# Patient Record
Sex: MALE | Race: WHITE | NOT HISPANIC OR LATINO
[De-identification: names, ages, dates, MRNs, and addresses within clinical notes are randomized per-mention and may not be internally consistent; named-entity substitution may affect disease eponyms.]

---

## 2017-02-24 ENCOUNTER — APPOINTMENT (OUTPATIENT)
Dept: ULTRASOUND IMAGING | Facility: HOSPITAL | Age: 72
End: 2017-02-24

## 2017-02-24 ENCOUNTER — OUTPATIENT (OUTPATIENT)
Dept: OUTPATIENT SERVICES | Facility: HOSPITAL | Age: 72
LOS: 1 days | End: 2017-02-24
Payer: MEDICARE

## 2017-02-24 DIAGNOSIS — R16.0 HEPATOMEGALY, NOT ELSEWHERE CLASSIFIED: ICD-10-CM

## 2017-02-24 PROCEDURE — 76700 US EXAM ABDOM COMPLETE: CPT

## 2017-03-06 ENCOUNTER — APPOINTMENT (OUTPATIENT)
Dept: MRI IMAGING | Facility: HOSPITAL | Age: 72
End: 2017-03-06

## 2017-03-06 ENCOUNTER — OUTPATIENT (OUTPATIENT)
Dept: OUTPATIENT SERVICES | Facility: HOSPITAL | Age: 72
LOS: 1 days | End: 2017-03-06
Payer: MEDICARE

## 2017-03-06 DIAGNOSIS — Z90.49 ACQUIRED ABSENCE OF OTHER SPECIFIED PARTS OF DIGESTIVE TRACT: ICD-10-CM

## 2017-03-06 DIAGNOSIS — K76.0 FATTY (CHANGE OF) LIVER, NOT ELSEWHERE CLASSIFIED: ICD-10-CM

## 2017-03-06 PROCEDURE — 76376 3D RENDER W/INTRP POSTPROCES: CPT

## 2017-03-06 PROCEDURE — 74183 MRI ABD W/O CNTR FLWD CNTR: CPT

## 2017-03-06 PROCEDURE — A9579: CPT

## 2017-03-17 ENCOUNTER — RX RENEWAL (OUTPATIENT)
Age: 72
End: 2017-03-17

## 2017-04-19 ENCOUNTER — APPOINTMENT (OUTPATIENT)
Dept: FAMILY MEDICINE | Facility: CLINIC | Age: 72
End: 2017-04-19

## 2017-05-19 ENCOUNTER — APPOINTMENT (OUTPATIENT)
Dept: FAMILY MEDICINE | Facility: CLINIC | Age: 72
End: 2017-05-19

## 2017-05-19 VITALS
DIASTOLIC BLOOD PRESSURE: 80 MMHG | TEMPERATURE: 98.5 F | SYSTOLIC BLOOD PRESSURE: 144 MMHG | HEIGHT: 68 IN | OXYGEN SATURATION: 96 % | HEART RATE: 90 BPM | RESPIRATION RATE: 16 BRPM

## 2017-05-19 VITALS — WEIGHT: 290 LBS | BODY MASS INDEX: 44.09 KG/M2

## 2017-05-19 DIAGNOSIS — Z78.9 OTHER SPECIFIED HEALTH STATUS: ICD-10-CM

## 2017-05-23 LAB
ALBUMIN SERPL ELPH-MCNC: 4.4 G/DL
ALP BLD-CCNC: 72 U/L
ALT SERPL-CCNC: 69 U/L
ANION GAP SERPL CALC-SCNC: 16 MMOL/L
APPEARANCE: CLEAR
AST SERPL-CCNC: 108 U/L
BACTERIA: NEGATIVE
BASOPHILS # BLD AUTO: 0.02 K/UL
BASOPHILS NFR BLD AUTO: 0.2 %
BILIRUB SERPL-MCNC: 0.4 MG/DL
BILIRUBIN URINE: NEGATIVE
BLOOD URINE: NEGATIVE
BUN SERPL-MCNC: 14 MG/DL
CALCIUM SERPL-MCNC: 9.6 MG/DL
CHLORIDE SERPL-SCNC: 101 MMOL/L
CHOLEST SERPL-MCNC: 226 MG/DL
CHOLEST/HDLC SERPL: 5.8 RATIO
CK SERPL-CCNC: 237 U/L
CO2 SERPL-SCNC: 26 MMOL/L
COLOR: ABNORMAL
CREAT SERPL-MCNC: 1.07 MG/DL
CREAT SPEC-SCNC: 174 MG/DL
EOSINOPHIL # BLD AUTO: 0.19 K/UL
EOSINOPHIL NFR BLD AUTO: 2.2 %
GLUCOSE QUALITATIVE U: 250 MG/DL
GLUCOSE SERPL-MCNC: 205 MG/DL
HBA1C MFR BLD HPLC: 8.4 %
HCT VFR BLD CALC: 50.2 %
HDLC SERPL-MCNC: 39 MG/DL
HGB BLD-MCNC: 16.3 G/DL
HYALINE CASTS: 0 /LPF
IMM GRANULOCYTES NFR BLD AUTO: 0.2 %
KETONES URINE: NEGATIVE
LDLC SERPL CALC-MCNC: NORMAL
LEUKOCYTE ESTERASE URINE: NEGATIVE
LYMPHOCYTES # BLD AUTO: 2.51 K/UL
LYMPHOCYTES NFR BLD AUTO: 29.4 %
MAN DIFF?: NORMAL
MCHC RBC-ENTMCNC: 31.3 PG
MCHC RBC-ENTMCNC: 32.5 GM/DL
MCV RBC AUTO: 96.4 FL
MICROALBUMIN 24H UR DL<=1MG/L-MCNC: 20.8 MG/DL
MICROALBUMIN/CREAT 24H UR-RTO: 120
MICROSCOPIC-UA: NORMAL
MONOCYTES # BLD AUTO: 0.78 K/UL
MONOCYTES NFR BLD AUTO: 9.1 %
NEUTROPHILS # BLD AUTO: 5.01 K/UL
NEUTROPHILS NFR BLD AUTO: 58.9 %
NITRITE URINE: NEGATIVE
PH URINE: 6.5
PLATELET # BLD AUTO: 179 K/UL
POTASSIUM SERPL-SCNC: 4 MMOL/L
PROT SERPL-MCNC: 7.4 G/DL
PROTEIN URINE: 30 MG/DL
RBC # BLD: 5.21 M/UL
RBC # FLD: 15.1 %
RED BLOOD CELLS URINE: 4 /HPF
SODIUM SERPL-SCNC: 143 MMOL/L
SPECIFIC GRAVITY URINE: 1.02
SQUAMOUS EPITHELIAL CELLS: 0 /HPF
T4 FREE SERPL-MCNC: 1.1 NG/DL
TRIGL SERPL-MCNC: 462 MG/DL
TSH SERPL-ACNC: 2.69 UIU/ML
UROBILINOGEN URINE: NORMAL MG/DL
WBC # FLD AUTO: 8.53 K/UL
WHITE BLOOD CELLS URINE: 1 /HPF

## 2017-05-31 ENCOUNTER — APPOINTMENT (OUTPATIENT)
Dept: SURGERY | Facility: CLINIC | Age: 72
End: 2017-05-31

## 2017-05-31 DIAGNOSIS — Z86.39 PERSONAL HISTORY OF OTHER ENDOCRINE, NUTRITIONAL AND METABOLIC DISEASE: ICD-10-CM

## 2017-05-31 DIAGNOSIS — Z85.46 PERSONAL HISTORY OF MALIGNANT NEOPLASM OF PROSTATE: ICD-10-CM

## 2017-05-31 DIAGNOSIS — R10.30 LOWER ABDOMINAL PAIN, UNSPECIFIED: ICD-10-CM

## 2017-06-01 PROBLEM — R10.30 GROIN PAIN, RIGHT: Status: ACTIVE | Noted: 2017-06-01

## 2017-06-13 ENCOUNTER — APPOINTMENT (OUTPATIENT)
Dept: HEMATOLOGY ONCOLOGY | Facility: CLINIC | Age: 72
End: 2017-06-13

## 2017-06-13 ENCOUNTER — RX RENEWAL (OUTPATIENT)
Age: 72
End: 2017-06-13

## 2017-06-13 VITALS
SYSTOLIC BLOOD PRESSURE: 154 MMHG | HEART RATE: 84 BPM | BODY MASS INDEX: 44.7 KG/M2 | DIASTOLIC BLOOD PRESSURE: 80 MMHG | WEIGHT: 294 LBS | TEMPERATURE: 97.8 F

## 2018-03-23 ENCOUNTER — APPOINTMENT (OUTPATIENT)
Dept: MRI IMAGING | Facility: HOSPITAL | Age: 73
End: 2018-03-23
Payer: MEDICARE

## 2018-03-23 ENCOUNTER — OUTPATIENT (OUTPATIENT)
Dept: OUTPATIENT SERVICES | Facility: HOSPITAL | Age: 73
LOS: 1 days | End: 2018-03-23
Payer: MEDICARE

## 2018-03-23 DIAGNOSIS — R59.0 LOCALIZED ENLARGED LYMPH NODES: ICD-10-CM

## 2018-03-23 DIAGNOSIS — K86.2 CYST OF PANCREAS: ICD-10-CM

## 2018-03-23 DIAGNOSIS — N28.1 CYST OF KIDNEY, ACQUIRED: ICD-10-CM

## 2018-03-23 DIAGNOSIS — K86.89 OTHER SPECIFIED DISEASES OF PANCREAS: ICD-10-CM

## 2018-03-23 PROCEDURE — A9579: CPT

## 2018-03-23 PROCEDURE — 74183 MRI ABD W/O CNTR FLWD CNTR: CPT

## 2018-03-23 PROCEDURE — 74183 MRI ABD W/O CNTR FLWD CNTR: CPT | Mod: 26

## 2018-04-03 ENCOUNTER — RX RENEWAL (OUTPATIENT)
Age: 73
End: 2018-04-03

## 2018-04-12 ENCOUNTER — MEDICATION RENEWAL (OUTPATIENT)
Age: 73
End: 2018-04-12

## 2018-06-08 ENCOUNTER — RX RENEWAL (OUTPATIENT)
Age: 73
End: 2018-06-08

## 2018-07-23 ENCOUNTER — FORM ENCOUNTER (OUTPATIENT)
Age: 73
End: 2018-07-23

## 2018-07-23 ENCOUNTER — MEDICATION RENEWAL (OUTPATIENT)
Age: 73
End: 2018-07-23

## 2018-07-23 ENCOUNTER — APPOINTMENT (OUTPATIENT)
Dept: FAMILY MEDICINE | Facility: CLINIC | Age: 73
End: 2018-07-23
Payer: MEDICARE

## 2018-07-23 VITALS
DIASTOLIC BLOOD PRESSURE: 80 MMHG | SYSTOLIC BLOOD PRESSURE: 158 MMHG | HEART RATE: 76 BPM | WEIGHT: 282 LBS | OXYGEN SATURATION: 91 % | TEMPERATURE: 98.2 F | BODY MASS INDEX: 42.74 KG/M2 | HEIGHT: 68 IN

## 2018-07-23 VITALS — RESPIRATION RATE: 16 BRPM

## 2018-07-23 PROCEDURE — 36415 COLL VENOUS BLD VENIPUNCTURE: CPT

## 2018-07-23 PROCEDURE — 93000 ELECTROCARDIOGRAM COMPLETE: CPT | Mod: 59

## 2018-07-23 PROCEDURE — G0439: CPT

## 2018-07-23 RX ORDER — EXENATIDE 2 MG/.65ML
2 INJECTION, SUSPENSION, EXTENDED RELEASE SUBCUTANEOUS
Qty: 12 | Refills: 3 | Status: COMPLETED | COMMUNITY
Start: 2017-05-19 | End: 2018-07-23

## 2018-07-23 NOTE — ASSESSMENT
[FreeTextEntry1] : Continue present medical management. Followup with urology, endocrinology, podiatry, rheumatology, and vascular.\par \par Comprehensive blood work obtained.\par \par Electrocardiogram no acute ST-T wave changes.\par \par Stool for fecal occult blood. Immunology ordered.

## 2018-07-23 NOTE — PHYSICAL EXAM
[No Acute Distress] : no acute distress [Well Nourished] : well nourished [Well Developed] : well developed [Well-Appearing] : well-appearing [Normal Sclera/Conjunctiva] : normal sclera/conjunctiva [PERRL] : pupils equal round and reactive to light [EOMI] : extraocular movements intact [Normal Outer Ear/Nose] : the outer ears and nose were normal in appearance [Normal Oropharynx] : the oropharynx was normal [No JVD] : no jugular venous distention [Supple] : supple [No Lymphadenopathy] : no lymphadenopathy [Thyroid Normal, No Nodules] : the thyroid was normal and there were no nodules present [No Respiratory Distress] : no respiratory distress  [Clear to Auscultation] : lungs were clear to auscultation bilaterally [No Accessory Muscle Use] : no accessory muscle use [Normal Rate] : normal rate  [Regular Rhythm] : with a regular rhythm [Normal S1, S2] : normal S1 and S2 [No Murmur] : no murmur heard [No Carotid Bruits] : no carotid bruits [No Abdominal Bruit] : a ~M bruit was not heard ~T in the abdomen [Pedal Pulses Present] : the pedal pulses are present [No Extremity Clubbing/Cyanosis] : no extremity clubbing/cyanosis [No Palpable Aorta] : no palpable aorta [Soft] : abdomen soft [Non Tender] : non-tender [Non-distended] : non-distended [No HSM] : no HSM [Normal Bowel Sounds] : normal bowel sounds [Normal Posterior Cervical Nodes] : no posterior cervical lymphadenopathy [Normal Anterior Cervical Nodes] : no anterior cervical lymphadenopathy [No CVA Tenderness] : no CVA  tenderness [No Spinal Tenderness] : no spinal tenderness [No Joint Swelling] : no joint swelling [Grossly Normal Strength/Tone] : grossly normal strength/tone [No Rash] : no rash [Normal Gait] : normal gait [Coordination Grossly Intact] : coordination grossly intact [No Focal Deficits] : no focal deficits [Deep Tendon Reflexes (DTR)] : deep tendon reflexes were 2+ and symmetric [Normal Affect] : the affect was normal [Normal Insight/Judgement] : insight and judgment were intact [Normal Voice/Communication] : normal voice/communication [Normal Nasal Mucosa] : the nasal mucosa was normal [Normal Appearance] : normal in appearance [No Masses] : no palpable masses [No Nipple Discharge] : no nipple discharge [No Hernias] : no hernias [Normal Supraclavicular Nodes] : no supraclavicular lymphadenopathy [Normal Axillary Nodes] : no axillary lymphadenopathy [Normal Femoral Nodes] : no femoral lymphadenopathy [Normal Inguinal Nodes] : no inguinal lymphadenopathy [Kyphosis] : no kyphosis [Scoliosis] : no scoliosis [Speech Grossly Normal] : speech grossly normal [Alert and Oriented x3] : oriented to person, place, and time [Normal Mood] : the mood was normal [de-identified] : reviewed opht. exam by Dr. Goff [de-identified] : hearing aides in place [de-identified] : stasis dermatitis,trace pre-tibial edema [FreeTextEntry1] : urology [de-identified] : urology [de-identified] : stasis dermatitis [de-identified] : podiatry

## 2018-07-23 NOTE — COUNSELING
[Weight management counseling provided] : Weight management [Healthy eating counseling provided] : healthy eating [Activity counseling provided] : activity [Behavioral health counseling provided] : behavioral health  [Target Wt Loss Goal ___] : Target weight loss goal [unfilled] lbs [Weight Self Once Weekly] : Weight self once weekly [Keep Food Diary] : Keep food diary [Low Fat Diet] : Low fat diet [Decrease Portions] : Decrease food portions [Low Salt Diet] : Low salt diet [___ min/wk activity recommended] : [unfilled] min/wk activity recommended [Walking] : Walking [Sleep ___ hours/day] : Sleep [unfilled] hours/day [Engage in a relaxing activity] : Engage in a relaxing activity [Plan in advance] : Plan in advance [None] : None [Good understanding] : Patient has a good understanding of lifestyle changes and the steps needed to achieve self management goals

## 2018-07-23 NOTE — HEALTH RISK ASSESSMENT
[Very Good] : ~his/her~  mood as very good [No falls in past year] : Patient reported no falls in the past year [0] : 2) Feeling down, depressed, or hopeless: Not at all (0) [Patient reported colonoscopy was normal] : Patient reported colonoscopy was normal [HIV test declined] : HIV test declined [None] : None [With Family] : lives with family [# of Members in Household ___] :  household currently consist of [unfilled] member(s) [Retired] : retired [Graduate School] : graduate school [] :  [Feels Safe at Home] : Feels safe at home [Fully functional (bathing, dressing, toileting, transferring, walking, feeding)] : Fully functional (bathing, dressing, toileting, transferring, walking, feeding) [Fully functional (using the telephone, shopping, preparing meals, housekeeping, doing laundry, using] : Fully functional and needs no help or supervision to perform IADLs (using the telephone, shopping, preparing meals, housekeeping, doing laundry, using transportation, managing medications and managing finances) [Smoke Detector] : smoke detector [Carbon Monoxide Detector] : carbon monoxide detector [Safety elements used in home] : safety elements used in home [Seat Belt] :  uses seat belt [Sunscreen] : uses sunscreen [Discussed at today's visit] : Advance Directives Discussed at today's visit [Patient's preferences updated] : Patient's preferences updated  [Designated Healthcare Proxy] : Designated healthcare proxy [Name: ___] : Health Care Proxy's Name: [unfilled]  [Relationship: ___] : Relationship: [unfilled] [Aggressive treatment] : aggressive treatment [] : No [de-identified] : Rarely [[id number]] : 0 [Change in mental status noted] : No change in mental status noted [Language] : denies difficulty with language [Behavior] : denies difficulty with behavior [Learning/Retaining New Information] : denies difficulty learning/retaining new information [Handling Complex Tasks] : denies difficulty handling complex tasks [Reasoning] : denies difficulty with reasoning [Spatial Ability and Orientation] : denies difficulty with spatial ability and orientation [Sexually Active] : not sexually active [High Risk Behavior] : no high risk behavior [Reports changes in hearing] : Reports no changes in hearing [Reports changes in vision] : Reports no changes in vision [Reports changes in dental health] : Reports no changes in dental health [Travel to Developing Areas] : does not  travel to developing areas [TB Exposure] : is not being exposed to tuberculosis [Caregiver Concerns] : does not have caregiver concerns [ColonoscopyDate] : 08/16

## 2018-07-23 NOTE — HISTORY OF PRESENT ILLNESS
[FreeTextEntry1] : Please see history of present illness [de-identified] : Patient is a 73-year-old gentleman, who presents today for health maintenance, physical exam patient is here for comprehensive yearly physical exam. Patient states it is in his usual state of health. He is followed by multiple subspecialists endocrinology for diabetes mellitus, type II insulin requiring, rheumatology, and Dr. Nicola Barajas. Patient is also followed by podiatry on a regular basis. Dr. Upton

## 2018-07-24 ENCOUNTER — APPOINTMENT (OUTPATIENT)
Dept: RADIOLOGY | Facility: HOSPITAL | Age: 73
End: 2018-07-24
Payer: MEDICARE

## 2018-07-24 ENCOUNTER — OUTPATIENT (OUTPATIENT)
Dept: OUTPATIENT SERVICES | Facility: HOSPITAL | Age: 73
LOS: 1 days | End: 2018-07-24
Payer: MEDICARE

## 2018-07-24 DIAGNOSIS — Z00.8 ENCOUNTER FOR OTHER GENERAL EXAMINATION: ICD-10-CM

## 2018-07-24 LAB
ALBUMIN SERPL ELPH-MCNC: 4.4 G/DL
ALP BLD-CCNC: 72 U/L
ALT SERPL-CCNC: 44 U/L
ANION GAP SERPL CALC-SCNC: 20 MMOL/L
APPEARANCE: CLEAR
AST SERPL-CCNC: 51 U/L
BACTERIA: NEGATIVE
BASOPHILS # BLD AUTO: 0.02 K/UL
BASOPHILS NFR BLD AUTO: 0.3 %
BILIRUB SERPL-MCNC: 0.4 MG/DL
BILIRUBIN URINE: NEGATIVE
BLOOD URINE: NEGATIVE
BUN SERPL-MCNC: 13 MG/DL
CALCIUM SERPL-MCNC: 10 MG/DL
CHLORIDE SERPL-SCNC: 103 MMOL/L
CHOLEST SERPL-MCNC: 207 MG/DL
CHOLEST/HDLC SERPL: 5.4 RATIO
CK SERPL-CCNC: 260 U/L
CO2 SERPL-SCNC: 23 MMOL/L
COLOR: ABNORMAL
CREAT SERPL-MCNC: 1.07 MG/DL
CRP SERPL-MCNC: 0.71 MG/DL
EOSINOPHIL # BLD AUTO: 0.15 K/UL
EOSINOPHIL NFR BLD AUTO: 2.3 %
ERYTHROCYTE [SEDIMENTATION RATE] IN BLOOD BY WESTERGREN METHOD: 20 MM/HR
GLUCOSE QUALITATIVE U: NEGATIVE MG/DL
GLUCOSE SERPL-MCNC: 149 MG/DL
HBA1C MFR BLD HPLC: 7.6 %
HCT VFR BLD CALC: 48.5 %
HCV AB SER QL: NONREACTIVE
HCV S/CO RATIO: 0.14 S/CO
HDLC SERPL-MCNC: 38 MG/DL
HGB BLD-MCNC: 16.2 G/DL
HYALINE CASTS: 4 /LPF
IMM GRANULOCYTES NFR BLD AUTO: 0.3 %
KETONES URINE: NEGATIVE
LDLC SERPL CALC-MCNC: 116 MG/DL
LEUKOCYTE ESTERASE URINE: NEGATIVE
LYMPHOCYTES # BLD AUTO: 1.99 K/UL
LYMPHOCYTES NFR BLD AUTO: 30 %
MAN DIFF?: NORMAL
MCHC RBC-ENTMCNC: 30.1 PG
MCHC RBC-ENTMCNC: 33.4 GM/DL
MCV RBC AUTO: 90.1 FL
MICROSCOPIC-UA: NORMAL
MONOCYTES # BLD AUTO: 0.63 K/UL
MONOCYTES NFR BLD AUTO: 9.5 %
NEUTROPHILS # BLD AUTO: 3.82 K/UL
NEUTROPHILS NFR BLD AUTO: 57.6 %
NITRITE URINE: NEGATIVE
PH URINE: 6.5
PLATELET # BLD AUTO: 196 K/UL
POTASSIUM SERPL-SCNC: 3.8 MMOL/L
PROT SERPL-MCNC: 7.3 G/DL
PROTEIN URINE: 300 MG/DL
PSA SERPL-MCNC: 0.02 NG/ML
RBC # BLD: 5.38 M/UL
RBC # FLD: 14.3 %
RED BLOOD CELLS URINE: 3 /HPF
SODIUM SERPL-SCNC: 146 MMOL/L
SPECIFIC GRAVITY URINE: 1.02
SQUAMOUS EPITHELIAL CELLS: 2 /HPF
T4 FREE SERPL-MCNC: 1.2 NG/DL
TRIGL SERPL-MCNC: 265 MG/DL
TSH SERPL-ACNC: 3.47 UIU/ML
UROBILINOGEN URINE: NEGATIVE MG/DL
WBC # FLD AUTO: 6.63 K/UL
WHITE BLOOD CELLS URINE: 2 /HPF

## 2018-07-24 PROCEDURE — 77080 DXA BONE DENSITY AXIAL: CPT

## 2018-07-24 PROCEDURE — 77080 DXA BONE DENSITY AXIAL: CPT | Mod: 26

## 2018-07-25 LAB
CREAT SPEC-SCNC: 149 MG/DL
MICROALBUMIN 24H UR DL<=1MG/L-MCNC: 110.6 MG/DL
MICROALBUMIN/CREAT 24H UR-RTO: 743 MG/G

## 2018-07-28 ENCOUNTER — TRANSCRIPTION ENCOUNTER (OUTPATIENT)
Age: 73
End: 2018-07-28

## 2018-07-30 LAB — HEMOCCULT STL QL IA: NEGATIVE

## 2018-09-21 ENCOUNTER — APPOINTMENT (OUTPATIENT)
Dept: FAMILY MEDICINE | Facility: CLINIC | Age: 73
End: 2018-09-21
Payer: MEDICARE

## 2018-09-21 VITALS
RESPIRATION RATE: 20 BRPM | SYSTOLIC BLOOD PRESSURE: 126 MMHG | TEMPERATURE: 98.9 F | DIASTOLIC BLOOD PRESSURE: 76 MMHG | HEART RATE: 78 BPM

## 2018-09-21 PROCEDURE — 99213 OFFICE O/P EST LOW 20 MIN: CPT

## 2018-09-21 NOTE — PHYSICAL EXAM
[Ill-Appearing] : ill-appearing [Normal Outer Ear/Nose] : the outer ears and nose were normal in appearance [Normal Nasal Mucosa] : the nasal mucosa was normal [Supple] : supple [No Respiratory Distress] : no respiratory distress  [No Accessory Muscle Use] : no accessory muscle use [Normal Rate] : normal rate  [Regular Rhythm] : with a regular rhythm [Normal S1, S2] : normal S1 and S2 [No Murmur] : no murmur heard [Soft] : abdomen soft [Non Tender] : non-tender [Cervical Lymph Nodes Enlarged Anterior Bilaterally] : enlarged nodes bilaterally [de-identified] : TM's and pharynx markedly congested; pharynx mildly injected [de-identified] : bilateral coarse rhonchi and scattered exp wheezes

## 2018-09-21 NOTE — HISTORY OF PRESENT ILLNESS
[Cough] : cough [Sore Throat] : sore throat [Moderate] : moderate [___ Weeks ago] :  [unfilled] weeks ago [Gradual] : gradually [Constant] : constant [OTC Remedies] : OTC remedies [Worsening] : worsening

## 2018-09-21 NOTE — ASSESSMENT
[FreeTextEntry1] : Acute bronchitis - will order Augmentin 875mg twice daily for one week; Medrol DosePak; encourage fluids

## 2019-07-26 ENCOUNTER — APPOINTMENT (OUTPATIENT)
Dept: FAMILY MEDICINE | Facility: CLINIC | Age: 74
End: 2019-07-26
Payer: MEDICARE

## 2019-07-26 ENCOUNTER — NON-APPOINTMENT (OUTPATIENT)
Age: 74
End: 2019-07-26

## 2019-07-26 VITALS
TEMPERATURE: 98.3 F | WEIGHT: 303 LBS | HEIGHT: 68 IN | HEART RATE: 79 BPM | BODY MASS INDEX: 45.92 KG/M2 | DIASTOLIC BLOOD PRESSURE: 86 MMHG | SYSTOLIC BLOOD PRESSURE: 164 MMHG | OXYGEN SATURATION: 93 %

## 2019-07-26 VITALS — RESPIRATION RATE: 16 BRPM

## 2019-07-26 PROCEDURE — G0009: CPT

## 2019-07-26 PROCEDURE — 90670 PCV13 VACCINE IM: CPT

## 2019-07-26 PROCEDURE — G0439: CPT

## 2019-07-26 PROCEDURE — 36415 COLL VENOUS BLD VENIPUNCTURE: CPT

## 2019-07-26 PROCEDURE — 93000 ELECTROCARDIOGRAM COMPLETE: CPT | Mod: 59

## 2019-07-26 RX ORDER — AMOXICILLIN AND CLAVULANATE POTASSIUM 875; 125 MG/1; MG/1
875-125 TABLET, COATED ORAL
Qty: 14 | Refills: 0 | Status: COMPLETED | COMMUNITY
Start: 2018-09-21 | End: 2019-07-26

## 2019-07-26 RX ORDER — METHYLPREDNISOLONE 4 MG/1
4 TABLET ORAL
Qty: 1 | Refills: 0 | Status: COMPLETED | COMMUNITY
Start: 2018-09-21 | End: 2019-07-26

## 2019-07-26 NOTE — HEALTH RISK ASSESSMENT
[Very Good] : ~his/her~ current health as very good [Excellent] : ~his/her~  mood as  excellent [Monthly or less (1 pt)] : Monthly or less (1 point) [1 or 2 (0 pts)] : 1 or 2 (0 points) [Yes] : Yes [No falls in past year] : Patient reported no falls in the past year [No] : In the past 12 months have you used drugs other than those required for medical reasons? No [0] : 2) Feeling down, depressed, or hopeless: Not at all (0) [HIV test declined] : HIV test declined [None] : None [With Family] : lives with family [# of Members in Household ___] :  household currently consist of [unfilled] member(s) [Retired] : retired [Graduate School] : graduate school [] :  [Feels Safe at Home] : Feels safe at home [Fully functional (bathing, dressing, toileting, transferring, walking, feeding)] : Fully functional (bathing, dressing, toileting, transferring, walking, feeding) [Fully functional (using the telephone, shopping, preparing meals, housekeeping, doing laundry, using] : Fully functional and needs no help or supervision to perform IADLs (using the telephone, shopping, preparing meals, housekeeping, doing laundry, using transportation, managing medications and managing finances) [Reports normal functional visual acuity (ie: able to read med bottle)] : Reports normal functional visual acuity [Smoke Detector] : smoke detector [Carbon Monoxide Detector] : carbon monoxide detector [Safety elements used in home] : safety elements used in home [Seat Belt] :  uses seat belt [Sunscreen] : uses sunscreen [Reviewed no changes] : Reviewed no changes [Designated Healthcare Proxy] : Designated healthcare proxy [Name: ___] : Health Care Proxy's Name: [unfilled]  [Relationship: ___] : Relationship: [unfilled] [Aggressive treatment] : aggressive treatment [] : No [Audit-CScore] : 1 [[id number]] : 0 [Change in mental status noted] : No change in mental status noted [Language] : denies difficulty with language [Behavior] : denies difficulty with behavior [Learning/Retaining New Information] : denies difficulty learning/retaining new information [Handling Complex Tasks] : denies difficulty handling complex tasks [Reasoning] : denies difficulty with reasoning [Spatial Ability and Orientation] : denies difficulty with spatial ability and orientation [Sexually Active] : not sexually active [High Risk Behavior] : no high risk behavior [Reports changes in hearing] : Reports no changes in hearing [Reports changes in vision] : Reports no changes in vision [Reports changes in dental health] : Reports no changes in dental health [Travel to Developing Areas] : does not  travel to developing areas [TB Exposure] : is not being exposed to tuberculosis [Caregiver Concerns] : does not have caregiver concerns [de-identified] : Hearing aids [AdvancecareDate] : 07/19

## 2019-07-26 NOTE — PHYSICAL EXAM
[No Acute Distress] : no acute distress [Well Nourished] : well nourished [Well Developed] : well developed [Well-Appearing] : well-appearing [Normal Voice/Communication] : normal voice/communication [Normal Sclera/Conjunctiva] : normal sclera/conjunctiva [PERRL] : pupils equal round and reactive to light [EOMI] : extraocular movements intact [Normal Outer Ear/Nose] : the outer ears and nose were normal in appearance [Normal Oropharynx] : the oropharynx was normal [No JVD] : no jugular venous distention [Supple] : supple [No Lymphadenopathy] : no lymphadenopathy [Thyroid Normal, No Nodules] : the thyroid was normal and there were no nodules present [No Respiratory Distress] : no respiratory distress  [No Accessory Muscle Use] : no accessory muscle use [Clear to Auscultation] : lungs were clear to auscultation bilaterally [Normal Rate] : normal rate  [Regular Rhythm] : with a regular rhythm [Normal S1, S2] : normal S1 and S2 [No Murmur] : no murmur heard [No Carotid Bruits] : no carotid bruits [No Abdominal Bruit] : a ~M bruit was not heard ~T in the abdomen [No Varicosities] : no varicosities [Pedal Pulses Present] : the pedal pulses are present [No Palpable Aorta] : no palpable aorta [No Extremity Clubbing/Cyanosis] : no extremity clubbing/cyanosis [Normal Appearance] : normal in appearance [No Nipple Discharge] : no nipple discharge [No Axillary Lymphadenopathy] : no axillary lymphadenopathy [Soft] : abdomen soft [Non Tender] : non-tender [Non-distended] : non-distended [No Masses] : no abdominal mass palpated [No HSM] : no HSM [Normal Bowel Sounds] : normal bowel sounds [No Hernias] : no hernias [Declined Rectal Exam] : declined rectal exam [Normal Posterior Cervical Nodes] : no posterior cervical lymphadenopathy [Normal Supraclavicular Nodes] : no supraclavicular lymphadenopathy [Normal Axillary Nodes] : no axillary lymphadenopathy [Normal Anterior Cervical Nodes] : no anterior cervical lymphadenopathy [Normal Femoral Nodes] : no femoral lymphadenopathy [Normal Inguinal Nodes] : no inguinal lymphadenopathy [No Spinal Tenderness] : no spinal tenderness [No CVA Tenderness] : no CVA  tenderness [No Joint Swelling] : no joint swelling [Grossly Normal Strength/Tone] : grossly normal strength/tone [No Rash] : no rash [No Focal Deficits] : no focal deficits [Coordination Grossly Intact] : coordination grossly intact [Normal Gait] : normal gait [Deep Tendon Reflexes (DTR)] : deep tendon reflexes were 2+ and symmetric [Speech Grossly Normal] : speech grossly normal [Memory Grossly Normal] : memory grossly normal [Normal Affect] : the affect was normal [Normal Mood] : the mood was normal [Alert and Oriented x3] : oriented to person, place, and time [Normal Insight/Judgement] : insight and judgment were intact [Comprehensive Foot Exam Normal] : Right and left foot were examined and both feet are normal. No ulcers in either foot. Toes are normal and with full ROM.  Normal tactile sensation with monofilament testing throughout both feet [de-identified] : Stasis dermatitis, secondary to venous insufficiency, right lower extremity edematous secondary to DVT in the past [de-identified] : obese [FreeTextEntry1] : urology [de-identified] : urology [de-identified] : Lipoma left upper back [de-identified] : Stasis dermatitis bilateral lower extremities

## 2019-07-26 NOTE — HISTORY OF PRESENT ILLNESS
[FreeTextEntry1] : Annual wellness exam [de-identified] : This is a 74-year-old gentleman presents today for annual wellness exam. Patient states that he is in his usual state of health. He does have multiple medical problems which are basically well controlled. He is followed by multiple subspecialists. Patient is awake, alert, and oriented x3 in no acute distress. He is calm, cooperative, well-groomed, and nourished. He denies any chest pain or shortness of breath.\par \par Medical history as stated earlier, is complicated and long has a history of hyperlipidemia, hypothyroidism, hypertension, diabetes mellitus, type II. 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 Patient is doing very well and he also has a history of DVT with a pulmonary embolus.

## 2019-07-26 NOTE — COUNSELING
[Weight management counseling provided] : Weight management [Healthy eating counseling provided] : healthy eating [Activity counseling provided] : activity [Fall prevention counseling provided] : fall prevention  [Behavioral health counseling provided] : behavioral health  [Target Wt Loss Goal ___] : Target weight loss goal [unfilled] lbs [Good understanding] : Patient has a good understanding of disease, goals and obesity follow-up plan [Weigh Self Once Weekly] : Weigh self once weekly [Low Fat Diet] : Low fat diet [Low Salt Diet] : Low salt diet [Keep Food Diary] : Keep food diary [Decrease Portions] : Decrease food portions [___ min/wk activity recommended] : [unfilled] min/wk activity recommended [Walking] : Walking [Sleep ___ hours/day] : Sleep [unfilled] hours/day [Engage in a relaxing activity] : Engage in a relaxing activity [Plan in advance] : Plan in advance [Adequate lighting] : Adequate lighting [No throw rugs] : No throw rugs [Use proper foot wear] : Use proper foot wear [None] : None

## 2019-07-26 NOTE — ASSESSMENT
[FreeTextEntry1] : Assessment and plan:\par \par Comprehensive health maintenance physical exam, stable compared to previous.\par \par Hypertension. Add hydralazine 25 mg twice a day.\par \par Health maintenance issues. Patient had Prevnar 13 administered at today's visit.\par \par Comprehensive blood work obtained.\par \par Electrocardiogram shows no acute ST-T wave changes, stable. When compared to previous the

## 2019-07-29 LAB
ALBUMIN SERPL ELPH-MCNC: 4.1 G/DL
ALP BLD-CCNC: 67 U/L
ALT SERPL-CCNC: 35 U/L
ANION GAP SERPL CALC-SCNC: 12 MMOL/L
AST SERPL-CCNC: 40 U/L
BASOPHILS # BLD AUTO: 0.03 K/UL
BASOPHILS NFR BLD AUTO: 0.5 %
BILIRUB SERPL-MCNC: 0.4 MG/DL
BUN SERPL-MCNC: 18 MG/DL
CALCIUM SERPL-MCNC: 9.6 MG/DL
CHLORIDE SERPL-SCNC: 103 MMOL/L
CHOLEST SERPL-MCNC: 209 MG/DL
CHOLEST/HDLC SERPL: 5.1 RATIO
CO2 SERPL-SCNC: 28 MMOL/L
CREAT SERPL-MCNC: 1.18 MG/DL
EOSINOPHIL # BLD AUTO: 0.16 K/UL
EOSINOPHIL NFR BLD AUTO: 2.7 %
ESTIMATED AVERAGE GLUCOSE: 143 MG/DL
GLUCOSE SERPL-MCNC: 172 MG/DL
HBA1C MFR BLD HPLC: 6.6 %
HCT VFR BLD CALC: 49.1 %
HDLC SERPL-MCNC: 41 MG/DL
HGB BLD-MCNC: 16.7 G/DL
IMM GRANULOCYTES NFR BLD AUTO: 0.2 %
LDLC SERPL CALC-MCNC: 105 MG/DL
LYMPHOCYTES # BLD AUTO: 1.42 K/UL
LYMPHOCYTES NFR BLD AUTO: 23.9 %
MAN DIFF?: NORMAL
MCHC RBC-ENTMCNC: 31 PG
MCHC RBC-ENTMCNC: 34 GM/DL
MCV RBC AUTO: 91.3 FL
MONOCYTES # BLD AUTO: 0.61 K/UL
MONOCYTES NFR BLD AUTO: 10.3 %
NEUTROPHILS # BLD AUTO: 3.7 K/UL
NEUTROPHILS NFR BLD AUTO: 62.4 %
PLATELET # BLD AUTO: 171 K/UL
POTASSIUM SERPL-SCNC: 4.1 MMOL/L
PROT SERPL-MCNC: 6.6 G/DL
RBC # BLD: 5.38 M/UL
RBC # FLD: 14.2 %
SODIUM SERPL-SCNC: 143 MMOL/L
T4 FREE SERPL-MCNC: 1 NG/DL
TRIGL SERPL-MCNC: 313 MG/DL
TSH SERPL-ACNC: 2.72 UIU/ML
WBC # FLD AUTO: 5.93 K/UL

## 2019-08-08 ENCOUNTER — RX RENEWAL (OUTPATIENT)
Age: 74
End: 2019-08-08

## 2019-09-18 ENCOUNTER — RX RENEWAL (OUTPATIENT)
Age: 74
End: 2019-09-18

## 2020-02-06 ENCOUNTER — APPOINTMENT (OUTPATIENT)
Dept: RHEUMATOLOGY | Facility: CLINIC | Age: 75
End: 2020-02-06
Payer: MEDICARE

## 2020-02-06 VITALS — HEART RATE: 74 BPM | DIASTOLIC BLOOD PRESSURE: 74 MMHG | SYSTOLIC BLOOD PRESSURE: 146 MMHG | RESPIRATION RATE: 14 BRPM

## 2020-02-06 PROCEDURE — 99204 OFFICE O/P NEW MOD 45 MIN: CPT

## 2020-02-06 NOTE — PHYSICAL EXAM
[General Appearance - Alert] : alert [General Appearance - In No Acute Distress] : in no acute distress [General Appearance - Well Nourished] : well nourished [Sclera] : the sclera and conjunctiva were normal [PERRL With Normal Accommodation] : pupils were equal in size, round, and reactive to light [Extraocular Movements] : extraocular movements were intact [Outer Ear] : the ears and nose were normal in appearance [Oropharynx] : the oropharynx was normal [Neck Appearance] : the appearance of the neck was normal [Auscultation Breath Sounds / Voice Sounds] : lungs were clear to auscultation bilaterally [Heart Rate And Rhythm] : heart rate was normal and rhythm regular [Heart Sounds] : normal S1 and S2 [Heart Sounds Gallop] : no gallops [Murmurs] : no murmurs [Heart Sounds Pericardial Friction Rub] : no pericardial rub [Bowel Sounds] : normal bowel sounds [Abdomen Soft] : soft [Cervical Lymph Nodes Enlarged Anterior Bilaterally] : anterior cervical [Supraclavicular Lymph Nodes Enlarged Bilaterally] : supraclavicular [No CVA Tenderness] : no ~M costovertebral angle tenderness [No Spinal Tenderness] : no spinal tenderness [Abnormal Walk] : normal gait [Nail Clubbing] : no clubbing  or cyanosis of the fingernails [Musculoskeletal - Swelling] : no joint swelling seen [Motor Tone] : muscle strength and tone were normal [FreeTextEntry1] : No synovitis, contractures. ROM intact. + crepitus in R shoulder but no limitation to ROM  [Skin Color & Pigmentation] : normal skin color and pigmentation [Skin Turgor] : normal skin turgor [] : no rash [Motor Exam] : the motor exam was normal [No Focal Deficits] : no focal deficits [Oriented To Time, Place, And Person] : oriented to person, place, and time [Impaired Insight] : insight and judgment were intact [Affect] : the affect was normal

## 2020-02-06 NOTE — HISTORY OF PRESENT ILLNESS
[FreeTextEntry1] : CK PACHECO is a 74 year old man with chronic, well controlled RA on Humira and SSZ 1000mg BID. Previously following with Dr Vaughan but unable to travel to his new office so transferring care. Presently with stable joints- no synovitis, prolonged AM stiffness, loss of ROM. Mild achiness in R shoulder but known OA in that joint. No recent F/C, infections. Feels well. No SE from meds. \par \par RA hx - dx in 2007, few flares since but no flares and has been off steroids x years. Never had rashes, eye inflammation, C spine involvement, inflammatory low back pain, IBD, lung involvement, SQ nodules, parethesias, muscle weakness.  \par \par + RLE DVT in 2016, off A/C\par + R hip replacement \par + hx of TM in 2006, fully resolved \par + intentional weight loss of approx 30lbs which has improved LE edema, HBA1c\par + recent retinal swelling, self-resolved, following with optho, reportedly related to DM2 and not RA\par \par Labs - chronically mild elevation in CPK since 2016\par DEXA 2018 - normal

## 2020-02-06 NOTE — ASSESSMENT
[FreeTextEntry1] : CK PACHECO is a 74 year old man with stable, well controlled RA on Humira and SSZ. Doing well today, joints quiescent. 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 \par \par - c/w Humira q2 weeks\par - c/w SSZ 1000mg BID \par - check routine labs and preimmunosuppression labs \par - release signed for prior rheum - Dr Vaughan - chart to review\par - RTC in 3 months

## 2020-03-06 LAB
25(OH)D3 SERPL-MCNC: 30.1 NG/ML
ALBUMIN SERPL ELPH-MCNC: 4.3 G/DL
ALP BLD-CCNC: 59 U/L
ALT SERPL-CCNC: 24 U/L
ANION GAP SERPL CALC-SCNC: 15 MMOL/L
AST SERPL-CCNC: 35 U/L
BASOPHILS # BLD AUTO: 0.04 K/UL
BASOPHILS NFR BLD AUTO: 0.6 %
BILIRUB SERPL-MCNC: 0.3 MG/DL
BUN SERPL-MCNC: 19 MG/DL
CALCIUM SERPL-MCNC: 9.3 MG/DL
CHLORIDE SERPL-SCNC: 105 MMOL/L
CO2 SERPL-SCNC: 26 MMOL/L
CREAT SERPL-MCNC: 1.32 MG/DL
CRP SERPL-MCNC: 0.34 MG/DL
EOSINOPHIL # BLD AUTO: 0.3 K/UL
EOSINOPHIL NFR BLD AUTO: 4.8 %
ERYTHROCYTE [SEDIMENTATION RATE] IN BLOOD BY WESTERGREN METHOD: 37 MM/HR
GLUCOSE SERPL-MCNC: 90 MG/DL
HBV CORE IGG+IGM SER QL: NONREACTIVE
HBV SURFACE AB SER QL: NONREACTIVE
HBV SURFACE AG SER QL: NONREACTIVE
HCT VFR BLD CALC: 50.6 %
HCV AB SER QL: NONREACTIVE
HCV S/CO RATIO: 0.22 S/CO
HGB BLD-MCNC: 16.2 G/DL
IMM GRANULOCYTES NFR BLD AUTO: 0.3 %
LYMPHOCYTES # BLD AUTO: 1.64 K/UL
LYMPHOCYTES NFR BLD AUTO: 26.4 %
MAN DIFF?: NORMAL
MCHC RBC-ENTMCNC: 30.6 PG
MCHC RBC-ENTMCNC: 32 GM/DL
MCV RBC AUTO: 95.7 FL
MONOCYTES # BLD AUTO: 0.55 K/UL
MONOCYTES NFR BLD AUTO: 8.8 %
NEUTROPHILS # BLD AUTO: 3.67 K/UL
NEUTROPHILS NFR BLD AUTO: 59.1 %
PLATELET # BLD AUTO: 186 K/UL
POTASSIUM SERPL-SCNC: 4.6 MMOL/L
PROT SERPL-MCNC: 7 G/DL
RBC # BLD: 5.29 M/UL
RBC # FLD: 13.6 %
SODIUM SERPL-SCNC: 146 MMOL/L
WBC # FLD AUTO: 6.22 K/UL

## 2020-03-09 LAB
CCP AB SER IA-ACNC: <8 UNITS
M TB IFN-G BLD-IMP: NEGATIVE
QUANTIFERON TB PLUS MITOGEN MINUS NIL: 6.76 IU/ML
QUANTIFERON TB PLUS NIL: 0 IU/ML
QUANTIFERON TB PLUS TB1 MINUS NIL: 0 IU/ML
QUANTIFERON TB PLUS TB2 MINUS NIL: 0 IU/ML
RF+CCP IGG SER-IMP: NEGATIVE

## 2020-05-07 ENCOUNTER — APPOINTMENT (OUTPATIENT)
Dept: RHEUMATOLOGY | Facility: CLINIC | Age: 75
End: 2020-05-07
Payer: MEDICARE

## 2020-05-07 PROCEDURE — 99214 OFFICE O/P EST MOD 30 MIN: CPT | Mod: 95

## 2020-05-07 NOTE — ASSESSMENT
[FreeTextEntry1] : CK PACHECO is a 74 year old man with stable, well controlled RA on Humira and SSZ. Doing well today, joints quiescent. 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 \par \par - c/w Humira q2 weeks\par - c/w SSZ 1000mg BID \par - check routine labs including CPK to trend, without any c/o muscle weakness\par - prior labs reviewed with patient \par - RTC on 7/29 at 1pm

## 2020-05-07 NOTE — HISTORY OF PRESENT ILLNESS
[Home] : at home, [unfilled] , at the time of the visit. [Other Location: e.g. Home (Enter Location, City,State)___] : at [unfilled] [Patient] : the patient [Self] : self [FreeTextEntry1] : CK PACHECO is a 74 year old man with chronic, well controlled RA on Humira and SSZ 1000mg BID. Presently with stable joints- no synovitis, prolonged AM stiffness, loss of ROM. No recent F/C, infections, cough, CP, SOB. Mild, tylenol responsive HA. Feels well. No SE from meds. Observing covid recommendations of social distancing and using a mask when outside. \par \par + R shoulder OA\par \par RA hx - dx in 2007, few flares since but no flares and has been off steroids x years. Never had rashes, eye inflammation, C spine involvement, inflammatory low back pain, IBD, lung involvement, SQ nodules, paresthesias, muscle weakness.  \par \par + RLE DVT in 2016, off A/C\par + R hip replacement \par + hx of TM in 2006, fully resolved \par + intentional weight loss of approx 30lbs which has improved LE edema, HBA1c\par + recent retinal swelling, self-resolved, following with optho, reportedly related to DM2 and not RA\par \par Labs - chronically mild elevation in CPK since 2016\par Preimm -- Hep B/C, Quant - negative 3/2020\par DEXA 2018 - normal

## 2020-05-07 NOTE — PHYSICAL EXAM
[General Appearance - In No Acute Distress] : in no acute distress [General Appearance - Alert] : alert [General Appearance - Well Nourished] : well nourished [Sclera] : the sclera and conjunctiva were normal [FreeTextEntry1] : On visual inspection, no synovitis in b/l hands or wrists  [Oriented To Time, Place, And Person] : oriented to person, place, and time [Impaired Insight] : insight and judgment were intact [Affect] : the affect was normal

## 2020-05-14 LAB
ALBUMIN SERPL ELPH-MCNC: 4.9 G/DL
ALP BLD-CCNC: 61 U/L
ALT SERPL-CCNC: 27 U/L
ANION GAP SERPL CALC-SCNC: 11 MMOL/L
AST SERPL-CCNC: 36 U/L
BASOPHILS # BLD AUTO: 0.03 K/UL
BASOPHILS NFR BLD AUTO: 0.4 %
BILIRUB SERPL-MCNC: 0.4 MG/DL
BUN SERPL-MCNC: 18 MG/DL
CALCIUM SERPL-MCNC: 9.9 MG/DL
CHLORIDE SERPL-SCNC: 105 MMOL/L
CK SERPL-CCNC: 481 U/L
CO2 SERPL-SCNC: 32 MMOL/L
CREAT SERPL-MCNC: 1.2 MG/DL
CRP SERPL-MCNC: 0.61 MG/DL
EOSINOPHIL # BLD AUTO: 0.2 K/UL
EOSINOPHIL NFR BLD AUTO: 2.9 %
ERYTHROCYTE [SEDIMENTATION RATE] IN BLOOD BY WESTERGREN METHOD: 26 MM/HR
GLUCOSE SERPL-MCNC: 94 MG/DL
HCT VFR BLD CALC: 51.3 %
HGB BLD-MCNC: 17 G/DL
IMM GRANULOCYTES NFR BLD AUTO: 0.3 %
LYMPHOCYTES # BLD AUTO: 2.02 K/UL
LYMPHOCYTES NFR BLD AUTO: 29.1 %
MAN DIFF?: NORMAL
MCHC RBC-ENTMCNC: 30.7 PG
MCHC RBC-ENTMCNC: 33.1 GM/DL
MCV RBC AUTO: 92.6 FL
MONOCYTES # BLD AUTO: 0.7 K/UL
MONOCYTES NFR BLD AUTO: 10.1 %
NEUTROPHILS # BLD AUTO: 3.98 K/UL
NEUTROPHILS NFR BLD AUTO: 57.2 %
PLATELET # BLD AUTO: 184 K/UL
POTASSIUM SERPL-SCNC: 4.8 MMOL/L
PROT SERPL-MCNC: 7.5 G/DL
RBC # BLD: 5.54 M/UL
RBC # FLD: 13.6 %
SODIUM SERPL-SCNC: 147 MMOL/L
WBC # FLD AUTO: 6.95 K/UL

## 2020-05-15 LAB — ALDOLASE SERPL-CCNC: 9.9 U/L

## 2020-07-29 ENCOUNTER — APPOINTMENT (OUTPATIENT)
Dept: RHEUMATOLOGY | Facility: CLINIC | Age: 75
End: 2020-07-29

## 2020-07-30 ENCOUNTER — APPOINTMENT (OUTPATIENT)
Dept: RHEUMATOLOGY | Facility: CLINIC | Age: 75
End: 2020-07-30
Payer: MEDICARE

## 2020-07-30 VITALS
HEART RATE: 84 BPM | SYSTOLIC BLOOD PRESSURE: 175 MMHG | WEIGHT: 282 LBS | RESPIRATION RATE: 14 BRPM | BODY MASS INDEX: 42.74 KG/M2 | OXYGEN SATURATION: 96 % | TEMPERATURE: 98.1 F | HEIGHT: 68 IN | DIASTOLIC BLOOD PRESSURE: 81 MMHG

## 2020-07-30 PROCEDURE — 99214 OFFICE O/P EST MOD 30 MIN: CPT

## 2020-07-30 NOTE — REVIEW OF SYSTEMS
[Negative] : Endocrine [As Noted in HPI] : as noted in HPI [de-identified] : chronic peripheral neuropathy

## 2020-07-30 NOTE — PHYSICAL EXAM
[General Appearance - Alert] : alert [General Appearance - In No Acute Distress] : in no acute distress [Sclera] : the sclera and conjunctiva were normal [General Appearance - Well Nourished] : well nourished [Oriented To Time, Place, And Person] : oriented to person, place, and time [Affect] : the affect was normal [Impaired Insight] : insight and judgment were intact [Extraocular Movements] : extraocular movements were intact [PERRL With Normal Accommodation] : pupils were equal in size, round, and reactive to light [Oropharynx] : the oropharynx was normal [Neck Appearance] : the appearance of the neck was normal [Auscultation Breath Sounds / Voice Sounds] : lungs were clear to auscultation bilaterally [Heart Rate And Rhythm] : heart rate was normal and rhythm regular [Heart Sounds] : normal S1 and S2 [Edema] : there was no peripheral edema [Murmurs] : no murmurs [Bowel Sounds] : normal bowel sounds [Abdomen Soft] : soft [Abdomen Tenderness] : non-tender [No CVA Tenderness] : no ~M costovertebral angle tenderness [No Spinal Tenderness] : no spinal tenderness [Abnormal Walk] : normal gait [Nail Clubbing] : no clubbing  or cyanosis of the fingernails [Musculoskeletal - Swelling] : no joint swelling seen [] : no rash [No Focal Deficits] : no focal deficits [Motor Tone] : muscle strength and tone were normal [Motor Exam] : the motor exam was normal [FreeTextEntry1] : strength 5/5 x 4

## 2020-07-30 NOTE — HISTORY OF PRESENT ILLNESS
[Patient] : the patient [Other Location: e.g. Home (Enter Location, City,State)___] : at [unfilled] [Home] : at home, [unfilled] , at the time of the visit. [Self] : self [FreeTextEntry1] : CK PACHECO is a 75 year old man with chronic, well controlled RA on Humira and SSZ 1000mg BID. \par \par + R shoulder OA\par \par RA hx - dx in 2007, few flares since but no flares and has been off steroids x years. Never had rashes, eye inflammation, C spine involvement, inflammatory low back pain, IBD, lung involvement, SQ nodules, paresthesias, muscle weakness.  \par \par + RLE DVT in 2016, off A/C\par + R hip replacement \par + hx of TM in 2006, fully resolved \par + intentional weight loss of approx 30lbs which has improved LE edema, HBA1c\par + recent retinal swelling, self-resolved, following with optho, reportedly related to DM2 and not RA\par \par Labs - chronically mild elevation in CPK since 2016\par Preimm -- Hep B/C, Quant - negative 3/2020\par DEXA 2018 - normal \par ----------------\par \par 7/30/20-- No complaints today -- no synovitis, prolonged AM stiffness, loss of ROM. No recent F/C, infections, cough, CP, SOB. Feels well. No SE from meds. Observing covid recommendations of social distancing and using a mask when outside. Recent improvement in HBA1c.

## 2020-07-31 ENCOUNTER — NON-APPOINTMENT (OUTPATIENT)
Age: 75
End: 2020-07-31

## 2020-07-31 ENCOUNTER — APPOINTMENT (OUTPATIENT)
Dept: FAMILY MEDICINE | Facility: CLINIC | Age: 75
End: 2020-07-31
Payer: MEDICARE

## 2020-07-31 VITALS
TEMPERATURE: 97.7 F | HEIGHT: 68 IN | WEIGHT: 282 LBS | RESPIRATION RATE: 14 BRPM | DIASTOLIC BLOOD PRESSURE: 90 MMHG | OXYGEN SATURATION: 97 % | HEART RATE: 84 BPM | BODY MASS INDEX: 42.74 KG/M2 | SYSTOLIC BLOOD PRESSURE: 187 MMHG

## 2020-07-31 DIAGNOSIS — Z23 ENCOUNTER FOR IMMUNIZATION: ICD-10-CM

## 2020-07-31 PROCEDURE — 93000 ELECTROCARDIOGRAM COMPLETE: CPT

## 2020-07-31 PROCEDURE — G0438: CPT

## 2020-07-31 PROCEDURE — G0009: CPT

## 2020-07-31 PROCEDURE — 90732 PPSV23 VACC 2 YRS+ SUBQ/IM: CPT

## 2020-07-31 PROCEDURE — 36415 COLL VENOUS BLD VENIPUNCTURE: CPT

## 2020-07-31 NOTE — PHYSICAL EXAM
[No Acute Distress] : no acute distress [Well Nourished] : well nourished [Well Developed] : well developed [Well-Appearing] : well-appearing [Normal Sclera/Conjunctiva] : normal sclera/conjunctiva [Normal Voice/Communication] : normal voice/communication [Normal Outer Ear/Nose] : the outer ears and nose were normal in appearance [EOMI] : extraocular movements intact [PERRL] : pupils equal round and reactive to light [Normal Oropharynx] : the oropharynx was normal [No JVD] : no jugular venous distention [No Lymphadenopathy] : no lymphadenopathy [Thyroid Normal, No Nodules] : the thyroid was normal and there were no nodules present [Supple] : supple [No Accessory Muscle Use] : no accessory muscle use [No Respiratory Distress] : no respiratory distress  [Normal Rate] : normal rate  [Clear to Auscultation] : lungs were clear to auscultation bilaterally [Regular Rhythm] : with a regular rhythm [Normal S1, S2] : normal S1 and S2 [No Carotid Bruits] : no carotid bruits [No Murmur] : no murmur heard [No Abdominal Bruit] : a ~M bruit was not heard ~T in the abdomen [No Varicosities] : no varicosities [No Palpable Aorta] : no palpable aorta [Pedal Pulses Present] : the pedal pulses are present [Normal Appearance] : normal in appearance [No Extremity Clubbing/Cyanosis] : no extremity clubbing/cyanosis [No Nipple Discharge] : no nipple discharge [No Axillary Lymphadenopathy] : no axillary lymphadenopathy [Non Tender] : non-tender [Soft] : abdomen soft [No Masses] : no abdominal mass palpated [Non-distended] : non-distended [No HSM] : no HSM [Normal Bowel Sounds] : normal bowel sounds [Declined Rectal Exam] : declined rectal exam [No Hernias] : no hernias [Normal Supraclavicular Nodes] : no supraclavicular lymphadenopathy [Normal Posterior Cervical Nodes] : no posterior cervical lymphadenopathy [Normal Axillary Nodes] : no axillary lymphadenopathy [Normal Inguinal Nodes] : no inguinal lymphadenopathy [Normal Anterior Cervical Nodes] : no anterior cervical lymphadenopathy [Normal Femoral Nodes] : no femoral lymphadenopathy [No CVA Tenderness] : no CVA  tenderness [No Spinal Tenderness] : no spinal tenderness [No Joint Swelling] : no joint swelling [No Rash] : no rash [Grossly Normal Strength/Tone] : grossly normal strength/tone [Coordination Grossly Intact] : coordination grossly intact [No Focal Deficits] : no focal deficits [Normal Gait] : normal gait [Memory Grossly Normal] : memory grossly normal [Speech Grossly Normal] : speech grossly normal [Normal Affect] : the affect was normal [Alert and Oriented x3] : oriented to person, place, and time [Normal Insight/Judgement] : insight and judgment were intact [Normal Mood] : the mood was normal [Comprehensive Foot Exam Normal] : Right and left foot were examined and both feet are normal. No ulcers in either foot. Toes are normal and with full ROM.  Normal tactile sensation with monofilament testing throughout both feet [de-identified] : Stasis dermatitis, secondary to venous insufficiency, right lower extremity edematous secondary to DVT in the past [de-identified] : obese [de-identified] : urology [FreeTextEntry1] : urology [de-identified] : Stasis dermatitis bilateral lower extremities [de-identified] : Lipoma left upper back stable [de-identified] : Peripheral neuropathy stable for patient, history of transverse myelitis patient has recovered

## 2020-07-31 NOTE — HISTORY OF PRESENT ILLNESS
[FreeTextEntry1] : Comprehensive health maintenance physical exam [de-identified] : Is a 75-year-old gentleman who presents today for comprehensive health maintenance physical exam patient states that he is been in his usual state of health in fact he states that he is feeling well he is followed by multiple subspecialist which include endocrinology and rheumatology patient also sees vascular surgery history of previous DVT and in the past has been seen by hematology oncology.\par \par At this visit we will be addressing hyperlipidemia hypothyroidism diabetes mellitus type 2, diabetic neuropathy which is well controlled at this time hypertension, history of hepatic steatosis and elevated BMI patient does have underlying rheumatoid arthritis which is well controlled with present medical management.\par \par Patient is awake alert and oriented x3 in no acute distress calm cooperative well-groomed and nourished.

## 2020-07-31 NOTE — ASSESSMENT
[FreeTextEntry1] : Assessment and plan:\par \par Comprehensive health maintenance physical exam is stable for patient no acute findings in fact improved.\par \par Health maintenance issues discussed with patient Pneumovax 23 administered at this visit.\par \par Electrocardiogram shows no acute ST-T wave changes in fact it is within normal limits.\par \par Hypertension blood pressure has been elevated on several occasions increase hydralazine to 50 mg twice a day continue amlodipine and Bystolic at present dose patient will be checking home blood pressure.\par \par Comprehensive blood work drawn in office by examiner.\par \par No change in other medications follow-up with rheumatology, and endocrinology.

## 2020-07-31 NOTE — COUNSELING
[Fall prevention counseling provided] : Fall prevention counseling provided [No throw rugs] : No throw rugs [Adequate lighting] : Adequate lighting [Behavioral health counseling provided] : Behavioral health counseling provided [Use proper foot wear] : Use proper foot wear [Sleep ___ hours/day] : Sleep [unfilled] hours/day [Engage in a relaxing activity] : Engage in a relaxing activity [Plan in advance] : Plan in advance [AUDIT-C Screening administered and reviewed] : AUDIT-C Screening administered and reviewed [Potential consequences of obesity discussed] : Potential consequences of obesity discussed [Benefits of weight loss discussed] : Benefits of weight loss discussed [Structured Weight Management Program suggested:] : Structured weight management program suggested [Encouraged to maintain food diary] : Encouraged to maintain food diary [Encouraged to increase physical activity] : Encouraged to increase physical activity [Weigh Self Weekly] : weigh self weekly [Encouraged to use exercise tracking device] : Encouraged to use exercise tracking device [Decrease Portions] : decrease portions [____ min/wk Activity] : [unfilled] min/wk activity [Keep Food Diary] : keep food diary [None] : None [Good understanding] : Patient has a good understanding of lifestyle changes and steps needed to achieve self management goal

## 2020-07-31 NOTE — HEALTH RISK ASSESSMENT
[Excellent] : ~his/her~  mood as  excellent [Yes] : Yes [Monthly or less (1 pt)] : Monthly or less (1 point) [1 or 2 (0 pts)] : 1 or 2 (0 points) [Never (0 pts)] : Never (0 points) [No] : In the past 12 months have you used drugs other than those required for medical reasons? No [No falls in past year] : Patient reported no falls in the past year [0] : 2) Feeling down, depressed, or hopeless: Not at all (0) [HIV test declined] : HIV test declined [Alone] : lives alone [Retired] : retired [] :  [Graduate School] : graduate school [Fully functional (using the telephone, shopping, preparing meals, housekeeping, doing laundry, using] : Fully functional and needs no help or supervision to perform IADLs (using the telephone, shopping, preparing meals, housekeeping, doing laundry, using transportation, managing medications and managing finances) [Feels Safe at Home] : Feels safe at home [Fully functional (bathing, dressing, toileting, transferring, walking, feeding)] : Fully functional (bathing, dressing, toileting, transferring, walking, feeding) [Reports normal functional visual acuity (ie: able to read med bottle)] : Reports normal functional visual acuity [Carbon Monoxide Detector] : carbon monoxide detector [Smoke Detector] : smoke detector [Safety elements used in home] : safety elements used in home [Seat Belt] :  uses seat belt [Sunscreen] : uses sunscreen [With Patient/Caregiver] : With Patient/Caregiver [Designated Healthcare Proxy] : Designated healthcare proxy [Name: ___] : Health Care Proxy's Name: [unfilled]  [Aggressive treatment] : aggressive treatment [Relationship: ___] : Relationship: [unfilled] [I will adhere to the patient's wishes as expressed in the advance directive except as noted below.] : I will adhere to the patient's wishes as expressed in the advance directive except as noted below [Audit-CScore] : 1 [] : No [[id number]] : 0 [Change in mental status noted] : No change in mental status noted [Language] : denies difficulty with language [Behavior] : denies difficulty with behavior [Learning/Retaining New Information] : denies difficulty learning/retaining new information [Reasoning] : denies difficulty with reasoning [Handling Complex Tasks] : denies difficulty handling complex tasks [Spatial Ability and Orientation] : denies difficulty with spatial ability and orientation [Sexually Active] : not sexually active [High Risk Behavior] : no high risk behavior [Reports changes in hearing] : Reports no changes in hearing [Reports changes in dental health] : Reports no changes in dental health [Reports changes in vision] : Reports no changes in vision [TB Exposure] : is not being exposed to tuberculosis [Travel to Developing Areas] : does not  travel to developing areas [Caregiver Concerns] : does not have caregiver concerns [AdvancecareDate] : 07/20

## 2020-08-02 LAB
ALBUMIN SERPL ELPH-MCNC: 4.9 G/DL
ALP BLD-CCNC: 55 U/L
ALT SERPL-CCNC: 25 U/L
ANION GAP SERPL CALC-SCNC: 14 MMOL/L
APPEARANCE: ABNORMAL
AST SERPL-CCNC: 32 U/L
BACTERIA: ABNORMAL
BASOPHILS # BLD AUTO: 0.04 K/UL
BASOPHILS NFR BLD AUTO: 0.7 %
BILIRUB SERPL-MCNC: 0.3 MG/DL
BILIRUBIN URINE: NEGATIVE
BLOOD URINE: ABNORMAL
BUN SERPL-MCNC: 16 MG/DL
CALCIUM SERPL-MCNC: 9.7 MG/DL
CHLORIDE SERPL-SCNC: 105 MMOL/L
CHOLEST SERPL-MCNC: 175 MG/DL
CHOLEST/HDLC SERPL: 3.7 RATIO
CO2 SERPL-SCNC: 25 MMOL/L
COLOR: YELLOW
CREAT SERPL-MCNC: 1.23 MG/DL
CREAT SPEC-SCNC: 95 MG/DL
CRP SERPL-MCNC: 0.21 MG/DL
EOSINOPHIL # BLD AUTO: 0.18 K/UL
EOSINOPHIL NFR BLD AUTO: 3.1 %
ERYTHROCYTE [SEDIMENTATION RATE] IN BLOOD BY WESTERGREN METHOD: 13 MM/HR
ESTIMATED AVERAGE GLUCOSE: 111 MG/DL
GLUCOSE QUALITATIVE U: NEGATIVE
GLUCOSE SERPL-MCNC: 137 MG/DL
HBA1C MFR BLD HPLC: 5.5 %
HCT VFR BLD CALC: 46.5 %
HDLC SERPL-MCNC: 48 MG/DL
HGB BLD-MCNC: 15.6 G/DL
HYALINE CASTS: 0 /LPF
IMM GRANULOCYTES NFR BLD AUTO: 0.3 %
KETONES URINE: NEGATIVE
LDLC SERPL CALC-MCNC: 90 MG/DL
LEUKOCYTE ESTERASE URINE: ABNORMAL
LYMPHOCYTES # BLD AUTO: 1.53 K/UL
LYMPHOCYTES NFR BLD AUTO: 26.2 %
MAN DIFF?: NORMAL
MCHC RBC-ENTMCNC: 31.3 PG
MCHC RBC-ENTMCNC: 33.5 GM/DL
MCV RBC AUTO: 93.4 FL
MICROALBUMIN 24H UR DL<=1MG/L-MCNC: 104.4 MG/DL
MICROALBUMIN/CREAT 24H UR-RTO: 1103 MG/G
MICROSCOPIC-UA: NORMAL
MONOCYTES # BLD AUTO: 0.52 K/UL
MONOCYTES NFR BLD AUTO: 8.9 %
NEUTROPHILS # BLD AUTO: 3.56 K/UL
NEUTROPHILS NFR BLD AUTO: 60.8 %
NITRITE URINE: NEGATIVE
PH URINE: 5.5
PLATELET # BLD AUTO: 187 K/UL
POTASSIUM SERPL-SCNC: 4 MMOL/L
PROT SERPL-MCNC: 7.3 G/DL
PROTEIN URINE: ABNORMAL
RBC # BLD: 4.98 M/UL
RBC # FLD: 13.8 %
RED BLOOD CELLS URINE: 18 /HPF
SODIUM SERPL-SCNC: 144 MMOL/L
SPECIFIC GRAVITY URINE: 1.02
SQUAMOUS EPITHELIAL CELLS: 2 /HPF
T4 FREE SERPL-MCNC: 1.1 NG/DL
TRIGL SERPL-MCNC: 187 MG/DL
TSH SERPL-ACNC: 2.58 UIU/ML
UROBILINOGEN URINE: NORMAL
WBC # FLD AUTO: 5.85 K/UL
WHITE BLOOD CELLS URINE: >720 /HPF

## 2020-08-15 ENCOUNTER — APPOINTMENT (OUTPATIENT)
Dept: ULTRASOUND IMAGING | Facility: HOSPITAL | Age: 75
End: 2020-08-15
Payer: MEDICARE

## 2020-08-15 ENCOUNTER — OUTPATIENT (OUTPATIENT)
Dept: OUTPATIENT SERVICES | Facility: HOSPITAL | Age: 75
LOS: 1 days | End: 2020-08-15
Payer: MEDICARE

## 2020-08-15 DIAGNOSIS — K76.0 FATTY (CHANGE OF) LIVER, NOT ELSEWHERE CLASSIFIED: ICD-10-CM

## 2020-08-15 PROCEDURE — 76700 US EXAM ABDOM COMPLETE: CPT | Mod: 26

## 2020-08-15 PROCEDURE — 76700 US EXAM ABDOM COMPLETE: CPT

## 2020-08-24 ENCOUNTER — INPATIENT (INPATIENT)
Facility: HOSPITAL | Age: 75
LOS: 1 days | Discharge: HOME | DRG: 378 | End: 2020-08-26
Attending: STUDENT IN AN ORGANIZED HEALTH CARE EDUCATION/TRAINING PROGRAM | Admitting: INTERNAL MEDICINE
Payer: MEDICARE

## 2020-08-24 VITALS
TEMPERATURE: 98 F | RESPIRATION RATE: 16 BRPM | DIASTOLIC BLOOD PRESSURE: 69 MMHG | OXYGEN SATURATION: 100 % | HEIGHT: 68 IN | SYSTOLIC BLOOD PRESSURE: 124 MMHG | HEART RATE: 67 BPM | WEIGHT: 278 LBS

## 2020-08-24 DIAGNOSIS — Z98.890 OTHER SPECIFIED POSTPROCEDURAL STATES: ICD-10-CM

## 2020-08-24 DIAGNOSIS — Z98.890 OTHER SPECIFIED POSTPROCEDURAL STATES: Chronic | ICD-10-CM

## 2020-08-24 LAB
ALBUMIN SERPL ELPH-MCNC: 3.2 G/DL — LOW (ref 3.3–5)
ALP SERPL-CCNC: 44 U/L — SIGNIFICANT CHANGE UP (ref 40–120)
ALT FLD-CCNC: 28 U/L — SIGNIFICANT CHANGE UP (ref 10–45)
ANION GAP SERPL CALC-SCNC: 8 MMOL/L — SIGNIFICANT CHANGE UP (ref 5–17)
APPEARANCE UR: ABNORMAL
APTT BLD: 25 SEC — LOW (ref 27.5–35.5)
AST SERPL-CCNC: 21 U/L — SIGNIFICANT CHANGE UP (ref 10–40)
BACTERIA # UR AUTO: ABNORMAL /HPF
BASOPHILS # BLD AUTO: 0.03 K/UL — SIGNIFICANT CHANGE UP (ref 0–0.2)
BASOPHILS NFR BLD AUTO: 0.4 % — SIGNIFICANT CHANGE UP (ref 0–2)
BILIRUB SERPL-MCNC: 0.2 MG/DL — SIGNIFICANT CHANGE UP (ref 0.2–1.2)
BILIRUB UR-MCNC: NEGATIVE — SIGNIFICANT CHANGE UP
BLD GP AB SCN SERPL QL: SIGNIFICANT CHANGE UP
BUN SERPL-MCNC: 30 MG/DL — HIGH (ref 7–23)
CALCIUM SERPL-MCNC: 8 MG/DL — LOW (ref 8.4–10.5)
CHLORIDE SERPL-SCNC: 110 MMOL/L — HIGH (ref 96–108)
CO2 SERPL-SCNC: 28 MMOL/L — SIGNIFICANT CHANGE UP (ref 22–31)
COLOR SPEC: YELLOW — SIGNIFICANT CHANGE UP
CREAT SERPL-MCNC: 2.04 MG/DL — HIGH (ref 0.5–1.3)
DIFF PNL FLD: ABNORMAL
EOSINOPHIL # BLD AUTO: 0.15 K/UL — SIGNIFICANT CHANGE UP (ref 0–0.5)
EOSINOPHIL NFR BLD AUTO: 2 % — SIGNIFICANT CHANGE UP (ref 0–6)
EPI CELLS # UR: SIGNIFICANT CHANGE UP
GLUCOSE BLDC GLUCOMTR-MCNC: 122 MG/DL — HIGH (ref 70–99)
GLUCOSE BLDC GLUCOMTR-MCNC: 145 MG/DL — HIGH (ref 70–99)
GLUCOSE SERPL-MCNC: 158 MG/DL — HIGH (ref 70–99)
GLUCOSE UR QL: NEGATIVE — SIGNIFICANT CHANGE UP
HCT VFR BLD CALC: 36 % — LOW (ref 39–50)
HGB BLD-MCNC: 11.8 G/DL — LOW (ref 13–17)
IMM GRANULOCYTES NFR BLD AUTO: 0.4 % — SIGNIFICANT CHANGE UP (ref 0–1.5)
INR BLD: 1.09 RATIO — SIGNIFICANT CHANGE UP (ref 0.88–1.16)
KETONES UR-MCNC: NEGATIVE — SIGNIFICANT CHANGE UP
LEUKOCYTE ESTERASE UR-ACNC: ABNORMAL
LYMPHOCYTES # BLD AUTO: 1.8 K/UL — SIGNIFICANT CHANGE UP (ref 1–3.3)
LYMPHOCYTES # BLD AUTO: 24.5 % — SIGNIFICANT CHANGE UP (ref 13–44)
MCHC RBC-ENTMCNC: 31.5 PG — SIGNIFICANT CHANGE UP (ref 27–34)
MCHC RBC-ENTMCNC: 32.8 GM/DL — SIGNIFICANT CHANGE UP (ref 32–36)
MCV RBC AUTO: 96 FL — SIGNIFICANT CHANGE UP (ref 80–100)
MONOCYTES # BLD AUTO: 0.61 K/UL — SIGNIFICANT CHANGE UP (ref 0–0.9)
MONOCYTES NFR BLD AUTO: 8.3 % — SIGNIFICANT CHANGE UP (ref 2–14)
NEUTROPHILS # BLD AUTO: 4.73 K/UL — SIGNIFICANT CHANGE UP (ref 1.8–7.4)
NEUTROPHILS NFR BLD AUTO: 64.4 % — SIGNIFICANT CHANGE UP (ref 43–77)
NITRITE UR-MCNC: POSITIVE
NRBC # BLD: 0 /100 WBCS — SIGNIFICANT CHANGE UP (ref 0–0)
OB PNL STL: POSITIVE
PH UR: 5 — SIGNIFICANT CHANGE UP (ref 5–8)
PLATELET # BLD AUTO: 203 K/UL — SIGNIFICANT CHANGE UP (ref 150–400)
POTASSIUM SERPL-MCNC: 4.1 MMOL/L — SIGNIFICANT CHANGE UP (ref 3.5–5.3)
POTASSIUM SERPL-SCNC: 4.1 MMOL/L — SIGNIFICANT CHANGE UP (ref 3.5–5.3)
PROT SERPL-MCNC: 6.1 G/DL — SIGNIFICANT CHANGE UP (ref 6–8.3)
PROT UR-MCNC: 100
PROTHROM AB SERPL-ACNC: 13.1 SEC — SIGNIFICANT CHANGE UP (ref 10.6–13.6)
RBC # BLD: 3.75 M/UL — LOW (ref 4.2–5.8)
RBC # FLD: 14.5 % — SIGNIFICANT CHANGE UP (ref 10.3–14.5)
RBC CASTS # UR COMP ASSIST: ABNORMAL /HPF (ref 0–4)
SARS-COV-2 RNA SPEC QL NAA+PROBE: SIGNIFICANT CHANGE UP
SODIUM SERPL-SCNC: 146 MMOL/L — HIGH (ref 135–145)
SP GR SPEC: 1.02 — SIGNIFICANT CHANGE UP (ref 1.01–1.02)
TROPONIN I SERPL-MCNC: 0.03 NG/ML — SIGNIFICANT CHANGE UP (ref 0.02–0.06)
UROBILINOGEN FLD QL: NEGATIVE — SIGNIFICANT CHANGE UP
WBC # BLD: 7.35 K/UL — SIGNIFICANT CHANGE UP (ref 3.8–10.5)
WBC # FLD AUTO: 7.35 K/UL — SIGNIFICANT CHANGE UP (ref 3.8–10.5)
WBC UR QL: ABNORMAL /HPF (ref 0–5)

## 2020-08-24 PROCEDURE — 99222 1ST HOSP IP/OBS MODERATE 55: CPT

## 2020-08-24 PROCEDURE — 99285 EMERGENCY DEPT VISIT HI MDM: CPT | Mod: CS

## 2020-08-24 PROCEDURE — 74176 CT ABD & PELVIS W/O CONTRAST: CPT | Mod: 26

## 2020-08-24 PROCEDURE — 93010 ELECTROCARDIOGRAM REPORT: CPT

## 2020-08-24 RX ORDER — NEBIVOLOL HYDROCHLORIDE 5 MG/1
10 TABLET ORAL DAILY
Refills: 0 | Status: DISCONTINUED | OUTPATIENT
Start: 2020-08-25 | End: 2020-08-26

## 2020-08-24 RX ORDER — SODIUM CHLORIDE 9 MG/ML
1000 INJECTION, SOLUTION INTRAVENOUS
Refills: 0 | Status: DISCONTINUED | OUTPATIENT
Start: 2020-08-24 | End: 2020-08-26

## 2020-08-24 RX ORDER — CEFTRIAXONE 500 MG/1
1000 INJECTION, POWDER, FOR SOLUTION INTRAMUSCULAR; INTRAVENOUS EVERY 24 HOURS
Refills: 0 | Status: DISCONTINUED | OUTPATIENT
Start: 2020-08-25 | End: 2020-08-25

## 2020-08-24 RX ORDER — DEXTROSE 50 % IN WATER 50 %
12.5 SYRINGE (ML) INTRAVENOUS ONCE
Refills: 0 | Status: DISCONTINUED | OUTPATIENT
Start: 2020-08-24 | End: 2020-08-26

## 2020-08-24 RX ORDER — ONDANSETRON 8 MG/1
4 TABLET, FILM COATED ORAL EVERY 6 HOURS
Refills: 0 | Status: DISCONTINUED | OUTPATIENT
Start: 2020-08-24 | End: 2020-08-26

## 2020-08-24 RX ORDER — INSULIN LISPRO 100 [IU]/ML
10 INJECTION, SUSPENSION SUBCUTANEOUS
Qty: 0 | Refills: 0 | DISCHARGE

## 2020-08-24 RX ORDER — INSULIN LISPRO 100/ML
VIAL (ML) SUBCUTANEOUS
Refills: 0 | Status: DISCONTINUED | OUTPATIENT
Start: 2020-08-24 | End: 2020-08-26

## 2020-08-24 RX ORDER — CEFTRIAXONE 500 MG/1
INJECTION, POWDER, FOR SOLUTION INTRAMUSCULAR; INTRAVENOUS
Refills: 0 | Status: DISCONTINUED | OUTPATIENT
Start: 2020-08-24 | End: 2020-08-25

## 2020-08-24 RX ORDER — SACCHAROMYCES BOULARDII 250 MG
250 POWDER IN PACKET (EA) ORAL
Refills: 0 | Status: DISCONTINUED | OUTPATIENT
Start: 2020-08-24 | End: 2020-08-26

## 2020-08-24 RX ORDER — SEMAGLUTIDE 0.68 MG/ML
0 INJECTION, SOLUTION SUBCUTANEOUS
Qty: 0 | Refills: 0 | DISCHARGE

## 2020-08-24 RX ORDER — DULOXETINE HYDROCHLORIDE 30 MG/1
60 CAPSULE, DELAYED RELEASE ORAL DAILY
Refills: 0 | Status: DISCONTINUED | OUTPATIENT
Start: 2020-08-24 | End: 2020-08-26

## 2020-08-24 RX ORDER — GLUCAGON INJECTION, SOLUTION 0.5 MG/.1ML
1 INJECTION, SOLUTION SUBCUTANEOUS ONCE
Refills: 0 | Status: DISCONTINUED | OUTPATIENT
Start: 2020-08-24 | End: 2020-08-26

## 2020-08-24 RX ORDER — CEFTRIAXONE 500 MG/1
1000 INJECTION, POWDER, FOR SOLUTION INTRAMUSCULAR; INTRAVENOUS ONCE
Refills: 0 | Status: COMPLETED | OUTPATIENT
Start: 2020-08-24 | End: 2020-08-24

## 2020-08-24 RX ORDER — INSULIN DEGLUDEC 100 U/ML
40 INJECTION, SOLUTION SUBCUTANEOUS
Qty: 0 | Refills: 0 | DISCHARGE

## 2020-08-24 RX ORDER — CHOLECALCIFEROL (VITAMIN D3) 125 MCG
1000 CAPSULE ORAL DAILY
Refills: 0 | Status: DISCONTINUED | OUTPATIENT
Start: 2020-08-24 | End: 2020-08-26

## 2020-08-24 RX ORDER — FENOFIBRATE,MICRONIZED 130 MG
145 CAPSULE ORAL DAILY
Refills: 0 | Status: DISCONTINUED | OUTPATIENT
Start: 2020-08-24 | End: 2020-08-26

## 2020-08-24 RX ORDER — INSULIN GLARGINE 100 [IU]/ML
20 INJECTION, SOLUTION SUBCUTANEOUS EVERY MORNING
Refills: 0 | Status: DISCONTINUED | OUTPATIENT
Start: 2020-08-24 | End: 2020-08-26

## 2020-08-24 RX ORDER — SODIUM CHLORIDE 9 MG/ML
1000 INJECTION INTRAMUSCULAR; INTRAVENOUS; SUBCUTANEOUS ONCE
Refills: 0 | Status: COMPLETED | OUTPATIENT
Start: 2020-08-24 | End: 2020-08-24

## 2020-08-24 RX ORDER — ZOLPIDEM TARTRATE 10 MG/1
5 TABLET ORAL AT BEDTIME
Refills: 0 | Status: DISCONTINUED | OUTPATIENT
Start: 2020-08-24 | End: 2020-08-26

## 2020-08-24 RX ORDER — MONTELUKAST 4 MG/1
10 TABLET, CHEWABLE ORAL DAILY
Refills: 0 | Status: DISCONTINUED | OUTPATIENT
Start: 2020-08-24 | End: 2020-08-26

## 2020-08-24 RX ORDER — DEXTROSE 50 % IN WATER 50 %
15 SYRINGE (ML) INTRAVENOUS ONCE
Refills: 0 | Status: DISCONTINUED | OUTPATIENT
Start: 2020-08-24 | End: 2020-08-26

## 2020-08-24 RX ORDER — FOLIC ACID 0.8 MG
1 TABLET ORAL DAILY
Refills: 0 | Status: DISCONTINUED | OUTPATIENT
Start: 2020-08-24 | End: 2020-08-26

## 2020-08-24 RX ORDER — PANTOPRAZOLE SODIUM 20 MG/1
40 TABLET, DELAYED RELEASE ORAL
Refills: 0 | Status: DISCONTINUED | OUTPATIENT
Start: 2020-08-24 | End: 2020-08-26

## 2020-08-24 RX ORDER — SULFASALAZINE 500 MG
1000 TABLET ORAL
Refills: 0 | Status: DISCONTINUED | OUTPATIENT
Start: 2020-08-24 | End: 2020-08-26

## 2020-08-24 RX ORDER — LEVOTHYROXINE SODIUM 125 MCG
75 TABLET ORAL DAILY
Refills: 0 | Status: DISCONTINUED | OUTPATIENT
Start: 2020-08-24 | End: 2020-08-26

## 2020-08-24 RX ORDER — ACETAMINOPHEN 500 MG
650 TABLET ORAL EVERY 4 HOURS
Refills: 0 | Status: DISCONTINUED | OUTPATIENT
Start: 2020-08-24 | End: 2020-08-26

## 2020-08-24 RX ORDER — INSULIN LISPRO 100/ML
VIAL (ML) SUBCUTANEOUS AT BEDTIME
Refills: 0 | Status: DISCONTINUED | OUTPATIENT
Start: 2020-08-24 | End: 2020-08-26

## 2020-08-24 RX ADMIN — CEFTRIAXONE 100 MILLIGRAM(S): 500 INJECTION, POWDER, FOR SOLUTION INTRAMUSCULAR; INTRAVENOUS at 18:05

## 2020-08-24 RX ADMIN — Medication 75 MICROGRAM(S): at 18:16

## 2020-08-24 RX ADMIN — SODIUM CHLORIDE 1000 MILLILITER(S): 9 INJECTION INTRAMUSCULAR; INTRAVENOUS; SUBCUTANEOUS at 12:55

## 2020-08-24 RX ADMIN — PANTOPRAZOLE SODIUM 40 MILLIGRAM(S): 20 TABLET, DELAYED RELEASE ORAL at 18:16

## 2020-08-24 RX ADMIN — Medication 1000 MILLIGRAM(S): at 18:16

## 2020-08-24 RX ADMIN — SODIUM CHLORIDE 75 MILLILITER(S): 9 INJECTION, SOLUTION INTRAVENOUS at 19:48

## 2020-08-24 RX ADMIN — Medication 250 MILLIGRAM(S): at 18:16

## 2020-08-24 NOTE — ED PROVIDER NOTE - OBJECTIVE STATEMENT
Pt is 74 y/o male with PMhx of RA, DM, asthma here for eval of BRBPR x  6 episodes today, also dizziness. Pt states Pt is 74 y/o male with PMhx of RA, DM, asthma here for eval of BRBPR x  6 episodes today, also dizziness. Pt states that he woke up at 12;30 am had the first bloody BM at that time and continued with very loose stools mixed with blood since then had another 5 episodes. Denies abd pain, denies fever, chills, nausea, vomiting , denies recent travel, sick contacts, denies recent abx use, on baby asa, denies other blood thinners venessa, had negative colonoscopy  2.5 yrs ago by Dr Parker. PCP - Dr Gannon; denies syncope, cp or sob; Pt is 74 y/o male with PMhx of RA, DM, HTN, asthma here for eval of BRBPR x  6 episodes today, also dizziness. Pt states that he woke up at 12;30 am had the first bloody BM at that time and continued with very loose stools mixed with blood since then had another 5 episodes. Denies abd pain, denies fever, chills, nausea, vomiting , denies recent travel, sick contacts, denies recent abx use, on baby asa, denies other blood thinners venessa, had negative colonoscopy  2.5 yrs ago by Dr Parker. PCP - Dr Gannon; denies syncope, cp or sob;

## 2020-08-24 NOTE — H&P ADULT - ASSESSMENT
This is 76 y/o male with h/o HLD, DM2(a1c 5.5% July 2020), Diabetic neuropathy, HTN, Colon polyps, RA, hypothyroidism, DVT s/p Eliquis  2016, prostate ca s/p TURP who p/w BRBPR/Dizziness.     Acute blood loss anemia sec to GIB likely sec to lower   Last Hgb 15.6 and today's Hgb 11.8  Clears  tele   IVF  GI called by ED per ED  PPI but likely LGIB  Hold ASA    [place]ne   IVF  Cr in AM    Dizziness likely sec to above  ECHO had none on file  Orthostatic  IVF  Hold hydralazine/Norvasc for now  Cont Bystolic with parameters    DM2  LAntus/Humalog while inpt  no need to get a1c as done last month    Pyuria with CT findings   Rocephin  urine c/s    Pt wishes to be DNR/DNI  PCP-Dr. Carrasquillo updated

## 2020-08-24 NOTE — H&P ADULT - NSHPREVIEWOFSYSTEMS_GEN_ALL_CORE
REVIEW OF SYSTEMS:    CONSTITUTIONAL: No weakness, fevers or chills  EYES/ENT: No visual changes;  No vertigo or throat pain   NECK: No pain or stiffness  RESPIRATORY: No cough, wheezing, hemoptysis; No shortness of breath  CARDIOVASCULAR: No chest pain or palpitations  GASTROINTESTINAL: No abdominal or epigastric pain.  GENITOURINARY: No dysuria, frequency or hematuria  NEUROLOGICAL: No numbness or weakness  SKIN: No itching, burning, rashes, or lesions   All other review of systems is negative unless indicated above.

## 2020-08-24 NOTE — CONSULT NOTE ADULT - SUBJECTIVE AND OBJECTIVE BOX
Chief Complaint:  Patient is a 75y old  Male who presents with a chief complaint of bloody stools.  Hypertension  Prostate cancer  Diabetes type 2, uncontrolled  Asthma  Rheumatoid arthritis  H/O hernia repair     HPI: 76 y/o male with PMhx of RA, DM, HTN, asthma here for eval of BRBPR x  6 episodes today, also started feeling dizzy. Pt states that he woke up at 12;30 am had the first bloody BM at that time and continued with very loose stools mixed with blood since then had another 5 episodes. Denies abd pain, denies fever, chills, nausea, vomiting , denies recent travel, sick contacts, denies recent abx use, on baby asa, denies other blood thinners venessa, had negative colonoscopy  2.5 yrs ago by Dr Parker. PCP - Dr Gannon; denies syncope, cp or sob     In ED H/H was 11.8/36.0 down from Hg of 15 as per attending.    [place] (Unknown)  No Known Drug Allergies      acetaminophen  Suppository .. 650 milliGRAM(s) Rectal every 4 hours PRN  dextrose 40% Gel 15 Gram(s) Oral once PRN  dextrose 5%. 1000 milliLiter(s) IV Continuous <Continuous>  dextrose 50% Injectable 12.5 Gram(s) IV Push once  glucagon  Injectable 1 milliGRAM(s) IntraMuscular once PRN  insulin lispro (HumaLOG) corrective regimen sliding scale   SubCutaneous three times a day before meals  insulin lispro (HumaLOG) corrective regimen sliding scale   SubCutaneous at bedtime  ondansetron Injectable 4 milliGRAM(s) IV Push every 6 hours PRN        FAMILY HISTORY:        Review of Systems:    General:  No wt loss, fevers, chills, night sweats, fatigue  Eyes:  Good vision, no reported pain  ENT:  No sore throat, pain, runny nose, dysphagia  CV:  No pain, palpitations, no lightheadedness  Resp:  No dyspnea, cough, tachypnea, wheezing  GI: no abdominal pain, no melena, no nausea, no vomiting, Positive for bloody BM.  :  No pain, bleeding, incontinence, nocturia  Muscle:  No pain, weakness  Neuro:  No weakness, tingling, memory problems  Psych:  No fatigue, insomnia, mood problems, depression  Endocrine:  No polyuria, polydipsia cold/heat intolerance  Heme:  No petechiae, ecchymosis, easy bruisability  Skin:  No rash, tattoos, scars, edema    Relevant Family History:   n/c    Relevant Social History: n/c      Physical Exam:    Vital Signs:  Vital Signs Last 24 Hrs  T(C): 36.4 (24 Aug 2020 12:27), Max: 36.4 (24 Aug 2020 12:27)  T(F): 97.5 (24 Aug 2020 12:27), Max: 97.5 (24 Aug 2020 12:27)  HR: 67 (24 Aug 2020 12:) (67 - 67)  BP: 124/69 (24 Aug 2020 12:) (124/69 - 124/69)  BP(mean): --  RR: 16 (24 Aug 2020 12:) (16 - 16)  SpO2: 100% (24 Aug 2020 12:) (100% - 100%)  Daily Height in cm: 172.72 (24 Aug 2020 12:)    Daily     General:  Appears stated age, well-groomed, nad  HEENT:  NC/AT,  conjunctivae clear and pink, no thyromegaly, nodules, adenopathy, no JVD  Chest:  Full & symmetric excursion, no increased effort, breath sounds clear  Cardiovascular:  Regular rhythm, S1, S2, no murmur/rub/S3/S4, no abdominal bruit, no edema  Abdomen:  Soft, non-tender, non-distended, normoactive bowel sounds,  no masses ,no hepato splenomegaly no signs of chronic liver disease  Extremities:  no cyanosis, clubbing or edema  Skin:  No rash/erythema/ecchymoses/petechiae/wounds/abscess/warm/dry  Neuro/Psych:  A & O x 3  , no asterixis, no tremor, no encephalopathy    Laboratory:                            11.8   7.35  )-----------( 203      ( 24 Aug 2020 13:00 )             36.0     08-24    146<H>  |  110<H>  |  30<H>  ----------------------------<  158<H>  4.1   |  28  |  2.04<H>    Ca    8.0<L>      24 Aug 2020 13:00    TPro  6.1  /  Alb  3.2<L>  /  TBili  0.2  /  DBili  x   /  AST  21  /  ALT  28  /  AlkPhos  44  08-24    LIVER FUNCTIONS - ( 24 Aug 2020 13:00 )  Alb: 3.2 g/dL / Pro: 6.1 g/dL / ALK PHOS: 44 U/L / ALT: 28 U/L / AST: 21 U/L / GGT: x           PT/INR - ( 24 Aug 2020 13:00 )   PT: 13.1 sec;   INR: 1.09 ratio         PTT - ( 24 Aug 2020 13:00 )  PTT:25.0 sec  Urinalysis Basic - ( 24 Aug 2020 15:30 )    Color: Yellow / Appearance: Slightly Turbid / S.020 / pH: x  Gluc: x / Ketone: Negative  / Bili: Negative / Urobili: Negative   Blood: x / Protein: 100 / Nitrite: Positive   Leuk Esterase: Moderate / RBC: 5-10 /HPF / WBC 26-50 /HPF   Sq Epi: x / Non Sq Epi: Neg.-Few / Bacteria: Moderate /HPF        Imaging: EXAM:  CT ABDOMEN AND PELVIS      PROCEDURE DATE:  2020        INTERPRETATION:  CLINICAL INFORMATION: Bloody diarrhea    COMPARISON: 10/31/2015    PROCEDURE:  CT of the Abdomen and Pelvis was performed without intravenous contrast.  Intravenous contrast: None.  Oral contrast: None.  Sagittal and coronal reformats were performed.    FINDINGS:  LOWER CHEST: Cardiac valvular and coronary artery calcification    LIVER: Within normal limits.  BILE DUCTS: Normal caliber.  GALLBLADDER: Cholecystectomy.  SPLEEN: Within normal limits.  PANCREAS: Within normal limits.  ADRENALS: Within normal limits.  KIDNEYS/URETERS: Bilateral perirenal stranding is nonspecific, can be seen in the setting of UTI. Please correlate    BLADDER: Mild bladder wall thickening and stranding suggestive of cystitis. Consider radiation cystitis and/or infectious etiology. Recommend laboratory clinical correlation  REPRODUCTIVE ORGANS: Prostate not enlarged. Prostate radiation seeds    BOWEL: Evaluation the alimentary tube is limited by non opacification underdistention. Colonic diverticula. No definite diverticulitis or other gross bowel inflammation. No bowel obstruction Appendix no appendicitis  PERITONEUM: No ascites.  VESSELS: Nonaneurysmal  RETROPERITONEUM/LYMPH NODES: No lymphadenopathy.  ABDOMINAL WALL: Dependent subcutaneous edema lower lumbar region. Fat-containing periumbilical hernia.  BONES: Senescent/degenerative changes    IMPRESSION:  Colonic diverticulosis. No definite diverticulitis or other gross bowel inflammation.  Bilateral nonspecific perirenal stranding. Bladder wall thickening and stranding suggestive of cystitis. Correlate with urinalysis and symptomatology for UTI                ROSSI PARSON M.D., ATTENDING RADIOLOGIST  Thisdocument has been electronically signed. Aug 24 2020  2:19PM

## 2020-08-24 NOTE — H&P ADULT - HISTORY OF PRESENT ILLNESS
This is 74 y/o male with h/o HLD, DM2(a1c 5.5% July 2020), Diabetic neuropathy, HTN, Colon polyps, RA, hypothyroidism, DVT s/p Eliquis  2016, prostate ca s/p TURP who p/w BRBPR.   Pt started to have BRBPR X 6 today which started suddenly.   Pos for dizziness.   Denies CP, palp, SOB, n/v, abd pain.   Had hot dog yesterday, daughter ate same food, but she's well.   Pt had colonoscopy 2.5 yra ago with Dr. Gates and was nl as far as he knows.

## 2020-08-24 NOTE — H&P ADULT - NSICDXPASTMEDICALHX_GEN_ALL_CORE_FT
PAST MEDICAL HISTORY:  Asthma     Diabetes type 2, uncontrolled     Hypertension     Prostate cancer     Rheumatoid arthritis

## 2020-08-24 NOTE — CONSULT NOTE ADULT - ATTENDING COMMENTS
Patient seen and examined.  Agree with assessment and plan as above.    Elderly male with multiple medical problems admitted with painless rectal bleeding that appears now to have ceased.  No prior similar episodes.  Last colonoscopy approx 2 years ago, reportedly unremarkable.    VSS.  Abdomen soft, nontender    Likely diverticular bleed  Clear liquid diet  Monitor Hgb/Hct and bowel movements for now

## 2020-08-24 NOTE — CONSULT NOTE ADULT - ASSESSMENT
Impression:    74 y/o male with PMhx of RA, DM, HTN, asthma here for eval of BRBPR x  6 episodes today, also started feeling dizzy. Last  colonoscopy  2.5 yrs ago was negative as per patient. CT Abdomen without contrast revealed Colonic diverticulosis. No definite diverticulitis or other gross bowel inflammation.  Bilateral nonspecific perirenal stranding. Bladder wall thickening and stranding suggestive of cystitis. Correlate with urinalysis and symptomatology for UTI.        Plan:    - GI Bleed  - H/H at present 11.8/36.0  - No h/o Bleeding,  No Melena  - Positive for Bloody diarrhea x 6   - Maintain Goal Hbg > 7.0  - Recommend trending CBC q12H for 24 hrs.  - Monitor Vitals signs for hemodynamic stability  - Transfuse as needed   - Continue monitoring, hold AC.  - Will need Colonoscopy to be scheduled.      Case discussed with Dr Orlando and he will followup.      Shane BROWN  Gastroenterology   GI cell: [phone]

## 2020-08-24 NOTE — ED PROVIDER NOTE - CLINICAL SUMMARY MEDICAL DECISION MAKING FREE TEXT BOX
bloody diarrhea/BRBPR, concern for GI bleed, pt is hemodynamically stable, stool positive for occult blood - will admit for scope; bloody diarrhea/BRBPR, concern for GI bleed, pt is hemodynamically stable, stool positive for occult blood - will admit for scope;  Pt also has decrease in HgB from 15 to 11. He has rise in creatinine from normal 1.1 to >2. Pt to be admitted for workup and eval.

## 2020-08-24 NOTE — H&P ADULT - NSHPPHYSICALEXAM_GEN_ALL_CORE
Vital Signs Last 24 Hrs  T(C): 36.4 (24 Aug 2020 12:27), Max: 36.4 (24 Aug 2020 12:27)  T(F): 97.5 (24 Aug 2020 12:27), Max: 97.5 (24 Aug 2020 12:27)  HR: 67 (24 Aug 2020 12:27) (67 - 67)  BP: 124/69 (24 Aug 2020 12:27) (124/69 - 124/69)  BP(mean): --  RR: 16 (24 Aug 2020 12:27) (16 - 16)  SpO2: 100% (24 Aug 2020 12:27) (100% - 100%)    GENERAL: NAD  HEAD:  Atraumatic, Normocephalic  EYES: EOMI, PERRLA, conjunctiva and sclera clear  ENT: MM mildly dry  NECK: Supple, No JVD  CHEST/LUNG: CTA b/l  HEART: S1S2, RRR  ABDOMEN: SOft, NT/ND, pos BS(guiac pos in ED)  EXTREMITIES:  No edema  NERVOUS SYSTEM:  A&Ox3, no focal deficits   SKIN: No rashes or lesions

## 2020-08-24 NOTE — ED ADULT NURSE NOTE - OBJECTIVE STATEMENT
Patient presents to ED alert and oriented x3 with c/o 6 episodes of bright red bloody diarrhea that began today. Patient /61, HR 62, 98% on room air. Denies SOB, chest pain, abd pain, or other complaints at this time.

## 2020-08-24 NOTE — H&P ADULT - NSHPLABSRESULTS_GEN_ALL_CORE
LABS:                          11.8   7.35  )-----------( 203      ( 24 Aug 2020 13:00 )             36.0     08-24    146<H>  |  110<H>  |  30<H>  ----------------------------<  158<H>  4.1   |  28  |  2.04<H>    Ca    8.0<L>      24 Aug 2020 13:00    TPro  6.1  /  Alb  3.2<L>  /  TBili  0.2  /  DBili  x   /  AST  21  /  ALT  28  /  AlkPhos  44  08-24    LIVER FUNCTIONS - ( 24 Aug 2020 13:00 )  Alb: 3.2 g/dL / Pro: 6.1 g/dL / ALK PHOS: 44 U/L / ALT: 28 U/L / AST: 21 U/L / GGT: x           PT/INR - ( 24 Aug 2020 13:00 )   PT: 13.1 sec;   INR: 1.09 ratio         PTT - ( 24 Aug 2020 13:00 )  PTT:25.0 sec  CARDIAC MARKERS ( 24 Aug 2020 13:00 )  .025 ng/mL / x     / x     / x     / x          Urinalysis Basic - ( 24 Aug 2020 15:30 )    Color: Yellow / Appearance: Slightly Turbid / S.020 / pH: x  Gluc: x / Ketone: Negative  / Bili: Negative / Urobili: Negative   Blood: x / Protein: 100 / Nitrite: Positive   Leuk Esterase: Moderate / RBC: 5-10 /HPF / WBC 26-50 /HPF   Sq Epi: x / Non Sq Epi: Neg.-Few / Bacteria: Moderate /HPF          Radiology personally reviewed.    EXAM:  CT ABDOMEN AND PELVIS      PROCEDURE DATE:  2020        INTERPRETATION:  CLINICAL INFORMATION: Bloody diarrhea    COMPARISON: 10/31/2015    PROCEDURE:  CT of the Abdomen and Pelvis was performed without intravenous contrast.  Intravenous contrast: None.  Oral contrast: None.  Sagittal and coronal reformats were performed.    FINDINGS:  LOWER CHEST: Cardiac valvular and coronary artery calcification    LIVER: Within normal limits.  BILE DUCTS: Normal caliber.  GALLBLADDER: Cholecystectomy.  SPLEEN: Within normal limits.  PANCREAS: Within normal limits.  ADRENALS: Within normal limits.  KIDNEYS/URETERS: Bilateral perirenal stranding is nonspecific, can be seen in the setting of UTI. Please correlate    BLADDER: Mild bladder wall thickening and stranding suggestive of cystitis. Consider radiation cystitis and/or infectious etiology. Recommend laboratory clinical correlation  REPRODUCTIVE ORGANS: Prostate not enlarged. Prostate radiation seeds    BOWEL: Evaluation the alimentary tube is limited by nonopacification underdistention. Colonic diverticula. No definite diverticulitis or other gross bowel inflammation. No bowel obstruction Appendix no appendicitis  PERITONEUM: No ascites.  VESSELS: Nonaneurysmal  RETROPERITONEUM/LYMPH NODES: No lymphadenopathy.  ABDOMINAL WALL: Dependent subcutaneous edema lower lumbar region. Fat-containing periumbilical hernia.  BONES: Senescent/degenerative changes    IMPRESSION:  Colonic diverticulosis. No definite diverticulitis or other gross bowel inflammation.  Bilateral nonspecific perirenal stranding. Bladder wall thickening and stranding suggestive of cystitis. Correlate with urinalysis and symptomatology for UTI

## 2020-08-24 NOTE — ED ADULT TRIAGE NOTE - CHIEF COMPLAINT QUOTE
Pt brought in by EMS reporting 6 episodes of bright red bloody diarrhea since midnight - pt states he also passed out on the toilet but did not fall to the floor.

## 2020-08-25 LAB
A1C WITH ESTIMATED AVERAGE GLUCOSE RESULT: 5.4 % — SIGNIFICANT CHANGE UP (ref 4–5.6)
ALBUMIN SERPL ELPH-MCNC: 3 G/DL — LOW (ref 3.3–5)
ALP SERPL-CCNC: 40 U/L — SIGNIFICANT CHANGE UP (ref 40–120)
ALT FLD-CCNC: 23 U/L — SIGNIFICANT CHANGE UP (ref 10–45)
ANION GAP SERPL CALC-SCNC: 5 MMOL/L — SIGNIFICANT CHANGE UP (ref 5–17)
AST SERPL-CCNC: 17 U/L — SIGNIFICANT CHANGE UP (ref 10–40)
BASOPHILS # BLD AUTO: 0.04 K/UL — SIGNIFICANT CHANGE UP (ref 0–0.2)
BASOPHILS NFR BLD AUTO: 0.6 % — SIGNIFICANT CHANGE UP (ref 0–2)
BILIRUB SERPL-MCNC: 0.3 MG/DL — SIGNIFICANT CHANGE UP (ref 0.2–1.2)
BUN SERPL-MCNC: 22 MG/DL — SIGNIFICANT CHANGE UP (ref 7–23)
CALCIUM SERPL-MCNC: 7.8 MG/DL — LOW (ref 8.4–10.5)
CHLORIDE SERPL-SCNC: 112 MMOL/L — HIGH (ref 96–108)
CO2 SERPL-SCNC: 29 MMOL/L — SIGNIFICANT CHANGE UP (ref 22–31)
CREAT SERPL-MCNC: 1.51 MG/DL — HIGH (ref 0.5–1.3)
EOSINOPHIL # BLD AUTO: 0.13 K/UL — SIGNIFICANT CHANGE UP (ref 0–0.5)
EOSINOPHIL NFR BLD AUTO: 1.9 % — SIGNIFICANT CHANGE UP (ref 0–6)
ESTIMATED AVERAGE GLUCOSE: 108 MG/DL — SIGNIFICANT CHANGE UP (ref 68–114)
GLUCOSE BLDC GLUCOMTR-MCNC: 101 MG/DL — HIGH (ref 70–99)
GLUCOSE BLDC GLUCOMTR-MCNC: 110 MG/DL — HIGH (ref 70–99)
GLUCOSE BLDC GLUCOMTR-MCNC: 120 MG/DL — HIGH (ref 70–99)
GLUCOSE BLDC GLUCOMTR-MCNC: 165 MG/DL — HIGH (ref 70–99)
GLUCOSE SERPL-MCNC: 128 MG/DL — HIGH (ref 70–99)
HCT VFR BLD CALC: 29.9 % — LOW (ref 39–50)
HCT VFR BLD CALC: 30.5 % — LOW (ref 39–50)
HCV AB S/CO SERPL IA: 0.09 S/CO — SIGNIFICANT CHANGE UP (ref 0–0.99)
HCV AB SERPL-IMP: SIGNIFICANT CHANGE UP
HGB BLD-MCNC: 10.1 G/DL — LOW (ref 13–17)
HGB BLD-MCNC: 10.2 G/DL — LOW (ref 13–17)
IMM GRANULOCYTES NFR BLD AUTO: 0.4 % — SIGNIFICANT CHANGE UP (ref 0–1.5)
LYMPHOCYTES # BLD AUTO: 1.58 K/UL — SIGNIFICANT CHANGE UP (ref 1–3.3)
LYMPHOCYTES # BLD AUTO: 23.3 % — SIGNIFICANT CHANGE UP (ref 13–44)
MCHC RBC-ENTMCNC: 31.9 PG — SIGNIFICANT CHANGE UP (ref 27–34)
MCHC RBC-ENTMCNC: 32.4 PG — SIGNIFICANT CHANGE UP (ref 27–34)
MCHC RBC-ENTMCNC: 33.1 GM/DL — SIGNIFICANT CHANGE UP (ref 32–36)
MCHC RBC-ENTMCNC: 34.1 GM/DL — SIGNIFICANT CHANGE UP (ref 32–36)
MCV RBC AUTO: 94.9 FL — SIGNIFICANT CHANGE UP (ref 80–100)
MCV RBC AUTO: 96.2 FL — SIGNIFICANT CHANGE UP (ref 80–100)
MONOCYTES # BLD AUTO: 0.52 K/UL — SIGNIFICANT CHANGE UP (ref 0–0.9)
MONOCYTES NFR BLD AUTO: 7.7 % — SIGNIFICANT CHANGE UP (ref 2–14)
NEUTROPHILS # BLD AUTO: 4.48 K/UL — SIGNIFICANT CHANGE UP (ref 1.8–7.4)
NEUTROPHILS NFR BLD AUTO: 66.1 % — SIGNIFICANT CHANGE UP (ref 43–77)
NRBC # BLD: 0 /100 WBCS — SIGNIFICANT CHANGE UP (ref 0–0)
NRBC # BLD: 0 /100 WBCS — SIGNIFICANT CHANGE UP (ref 0–0)
PLATELET # BLD AUTO: 137 K/UL — LOW (ref 150–400)
PLATELET # BLD AUTO: 155 K/UL — SIGNIFICANT CHANGE UP (ref 150–400)
POTASSIUM SERPL-MCNC: 4.4 MMOL/L — SIGNIFICANT CHANGE UP (ref 3.5–5.3)
POTASSIUM SERPL-SCNC: 4.4 MMOL/L — SIGNIFICANT CHANGE UP (ref 3.5–5.3)
PROT SERPL-MCNC: 5.6 G/DL — LOW (ref 6–8.3)
RBC # BLD: 3.15 M/UL — LOW (ref 4.2–5.8)
RBC # BLD: 3.17 M/UL — LOW (ref 4.2–5.8)
RBC # FLD: 14.4 % — SIGNIFICANT CHANGE UP (ref 10.3–14.5)
RBC # FLD: 14.6 % — HIGH (ref 10.3–14.5)
SARS-COV-2 IGG SERPL QL IA: NEGATIVE — SIGNIFICANT CHANGE UP
SARS-COV-2 IGM SERPL IA-ACNC: 0.01 INDEX — SIGNIFICANT CHANGE UP
SODIUM SERPL-SCNC: 146 MMOL/L — HIGH (ref 135–145)
WBC # BLD: 5.79 K/UL — SIGNIFICANT CHANGE UP (ref 3.8–10.5)
WBC # BLD: 6.78 K/UL — SIGNIFICANT CHANGE UP (ref 3.8–10.5)
WBC # FLD AUTO: 5.79 K/UL — SIGNIFICANT CHANGE UP (ref 3.8–10.5)
WBC # FLD AUTO: 6.78 K/UL — SIGNIFICANT CHANGE UP (ref 3.8–10.5)

## 2020-08-25 PROCEDURE — 99233 SBSQ HOSP IP/OBS HIGH 50: CPT

## 2020-08-25 PROCEDURE — 93306 TTE W/DOPPLER COMPLETE: CPT | Mod: 26

## 2020-08-25 RX ORDER — AMLODIPINE BESYLATE 2.5 MG/1
5 TABLET ORAL DAILY
Refills: 0 | Status: DISCONTINUED | OUTPATIENT
Start: 2020-08-25 | End: 2020-08-26

## 2020-08-25 RX ORDER — ACETAMINOPHEN 500 MG
650 TABLET ORAL ONCE
Refills: 0 | Status: COMPLETED | OUTPATIENT
Start: 2020-08-25 | End: 2020-08-25

## 2020-08-25 RX ADMIN — Medication 2: at 12:14

## 2020-08-25 RX ADMIN — Medication 75 MICROGRAM(S): at 06:44

## 2020-08-25 RX ADMIN — Medication 250 MILLIGRAM(S): at 06:44

## 2020-08-25 RX ADMIN — NEBIVOLOL HYDROCHLORIDE 10 MILLIGRAM(S): 5 TABLET ORAL at 06:45

## 2020-08-25 RX ADMIN — Medication 650 MILLIGRAM(S): at 07:07

## 2020-08-25 RX ADMIN — Medication 250 MILLIGRAM(S): at 17:10

## 2020-08-25 RX ADMIN — Medication 650 MILLIGRAM(S): at 08:07

## 2020-08-25 RX ADMIN — Medication 145 MILLIGRAM(S): at 12:11

## 2020-08-25 RX ADMIN — SODIUM CHLORIDE 75 MILLILITER(S): 9 INJECTION, SOLUTION INTRAVENOUS at 06:44

## 2020-08-25 RX ADMIN — Medication 1000 MILLIGRAM(S): at 06:45

## 2020-08-25 RX ADMIN — PANTOPRAZOLE SODIUM 40 MILLIGRAM(S): 20 TABLET, DELAYED RELEASE ORAL at 06:45

## 2020-08-25 RX ADMIN — INSULIN GLARGINE 20 UNIT(S): 100 INJECTION, SOLUTION SUBCUTANEOUS at 07:53

## 2020-08-25 RX ADMIN — MONTELUKAST 10 MILLIGRAM(S): 4 TABLET, CHEWABLE ORAL at 12:10

## 2020-08-25 RX ADMIN — DULOXETINE HYDROCHLORIDE 60 MILLIGRAM(S): 30 CAPSULE, DELAYED RELEASE ORAL at 12:11

## 2020-08-25 RX ADMIN — Medication 1000 UNIT(S): at 12:17

## 2020-08-25 RX ADMIN — PANTOPRAZOLE SODIUM 40 MILLIGRAM(S): 20 TABLET, DELAYED RELEASE ORAL at 17:11

## 2020-08-25 RX ADMIN — Medication 1 MILLIGRAM(S): at 12:10

## 2020-08-25 RX ADMIN — Medication 1000 MILLIGRAM(S): at 17:11

## 2020-08-25 NOTE — PROGRESS NOTE ADULT - ASSESSMENT
76 y/o male with PMhx of RA, DM, HTN, asthma here for eval of BRBPR x  6 episodes today, also started feeling dizzy. Last  colonoscopy  2.5 yrs ago was negative as per patient. CT Abdomen without contrast revealed Colonic diverticulosis. No definite diverticulitis or other gross bowel inflammation.Bilateral nonspecific perirenal stranding. Bladder wall thickening and stranding suggestive of cystitis.    Plan:    - GI Bleed  - H/H on 8/24/20 -->11.8/36.0  -H/H on 8/25/20 --? 10.7/30.5  - No h/o Bleeding,  No Melena  - Positive for Bloody diarrhea x 6   - Maintain Goal Hbg > 7.0  - Recommend trending CBC q12H   - Monitor Vitals signs for hemodynamic stability  - Transfuse as needed   - Continue monitoring, hold AC.  - Possible colonoscopy to be scheduled.      Case discussed with Dr Orlando and he will followup. 76 y/o male with PMhx of RA, DM, HTN, asthma here for eval of BRBPR x  6 episodes today, also started feeling dizzy. Last  colonoscopy  2.5 yrs ago was negative as per patient. CT Abdomen without contrast revealed Colonic diverticulosis. No definite diverticulitis or other gross bowel inflammation.Bilateral nonspecific perirenal stranding. Bladder wall thickening and stranding suggestive of cystitis. Positive for UTI.     Plan:    - GI Bleed  - H/H on 8/24/20 -->11.8/36.0  -H/H on 8/25/20 --? 10.7/30.5  - No h/o Bleeding,  No Melena  - Positive for Bloody diarrhea x 6. Last bloody BM 8/24/20 AM    - Maintain Goal Hbg > 7.0  - Recommend trending CBC q12H   - Monitor Vitals signs for hemodynamic stability  - Transfuse as needed   - Continue monitoring, hold AC.  - Possible colonoscopy to be scheduled, however deferred at the present time for positive UTI. On antibiotic therapy,    Case discussed with Dr Orlando and he will followup. 74 y/o male with PMhx of RA, DM, HTN, asthma here for eval of BRBPR x  6 episodes today, also started feeling dizzy. Last  colonoscopy  2.5 yrs ago was negative as per patient. CT Abdomen without contrast revealed Colonic diverticulosis. No definite diverticulitis or other gross bowel inflammation.Bilateral nonspecific perirenal stranding. Bladder wall thickening and stranding suggestive of cystitis. Positive for UTI.     Plan:    - GI Bleed  - H/H on 8/24/20 -->11.8/36.0  -H/H on 8/25/20 --? 10.7/30.5  - No h/o Bleeding,  No Melena  - Positive for Bloody diarrhea x 6. Last bloody BM 8/24/20 AM    - Maintain Goal Hbg > 7.0  - Recommend trending CBC q12H   - Monitor Vitals signs for hemodynamic stability  - Transfuse as needed   - Continue monitoring, hold AC.  - Colonoscopy deferred at the present time for positive UTI. On antibiotic therapy.  - No events of bloody BMs, so possibly diverticular bleed    Case discussed with Dr Orlando and he will followup.

## 2020-08-25 NOTE — DIETITIAN INITIAL EVALUATION ADULT. - OTHER INFO
This is 76 y/o male with h/o HLD, DM2(a1c 5.5% July 2020), Diabetic neuropathy, HTN, Colon polyps, RA, hypothyroidism, DVT s/p Eliquis  2016, prostate ca s/p TURP who p/w BRBPR/Dizziness.   Pt interviewed at bedside. Reports tolerating clear liquids, no GI distress/pain. Confirms allergy to anabella. Reports intentional weight loss over the last 6 months on Nutrisystem program. Went from 307lbs to 277lbs. Pt states that he discontinued Nutrisystem 2/2 cost, but will continue to apply portion control principles to his diet. States that he and his daughter share meal preparation at home. Glycemic control improved with weight loss, now A1c 5.5%. Weight management diet education reviewed. No skin breakdown or edema noted. When medically feasible, recommend advance to consistent carbohydrate, DASH/TLC as tolerated. GI following.

## 2020-08-25 NOTE — PROGRESS NOTE ADULT - SUBJECTIVE AND OBJECTIVE BOX
INTERVAL HPI/OVERNIGHT EVENTS:  HPI:  This is 74 y/o male with h/o HLD, DM2(a1c 5.5% 2020), Diabetic neuropathy, HTN, Colon polyps, RA, hypothyroidism, DVT s/p Eliquis  2016, prostate ca s/p TURP who p/w BRBPR x 6 days.     MEDICATIONS  (STANDING):  cefTRIAXone   IVPB 1000 milliGRAM(s) IV Intermittent every 24 hours  cefTRIAXone   IVPB      cholecalciferol 1000 Unit(s) Oral daily  dextrose 5%. 1000 milliLiter(s) (50 mL/Hr) IV Continuous <Continuous>  dextrose 50% Injectable 12.5 Gram(s) IV Push once  DULoxetine 60 milliGRAM(s) Oral daily  fenofibrate Tablet 145 milliGRAM(s) Oral daily  folic acid 1 milliGRAM(s) Oral daily  insulin glargine Injectable (LANTUS) 20 Unit(s) SubCutaneous every morning  insulin lispro (HumaLOG) corrective regimen sliding scale   SubCutaneous three times a day before meals  insulin lispro (HumaLOG) corrective regimen sliding scale   SubCutaneous at bedtime  levothyroxine 75 MICROGram(s) Oral daily  montelukast 10 milliGRAM(s) Oral daily  nebivolol 10 milliGRAM(s) Oral daily  pantoprazole  Injectable 40 milliGRAM(s) IV Push two times a day  saccharomyces boulardii 250 milliGRAM(s) Oral two times a day  sodium chloride 0.45%. 1000 milliLiter(s) (75 mL/Hr) IV Continuous <Continuous>  sulfaSALAzine 1000 milliGRAM(s) Oral two times a day    MEDICATIONS  (PRN):  acetaminophen  Suppository .. 650 milliGRAM(s) Rectal every 4 hours PRN Temp greater or equal to 38C (100.4F), Mild Pain (1 - 3)  dextrose 40% Gel 15 Gram(s) Oral once PRN Blood Glucose LESS THAN 70 milliGRAM(s)/deciliter  glucagon  Injectable 1 milliGRAM(s) IntraMuscular once PRN Glucose LESS THAN 70 milligrams/deciliter  ondansetron Injectable 4 milliGRAM(s) IV Push every 6 hours PRN Nausea and/or Vomiting  zolpidem 5 milliGRAM(s) Oral at bedtime PRN Insomnia  zolpidem 5 milliGRAM(s) Oral at bedtime PRN Insomnia      Allergies    Osito (Unknown)  No Known Drug Allergies              General:  No wt loss, fevers, chills, night sweats, fatigue  Eyes:  Good vision, no reported pain  ENT:  No sore throat, pain, runny nose, dysphagia  CV:  No pain, palpitations, no lightheadedness  Resp:  No dyspnea, cough, tachypnea, wheezing  GI: no abdominal pain, no melena, no nausea, no vomiting, Positive for bloody BM.  :  No pain, bleeding, incontinence, nocturia  Muscle:  No pain, weakness  Neuro:  No weakness, tingling, memory problems  Psych:  No fatigue, insomnia, mood problems, depression  Endocrine:  No polyuria, polydipsia cold/heat intolerance  Heme:  No petechiae, ecchymosis, easy bruisability  Skin:  No rash, tattoos, scars, edema      PHYSICAL EXAM:   Vital Signs:  Vital Signs Last 24 Hrs  T(C): 37.1 (25 Aug 2020 05:38), Max: 37.1 (24 Aug 2020 20:42)  T(F): 98.7 (25 Aug 2020 05:38), Max: 98.7 (24 Aug 2020 20:42)  HR: 83 (25 Aug 2020 05:38) (67 - 87)  BP: 131/84 (25 Aug 2020 05:38) (116/82 - 156/82)  BP(mean): --  RR: 16 (25 Aug 2020 05:38) (16 - 17)  SpO2: 95% (25 Aug 2020 05:38) (93% - 100%)  Daily Height in cm: 172.72 (24 Aug 2020 16:43)    Daily Weight in k (25 Aug 2020 05:38)I&O's Summary    24 Aug 2020 07:01  -  25 Aug 2020 07:00  --------------------------------------------------------  IN: 1715 mL / OUT: 0 mL / NET: 1715 mL          General:  Appears stated age, well-groomed, nad  HEENT:  NC/AT,  conjunctivae clear and pink, no thyromegaly, nodules, adenopathy, no JVD  Chest:  Full & symmetric excursion, no increased effort, breath sounds clear  Cardiovascular:  Regular rhythm, S1, S2, no murmur/rub/S3/S4, no abdominal bruit, no edema  Abdomen:  Soft, non-tender, non-distended, normoactive bowel sounds,  no masses ,no hepato splenomegaly no signs of chronic liver disease  Extremities:  no cyanosis, clubbing or edema  Skin:  No rash/erythema/ecchymoses/petechiae/wounds/abscess/warm/dry  Neuro/Psych:  A & O x 3  , no asterixis, no tremor, no encephalopathy        LABS:                        10.1   6.78  )-----------( 155      ( 25 Aug 2020 06:44 )             30.5     08-25    146<H>  |  112<H>  |  22  ----------------------------<  128<H>  4.4   |  29  |  1.51<H>    Ca    7.8<L>      25 Aug 2020 06:44    TPro  5.6<L>  /  Alb  3.0<L>  /  TBili  0.3  /  DBili  x   /  AST  17  /  ALT  23  /  AlkPhos  40  08-25    PT/INR - ( 24 Aug 2020 13:00 )   PT: 13.1 sec;   INR: 1.09 ratio         PTT - ( 24 Aug 2020 13:00 )  PTT:25.0 sec  Urinalysis Basic - ( 24 Aug 2020 15:30 )    Color: Yellow / Appearance: Slightly Turbid / S.020 / pH: x  Gluc: x / Ketone: Negative  / Bili: Negative / Urobili: Negative   Blood: x / Protein: 100 / Nitrite: Positive   Leuk Esterase: Moderate / RBC: 5-10 /HPF / WBC 26-50 /HPF   Sq Epi: x / Non Sq Epi: Neg.-Few / Bacteria: Moderate /HPF      amylase   lipase  RADIOLOGY & ADDITIONAL TESTS: INTERVAL HPI/OVERNIGHT EVENTS:  HPI:  This is 76 y/o male with h/o HLD, DM2(a1c 5.5% 2020), Diabetic neuropathy, HTN, Colon polyps, RA, hypothyroidism, DVT s/p Eliquis  2016, prostate ca s/p TURP who p/w BRBPR x 6 days.     MEDICATIONS  (STANDING):  cefTRIAXone   IVPB 1000 milliGRAM(s) IV Intermittent every 24 hours  cefTRIAXone   IVPB      cholecalciferol 1000 Unit(s) Oral daily  dextrose 5%. 1000 milliLiter(s) (50 mL/Hr) IV Continuous <Continuous>  dextrose 50% Injectable 12.5 Gram(s) IV Push once  DULoxetine 60 milliGRAM(s) Oral daily  fenofibrate Tablet 145 milliGRAM(s) Oral daily  folic acid 1 milliGRAM(s) Oral daily  insulin glargine Injectable (LANTUS) 20 Unit(s) SubCutaneous every morning  insulin lispro (HumaLOG) corrective regimen sliding scale   SubCutaneous three times a day before meals  insulin lispro (HumaLOG) corrective regimen sliding scale   SubCutaneous at bedtime  levothyroxine 75 MICROGram(s) Oral daily  montelukast 10 milliGRAM(s) Oral daily  nebivolol 10 milliGRAM(s) Oral daily  pantoprazole  Injectable 40 milliGRAM(s) IV Push two times a day  saccharomyces boulardii 250 milliGRAM(s) Oral two times a day  sodium chloride 0.45%. 1000 milliLiter(s) (75 mL/Hr) IV Continuous <Continuous>  sulfaSALAzine 1000 milliGRAM(s) Oral two times a day    MEDICATIONS  (PRN):  acetaminophen  Suppository .. 650 milliGRAM(s) Rectal every 4 hours PRN Temp greater or equal to 38C (100.4F), Mild Pain (1 - 3)  dextrose 40% Gel 15 Gram(s) Oral once PRN Blood Glucose LESS THAN 70 milliGRAM(s)/deciliter  glucagon  Injectable 1 milliGRAM(s) IntraMuscular once PRN Glucose LESS THAN 70 milligrams/deciliter  ondansetron Injectable 4 milliGRAM(s) IV Push every 6 hours PRN Nausea and/or Vomiting  zolpidem 5 milliGRAM(s) Oral at bedtime PRN Insomnia  zolpidem 5 milliGRAM(s) Oral at bedtime PRN Insomnia      Allergies    Osito (Unknown)  No Known Drug Allergies              General:  No wt loss, fevers, chills, night sweats, fatigue  Eyes:  Good vision, no reported pain  ENT:  No sore throat, pain, runny nose, dysphagia  CV:  No pain, palpitations, no lightheadedness  Resp:  No dyspnea, cough, tachypnea, wheezing  GI: no abdominal pain, no melena, no nausea, no vomiting, Positive for bloody BM.  :  No pain, bleeding, incontinence, nocturia  Muscle:  No pain, weakness  Neuro:  No weakness, tingling, memory problems  Psych:  No fatigue, insomnia, mood problems, depression  Endocrine:  No polyuria, polydipsia cold/heat intolerance  Heme:  No petechiae, ecchymosis, easy bruisability  Skin:  No rash, tattoos, scars, edema      PHYSICAL EXAM:   Vital Signs:  Vital Signs Last 24 Hrs  T(C): 37.1 (25 Aug 2020 05:38), Max: 37.1 (24 Aug 2020 20:42)  T(F): 98.7 (25 Aug 2020 05:38), Max: 98.7 (24 Aug 2020 20:42)  HR: 83 (25 Aug 2020 05:38) (67 - 87)  BP: 131/84 (25 Aug 2020 05:38) (116/82 - 156/82)  BP(mean): --  RR: 16 (25 Aug 2020 05:38) (16 - 17)  SpO2: 95% (25 Aug 2020 05:38) (93% - 100%)  Daily Height in cm: 172.72 (24 Aug 2020 16:43)    Daily Weight in k (25 Aug 2020 05:38)I&O's Summary    24 Aug 2020 07:01  -  25 Aug 2020 07:00  --------------------------------------------------------  IN: 1715 mL / OUT: 0 mL / NET: 1715 mL          General:  Appears stated age, well-groomed, nad  HEENT:  NC/AT,  conjunctivae clear and pink, no thyromegaly, nodules, adenopathy, no JVD  Chest:  Full & symmetric excursion, no increased effort, breath sounds clear  Cardiovascular:  Regular rhythm, S1, S2, no murmur/rub/S3/S4, no abdominal bruit, no edema  Abdomen:  Soft, non-tender, non-distended, normoactive bowel sounds,  no masses ,no hepato splenomegaly no signs of chronic liver disease  Extremities:  no cyanosis, clubbing or edema  Skin:  No rash/erythema/ecchymoses/petechiae/wounds/abscess/warm/dry  Neuro/Psych:  A & O x 3  , no asterixis, no tremor, no encephalopathy        LABS:                        10.1   6.78  )-----------( 155      ( 25 Aug 2020 06:44 )             30.5     08-25    146<H>  |  112<H>  |  22  ----------------------------<  128<H>  4.4   |  29  |  1.51<H>    Ca    7.8<L>      25 Aug 2020 06:44    TPro  5.6<L>  /  Alb  3.0<L>  /  TBili  0.3  /  DBili  x   /  AST  17  /  ALT  23  /  AlkPhos  40  08-25    PT/INR - ( 24 Aug 2020 13:00 )   PT: 13.1 sec;   INR: 1.09 ratio         PTT - ( 24 Aug 2020 13:00 )  PTT:25.0 sec  Urinalysis Basic - ( 24 Aug 2020 15:30 )    Color: Yellow / Appearance: Slightly Turbid / S.020 / pH: x  Gluc: x / Ketone: Negative  / Bili: Negative / Urobili: Negative   Blood: x / Protein: 100 / Nitrite: Positive   Leuk Esterase: Moderate / RBC: 5-10 /HPF / WBC 26-50 /HPF   Sq Epi: x / Non Sq Epi: Neg.-Few / Bacteria: Moderate /HPF        RADIOLOGY & ADDITIONAL TESTS:        General:  Appears stated age, well-groomed, nad  HEENT:  NC/AT,  conjunctivae clear and pink, no thyromegaly, nodules, adenopathy, no JVD  Chest:  Full & symmetric excursion, no increased effort, breath sounds clear  Cardiovascular:  Regular rhythm, S1, S2, no murmur/rub/S3/S4, no abdominal bruit, no edema  Abdomen:  Soft, non-tender, non-distended, normoactive bowel sounds,  no masses ,no hepato splenomegaly no signs of chronic liver disease  Extremities:  no cyanosis, clubbing or edema  Skin:  No rash/erythema/ecchymoses/petechiae/wounds/abscess/warm/dry  Neuro/Psych:  A & O x 3  , no asterixis, no tremor, no encephalopathy

## 2020-08-25 NOTE — PROGRESS NOTE ADULT - PROVIDER SPECIALTY LIST ADULT
Hospitalist Patient called in again today requesting a refill on her Zofran 4 mg tablet #30 day supply  Patient would like this to be sent to [place] on [facility]  Please authorize refill if appropriate      Thank you

## 2020-08-25 NOTE — DIETITIAN INITIAL EVALUATION ADULT. - PERTINENT MEDS FT
rocephin, IV NS, lantus, SSI, Vitamin D, cymbalta, folate, synthroid, singulair, zofran, protonix, florastor, ambien

## 2020-08-25 NOTE — CHART NOTE - NSCHARTNOTEFT_GEN_A_CORE
Upon Nutritional Assessment by the Registered Dietitian your patient was determined to meet criteria / has evidence of the following diagnosis/diagnoses:            [X] Morbid Obesity / BMI > 40      Findings as based on:  [X] Comprehensive nutrition assessment   [ ] Nutrition Focused Physical Exam  [X] Other: BMI 42.1      Nutrition Plan/Recommendations:    1. When medically feasible, advance diet to consistent carbohydrate, DASH/TLC as tolerated   2. Weight management education reviewed with pt    PROVIDER Section:     By signing this assessment you are acknowledging and agree with the diagnosis/diagnoses assigned by the Registered Dietitian    Comments:

## 2020-08-25 NOTE — PROGRESS NOTE ADULT - ASSESSMENT
75M with HLD, DM2, diabetic neuropathy, HTN, colon polyps, hypothyroidism, DVT s/p Eliquis 2016, prostate ca s/p TURP came to hospital for BRBPR, dizziness, JAN    #Acute blood loss anemia  #GI bleed, suspect LGIB  #Suspect diverticular bleed  -Colonoscopy 2 years ago - reportedly normal  -Diverticulosis noted on CT scan  -CBC q12 h, c/w clear liquid diet  -Colonoscopy on THURSDAY IF CBC continues to downtrend  -If CBC stabilizes, will defer for outpatient repeat colonoscopy - case d/w Dr. Orlando, GI attending  -hold ASA at this time    #Diabetes, type 2, on insulin  A1C is pending  POCT Blood Glucose.: 165 mg/dL (25 Aug 2020 12:06)  POCT Blood Glucose.: 120 mg/dL (25 Aug 2020 07:43)  POCT Blood Glucose.: 145 mg/dL (24 Aug 2020 21:24)  POCT Blood Glucose.: 122 mg/dL (24 Aug 2020 17:07)  Current insulin regimen:  insulin glargine Injectable (LANTUS)   20 Unit(s) SubCutaneous (08-25-20 @ 07:53)  insulin lispro (HumaLOG) corrective regimen sliding scale   2 Unit(s) SubCutaneous (08-25-20 @ 12:14)  Additional insulin Received in the past 24 hours: 2 units sliding scale coverage  c/w current insulin regimen    #Morbid obesity  -BMI 42.1  -Outpatient exercise regimen as tolerated    #Asymptomatic bacteruria  -NO CLINICAL signs of UTI, patient is ASYMPTOMATIC, was treated for "UTI" one month ago (no symptoms at that time, per patient)  -Urine Cx sent, but would not , as patient asymptomatic, and will stop antibiotics at this time    #JAN  -Baseline Cr 1.2  -c/w IVF    #Dizziness   -likely from volume depletion  -improved with IVF  -Resume norvasc at 1/2 dose (5 mg)  -continue to hold hydralazine    #HTN  -c/w beta blocker, resume CCB at 1/2 dose, hold hydralazine, to avoid hypotension     #DVT ppx: SCDs, hold chemical ppx due to GI bleed    Case d/w patient's daughter, Emma, [phone], agrees with plan

## 2020-08-25 NOTE — PROGRESS NOTE ADULT - SUBJECTIVE AND OBJECTIVE BOX
Patient is a 75y old  Male who presents with a chief complaint of BRBPR (25 Aug 2020 08:56)    Patient seen and examined at bedside. No overnight events reported. No longer having any GI bleeding.    ALLERGIES:  [place] (Unknown)  No Known Drug Allergies    MEDICATIONS  (STANDING):  cefTRIAXone   IVPB 1000 milliGRAM(s) IV Intermittent every 24 hours  cefTRIAXone   IVPB      cholecalciferol 1000 Unit(s) Oral daily  dextrose 5%. 1000 milliLiter(s) (50 mL/Hr) IV Continuous <Continuous>  dextrose 50% Injectable 12.5 Gram(s) IV Push once  DULoxetine 60 milliGRAM(s) Oral daily  fenofibrate Tablet 145 milliGRAM(s) Oral daily  folic acid 1 milliGRAM(s) Oral daily  insulin glargine Injectable (LANTUS) 20 Unit(s) SubCutaneous every morning  insulin lispro (HumaLOG) corrective regimen sliding scale   SubCutaneous three times a day before meals  insulin lispro (HumaLOG) corrective regimen sliding scale   SubCutaneous at bedtime  levothyroxine 75 MICROGram(s) Oral daily  montelukast 10 milliGRAM(s) Oral daily  nebivolol 10 milliGRAM(s) Oral daily  pantoprazole  Injectable 40 milliGRAM(s) IV Push two times a day  saccharomyces boulardii 250 milliGRAM(s) Oral two times a day  sodium chloride 0.45%. 1000 milliLiter(s) (75 mL/Hr) IV Continuous <Continuous>  sulfaSALAzine 1000 milliGRAM(s) Oral two times a day    MEDICATIONS  (PRN):  acetaminophen  Suppository .. 650 milliGRAM(s) Rectal every 4 hours PRN Temp greater or equal to 38C (100.4F), Mild Pain (1 - 3)  dextrose 40% Gel 15 Gram(s) Oral once PRN Blood Glucose LESS THAN 70 milliGRAM(s)/deciliter  glucagon  Injectable 1 milliGRAM(s) IntraMuscular once PRN Glucose LESS THAN 70 milligrams/deciliter  ondansetron Injectable 4 milliGRAM(s) IV Push every 6 hours PRN Nausea and/or Vomiting  zolpidem 5 milliGRAM(s) Oral at bedtime PRN Insomnia  zolpidem 5 milliGRAM(s) Oral at bedtime PRN Insomnia    Vital Signs Last 24 Hrs  T(F): 98.7 (25 Aug 2020 05:38), Max: 98.7 (24 Aug 2020 20:42)  HR: 83 (25 Aug 2020 05:38) (75 - 87)  BP: 131/84 (25 Aug 2020 05:38) (116/82 - 156/82)  RR: 16 (25 Aug 2020 05:38) (16 - 17)  SpO2: 95% (25 Aug 2020 05:38) (93% - 95%)  I&O's Summary	    24 Aug 2020 07:01  -  25 Aug 2020 07:00  --------------------------------------------------------  IN: 1715 mL / OUT: 0 mL / NET: 1715 mL    25 Aug 2020 07:01  -  25 Aug 2020 13:06  --------------------------------------------------------  IN: 700 mL / OUT: 0 mL / NET: 700 mL      PHYSICAL EXAM:  General: NAD, A/O x 3  ENT: Moist mucous membranes, no thrush  Neck: Supple, No JVD  Lungs: Clear to auscultation bilaterally, good air entry, non-labored breathing  Cardio: RRR, S1/S2, No murmur  Abdomen: Soft, Nontender, Nondistended; Bowel sounds present; obese  Extremities: No calf tenderness, No pitting edema    LABS:                        10.1   6.78  )-----------( 155      ( 25 Aug 2020 06:44 )             30.5         146  |  112  |  22  ----------------------------<  128  4.4   |  29  |  1.51    Ca    7.8      25 Aug 2020 06:44    TPro  5.6  /  Alb  3.0  /  TBili  0.3  /  DBili  x   /  AST  17  /  ALT  23  /  AlkPhos  40          eGFR if African American: 52 mL/min/1.73M2 (- @ 06:44)  eGFR if Non African American: 45 mL/min/1.73M2 (20 @ 06:44)    PT/INR - ( 24 Aug 2020 13:00 )   PT: 13.1 sec;   INR: 1.09 ratio         PTT - ( 24 Aug 2020 13:00 )  PTT:25.0 sec      CARDIAC MARKERS ( 24 Aug 2020 13:00 )  .025 ng/mL / x     / x     / x     / x        POCT Blood Glucose.: 165 mg/dL (25 Aug 2020 12:06)  POCT Blood Glucose.: 120 mg/dL (25 Aug 2020 07:43)  POCT Blood Glucose.: 145 mg/dL (24 Aug 2020 21:24)  POCT Blood Glucose.: 122 mg/dL (24 Aug 2020 17:07)      Urinalysis Basic - ( 24 Aug 2020 15:30 )    Color: Yellow / Appearance: Slightly Turbid / S.020 / pH: x  Gluc: x / Ketone: Negative  / Bili: Negative / Urobili: Negative   Blood: x / Protein: 100 / Nitrite: Positive   Leuk Esterase: Moderate / RBC: 5-10 /HPF / WBC 26-50 /HPF   Sq Epi: x / Non Sq Epi: Neg.-Few / Bacteria: Moderate /HPF        RADIOLOGY & ADDITIONAL TESTS:  < from: CT Abdomen and Pelvis No Cont (20 @ 13:56) >  Colonic diverticulosis. No definite diverticulitis or other gross bowel inflammation.  Bilateral nonspecific perirenal stranding. Bladder wall thickening and stranding suggestive of cystitis. Correlate with urinalysis and symptomatology for UTI    < end of copied text >    Care Discussed with Consultants/Other Providers: Case d/w Darion Welch - GI attending

## 2020-08-26 ENCOUNTER — TRANSCRIPTION ENCOUNTER (OUTPATIENT)
Age: 75
End: 2020-08-26

## 2020-08-26 VITALS
SYSTOLIC BLOOD PRESSURE: 146 MMHG | HEART RATE: 81 BPM | DIASTOLIC BLOOD PRESSURE: 73 MMHG | OXYGEN SATURATION: 98 % | TEMPERATURE: 98 F | RESPIRATION RATE: 16 BRPM

## 2020-08-26 LAB
-  AMIKACIN: SIGNIFICANT CHANGE UP
-  AMOXICILLIN/CLAVULANIC ACID: SIGNIFICANT CHANGE UP
-  AMPICILLIN/SULBACTAM: SIGNIFICANT CHANGE UP
-  AMPICILLIN: SIGNIFICANT CHANGE UP
-  AZTREONAM: SIGNIFICANT CHANGE UP
-  CEFAZOLIN: SIGNIFICANT CHANGE UP
-  CEFEPIME: SIGNIFICANT CHANGE UP
-  CEFOXITIN: SIGNIFICANT CHANGE UP
-  CEFTRIAXONE: SIGNIFICANT CHANGE UP
-  CIPROFLOXACIN: SIGNIFICANT CHANGE UP
-  ERTAPENEM: SIGNIFICANT CHANGE UP
-  GENTAMICIN: SIGNIFICANT CHANGE UP
-  IMIPENEM: SIGNIFICANT CHANGE UP
-  LEVOFLOXACIN: SIGNIFICANT CHANGE UP
-  MEROPENEM: SIGNIFICANT CHANGE UP
-  NITROFURANTOIN: SIGNIFICANT CHANGE UP
-  PIPERACILLIN/TAZOBACTAM: SIGNIFICANT CHANGE UP
-  TIGECYCLINE: SIGNIFICANT CHANGE UP
-  TOBRAMYCIN: SIGNIFICANT CHANGE UP
-  TRIMETHOPRIM/SULFAMETHOXAZOLE: SIGNIFICANT CHANGE UP
ANION GAP SERPL CALC-SCNC: 7 MMOL/L — SIGNIFICANT CHANGE UP (ref 5–17)
BUN SERPL-MCNC: 10 MG/DL — SIGNIFICANT CHANGE UP (ref 7–23)
CALCIUM SERPL-MCNC: 7.8 MG/DL — LOW (ref 8.4–10.5)
CHLORIDE SERPL-SCNC: 110 MMOL/L — HIGH (ref 96–108)
CO2 SERPL-SCNC: 29 MMOL/L — SIGNIFICANT CHANGE UP (ref 22–31)
CREAT SERPL-MCNC: 1.3 MG/DL — SIGNIFICANT CHANGE UP (ref 0.5–1.3)
CULTURE RESULTS: SIGNIFICANT CHANGE UP
GLUCOSE BLDC GLUCOMTR-MCNC: 141 MG/DL — HIGH (ref 70–99)
GLUCOSE BLDC GLUCOMTR-MCNC: 154 MG/DL — HIGH (ref 70–99)
GLUCOSE SERPL-MCNC: 114 MG/DL — HIGH (ref 70–99)
HCT VFR BLD CALC: 30.2 % — LOW (ref 39–50)
HGB BLD-MCNC: 10.1 G/DL — LOW (ref 13–17)
MAGNESIUM SERPL-MCNC: 1.7 MG/DL — SIGNIFICANT CHANGE UP (ref 1.6–2.6)
MCHC RBC-ENTMCNC: 31.9 PG — SIGNIFICANT CHANGE UP (ref 27–34)
MCHC RBC-ENTMCNC: 33.4 GM/DL — SIGNIFICANT CHANGE UP (ref 32–36)
MCV RBC AUTO: 95.3 FL — SIGNIFICANT CHANGE UP (ref 80–100)
METHOD TYPE: SIGNIFICANT CHANGE UP
NRBC # BLD: 0 /100 WBCS — SIGNIFICANT CHANGE UP (ref 0–0)
ORGANISM # SPEC MICROSCOPIC CNT: SIGNIFICANT CHANGE UP
ORGANISM # SPEC MICROSCOPIC CNT: SIGNIFICANT CHANGE UP
PHOSPHATE SERPL-MCNC: 2.4 MG/DL — LOW (ref 2.5–4.5)
PLATELET # BLD AUTO: 141 K/UL — LOW (ref 150–400)
POTASSIUM SERPL-MCNC: 3.7 MMOL/L — SIGNIFICANT CHANGE UP (ref 3.5–5.3)
POTASSIUM SERPL-SCNC: 3.7 MMOL/L — SIGNIFICANT CHANGE UP (ref 3.5–5.3)
RBC # BLD: 3.17 M/UL — LOW (ref 4.2–5.8)
RBC # FLD: 14.1 % — SIGNIFICANT CHANGE UP (ref 10.3–14.5)
SODIUM SERPL-SCNC: 146 MMOL/L — HIGH (ref 135–145)
SPECIMEN SOURCE: SIGNIFICANT CHANGE UP
WBC # BLD: 5.63 K/UL — SIGNIFICANT CHANGE UP (ref 3.8–10.5)
WBC # FLD AUTO: 5.63 K/UL — SIGNIFICANT CHANGE UP (ref 3.8–10.5)

## 2020-08-26 PROCEDURE — 80048 BASIC METABOLIC PNL TOTAL CA: CPT

## 2020-08-26 PROCEDURE — 84100 ASSAY OF PHOSPHORUS: CPT

## 2020-08-26 PROCEDURE — 93005 ELECTROCARDIOGRAM TRACING: CPT

## 2020-08-26 PROCEDURE — 85610 PROTHROMBIN TIME: CPT

## 2020-08-26 PROCEDURE — 99239 HOSP IP/OBS DSCHRG MGMT >30: CPT

## 2020-08-26 PROCEDURE — 86900 BLOOD TYPING SEROLOGIC ABO: CPT

## 2020-08-26 PROCEDURE — 93306 TTE W/DOPPLER COMPLETE: CPT

## 2020-08-26 PROCEDURE — U0003: CPT

## 2020-08-26 PROCEDURE — 87086 URINE CULTURE/COLONY COUNT: CPT

## 2020-08-26 PROCEDURE — 83735 ASSAY OF MAGNESIUM: CPT

## 2020-08-26 PROCEDURE — 99285 EMERGENCY DEPT VISIT HI MDM: CPT | Mod: 25

## 2020-08-26 PROCEDURE — 80053 COMPREHEN METABOLIC PANEL: CPT

## 2020-08-26 PROCEDURE — 82962 GLUCOSE BLOOD TEST: CPT

## 2020-08-26 PROCEDURE — 86769 SARS-COV-2 COVID-19 ANTIBODY: CPT

## 2020-08-26 PROCEDURE — 86803 HEPATITIS C AB TEST: CPT

## 2020-08-26 PROCEDURE — 74176 CT ABD & PELVIS W/O CONTRAST: CPT

## 2020-08-26 PROCEDURE — 85730 THROMBOPLASTIN TIME PARTIAL: CPT

## 2020-08-26 PROCEDURE — 86850 RBC ANTIBODY SCREEN: CPT

## 2020-08-26 PROCEDURE — 96360 HYDRATION IV INFUSION INIT: CPT

## 2020-08-26 PROCEDURE — 83036 HEMOGLOBIN GLYCOSYLATED A1C: CPT

## 2020-08-26 PROCEDURE — 82272 OCCULT BLD FECES 1-3 TESTS: CPT

## 2020-08-26 PROCEDURE — 86901 BLOOD TYPING SEROLOGIC RH(D): CPT

## 2020-08-26 PROCEDURE — 87186 SC STD MICRODIL/AGAR DIL: CPT

## 2020-08-26 PROCEDURE — 36415 COLL VENOUS BLD VENIPUNCTURE: CPT

## 2020-08-26 PROCEDURE — 84484 ASSAY OF TROPONIN QUANT: CPT

## 2020-08-26 PROCEDURE — 81001 URINALYSIS AUTO W/SCOPE: CPT

## 2020-08-26 PROCEDURE — 85027 COMPLETE CBC AUTOMATED: CPT

## 2020-08-26 PROCEDURE — 96361 HYDRATE IV INFUSION ADD-ON: CPT

## 2020-08-26 RX ORDER — AMLODIPINE BESYLATE 2.5 MG/1
10 TABLET ORAL DAILY
Refills: 0 | Status: DISCONTINUED | OUTPATIENT
Start: 2020-08-27 | End: 2020-08-26

## 2020-08-26 RX ORDER — ASPIRIN/CALCIUM CARB/MAGNESIUM 324 MG
0 TABLET ORAL
Qty: 0 | Refills: 0 | DISCHARGE

## 2020-08-26 RX ORDER — HYDRALAZINE HCL 50 MG
25 TABLET ORAL
Refills: 0 | Status: DISCONTINUED | OUTPATIENT
Start: 2020-08-26 | End: 2020-08-26

## 2020-08-26 RX ADMIN — AMLODIPINE BESYLATE 5 MILLIGRAM(S): 2.5 TABLET ORAL at 06:07

## 2020-08-26 RX ADMIN — Medication 250 MILLIGRAM(S): at 06:08

## 2020-08-26 RX ADMIN — NEBIVOLOL HYDROCHLORIDE 10 MILLIGRAM(S): 5 TABLET ORAL at 06:08

## 2020-08-26 RX ADMIN — Medication 1000 UNIT(S): at 11:30

## 2020-08-26 RX ADMIN — Medication 1 MILLIGRAM(S): at 11:28

## 2020-08-26 RX ADMIN — INSULIN GLARGINE 20 UNIT(S): 100 INJECTION, SOLUTION SUBCUTANEOUS at 08:41

## 2020-08-26 RX ADMIN — SODIUM CHLORIDE 75 MILLILITER(S): 9 INJECTION, SOLUTION INTRAVENOUS at 06:07

## 2020-08-26 RX ADMIN — Medication 145 MILLIGRAM(S): at 11:28

## 2020-08-26 RX ADMIN — PANTOPRAZOLE SODIUM 40 MILLIGRAM(S): 20 TABLET, DELAYED RELEASE ORAL at 06:08

## 2020-08-26 RX ADMIN — Medication 2: at 11:35

## 2020-08-26 RX ADMIN — Medication 1000 MILLIGRAM(S): at 06:08

## 2020-08-26 RX ADMIN — DULOXETINE HYDROCHLORIDE 60 MILLIGRAM(S): 30 CAPSULE, DELAYED RELEASE ORAL at 11:28

## 2020-08-26 RX ADMIN — Medication 75 MICROGRAM(S): at 06:08

## 2020-08-26 RX ADMIN — MONTELUKAST 10 MILLIGRAM(S): 4 TABLET, CHEWABLE ORAL at 11:28

## 2020-08-26 NOTE — PROGRESS NOTE ADULT - ASSESSMENT
75M with HLD, DM2, diabetic neuropathy, HTN, colon polyps, hypothyroidism, DVT s/p Eliquis 2016, prostate ca s/p TURP came to hospital for BRBPR, dizziness, JAN    #Acute blood loss anemia  #GI bleed, suspect LGIB  #Suspect diverticular bleed  - Colonoscopy 2 years ago - reportedly normal  - Diverticulosis noted on CT scan  - CBC is stable  - Tolerating clear liquid- will advance to soft diet   - Likely plan for outpatient repeat colonoscopy in 2 weeks  - holding ASA at this time    #Diabetes, type 2, on insulin  - HgA1C = 5.4%  - c/w current insulin regimen    #Morbid obesity  - BMI 42.1  - Outpatient exercise regimen as tolerated    #Asymptomatic bacteruria  - NO CLINICAL signs of UTI, patient is ASYMPTOMATIC, was treated for "UTI" one month ago (no symptoms at that time, per patient)  - No indication for antibiotics     #JAN  - Baseline Cr 1.2  - Improved today, Cr 1.3  - c/w IVF for now    #Dizziness   - likely from volume depletion  - improved with IVF    #HTN  - BP: 162/67 (26 Aug 2020 09:08) (160/73 - 173/74)  - c/w beta blocker  - Will resume CCB at normal home dose, resume hydralazine at home dose    #DVT ppx: SCDs, hold chemical ppx due to GI bleed    patient's daughter, Emma, [phone]

## 2020-08-26 NOTE — DISCHARGE NOTE PROVIDER - NSDCMRMEDTOKEN_GEN_ALL_CORE_FT
Alpha Lipoic Acid 200 mg oral capsule: orally once a day  amLODIPine 10 mg oral tablet: 1 tab(s) orally once a day  Bystolic 10 mg oral tablet: 1 tab(s) orally once a day  DULoxetine 60 mg oral delayed release capsule: 1 cap(s) orally once a day  fenofibrate 160 mg oral tablet: 1 tab(s) orally once a day  Fish Oil 1200 mg oral capsule: orally once a day  folic acid 1 mg oral tablet: 1 tab(s) orally once a day  HumaLOG Mix 75/25 subcutaneous suspension: 10  subcutaneous once a day(QPM)  Humira Pen 40 mg/0.8 mL subcutaneous kit: subcutaneous every 2 weeks  hydrALAZINE 25 mg oral tablet: orally 2 times a day  levothyroxine 75 mcg (0.075 mg) oral tablet: 1 tab(s) orally once a day  montelukast 10 mg oral tablet: 1 tab(s) orally once a day  Ozempic (1 mg dose) 2 mg/1.5 mL subcutaneous solution: 1 milligram(s) subcutaneous every 7 days  sulfaSALAzine 500 mg oral tablet: 2 tab(s) orally 2 times a day  Super B Complex oral tablet: 1 tab(s) orally once a day  Tresiba FlexTouch: 40 unit(s) subcutaneous once a day, QAM  Vitamin C 1000 mg oral tablet: 1 tab(s) orally once a day  Vitamin D3 1000 intl units (25 mcg) oral capsule: orally once a day  zolpidem 12.5 mg oral tablet, extended release: 1 tab(s) orally once a day (at bedtime), As Needed

## 2020-08-26 NOTE — DISCHARGE NOTE PROVIDER - CARE PROVIDER_API CALL
Abad Gannon  MEDICINE - [facility] - [address], [place] [address]  Phone: [phone]  Fax: [phone]  Follow Up Time:

## 2020-08-26 NOTE — DISCHARGE NOTE NURSING/CASE MANAGEMENT/SOCIAL WORK - PATIENT PORTAL LINK FT
You can access the FollowMyHealth Patient Portal offered by [facility] by registering at the following website: [email or web address]. By joining [employer]’s FollowMyHealth portal, you will also be able to view your health information using other applications (apps) compatible with our system.

## 2020-08-26 NOTE — PROGRESS NOTE ADULT - ATTENDING COMMENTS
I have personally seen and examined patient on the above date. I discussed the case with GREYSON Katz and I agree with the findings and plan as detailed per note above, which I have amended where appropriate.
Patient seen and examined.  Agree with assessment and plan as above.    He has no acute complaints today.  He is tolerating liquid diet without difficulty.  No abdominal pain.    VSS.  Abdomen soft, nontender    Likely diverticular bleeding that has now stopped  Monitor Hgb/Hct and bowel movements for now  If Hgb remains stable will advance diet    Discussed with Dr. Vidales

## 2020-08-26 NOTE — PROGRESS NOTE ADULT - SUBJECTIVE AND OBJECTIVE BOX
Patient is a 75y old  Male who presents with a chief complaint of BRBPR (25 Aug 2020 13:06)    Patient seen and examined at bedside. Patient feels well, reports that he had BM this morning with no blood. Tolerating clears. Hoping to go home today     ALLERGIES:  [place] (Unknown)  No Known Drug Allergies    MEDICATIONS  (STANDING):  amLODIPine   Tablet 5 milliGRAM(s) Oral daily  cholecalciferol 1000 Unit(s) Oral daily  dextrose 5%. 1000 milliLiter(s) (50 mL/Hr) IV Continuous <Continuous>  dextrose 50% Injectable 12.5 Gram(s) IV Push once  DULoxetine 60 milliGRAM(s) Oral daily  fenofibrate Tablet 145 milliGRAM(s) Oral daily  folic acid 1 milliGRAM(s) Oral daily  insulin glargine Injectable (LANTUS) 20 Unit(s) SubCutaneous every morning  insulin lispro (HumaLOG) corrective regimen sliding scale   SubCutaneous three times a day before meals  insulin lispro (HumaLOG) corrective regimen sliding scale   SubCutaneous at bedtime  levothyroxine 75 MICROGram(s) Oral daily  montelukast 10 milliGRAM(s) Oral daily  nebivolol 10 milliGRAM(s) Oral daily  pantoprazole  Injectable 40 milliGRAM(s) IV Push two times a day  saccharomyces boulardii 250 milliGRAM(s) Oral two times a day  sodium chloride 0.45%. 1000 milliLiter(s) (75 mL/Hr) IV Continuous <Continuous>  sulfaSALAzine 1000 milliGRAM(s) Oral two times a day    MEDICATIONS  (PRN):  acetaminophen  Suppository .. 650 milliGRAM(s) Rectal every 4 hours PRN Temp greater or equal to 38C (100.4F), Mild Pain (1 - 3)  dextrose 40% Gel 15 Gram(s) Oral once PRN Blood Glucose LESS THAN 70 milliGRAM(s)/deciliter  glucagon  Injectable 1 milliGRAM(s) IntraMuscular once PRN Glucose LESS THAN 70 milligrams/deciliter  ondansetron Injectable 4 milliGRAM(s) IV Push every 6 hours PRN Nausea and/or Vomiting  zolpidem 5 milliGRAM(s) Oral at bedtime PRN Insomnia  zolpidem 5 milliGRAM(s) Oral at bedtime PRN Insomnia    Vital Signs Last 24 Hrs  T(F): 98 (26 Aug 2020 06:02), Max: 98.5 (25 Aug 2020 16:19)  HR: 75 (26 Aug 2020 09:08) (75 - 82)  BP: 162/67 (26 Aug 2020 09:08) (160/73 - 173/74)  RR: 16 (26 Aug 2020 09:08) (15 - 16)  SpO2: 95% (26 Aug 2020 09:08) (94% - 96%)    I&O's Summary  25 Aug 2020 07:01  -  26 Aug 2020 07:00  --------------------------------------------------------  IN: 3000 mL / OUT: 0 mL / NET: 3000 mL    PHYSICAL EXAM:  General: NAD, A/O x 3  ENT: MMM  Neck: Supple, No JVD  Lungs: Clear to auscultation bilaterally  Cardio: RRR, S1/S2, No murmurs  Abdomen: Soft, Nontender, Nondistended; Bowel sounds present  Extremities: No calf tenderness, No pitting edema    LABS:                        10.1   5.63  )-----------( 141      ( 26 Aug 2020 05:40 )             30.2     -    146  |  110  |  10  ----------------------------<  114  3.7   |  29  |  1.30    Ca    7.8      26 Aug 2020 05:40  Phos  2.4       Mg     1.7         TPro  5.6  /  Alb  3.0  /  TBili  0.3  /  DBili  x   /  AST  17  /  ALT  23  /  AlkPhos  40  08-25    eGFR if Non African American: 54 mL/min/1.73M2 (20 @ 05:40)  eGFR if : 62 mL/min/1.73M2 (20 @ 05:40)    PT/INR - ( 24 Aug 2020 13:00 )   PT: 13.1 sec;   INR: 1.09 ratio         PTT - ( 24 Aug 2020 13:00 )  PTT:25.0 sec    CARDIAC MARKERS ( 24 Aug 2020 13:00 )  .025 ng/mL / x     / x     / x     / x        POCT Blood Glucose.: 141 mg/dL (26 Aug 2020 08:38)  POCT Blood Glucose.: 101 mg/dL (25 Aug 2020 21:39)  POCT Blood Glucose.: 110 mg/dL (25 Aug 2020 16:56)  POCT Blood Glucose.: 165 mg/dL (25 Aug 2020 12:06)    Urinalysis Basic - ( 24 Aug 2020 15:30 )    Color: Yellow / Appearance: Slightly Turbid / S.020 / pH: x  Gluc: x / Ketone: Negative  / Bili: Negative / Urobili: Negative   Blood: x / Protein: 100 / Nitrite: Positive   Leuk Esterase: Moderate / RBC: 5-10 /HPF / WBC 26-50 /HPF   Sq Epi: x / Non Sq Epi: Neg.-Few / Bacteria: Moderate /HPF    Culture - Urine (collected 24 Aug 2020 15:30)  Source: .Urine Clean Catch (Midstream)  Preliminary Report (25 Aug 2020 21:16):    >100,000 CFU/ml Escherichia coli      RADIOLOGY & ADDITIONAL TESTS:    Care Discussed with Consultants/Other Providers:

## 2020-08-26 NOTE — DISCHARGE NOTE PROVIDER - HOSPITAL COURSE
Hospital Course    75y Male with PMH of HLD, T2DM, Diabetic neuropathy, HTN, Colon polyps, RA, hypothyroidism, DVT s/p Eliquis  2016, prostate ca s/p TURP, presented with sudden onset painless BRBPR and dizziness. Noted to have JAN on admission. CTAP revealed colonic diverticulosis, no definite diverticulitis or other gross bowel inflammation.     Patient was evalured by gastroenterology. Diet was advanced slowly, now tolerating solids, bleeding his resolved. H&H stable. Patient to follow up with gastroenterology in 2 weeks, plan for outpatient colonoscopy.     JAN resolved with IVF.         Palliative Care / Advanced Care Planning    Code Status: DNR        Discharging Provider:  Orin Katz NP    Contact Info: [phone] - Please call with any questions or concerns.        Outpatient Provider: Dr. Gannon         Signout given to  SNF Provider: Hospital Course    75y Male with PMH of HLD, T2DM, Diabetic neuropathy, HTN, Colon polyps, RA, hypothyroidism, DVT s/p Eliquis  2016, prostate ca s/p TURP, presented with sudden onset painless BRBPR and dizziness. Noted to have JAN on admission. CTAP revealed colonic diverticulosis, no definite diverticulitis or other gross bowel inflammation.     Patient was evalured by gastroenterology. Diet was advanced slowly, now tolerating solids, bleeding his resolved. H&H stable. Patient to follow up with gastroenterology in 2 weeks, plan for outpatient colonoscopy.     JAN resolved with IVF.         Palliative Care / Advanced Care Planning    Code Status: DNR        Discharging Provider:  Orin Katz NP    Contact Info: [phone] - Please call with any questions or concerns.        Outpatient Provider: Dr. Teressa PEARL, the attending physician, was physically present for the key portions of the evaluation and management (E/M) service provided. The total amount of time spent reviewing the hospital course, laboratory values, imaging findings, assessing/counseling the patient, discussing with consultant physicians, social work, nursing staff was 32 minutes.

## 2020-08-26 NOTE — DISCHARGE NOTE PROVIDER - NSDCCPCAREPLAN_GEN_ALL_CORE_FT
PRINCIPAL DISCHARGE DIAGNOSIS  Diagnosis: GI bleed  Assessment and Plan of Treatment: - You came to the hospital for bloody stool, likely related to diverticulosis.  - Your bleeding has stopped. Your blood count has stabilized.  - You can eat your normal diet.  - Do not take aspirin until cleared to do so by your gastroenterologist.  - Follow up with your primary care doctor, Dr Gannon, on 8/28.   - Follow up with gastroenterology (Dr. Orlando) in two weeks. Call for appointment.   - Return to hospital for any new or worsening symptoms.      SECONDARY DISCHARGE DIAGNOSES  Diagnosis: JAN (acute kidney injury)  Assessment and Plan of Treatment:

## 2020-08-26 NOTE — DISCHARGE NOTE PROVIDER - NSDCFUSCHEDAPPT_GEN_ALL_CORE_FT
CK PACHECO ; 10/29/2020 ; [facility] Rhe[facility] [address] CK PACHECO ; 10/29/2020 ; NPP Rheum [address] CK PACHECO ; 10/29/2020 ; NPP Rheum [address]

## 2020-08-28 ENCOUNTER — APPOINTMENT (OUTPATIENT)
Dept: FAMILY MEDICINE | Facility: CLINIC | Age: 75
End: 2020-08-28
Payer: MEDICARE

## 2020-08-28 VITALS
HEART RATE: 78 BPM | WEIGHT: 284.31 LBS | DIASTOLIC BLOOD PRESSURE: 72 MMHG | SYSTOLIC BLOOD PRESSURE: 150 MMHG | TEMPERATURE: 98.2 F | BODY MASS INDEX: 43.09 KG/M2 | HEIGHT: 68 IN | OXYGEN SATURATION: 94 %

## 2020-08-28 DIAGNOSIS — Z23 ENCOUNTER FOR IMMUNIZATION: ICD-10-CM

## 2020-08-28 PROBLEM — M06.9 RHEUMATOID ARTHRITIS, UNSPECIFIED: Chronic | Status: ACTIVE | Noted: 2020-08-24

## 2020-08-28 PROBLEM — I10 ESSENTIAL (PRIMARY) HYPERTENSION: Chronic | Status: ACTIVE | Noted: 2020-08-24

## 2020-08-28 PROBLEM — J45.909 UNSPECIFIED ASTHMA, UNCOMPLICATED: Chronic | Status: ACTIVE | Noted: 2020-08-24

## 2020-08-28 PROCEDURE — 90688 IIV4 VACCINE SPLT 0.5 ML IM: CPT

## 2020-08-28 PROCEDURE — 99496 TRANSJ CARE MGMT HIGH F2F 7D: CPT | Mod: 25

## 2020-08-28 PROCEDURE — G0008: CPT

## 2020-08-28 RX ORDER — CIPROFLOXACIN HYDROCHLORIDE 500 MG/1
500 TABLET, FILM COATED ORAL
Qty: 10 | Refills: 0 | Status: DISCONTINUED | COMMUNITY
Start: 2020-08-03 | End: 2020-08-28

## 2020-08-28 NOTE — PHYSICAL EXAM
[Supple] : supple [No Acute Distress] : no acute distress [EOMI] : extraocular movements intact [Clear to Auscultation] : lungs were clear to auscultation bilaterally [Normal S1, S2] : normal S1 and S2 [No Edema] : there was no peripheral edema [Soft] : abdomen soft [Non Tender] : non-tender [No Joint Swelling] : no joint swelling [Normal Bowel Sounds] : normal bowel sounds [No Rash] : no rash [Normal Affect] : the affect was normal [Normal Gait] : normal gait [de-identified] : obese, elderly male

## 2020-08-28 NOTE — HISTORY OF PRESENT ILLNESS
[Post-hospitalization from ___ Hospital] : Post-hospitalization from [unfilled] Hospital [Admitted on: ___] : The patient was admitted on [unfilled] [Discharged on ___] : discharged on [unfilled] [Discharge Summary] : discharge summary [Pertinent Labs] : pertinent labs [Radiology Findings] : radiology findings [Discharge Med List] : discharge medication list [Med Reconciliation] : medication reconciliation has been completed [FreeTextEntry2] : Mr Fabio Medina is a 76 yo male presents for post hospitalization visit. Pt has PMH of HLD, T2 DM, Diabetic neuropathy, HTN, Colon polyps, RA,hypothyroidism, DVT s/p Eliquis 2016 (no longer on Eliquis), prostate ca s/p TURP, who presented with sudden onset painless BRBPR and dizziness to [facility]. Pt was admitted for 3 days for diverticular bleed, which stabilized. Latest H/H 10.8. Pt denies any new symptoms of bleeding had 3 BM which were normal, also no longer having dizziness. Pt is scheduled for an upcoming visit with GI: Dr. Burgos, and currently holding his aspirin 81mg.

## 2020-08-28 NOTE — HEALTH RISK ASSESSMENT
[Intercurrent hospitalizations] : was admitted to the hospital  [No] : In the past 12 months have you used drugs other than those required for medical reasons? No [No falls in past year] : Patient reported no falls in the past year [0] : 2) Feeling down, depressed, or hopeless: Not at all (0) [Patient reported colonoscopy was normal] : Patient reported colonoscopy was normal [# of Members in Household ___] :  household currently consist of [unfilled] member(s) [With Family] : lives with family [None] : None [Graduate School] : graduate school [Retired] : retired [] :  [Fully functional (bathing, dressing, toileting, transferring, walking, feeding)] : Fully functional (bathing, dressing, toileting, transferring, walking, feeding) [Feels Safe at Home] : Feels safe at home [Fully functional (using the telephone, shopping, preparing meals, housekeeping, doing laundry, using] : Fully functional and needs no help or supervision to perform IADLs (using the telephone, shopping, preparing meals, housekeeping, doing laundry, using transportation, managing medications and managing finances) [Smoke Detector] : smoke detector [Carbon Monoxide Detector] : carbon monoxide detector [Seat Belt] :  uses seat belt [Sunscreen] : uses sunscreen [Safety elements used in home] : safety elements used in home [Reviewed no changes] : Reviewed no changes [Relationship: ___] : Relationship: [unfilled] [Name: ___] : Health Care Proxy's Name: [unfilled]  [DNR] : DNR [DNI] : DNI [de-identified] : [facility] [] : No [[id number]] : 0 [Language] : denies difficulty with language [Change in mental status noted] : No change in mental status noted [Learning/Retaining New Information] : denies difficulty learning/retaining new information [Behavior] : denies difficulty with behavior [Spatial Ability and Orientation] : denies difficulty with spatial ability and orientation [Handling Complex Tasks] : denies difficulty handling complex tasks [Reasoning] : denies difficulty with reasoning [Sexually Active] : not sexually active [High Risk Behavior] : no high risk behavior [Reports changes in vision] : Reports no changes in vision [Reports changes in hearing] : Reports no changes in hearing [Reports changes in dental health] : Reports no changes in dental health [ColonoscopyDate] : 08/2017 [Guns at Home] : no guns at home [ColonoscopyComments] : Dr. 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

## 2020-08-28 NOTE — ASSESSMENT
[FreeTextEntry1] : advised to follow up with GI: Dr. Burgos, accordingly. Currently holding Aspirin, reports no new blood in stool since discharge. Dizziness resolved. Advised to watch for recurrence head to nearest ER if new BRBPR. \par Agreed for Flu vaccine today, administered high dose Flu vaccine left deltoid, tolerated procedure well.

## 2020-09-30 LAB
ALBUMIN SERPL ELPH-MCNC: 4.5 G/DL
ALP BLD-CCNC: 62 U/L
ALT SERPL-CCNC: 22 U/L
ANION GAP SERPL CALC-SCNC: 15 MMOL/L
AST SERPL-CCNC: 27 U/L
BASOPHILS # BLD AUTO: 0.03 K/UL
BASOPHILS NFR BLD AUTO: 0.5 %
BILIRUB SERPL-MCNC: 0.3 MG/DL
BUN SERPL-MCNC: 18 MG/DL
CALCIUM SERPL-MCNC: 9.7 MG/DL
CHLORIDE SERPL-SCNC: 106 MMOL/L
CO2 SERPL-SCNC: 26 MMOL/L
CREAT SERPL-MCNC: 1.42 MG/DL
CRP SERPL-MCNC: 0.19 MG/DL
EOSINOPHIL # BLD AUTO: 0.16 K/UL
EOSINOPHIL NFR BLD AUTO: 2.8 %
ERYTHROCYTE [SEDIMENTATION RATE] IN BLOOD BY WESTERGREN METHOD: 25 MM/HR
GLUCOSE SERPL-MCNC: 108 MG/DL
HCT VFR BLD CALC: 45.3 %
HGB BLD-MCNC: 14.1 G/DL
IMM GRANULOCYTES NFR BLD AUTO: 0.2 %
LYMPHOCYTES # BLD AUTO: 1.7 K/UL
LYMPHOCYTES NFR BLD AUTO: 29.7 %
MAN DIFF?: NORMAL
MCHC RBC-ENTMCNC: 29.1 PG
MCHC RBC-ENTMCNC: 31.1 GM/DL
MCV RBC AUTO: 93.4 FL
MONOCYTES # BLD AUTO: 0.63 K/UL
MONOCYTES NFR BLD AUTO: 11 %
NEUTROPHILS # BLD AUTO: 3.2 K/UL
NEUTROPHILS NFR BLD AUTO: 55.8 %
PLATELET # BLD AUTO: 222 K/UL
POTASSIUM SERPL-SCNC: 4.7 MMOL/L
PROT SERPL-MCNC: 7 G/DL
RBC # BLD: 4.85 M/UL
RBC # FLD: 13.8 %
SODIUM SERPL-SCNC: 146 MMOL/L
WBC # FLD AUTO: 5.73 K/UL

## 2020-10-02 ENCOUNTER — RX RENEWAL (OUTPATIENT)
Age: 75
End: 2020-10-02

## 2020-10-16 ENCOUNTER — RX RENEWAL (OUTPATIENT)
Age: 75
End: 2020-10-16

## 2020-10-29 ENCOUNTER — APPOINTMENT (OUTPATIENT)
Dept: RHEUMATOLOGY | Facility: CLINIC | Age: 75
End: 2020-10-29
Payer: MEDICARE

## 2020-10-29 VITALS
HEART RATE: 90 BPM | SYSTOLIC BLOOD PRESSURE: 130 MMHG | OXYGEN SATURATION: 90 % | WEIGHT: 287 LBS | HEIGHT: 68 IN | TEMPERATURE: 98.9 F | BODY MASS INDEX: 43.5 KG/M2 | DIASTOLIC BLOOD PRESSURE: 60 MMHG | RESPIRATION RATE: 16 BRPM

## 2020-10-29 DIAGNOSIS — M25.551 PAIN IN RIGHT HIP: ICD-10-CM

## 2020-10-29 DIAGNOSIS — M19.011 PRIMARY OSTEOARTHRITIS, RIGHT SHOULDER: ICD-10-CM

## 2020-10-29 PROCEDURE — 99214 OFFICE O/P EST MOD 30 MIN: CPT

## 2020-10-29 NOTE — HISTORY OF PRESENT ILLNESS
[FreeTextEntry1] : CK PACHECO is a 75 year old man with chronic, well controlled RA on Humira and SSZ 1000mg BID. \par \par RA hx - dx in 2007, few flares since but no flares and has been off steroids x years. Never had rashes, eye inflammation, C spine involvement, inflammatory low back pain, IBD, lung involvement, SQ nodules, paresthesias, muscle weakness.  \par \par + RLE DVT in 2016, off A/C\par + R shoulder OA\par + R hip replacement \par + hx of TM in 2006, fully resolved \par + intentional weight loss of approx 30lbs which has improved LE edema, HBA1c\par + recent retinal swelling, self-resolved, following with optho, reportedly related to DM2 and not RA\par \par Labs - chronically mild elevation in CPK since 2016\par Preimm -- Hep B/C, Quant - negative 3/2020\par DEXA 2018 - normal \par ----------------\par \par 7/30/20-- No complaints today -- no synovitis, prolonged AM stiffness, loss of ROM. No recent F/C, infections, cough, CP, SOB. Feels well. No SE from meds. Observing covid recommendations of social distancing and using a mask when outside. Recent improvement in HBA1c. \par \par 10/29/20 -- No joint complaints, R hip replacement mild discomfort with positional change but able to ambulate/sit/sleep comfortably. No extra-articular sx, F/C, infectious sx. HBA1c markedly improved and endo adjusted DM2 meds, mild worsening of renal fxn, endo added ARB.

## 2020-10-29 NOTE — REVIEW OF SYSTEMS
[Negative] : Heme/Lymph [As Noted in HPI] : as noted in HPI [Joint Pain] : joint pain [de-identified] : chronic peripheral neuropathy

## 2020-10-29 NOTE — PHYSICAL EXAM
[General Appearance - Alert] : alert [General Appearance - In No Acute Distress] : in no acute distress [General Appearance - Well Nourished] : well nourished [Sclera] : the sclera and conjunctiva were normal [PERRL With Normal Accommodation] : pupils were equal in size, round, and reactive to light [Extraocular Movements] : extraocular movements were intact [Oropharynx] : the oropharynx was normal [Neck Appearance] : the appearance of the neck was normal [] : no respiratory distress [Auscultation Breath Sounds / Voice Sounds] : lungs were clear to auscultation bilaterally [Heart Rate And Rhythm] : heart rate was normal and rhythm regular [Heart Sounds] : normal S1 and S2 [Murmurs] : no murmurs [Bowel Sounds] : normal bowel sounds [Abdomen Soft] : soft [Abdomen Tenderness] : non-tender [No CVA Tenderness] : no ~M costovertebral angle tenderness [No Spinal Tenderness] : no spinal tenderness [Abnormal Walk] : normal gait [Nail Clubbing] : no clubbing  or cyanosis of the fingernails [Musculoskeletal - Swelling] : no joint swelling seen [Motor Tone] : muscle strength and tone were normal [Motor Exam] : the motor exam was normal [No Focal Deficits] : no focal deficits [Oriented To Time, Place, And Person] : oriented to person, place, and time [Impaired Insight] : insight and judgment were intact [Affect] : the affect was normal [FreeTextEntry1] : strength 5/5 x 4

## 2020-10-29 NOTE — ASSESSMENT
[FreeTextEntry1] : CK PACHECO is a 75 year old man with stable, well controlled RA on Humira and SSZ. Doing well today, joints quiescent. Mild R hip pain over site of replacement only with position change. \par \par - c/w Humira -- as he has been stable, will try to increase duration from q2 weeks to q17 days until next visit\par - c/w SSZ 1000mg BID \par - check routine labs prior to next visit \par - prior labs reviewed with patient -- increase PO hydration and will trend renal fxn \par - home ROM exercises for R hip for now. If worsening in intensity, will send back to ortho to eval \par - RTC 3-4 months, sooner if flare

## 2020-12-02 LAB
ALBUMIN SERPL ELPH-MCNC: 4.5 G/DL
ALP BLD-CCNC: 69 U/L
ALT SERPL-CCNC: 25 U/L
ANION GAP SERPL CALC-SCNC: 10 MMOL/L
AST SERPL-CCNC: 31 U/L
BASOPHILS # BLD AUTO: 0.04 K/UL
BASOPHILS NFR BLD AUTO: 0.6 %
BILIRUB SERPL-MCNC: 0.2 MG/DL
BUN SERPL-MCNC: 18 MG/DL
CALCIUM SERPL-MCNC: 9.3 MG/DL
CHLORIDE SERPL-SCNC: 104 MMOL/L
CO2 SERPL-SCNC: 31 MMOL/L
CREAT SERPL-MCNC: 1.25 MG/DL
CRP SERPL-MCNC: 0.17 MG/DL
EOSINOPHIL # BLD AUTO: 0.2 K/UL
EOSINOPHIL NFR BLD AUTO: 3.2 %
ERYTHROCYTE [SEDIMENTATION RATE] IN BLOOD BY WESTERGREN METHOD: 23 MM/HR
GLUCOSE SERPL-MCNC: 136 MG/DL
HCT VFR BLD CALC: 49.9 %
HGB BLD-MCNC: 16 G/DL
IMM GRANULOCYTES NFR BLD AUTO: 0.3 %
LYMPHOCYTES # BLD AUTO: 1.72 K/UL
LYMPHOCYTES NFR BLD AUTO: 27.4 %
MAN DIFF?: NORMAL
MCHC RBC-ENTMCNC: 27.9 PG
MCHC RBC-ENTMCNC: 32.1 GM/DL
MCV RBC AUTO: 87.1 FL
MONOCYTES # BLD AUTO: 0.61 K/UL
MONOCYTES NFR BLD AUTO: 9.7 %
NEUTROPHILS # BLD AUTO: 3.69 K/UL
NEUTROPHILS NFR BLD AUTO: 58.8 %
PLATELET # BLD AUTO: 204 K/UL
POTASSIUM SERPL-SCNC: 4.5 MMOL/L
PROT SERPL-MCNC: 7.3 G/DL
RBC # BLD: 5.73 M/UL
RBC # FLD: 15.4 %
SODIUM SERPL-SCNC: 145 MMOL/L
WBC # FLD AUTO: 6.28 K/UL

## 2021-01-28 ENCOUNTER — APPOINTMENT (OUTPATIENT)
Dept: RHEUMATOLOGY | Facility: CLINIC | Age: 76
End: 2021-01-28
Payer: MEDICARE

## 2021-01-28 VITALS
HEIGHT: 68 IN | DIASTOLIC BLOOD PRESSURE: 70 MMHG | TEMPERATURE: 97.7 F | HEART RATE: 82 BPM | BODY MASS INDEX: 44.1 KG/M2 | WEIGHT: 291 LBS | RESPIRATION RATE: 16 BRPM | SYSTOLIC BLOOD PRESSURE: 130 MMHG | OXYGEN SATURATION: 95 %

## 2021-01-28 PROCEDURE — 99214 OFFICE O/P EST MOD 30 MIN: CPT

## 2021-01-28 NOTE — REVIEW OF SYSTEMS
[As Noted in HPI] : as noted in HPI [Negative] : Musculoskeletal [de-identified] : chronic peripheral neuropathy

## 2021-01-28 NOTE — ASSESSMENT
[FreeTextEntry1] : CK PACHECO is a 75 year old man with stable, well controlled RA on Humira and SSZ. Doing well today, joints quiescent. Tolerating Humira taper well. Mild R hip pain from before now resolved. \par \par - c/w Humira -- has 2 additional pens at home, advised that if no flare as he continues to space it out, can discontinue after the 2nd pen\par - c/w SSZ 1000mg BID \par - check routine labs prior to next visit \par - prior labs reviewed with patient and stable \par - I recommend he get the covid vaccine, advised to hold SSZ for 7 days prior to and after each dose. Advised to hold Humira for 2 weeks prior to and after each dose. \par - RTC 3-4 months, sooner if flare

## 2021-01-28 NOTE — PHYSICAL EXAM
[General Appearance - Alert] : alert [General Appearance - In No Acute Distress] : in no acute distress [General Appearance - Well Nourished] : well nourished [Sclera] : the sclera and conjunctiva were normal [PERRL With Normal Accommodation] : pupils were equal in size, round, and reactive to light [Extraocular Movements] : extraocular movements were intact [Oropharynx] : the oropharynx was normal [Neck Appearance] : the appearance of the neck was normal [Auscultation Breath Sounds / Voice Sounds] : lungs were clear to auscultation bilaterally [Heart Rate And Rhythm] : heart rate was normal and rhythm regular [Heart Sounds] : normal S1 and S2 [Murmurs] : no murmurs [Bowel Sounds] : normal bowel sounds [Abdomen Soft] : soft [Abdomen Tenderness] : non-tender [No CVA Tenderness] : no ~M costovertebral angle tenderness [No Spinal Tenderness] : no spinal tenderness [Abnormal Walk] : normal gait [Nail Clubbing] : no clubbing  or cyanosis of the fingernails [Motor Tone] : muscle strength and tone were normal [Musculoskeletal - Swelling] : no joint swelling seen [Motor Exam] : the motor exam was normal [No Focal Deficits] : no focal deficits [Oriented To Time, Place, And Person] : oriented to person, place, and time [Impaired Insight] : insight and judgment were intact [Affect] : the affect was normal [] : no rash [FreeTextEntry1] : strength 5/5 x 4

## 2021-01-28 NOTE — HISTORY OF PRESENT ILLNESS
[FreeTextEntry1] : CK PACHECO is a 75 year old man with chronic, well controlled RA on Humira and SSZ 1000mg BID. \par \par RA hx - dx in 2007, few flares since but no flares and has been off steroids x years. Never had rashes, eye inflammation, C spine involvement, inflammatory low back pain, IBD, lung involvement, SQ nodules, paresthesias, muscle weakness.  \par \par + RLE DVT in 2016, off A/C\par + R shoulder OA\par + R hip replacement \par + hx of TM in 2006, fully resolved \par + intentional weight loss of approx 30lbs which has improved LE edema, HBA1c\par + recent retinal swelling, self-resolved, following with optho, reportedly related to DM2 and not RA\par \par Labs - chronically mild elevation in CPK since 2016\par Preimm -- Hep B/C, Quant - negative 3/2020\par DEXA 2018 - normal \par ----------------\par \par 7/30/20-- No complaints today -- no synovitis, prolonged AM stiffness, loss of ROM. No recent F/C, infections, cough, CP, SOB. Feels well. No SE from meds. Observing covid recommendations of social distancing and using a mask when outside. Recent improvement in HBA1c. \par \par 10/29/20 -- No joint complaints, R hip replacement mild discomfort with positional change but able to ambulate/sit/sleep comfortably. No extra-articular sx, F/C, infectious sx. HBA1c markedly improved and endo adjusted DM2 meds, mild worsening of renal fxn, endo added ARB. \par \par 1/28/21 -- No joint complaints today. Has been spacing out Humira, now up to q26 days without recurrence of sx. Feels well otherwise, no infectious sx, no extra-articular sx. Improving DM2 control.

## 2021-04-05 ENCOUNTER — RX RENEWAL (OUTPATIENT)
Age: 76
End: 2021-04-05

## 2021-04-06 ENCOUNTER — APPOINTMENT (OUTPATIENT)
Dept: RHEUMATOLOGY | Facility: CLINIC | Age: 76
End: 2021-04-06
Payer: MEDICARE

## 2021-04-06 VITALS
HEART RATE: 74 BPM | BODY MASS INDEX: 44.71 KG/M2 | TEMPERATURE: 96.6 F | HEIGHT: 68 IN | DIASTOLIC BLOOD PRESSURE: 70 MMHG | SYSTOLIC BLOOD PRESSURE: 130 MMHG | OXYGEN SATURATION: 91 % | WEIGHT: 295 LBS | RESPIRATION RATE: 15 BRPM

## 2021-04-06 PROCEDURE — 99213 OFFICE O/P EST LOW 20 MIN: CPT

## 2021-04-06 NOTE — PHYSICAL EXAM
[General Appearance - Alert] : alert [General Appearance - In No Acute Distress] : in no acute distress [General Appearance - Well Nourished] : well nourished [Sclera] : the sclera and conjunctiva were normal [PERRL With Normal Accommodation] : pupils were equal in size, round, and reactive to light [Extraocular Movements] : extraocular movements were intact [Oropharynx] : the oropharynx was normal [Neck Appearance] : the appearance of the neck was normal [Auscultation Breath Sounds / Voice Sounds] : lungs were clear to auscultation bilaterally [Heart Rate And Rhythm] : heart rate was normal and rhythm regular [Heart Sounds] : normal S1 and S2 [Murmurs] : no murmurs [Bowel Sounds] : normal bowel sounds [Abdomen Soft] : soft [Abdomen Tenderness] : non-tender [No CVA Tenderness] : no ~M costovertebral angle tenderness [No Spinal Tenderness] : no spinal tenderness [Nail Clubbing] : no clubbing  or cyanosis of the fingernails [Musculoskeletal - Swelling] : no joint swelling seen [Motor Tone] : muscle strength and tone were normal [] : no rash [Motor Exam] : the motor exam was normal [No Focal Deficits] : no focal deficits [Oriented To Time, Place, And Person] : oriented to person, place, and time [Affect] : the affect was normal [Impaired Insight] : insight and judgment were intact [FreeTextEntry1] : strength 5/5 x 4

## 2021-04-06 NOTE — HISTORY OF PRESENT ILLNESS
[FreeTextEntry1] : CK PACHECO is a 75 year old man with chronic, well controlled RA on Humira and SSZ 1000mg BID. \par \par RA hx - dx in 2007, few flares since but no flares and has been off steroids x years. Never had rashes, eye inflammation, C spine involvement, inflammatory low back pain, IBD, lung involvement, SQ nodules, paresthesias, muscle weakness.  \par \par + RLE DVT in 2016, off A/C\par + R shoulder OA\par + R hip replacement \par + hx of TM in 2006, fully resolved \par + intentional weight loss of approx 30lbs which has improved LE edema, HBA1c\par + recent retinal swelling, self-resolved, following with optho, reportedly related to DM2 and not RA\par \par Labs - chronically mild elevation in CPK since 2016\par Preimm -- Hep B/C, Quant - negative 3/2020\par DEXA 2018 - normal \par ----------------\par \par 7/30/20-- No complaints today -- no synovitis, prolonged AM stiffness, loss of ROM. No recent F/C, infections, cough, CP, SOB. Feels well. No SE from meds. Observing covid recommendations of social distancing and using a mask when outside. Recent improvement in HBA1c. \par \par 10/29/20 -- No joint complaints, R hip replacement mild discomfort with positional change but able to ambulate/sit/sleep comfortably. No extra-articular sx, F/C, infectious sx. HBA1c markedly improved and endo adjusted DM2 meds, mild worsening of renal fxn, endo added ARB. \par \par 1/28/21 -- No joint complaints today. Has been spacing out Humira, now up to q26 days without recurrence of sx. Feels well otherwise, no infectious sx, no extra-articular sx. Improving DM2 control. \par \par 4/6/21 -- L hip acute pain x 5 days, no preceding issues, radiating to groin with weight bearing, no sciatica pain or back pain. Up to Humira q4 weeks, some stiffness creeping back into hands and shoulders but no pain or synovitis. Feels well otherwise.

## 2021-04-06 NOTE — REVIEW OF SYSTEMS
[Negative] : Heme/Lymph [As Noted in HPI] : as noted in HPI [Arthralgias] : arthralgias [Joint Pain] : joint pain [de-identified] : chronic peripheral neuropathy

## 2021-04-06 NOTE — ASSESSMENT
[FreeTextEntry1] : CK PACHECO is a 75 year old man with stable, well controlled RA on Humira and SSZ. Some recurrence of mild inflammatory sx with tapering off Humira. Prior R hip pain resolved, acute onset severe L hip pain x 5 days with radiation to groin, most concerning for hip pathology. \par \par - c/w Humira but given recurrence of sx, return to q3 weeks, if this does not resolve sx, will resume q2 weeks \par - c/w SSZ 1000mg BID \par - check routine labs prior to next visit \par - medrol dose pack for acute hip pain as unable to ambulate. Take with food, monitor sugars closely. \par - if returns s/p pack, can start celebrex 100mg BID with food -- no other NSAIDs given hx of prior GIB \par - XR L hip/pelvis -- if + OA not responding to above, will send for US guided injection \par - will call with imaging results, has RTC in mid May

## 2021-04-07 ENCOUNTER — OUTPATIENT (OUTPATIENT)
Dept: OUTPATIENT SERVICES | Facility: HOSPITAL | Age: 76
LOS: 1 days | End: 2021-04-07
Payer: MEDICARE

## 2021-04-07 ENCOUNTER — APPOINTMENT (OUTPATIENT)
Dept: RADIOLOGY | Facility: HOSPITAL | Age: 76
End: 2021-04-07
Payer: MEDICARE

## 2021-04-07 DIAGNOSIS — M25.552 PAIN IN LEFT HIP: ICD-10-CM

## 2021-04-07 DIAGNOSIS — Z98.890 OTHER SPECIFIED POSTPROCEDURAL STATES: Chronic | ICD-10-CM

## 2021-04-07 PROCEDURE — 73502 X-RAY EXAM HIP UNI 2-3 VIEWS: CPT | Mod: 26,LT

## 2021-04-07 PROCEDURE — 73502 X-RAY EXAM HIP UNI 2-3 VIEWS: CPT

## 2021-04-09 ENCOUNTER — NON-APPOINTMENT (OUTPATIENT)
Age: 76
End: 2021-04-09

## 2021-04-23 ENCOUNTER — APPOINTMENT (OUTPATIENT)
Dept: ULTRASOUND IMAGING | Facility: CLINIC | Age: 76
End: 2021-04-23
Payer: MEDICARE

## 2021-04-23 ENCOUNTER — RESULT REVIEW (OUTPATIENT)
Age: 76
End: 2021-04-23

## 2021-04-23 ENCOUNTER — OUTPATIENT (OUTPATIENT)
Dept: OUTPATIENT SERVICES | Facility: HOSPITAL | Age: 76
LOS: 1 days | End: 2021-04-23
Payer: MEDICARE

## 2021-04-23 DIAGNOSIS — M25.552 PAIN IN LEFT HIP: ICD-10-CM

## 2021-04-23 DIAGNOSIS — Z98.890 OTHER SPECIFIED POSTPROCEDURAL STATES: Chronic | ICD-10-CM

## 2021-04-23 PROCEDURE — 20611 DRAIN/INJ JOINT/BURSA W/US: CPT | Mod: LT

## 2021-04-23 PROCEDURE — 20611 DRAIN/INJ JOINT/BURSA W/US: CPT

## 2021-05-03 ENCOUNTER — NON-APPOINTMENT (OUTPATIENT)
Age: 76
End: 2021-05-03

## 2021-05-05 ENCOUNTER — NON-APPOINTMENT (OUTPATIENT)
Age: 76
End: 2021-05-05

## 2021-05-05 ENCOUNTER — APPOINTMENT (OUTPATIENT)
Dept: ORTHOPEDIC SURGERY | Facility: CLINIC | Age: 76
End: 2021-05-05
Payer: MEDICARE

## 2021-05-05 PROCEDURE — 99204 OFFICE O/P NEW MOD 45 MIN: CPT

## 2021-05-05 PROCEDURE — 73502 X-RAY EXAM HIP UNI 2-3 VIEWS: CPT | Mod: LT

## 2021-05-17 LAB
BASOPHILS # BLD AUTO: 0.03 K/UL
BASOPHILS NFR BLD AUTO: 0.5 %
EOSINOPHIL # BLD AUTO: 0.09 K/UL
EOSINOPHIL NFR BLD AUTO: 1.5 %
HCT VFR BLD CALC: 51.3 %
HGB BLD-MCNC: 17 G/DL
IMM GRANULOCYTES NFR BLD AUTO: 0.3 %
LYMPHOCYTES # BLD AUTO: 1.39 K/UL
LYMPHOCYTES NFR BLD AUTO: 23.4 %
MAN DIFF?: NORMAL
MCHC RBC-ENTMCNC: 30.7 PG
MCHC RBC-ENTMCNC: 33.1 GM/DL
MCV RBC AUTO: 92.6 FL
MONOCYTES # BLD AUTO: 0.51 K/UL
MONOCYTES NFR BLD AUTO: 8.6 %
NEUTROPHILS # BLD AUTO: 3.91 K/UL
NEUTROPHILS NFR BLD AUTO: 65.7 %
PLATELET # BLD AUTO: 202 K/UL
RBC # BLD: 5.54 M/UL
RBC # FLD: 14.2 %
WBC # FLD AUTO: 5.95 K/UL

## 2021-05-18 LAB
ALBUMIN SERPL ELPH-MCNC: 4.5 G/DL
ALP BLD-CCNC: 60 U/L
ALT SERPL-CCNC: 22 U/L
ANION GAP SERPL CALC-SCNC: 13 MMOL/L
AST SERPL-CCNC: 29 U/L
BILIRUB SERPL-MCNC: 0.3 MG/DL
BUN SERPL-MCNC: 18 MG/DL
CALCIUM SERPL-MCNC: 9.6 MG/DL
CHLORIDE SERPL-SCNC: 104 MMOL/L
CO2 SERPL-SCNC: 27 MMOL/L
CREAT SERPL-MCNC: 1.57 MG/DL
CRP SERPL-MCNC: <3 MG/L
ERYTHROCYTE [SEDIMENTATION RATE] IN BLOOD BY WESTERGREN METHOD: 23 MM/HR
GLUCOSE SERPL-MCNC: 147 MG/DL
POTASSIUM SERPL-SCNC: 4.8 MMOL/L
PROT SERPL-MCNC: 7.2 G/DL
SODIUM SERPL-SCNC: 144 MMOL/L

## 2021-05-20 ENCOUNTER — APPOINTMENT (OUTPATIENT)
Dept: RHEUMATOLOGY | Facility: CLINIC | Age: 76
End: 2021-05-20
Payer: MEDICARE

## 2021-05-20 VITALS
DIASTOLIC BLOOD PRESSURE: 64 MMHG | OXYGEN SATURATION: 90 % | TEMPERATURE: 96.9 F | BODY MASS INDEX: 43.19 KG/M2 | SYSTOLIC BLOOD PRESSURE: 144 MMHG | RESPIRATION RATE: 14 BRPM | WEIGHT: 285 LBS | HEIGHT: 68 IN | HEART RATE: 83 BPM

## 2021-05-20 PROCEDURE — 99214 OFFICE O/P EST MOD 30 MIN: CPT

## 2021-05-20 NOTE — PHYSICAL EXAM
[General Appearance - Alert] : alert [General Appearance - In No Acute Distress] : in no acute distress [General Appearance - Well Nourished] : well nourished [Sclera] : the sclera and conjunctiva were normal [PERRL With Normal Accommodation] : pupils were equal in size, round, and reactive to light [Extraocular Movements] : extraocular movements were intact [Oropharynx] : the oropharynx was normal [Neck Appearance] : the appearance of the neck was normal [Auscultation Breath Sounds / Voice Sounds] : lungs were clear to auscultation bilaterally [Heart Rate And Rhythm] : heart rate was normal and rhythm regular [Heart Sounds] : normal S1 and S2 [Murmurs] : no murmurs [Bowel Sounds] : normal bowel sounds [Abdomen Soft] : soft [Abdomen Tenderness] : non-tender [No CVA Tenderness] : no ~M costovertebral angle tenderness [No Spinal Tenderness] : no spinal tenderness [Nail Clubbing] : no clubbing  or cyanosis of the fingernails [Musculoskeletal - Swelling] : no joint swelling seen [Motor Tone] : muscle strength and tone were normal [] : no rash [Motor Exam] : the motor exam was normal [No Focal Deficits] : no focal deficits [Oriented To Time, Place, And Person] : oriented to person, place, and time [Impaired Insight] : insight and judgment were intact [Affect] : the affect was normal [FreeTextEntry1] : strength 5/5 x 4

## 2021-05-20 NOTE — HISTORY OF PRESENT ILLNESS
[FreeTextEntry1] : CK PACHECO is a 75 year old man with chronic, well controlled RA on Humira and SSZ 1000mg BID. \par \par RA hx - dx in 2007, few flares since but no flares and has been off steroids x years. Never had rashes, eye inflammation, C spine involvement, inflammatory low back pain, IBD, lung involvement, SQ nodules, paresthesias, muscle weakness.  \par \par + RLE DVT in 2016, off A/C\par + R shoulder OA\par + R hip replacement \par + hx of TM in 2006, fully resolved \par + intentional weight loss of approx 30lbs which has improved LE edema, HBA1c\par + recent retinal swelling, self-resolved, following with optho, reportedly related to DM2 and not RA\par \par Labs - chronically mild elevation in CPK since 2016\par Preimm -- Hep B/C, Quant - negative 3/2020\par DEXA 2018 - normal \par ----------------\par \par 7/30/20-- No complaints today -- no synovitis, prolonged AM stiffness, loss of ROM. No recent F/C, infections, cough, CP, SOB. Feels well. No SE from meds. Observing covid recommendations of social distancing and using a mask when outside. Recent improvement in HBA1c. \par \par 10/29/20 -- No joint complaints, R hip replacement mild discomfort with positional change but able to ambulate/sit/sleep comfortably. No extra-articular sx, F/C, infectious sx. HBA1c markedly improved and endo adjusted DM2 meds, mild worsening of renal fxn, endo added ARB. \par \par 1/28/21 -- No joint complaints today. Has been spacing out Humira, now up to q26 days without recurrence of sx. Feels well otherwise, no infectious sx, no extra-articular sx. Improving DM2 control. \par \par 4/6/21 -- L hip acute pain x 5 days, no preceding issues, radiating to groin with weight bearing, no sciatica pain or back pain. Up to Humira q4 weeks, some stiffness creeping back into hands and shoulders but no pain or synovitis. Feels well otherwise. \par \par 5/20/21 -- L hip replacement scheduled for 6/10/21. Currently on Humira q3 weeks, daily SSZ and no active inflammatory sx, prior stiffness resolved. No infectious sx, feels fine otherwise.

## 2021-05-20 NOTE — REVIEW OF SYSTEMS
[Arthralgias] : arthralgias [Joint Pain] : joint pain [As Noted in HPI] : as noted in HPI [Negative] : Heme/Lymph [de-identified] : chronic peripheral neuropathy

## 2021-05-27 ENCOUNTER — OUTPATIENT (OUTPATIENT)
Dept: OUTPATIENT SERVICES | Facility: HOSPITAL | Age: 76
LOS: 1 days | End: 2021-05-27
Payer: MEDICARE

## 2021-05-27 VITALS
OXYGEN SATURATION: 96 % | HEART RATE: 72 BPM | DIASTOLIC BLOOD PRESSURE: 72 MMHG | TEMPERATURE: 97 F | SYSTOLIC BLOOD PRESSURE: 149 MMHG | RESPIRATION RATE: 15 BRPM | HEIGHT: 68 IN | WEIGHT: 279.99 LBS

## 2021-05-27 DIAGNOSIS — M06.9 RHEUMATOID ARTHRITIS, UNSPECIFIED: ICD-10-CM

## 2021-05-27 DIAGNOSIS — M25.552 PAIN IN LEFT HIP: ICD-10-CM

## 2021-05-27 DIAGNOSIS — Z90.89 ACQUIRED ABSENCE OF OTHER ORGANS: Chronic | ICD-10-CM

## 2021-05-27 DIAGNOSIS — Z98.890 OTHER SPECIFIED POSTPROCEDURAL STATES: Chronic | ICD-10-CM

## 2021-05-27 DIAGNOSIS — M16.12 UNILATERAL PRIMARY OSTEOARTHRITIS, LEFT HIP: ICD-10-CM

## 2021-05-27 DIAGNOSIS — Z90.49 ACQUIRED ABSENCE OF OTHER SPECIFIED PARTS OF DIGESTIVE TRACT: Chronic | ICD-10-CM

## 2021-05-27 DIAGNOSIS — Z01.818 ENCOUNTER FOR OTHER PREPROCEDURAL EXAMINATION: ICD-10-CM

## 2021-05-27 DIAGNOSIS — G47.30 SLEEP APNEA, UNSPECIFIED: ICD-10-CM

## 2021-05-27 DIAGNOSIS — Z96.649 PRESENCE OF UNSPECIFIED ARTIFICIAL HIP JOINT: Chronic | ICD-10-CM

## 2021-05-27 DIAGNOSIS — E11.9 TYPE 2 DIABETES MELLITUS WITHOUT COMPLICATIONS: ICD-10-CM

## 2021-05-27 LAB
ALBUMIN SERPL ELPH-MCNC: 3.7 G/DL — SIGNIFICANT CHANGE UP (ref 3.3–5)
ALP SERPL-CCNC: 58 U/L — SIGNIFICANT CHANGE UP (ref 30–120)
ALT FLD-CCNC: 35 U/L DA — SIGNIFICANT CHANGE UP (ref 10–60)
ANION GAP SERPL CALC-SCNC: 2 MMOL/L — LOW (ref 5–17)
APTT BLD: 30.2 SEC — SIGNIFICANT CHANGE UP (ref 27.5–35.5)
AST SERPL-CCNC: 29 U/L — SIGNIFICANT CHANGE UP (ref 10–40)
BILIRUB SERPL-MCNC: 0.3 MG/DL — SIGNIFICANT CHANGE UP (ref 0.2–1.2)
BUN SERPL-MCNC: 17 MG/DL — SIGNIFICANT CHANGE UP (ref 7–23)
CALCIUM SERPL-MCNC: 9.4 MG/DL — SIGNIFICANT CHANGE UP (ref 8.4–10.5)
CHLORIDE SERPL-SCNC: 107 MMOL/L — SIGNIFICANT CHANGE UP (ref 96–108)
CO2 SERPL-SCNC: 35 MMOL/L — HIGH (ref 22–31)
CREAT SERPL-MCNC: 1.41 MG/DL — HIGH (ref 0.5–1.3)
GLUCOSE SERPL-MCNC: 95 MG/DL — SIGNIFICANT CHANGE UP (ref 70–99)
HCT VFR BLD CALC: 49.1 % — SIGNIFICANT CHANGE UP (ref 39–50)
HGB BLD-MCNC: 16.7 G/DL — SIGNIFICANT CHANGE UP (ref 13–17)
INR BLD: 1.03 RATIO — SIGNIFICANT CHANGE UP (ref 0.88–1.16)
MCHC RBC-ENTMCNC: 31 PG — SIGNIFICANT CHANGE UP (ref 27–34)
MCHC RBC-ENTMCNC: 34 GM/DL — SIGNIFICANT CHANGE UP (ref 32–36)
MCV RBC AUTO: 91.3 FL — SIGNIFICANT CHANGE UP (ref 80–100)
NRBC # BLD: 0 /100 WBCS — SIGNIFICANT CHANGE UP (ref 0–0)
PLATELET # BLD AUTO: 208 K/UL — SIGNIFICANT CHANGE UP (ref 150–400)
POTASSIUM SERPL-MCNC: 4 MMOL/L — SIGNIFICANT CHANGE UP (ref 3.5–5.3)
POTASSIUM SERPL-SCNC: 4 MMOL/L — SIGNIFICANT CHANGE UP (ref 3.5–5.3)
PROT SERPL-MCNC: 7.6 G/DL — SIGNIFICANT CHANGE UP (ref 6–8.3)
PROTHROM AB SERPL-ACNC: 12.4 SEC — SIGNIFICANT CHANGE UP (ref 10.6–13.6)
RBC # BLD: 5.38 M/UL — SIGNIFICANT CHANGE UP (ref 4.2–5.8)
RBC # FLD: 13.9 % — SIGNIFICANT CHANGE UP (ref 10.3–14.5)
SODIUM SERPL-SCNC: 144 MMOL/L — SIGNIFICANT CHANGE UP (ref 135–145)
WBC # BLD: 6.42 K/UL — SIGNIFICANT CHANGE UP (ref 3.8–10.5)
WBC # FLD AUTO: 6.42 K/UL — SIGNIFICANT CHANGE UP (ref 3.8–10.5)

## 2021-05-27 PROCEDURE — G0463: CPT

## 2021-05-27 RX ORDER — ZOLPIDEM TARTRATE 10 MG/1
1 TABLET ORAL
Qty: 0 | Refills: 0 | DISCHARGE

## 2021-05-27 RX ORDER — ADALIMUMAB 40MG/0.8ML
0 KIT SUBCUTANEOUS
Qty: 0 | Refills: 0 | DISCHARGE

## 2021-05-27 RX ORDER — INSULIN DEGLUDEC 100 U/ML
40 INJECTION, SOLUTION SUBCUTANEOUS
Qty: 0 | Refills: 0 | DISCHARGE

## 2021-05-27 RX ORDER — HYDRALAZINE HCL 50 MG
0 TABLET ORAL
Qty: 0 | Refills: 0 | DISCHARGE

## 2021-05-27 RX ORDER — INSULIN LISPRO 100 [IU]/ML
10 INJECTION, SUSPENSION SUBCUTANEOUS
Qty: 0 | Refills: 0 | DISCHARGE

## 2021-05-27 NOTE — H&P PST ADULT - HISTORY OF PRESENT ILLNESS
this is a    this is a 76 y/o male has been having left hip pain for about 1 yr on and off, it became more intense in the last 3-4 months; has received injections in past; to have left hip replacement

## 2021-05-27 NOTE — H&P PST ADULT - NSICDXPASTSURGICALHX_GEN_ALL_CORE_FT
PAST SURGICAL HISTORY:  H/O hernia repair     S/P foot surgery bilateral    S/P laparoscopic cholecystectomy     S/P LASIK surgery of both eyes     S/P lumbar laminectomy 2012    S/P tonsillectomy     S/P total hip arthroplasty right 2011

## 2021-05-27 NOTE — H&P PST ADULT - NSICDXPROBLEM_GEN_ALL_CORE_FT
PROBLEM DIAGNOSES  Problem: Left hip pain  Assessment and Plan: left total hip replacement, anterior approach, no need for covid test, preop instructions given, went for cardio clearance,had echo, but needs another test, went for rheumatology clearance and endocrine clearance; to go to PCP and urologist    Problem: Type 2 diabetes mellitus  Assessment and Plan: went to endocrine drSuzanne for clearance; was advised regarding diabetes meds preop    Problem: Rheumatoid arthritis  Assessment and Plan: patient went to rheumatoid  for clearance; he received instructions about what to do with humira and sulfasalazone    Problem: Sleep apnea  Assessment and Plan: SAMARA precautions

## 2021-05-27 NOTE — H&P PST ADULT - NSICDXPASTMEDICALHX_GEN_ALL_CORE_FT
PAST MEDICAL HISTORY:  Asthma     DVT (deep venous thrombosis) right leg 4-5 yrs ago    Hyperlipidemia     Hypertension     Hypothyroid     Prostate cancer seed implant 2008    Rheumatoid arthritis     Seasonal allergies     Type 2 diabetes mellitus      PAST MEDICAL HISTORY:  Asthma     COVID-19 vaccine series completed pfizer, last dose 3/6/21    DVT (deep venous thrombosis) right leg 4-5 yrs ago    Hyperlipidemia     Hypertension     Hypothyroid     Prostate cancer seed implant 2008    Rheumatoid arthritis     Seasonal allergies     Type 2 diabetes mellitus

## 2021-05-27 NOTE — H&P PST ADULT - NSICDXFAMILYHX_GEN_ALL_CORE_FT
FAMILY HISTORY:  Father  Still living? No  Family history of prostate cancer in father, Age at diagnosis: Age Unknown    Mother  Still living? No  Family history- stomach cancer, Age at diagnosis: Age Unknown

## 2021-05-28 LAB
A1C WITH ESTIMATED AVERAGE GLUCOSE RESULT: 5.8 % — HIGH (ref 4–5.6)
ESTIMATED AVERAGE GLUCOSE: 120 MG/DL — HIGH (ref 68–114)
MRSA PCR RESULT.: SIGNIFICANT CHANGE UP
S AUREUS DNA NOSE QL NAA+PROBE: SIGNIFICANT CHANGE UP

## 2021-06-01 ENCOUNTER — APPOINTMENT (OUTPATIENT)
Dept: FAMILY MEDICINE | Facility: CLINIC | Age: 76
End: 2021-06-01
Payer: MEDICARE

## 2021-06-01 VITALS
OXYGEN SATURATION: 92 % | HEIGHT: 68 IN | DIASTOLIC BLOOD PRESSURE: 76 MMHG | RESPIRATION RATE: 15 BRPM | WEIGHT: 281 LBS | TEMPERATURE: 96 F | BODY MASS INDEX: 42.59 KG/M2 | HEART RATE: 79 BPM | SYSTOLIC BLOOD PRESSURE: 136 MMHG

## 2021-06-01 DIAGNOSIS — M25.552 PAIN IN LEFT HIP: ICD-10-CM

## 2021-06-01 PROCEDURE — 99214 OFFICE O/P EST MOD 30 MIN: CPT

## 2021-06-02 ENCOUNTER — APPOINTMENT (OUTPATIENT)
Dept: ORTHOPEDIC SURGERY | Facility: CLINIC | Age: 76
End: 2021-06-02
Payer: MEDICARE

## 2021-06-02 VITALS — WEIGHT: 281 LBS | BODY MASS INDEX: 42.59 KG/M2 | HEIGHT: 68 IN

## 2021-06-02 PROCEDURE — 99212 OFFICE O/P EST SF 10 MIN: CPT

## 2021-06-02 NOTE — HISTORY OF PRESENT ILLNESS
[de-identified] : Follow-up chronic intermittent pain left hip increasing in severity. Pain and left groin radiating down thigh to knee is provoked by weightbearing. Walking tolerance is become progressively less and pain interferes with activity or lifting such as putting on shoes and socks pants climbing stairs and getting in and out of a car. His symptoms have not responded to oral anti-inflammatory medication post steroid injection left hip 12 days ago with minimal relief He status post right anterior total hip replacement 2011 with no complaints. Has been undergoing dermatological care for left groin dermatitis has been cleared by dermatology for surgery

## 2021-06-02 NOTE — PHYSICAL EXAM
[de-identified] : Constitutional: Obese, well developed and in no acute distress\par Psychiatric: Alert and oriented to time place and person.Appropriate affect\par Respiratory: Unlabored respirations,no audible wheezing\par Cardiovascular: no leg swelling  ankle edema\par Vascular: no calf or thigh tenderness, \par Peripheral pulses; intact\par Skin:Head, neck, arms and lower extremities:no lesions or discoloration\par Lymphatics:No groin adenopathy\par Neurological: intact light touch sensation and grossly intact coordination and motor power.\par Left hip Trendelenburg gait mild shortening log roll provokes typical groin and thigh pain neurovascular intact no equal tenderness operative site skin markedly improved\par  [de-identified] : Prior x-ray AP pelvis left hip AP lateral reveals degenerative narrowing subchondral sclerosis marginal osteophytes satisfactory appearance right total hip replacement

## 2021-06-02 NOTE — DISCUSSION/SUMMARY
[Surgical risks reviewed] : Surgical risks reviewed [de-identified] : Diagnosis end-stage osteoarthritis left hip compromise and quality life and unresponsive to comprehensive medical management.\par Plan left total hip replacement

## 2021-06-02 NOTE — CONSULT LETTER
[Dear  ___] : Dear  [unfilled], [Consult Letter:] : I had the pleasure of evaluating your patient, [unfilled]. [Consult Closing:] : Thank you very much for allowing me to participate in the care of this patient.  If you have any questions, please do not hesitate to contact me. [Sincerely,] : Sincerely, [FreeTextEntry2] : BESSIE WILLIAMSON [FreeTextEntry3] : Fabio Girard MD, FAAOS\par Total Hip and Total Knee Replacement \par Anterior Total Hip Replacement\par [facility]\par [address]\par [place], [place] \par [phone]\par fax [phone]\par

## 2021-06-07 RX ORDER — CEFAZOLIN SODIUM 1 G
3000 VIAL (EA) INJECTION ONCE
Refills: 0 | Status: COMPLETED | OUTPATIENT
Start: 2021-06-10 | End: 2021-06-10

## 2021-06-07 RX ORDER — TRANEXAMIC ACID 100 MG/ML
1000 INJECTION, SOLUTION INTRAVENOUS ONCE
Refills: 0 | Status: COMPLETED | OUTPATIENT
Start: 2021-06-10 | End: 2021-06-10

## 2021-06-07 RX ORDER — ACETAMINOPHEN 500 MG
1000 TABLET ORAL ONCE
Refills: 0 | Status: COMPLETED | OUTPATIENT
Start: 2021-06-10 | End: 2021-06-10

## 2021-06-07 RX ORDER — CHLORHEXIDINE GLUCONATE 213 G/1000ML
1 SOLUTION TOPICAL ONCE
Refills: 0 | Status: COMPLETED | OUTPATIENT
Start: 2021-06-10 | End: 2021-06-10

## 2021-06-07 RX ORDER — APREPITANT 80 MG/1
40 CAPSULE ORAL ONCE
Refills: 0 | Status: COMPLETED | OUTPATIENT
Start: 2021-06-10 | End: 2021-06-10

## 2021-06-09 NOTE — HISTORY OF PRESENT ILLNESS
[No Pertinent Cardiac History] : no history of aortic stenosis, atrial fibrillation, coronary artery disease, recent myocardial infarction, or implantable device/pacemaker [Asthma] : asthma [Sleep Apnea] : sleep apnea [Chronic Anticoagulation] : chronic anticoagulation [Chronic Kidney Disease] : chronic kidney disease [Diabetes] : diabetes [(Patient denies any chest pain, claudication, dyspnea on exertion, orthopnea, palpitations or syncope)] : Patient denies any chest pain, claudication, dyspnea on exertion, orthopnea, palpitations or syncope [Moderate (4-6 METs)] : Moderate (4-6 METs) [Anti-Platelet Agents: _____] : Anti-Platelet Agents: [unfilled] [NSAIDs: _____] : NSAIDs: [unfilled] [Aortic Stenosis] : no aortic stenosis [Atrial Fibrillation] : no atrial fibrillation [Coronary Artery Disease] : no coronary artery disease [Recent Myocardial Infarction] : no recent myocardial infarction [Implantable Device/Pacemaker] : no implantable device/pacemaker [COPD] : no COPD [Smoker] : not a smoker [Family Member] : no family member with adverse anesthesia reaction/sudden death [Self] : no previous adverse anesthesia reaction [FreeTextEntry1] : Left Total Hip Replacement [FreeTextEntry2] : 06/10/2021 [FreeTextEntry3] : Dr. Girard [FreeTextEntry4] : Mr. Fabio Medina is a 74 yo male presents today for preop evaluation for a left total hip replacement to be done by Dr. Girard on 06/10/2021 at [facility]. Pt has prior hx of right THR, in 2011 with good results. Pt report lately more discomfort of his left hip, and has attempted multiple modalities of treatment with dismal results, now opting for surgery.  [FreeTextEntry7] : Pt seen Cardiology Dr. Scott, pending cardiac clearance

## 2021-06-09 NOTE — RESULTS/DATA
[] : results reviewed [de-identified] : 05/27/2021: Results within acceptable limits [de-identified] : 05/27/2021: Results within acceptable limits [de-identified] : 05/27/2021: Results within acceptable limits [de-identified] : Pt seen Cardiology Dr. Scott, pending cardiac clearance [de-identified] : HgbA1c: 5.8, within acceptable limits

## 2021-06-09 NOTE — PHYSICAL EXAM
[EOMI] : extraocular movements intact [Supple] : supple [Clear to Auscultation] : lungs were clear to auscultation bilaterally [Normal S1, S2] : normal S1 and S2 [No Edema] : there was no peripheral edema [Soft] : abdomen soft [Non Tender] : non-tender [Normal Bowel Sounds] : normal bowel sounds [No Rash] : no rash [Normal Affect] : the affect was normal [de-identified] : obese, elderly male [de-identified] : left hip pain with motion, and ambulation [de-identified] : cane

## 2021-06-09 NOTE — ASSESSMENT
[High Risk Surgery - Intraperitoneal, Intrathoracic or Supringuinal Vascular Procedures] : High Risk Surgery - Intraperitoneal, Intrathoracic or Supringuinal Vascular Procedures - No (0) [Ischemic Heart Disease] : Ischemic Heart Disease - No (0) [Congestive Heart Failure] : Congestive Heart Failure - No (0) [Prior Cerebrovascular Accident or TIA] : Prior Cerebrovascular Accident or TIA - No (0) [Creatinine >= 2mg/dL (1 Point)] : Creatinine >= 2mg/dL - No (0) [Insulin-dependent Diabetic (1 Point)] : Insulin-dependent Diabetic - No (0) [0] : 0 , RCRI Class: I, Risk of Post-Op Cardiac Complications: 3.9%, 95% CI for Risk Estimate: 2.8% - 5.4% [Echocardiogram] : echocardiogram [Cardiology consultation] : Cardiology consultation [Other: _____] : [unfilled] [As per surgery] : as per surgery [Patient Optimized for Surgery] : Patient optimized for surgery [FreeTextEntry4] : Completed physical Exam, reviewed pre op labs, EKG done at cardiology \par Advised pt to avoid any NSAIDs, vitamins, aspirin for 7 days till post surgery.\par  Avoid any food or drinks past midnight, the day prior to surgery. \par HOLD the Humira/Sulfasalazine 1 week prior to surgery, and then resume 2 weeks after\par HOLD anti glycemic medications Tresiba 48 hrs prior surgery. resume 24 hrs post surgery\par Pt may take rest of the medication with small sips of water the day of the surgery.\par  RTO for a follow up appointment post surgery.\par \par RCRI: Class I for procedure\par Cardiac Clearance received\par Received clearance from rheumatology with instructions about medication prior surgery\par Endocrine, received instructions about medications prior to surgery, recent HgbA1c: 5.8, acceptable for surgery\par Medical standpoint: Pt at current time has been optimized for surgery. \par

## 2021-06-10 ENCOUNTER — OUTPATIENT (OUTPATIENT)
Dept: INPATIENT UNIT | Facility: HOSPITAL | Age: 76
LOS: 1 days | End: 2021-06-10
Payer: MEDICARE

## 2021-06-10 ENCOUNTER — NON-APPOINTMENT (OUTPATIENT)
Age: 76
End: 2021-06-10

## 2021-06-10 VITALS
TEMPERATURE: 99 F | RESPIRATION RATE: 20 BRPM | DIASTOLIC BLOOD PRESSURE: 71 MMHG | HEART RATE: 77 BPM | SYSTOLIC BLOOD PRESSURE: 179 MMHG | WEIGHT: 276.46 LBS | OXYGEN SATURATION: 97 % | HEIGHT: 68 IN

## 2021-06-10 DIAGNOSIS — Z01.818 ENCOUNTER FOR OTHER PREPROCEDURAL EXAMINATION: ICD-10-CM

## 2021-06-10 DIAGNOSIS — Z90.89 ACQUIRED ABSENCE OF OTHER ORGANS: Chronic | ICD-10-CM

## 2021-06-10 DIAGNOSIS — Z96.649 PRESENCE OF UNSPECIFIED ARTIFICIAL HIP JOINT: Chronic | ICD-10-CM

## 2021-06-10 DIAGNOSIS — Z98.890 OTHER SPECIFIED POSTPROCEDURAL STATES: Chronic | ICD-10-CM

## 2021-06-10 DIAGNOSIS — Z90.49 ACQUIRED ABSENCE OF OTHER SPECIFIED PARTS OF DIGESTIVE TRACT: Chronic | ICD-10-CM

## 2021-06-10 DIAGNOSIS — M16.12 UNILATERAL PRIMARY OSTEOARTHRITIS, LEFT HIP: ICD-10-CM

## 2021-06-10 PROCEDURE — 82962 GLUCOSE BLOOD TEST: CPT

## 2021-06-10 RX ORDER — ATORVASTATIN CALCIUM 80 MG/1
1 TABLET, FILM COATED ORAL
Qty: 0 | Refills: 0 | DISCHARGE

## 2021-06-10 RX ADMIN — CHLORHEXIDINE GLUCONATE 1 APPLICATION(S): 213 SOLUTION TOPICAL at 07:04

## 2021-06-10 RX ADMIN — APREPITANT 40 MILLIGRAM(S): 80 CAPSULE ORAL at 07:05

## 2021-06-10 NOTE — PROGRESS NOTE ADULT - SUBJECTIVE AND OBJECTIVE BOX
Patient reports undergoing steroid injection by his rheumatologist into the left hip for treatement of panful arthritis approximately 4 weeks ago. Advised patient that minimum wait after steroid injection fro total hip replacement is three months  in order to minimize the risk of periprosthetic infection.

## 2021-06-11 PROBLEM — E78.5 HYPERLIPIDEMIA, UNSPECIFIED: Chronic | Status: ACTIVE | Noted: 2021-05-27

## 2021-06-11 PROBLEM — I82.409 ACUTE EMBOLISM AND THROMBOSIS OF UNSPECIFIED DEEP VEINS OF UNSPECIFIED LOWER EXTREMITY: Chronic | Status: ACTIVE | Noted: 2021-05-27

## 2021-06-11 PROBLEM — E03.9 HYPOTHYROIDISM, UNSPECIFIED: Chronic | Status: ACTIVE | Noted: 2021-05-27

## 2021-06-11 PROBLEM — J30.2 OTHER SEASONAL ALLERGIC RHINITIS: Chronic | Status: ACTIVE | Noted: 2021-05-27

## 2021-06-11 PROBLEM — C61 MALIGNANT NEOPLASM OF PROSTATE: Chronic | Status: ACTIVE | Noted: 2020-08-24

## 2021-06-11 PROBLEM — Z92.29 PERSONAL HISTORY OF OTHER DRUG THERAPY: Chronic | Status: ACTIVE | Noted: 2021-05-27

## 2021-06-11 PROBLEM — E11.9 TYPE 2 DIABETES MELLITUS WITHOUT COMPLICATIONS: Chronic | Status: ACTIVE | Noted: 2021-05-27

## 2021-06-23 ENCOUNTER — INPATIENT (INPATIENT)
Facility: HOSPITAL | Age: 76
LOS: 2 days | Discharge: HOME | DRG: 377 | End: 2021-06-26
Attending: STUDENT IN AN ORGANIZED HEALTH CARE EDUCATION/TRAINING PROGRAM | Admitting: INTERNAL MEDICINE
Payer: MEDICARE

## 2021-06-23 ENCOUNTER — TRANSCRIPTION ENCOUNTER (OUTPATIENT)
Age: 76
End: 2021-06-23

## 2021-06-23 VITALS
TEMPERATURE: 98 F | DIASTOLIC BLOOD PRESSURE: 52 MMHG | WEIGHT: 278 LBS | OXYGEN SATURATION: 97 % | RESPIRATION RATE: 18 BRPM | SYSTOLIC BLOOD PRESSURE: 96 MMHG | HEART RATE: 72 BPM | HEIGHT: 68 IN

## 2021-06-23 DIAGNOSIS — Z98.890 OTHER SPECIFIED POSTPROCEDURAL STATES: Chronic | ICD-10-CM

## 2021-06-23 DIAGNOSIS — K57.91 DIVERTICULOSIS OF INTESTINE, PART UNSPECIFIED, WITHOUT PERFORATION OR ABSCESS WITH BLEEDING: ICD-10-CM

## 2021-06-23 DIAGNOSIS — Z90.49 ACQUIRED ABSENCE OF OTHER SPECIFIED PARTS OF DIGESTIVE TRACT: Chronic | ICD-10-CM

## 2021-06-23 DIAGNOSIS — Z90.89 ACQUIRED ABSENCE OF OTHER ORGANS: Chronic | ICD-10-CM

## 2021-06-23 DIAGNOSIS — Z96.649 PRESENCE OF UNSPECIFIED ARTIFICIAL HIP JOINT: Chronic | ICD-10-CM

## 2021-06-23 LAB
ALBUMIN SERPL ELPH-MCNC: 2.7 G/DL — LOW (ref 3.3–5)
ALP SERPL-CCNC: 53 U/L — SIGNIFICANT CHANGE UP (ref 40–120)
ALT FLD-CCNC: 22 U/L — SIGNIFICANT CHANGE UP (ref 10–45)
ANION GAP SERPL CALC-SCNC: 2 MMOL/L — LOW (ref 5–17)
ANION GAP SERPL CALC-SCNC: 8 MMOL/L — SIGNIFICANT CHANGE UP (ref 5–17)
APTT BLD: 27.1 SEC — LOW (ref 27.5–35.5)
AST SERPL-CCNC: 23 U/L — SIGNIFICANT CHANGE UP (ref 10–40)
BASOPHILS # BLD AUTO: 0.04 K/UL — SIGNIFICANT CHANGE UP (ref 0–0.2)
BASOPHILS NFR BLD AUTO: 0.6 % — SIGNIFICANT CHANGE UP (ref 0–2)
BILIRUB SERPL-MCNC: 0.2 MG/DL — SIGNIFICANT CHANGE UP (ref 0.2–1.2)
BLD GP AB SCN SERPL QL: SIGNIFICANT CHANGE UP
BUN SERPL-MCNC: 30 MG/DL — HIGH (ref 7–23)
BUN SERPL-MCNC: 34 MG/DL — HIGH (ref 7–23)
CALCIUM SERPL-MCNC: 7.8 MG/DL — LOW (ref 8.4–10.5)
CALCIUM SERPL-MCNC: 8.4 MG/DL — SIGNIFICANT CHANGE UP (ref 8.4–10.5)
CHLORIDE SERPL-SCNC: 113 MMOL/L — HIGH (ref 96–108)
CHLORIDE SERPL-SCNC: 113 MMOL/L — HIGH (ref 96–108)
CO2 SERPL-SCNC: 27 MMOL/L — SIGNIFICANT CHANGE UP (ref 22–31)
CO2 SERPL-SCNC: 32 MMOL/L — HIGH (ref 22–31)
CREAT SERPL-MCNC: 1.91 MG/DL — HIGH (ref 0.5–1.3)
CREAT SERPL-MCNC: 1.94 MG/DL — HIGH (ref 0.5–1.3)
EOSINOPHIL # BLD AUTO: 0.18 K/UL — SIGNIFICANT CHANGE UP (ref 0–0.5)
EOSINOPHIL NFR BLD AUTO: 2.7 % — SIGNIFICANT CHANGE UP (ref 0–6)
GLUCOSE BLDC GLUCOMTR-MCNC: 123 MG/DL — HIGH (ref 70–99)
GLUCOSE BLDC GLUCOMTR-MCNC: 146 MG/DL — HIGH (ref 70–99)
GLUCOSE BLDC GLUCOMTR-MCNC: 159 MG/DL — HIGH (ref 70–99)
GLUCOSE BLDC GLUCOMTR-MCNC: 177 MG/DL — HIGH (ref 70–99)
GLUCOSE SERPL-MCNC: 179 MG/DL — HIGH (ref 70–99)
GLUCOSE SERPL-MCNC: 182 MG/DL — HIGH (ref 70–99)
HCT VFR BLD CALC: 36.5 % — LOW (ref 39–50)
HCT VFR BLD CALC: 36.5 % — LOW (ref 39–50)
HCT VFR BLD CALC: 37.3 % — LOW (ref 39–50)
HGB BLD-MCNC: 11.9 G/DL — LOW (ref 13–17)
HGB BLD-MCNC: 12.1 G/DL — LOW (ref 13–17)
HGB BLD-MCNC: 12.1 G/DL — LOW (ref 13–17)
IMM GRANULOCYTES NFR BLD AUTO: 0.4 % — SIGNIFICANT CHANGE UP (ref 0–1.5)
INR BLD: 1.1 RATIO — SIGNIFICANT CHANGE UP (ref 0.88–1.16)
LYMPHOCYTES # BLD AUTO: 1.47 K/UL — SIGNIFICANT CHANGE UP (ref 1–3.3)
LYMPHOCYTES # BLD AUTO: 21.9 % — SIGNIFICANT CHANGE UP (ref 13–44)
MCHC RBC-ENTMCNC: 30.4 PG — SIGNIFICANT CHANGE UP (ref 27–34)
MCHC RBC-ENTMCNC: 30.9 PG — SIGNIFICANT CHANGE UP (ref 27–34)
MCHC RBC-ENTMCNC: 31.2 PG — SIGNIFICANT CHANGE UP (ref 27–34)
MCHC RBC-ENTMCNC: 32.4 GM/DL — SIGNIFICANT CHANGE UP (ref 32–36)
MCHC RBC-ENTMCNC: 32.6 GM/DL — SIGNIFICANT CHANGE UP (ref 32–36)
MCHC RBC-ENTMCNC: 33.2 GM/DL — SIGNIFICANT CHANGE UP (ref 32–36)
MCV RBC AUTO: 93.4 FL — SIGNIFICANT CHANGE UP (ref 80–100)
MCV RBC AUTO: 93.4 FL — SIGNIFICANT CHANGE UP (ref 80–100)
MCV RBC AUTO: 96.1 FL — SIGNIFICANT CHANGE UP (ref 80–100)
MONOCYTES # BLD AUTO: 0.78 K/UL — SIGNIFICANT CHANGE UP (ref 0–0.9)
MONOCYTES NFR BLD AUTO: 11.6 % — SIGNIFICANT CHANGE UP (ref 2–14)
NEUTROPHILS # BLD AUTO: 4.21 K/UL — SIGNIFICANT CHANGE UP (ref 1.8–7.4)
NEUTROPHILS NFR BLD AUTO: 62.8 % — SIGNIFICANT CHANGE UP (ref 43–77)
NRBC # BLD: 0 /100 WBCS — SIGNIFICANT CHANGE UP (ref 0–0)
PLATELET # BLD AUTO: 154 K/UL — SIGNIFICANT CHANGE UP (ref 150–400)
PLATELET # BLD AUTO: 165 K/UL — SIGNIFICANT CHANGE UP (ref 150–400)
PLATELET # BLD AUTO: 182 K/UL — SIGNIFICANT CHANGE UP (ref 150–400)
POTASSIUM SERPL-MCNC: 4.3 MMOL/L — SIGNIFICANT CHANGE UP (ref 3.5–5.3)
POTASSIUM SERPL-MCNC: 4.3 MMOL/L — SIGNIFICANT CHANGE UP (ref 3.5–5.3)
POTASSIUM SERPL-SCNC: 4.3 MMOL/L — SIGNIFICANT CHANGE UP (ref 3.5–5.3)
POTASSIUM SERPL-SCNC: 4.3 MMOL/L — SIGNIFICANT CHANGE UP (ref 3.5–5.3)
PROT SERPL-MCNC: 5.5 G/DL — LOW (ref 6–8.3)
PROTHROM AB SERPL-ACNC: 13.3 SEC — SIGNIFICANT CHANGE UP (ref 10.6–13.6)
RBC # BLD: 3.88 M/UL — LOW (ref 4.2–5.8)
RBC # BLD: 3.91 M/UL — LOW (ref 4.2–5.8)
RBC # BLD: 3.91 M/UL — LOW (ref 4.2–5.8)
RBC # FLD: 14.2 % — SIGNIFICANT CHANGE UP (ref 10.3–14.5)
RBC # FLD: 14.7 % — HIGH (ref 10.3–14.5)
RBC # FLD: 15.9 % — HIGH (ref 10.3–14.5)
SARS-COV-2 RNA SPEC QL NAA+PROBE: SIGNIFICANT CHANGE UP
SODIUM SERPL-SCNC: 147 MMOL/L — HIGH (ref 135–145)
SODIUM SERPL-SCNC: 148 MMOL/L — HIGH (ref 135–145)
TSH SERPL-MCNC: 3.25 UIU/ML — SIGNIFICANT CHANGE UP (ref 0.27–4.2)
WBC # BLD: 6.71 K/UL — SIGNIFICANT CHANGE UP (ref 3.8–10.5)
WBC # BLD: 7.16 K/UL — SIGNIFICANT CHANGE UP (ref 3.8–10.5)
WBC # BLD: 8.24 K/UL — SIGNIFICANT CHANGE UP (ref 3.8–10.5)
WBC # FLD AUTO: 6.71 K/UL — SIGNIFICANT CHANGE UP (ref 3.8–10.5)
WBC # FLD AUTO: 7.16 K/UL — SIGNIFICANT CHANGE UP (ref 3.8–10.5)
WBC # FLD AUTO: 8.24 K/UL — SIGNIFICANT CHANGE UP (ref 3.8–10.5)

## 2021-06-23 PROCEDURE — 93010 ELECTROCARDIOGRAM REPORT: CPT

## 2021-06-23 PROCEDURE — 99223 1ST HOSP IP/OBS HIGH 75: CPT

## 2021-06-23 PROCEDURE — 71045 X-RAY EXAM CHEST 1 VIEW: CPT | Mod: 26

## 2021-06-23 PROCEDURE — 99285 EMERGENCY DEPT VISIT HI MDM: CPT

## 2021-06-23 RX ORDER — SODIUM CHLORIDE 9 MG/ML
1000 INJECTION INTRAMUSCULAR; INTRAVENOUS; SUBCUTANEOUS ONCE
Refills: 0 | Status: COMPLETED | OUTPATIENT
Start: 2021-06-23 | End: 2021-06-23

## 2021-06-23 RX ORDER — FOLIC ACID 0.8 MG
1 TABLET ORAL DAILY
Refills: 0 | Status: DISCONTINUED | OUTPATIENT
Start: 2021-06-23 | End: 2021-06-24

## 2021-06-23 RX ORDER — ATORVASTATIN CALCIUM 80 MG/1
1 TABLET, FILM COATED ORAL
Qty: 0 | Refills: 0 | DISCHARGE

## 2021-06-23 RX ORDER — SODIUM CHLORIDE 9 MG/ML
1000 INJECTION, SOLUTION INTRAVENOUS
Refills: 0 | Status: DISCONTINUED | OUTPATIENT
Start: 2021-06-23 | End: 2021-06-24

## 2021-06-23 RX ORDER — SODIUM CHLORIDE 9 MG/ML
1000 INJECTION, SOLUTION INTRAVENOUS
Refills: 0 | Status: DISCONTINUED | OUTPATIENT
Start: 2021-06-23 | End: 2021-06-25

## 2021-06-23 RX ORDER — DEXTROSE 50 % IN WATER 50 %
25 SYRINGE (ML) INTRAVENOUS ONCE
Refills: 0 | Status: DISCONTINUED | OUTPATIENT
Start: 2021-06-23 | End: 2021-06-24

## 2021-06-23 RX ORDER — SOD SULF/SODIUM/NAHCO3/KCL/PEG
1000 SOLUTION, RECONSTITUTED, ORAL ORAL ONCE
Refills: 0 | Status: COMPLETED | OUTPATIENT
Start: 2021-06-23 | End: 2021-06-23

## 2021-06-23 RX ORDER — ACETAMINOPHEN 500 MG
650 TABLET ORAL EVERY 6 HOURS
Refills: 0 | Status: DISCONTINUED | OUTPATIENT
Start: 2021-06-23 | End: 2021-06-24

## 2021-06-23 RX ORDER — DEXTROSE 50 % IN WATER 50 %
15 SYRINGE (ML) INTRAVENOUS ONCE
Refills: 0 | Status: DISCONTINUED | OUTPATIENT
Start: 2021-06-23 | End: 2021-06-24

## 2021-06-23 RX ORDER — GLUCAGON INJECTION, SOLUTION 0.5 MG/.1ML
1 INJECTION, SOLUTION SUBCUTANEOUS ONCE
Refills: 0 | Status: DISCONTINUED | OUTPATIENT
Start: 2021-06-23 | End: 2021-06-24

## 2021-06-23 RX ORDER — DULOXETINE HYDROCHLORIDE 30 MG/1
60 CAPSULE, DELAYED RELEASE ORAL DAILY
Refills: 0 | Status: DISCONTINUED | OUTPATIENT
Start: 2021-06-23 | End: 2021-06-24

## 2021-06-23 RX ORDER — PANTOPRAZOLE SODIUM 20 MG/1
40 TABLET, DELAYED RELEASE ORAL ONCE
Refills: 0 | Status: COMPLETED | OUTPATIENT
Start: 2021-06-23 | End: 2021-06-23

## 2021-06-23 RX ORDER — PANTOPRAZOLE SODIUM 20 MG/1
40 TABLET, DELAYED RELEASE ORAL
Refills: 0 | Status: DISCONTINUED | OUTPATIENT
Start: 2021-06-23 | End: 2021-06-24

## 2021-06-23 RX ORDER — LEVOTHYROXINE SODIUM 125 MCG
75 TABLET ORAL DAILY
Refills: 0 | Status: DISCONTINUED | OUTPATIENT
Start: 2021-06-23 | End: 2021-06-24

## 2021-06-23 RX ORDER — INSULIN LISPRO 100/ML
VIAL (ML) SUBCUTANEOUS EVERY 6 HOURS
Refills: 0 | Status: DISCONTINUED | OUTPATIENT
Start: 2021-06-23 | End: 2021-06-24

## 2021-06-23 RX ORDER — DEXTROSE 50 % IN WATER 50 %
12.5 SYRINGE (ML) INTRAVENOUS ONCE
Refills: 0 | Status: DISCONTINUED | OUTPATIENT
Start: 2021-06-23 | End: 2021-06-24

## 2021-06-23 RX ORDER — INSULIN GLARGINE 100 [IU]/ML
15 INJECTION, SOLUTION SUBCUTANEOUS EVERY MORNING
Refills: 0 | Status: DISCONTINUED | OUTPATIENT
Start: 2021-06-23 | End: 2021-06-24

## 2021-06-23 RX ORDER — DAPAGLIFLOZIN 10 MG/1
1 TABLET, FILM COATED ORAL
Qty: 0 | Refills: 0 | DISCHARGE

## 2021-06-23 RX ORDER — FENOFIBRATE,MICRONIZED 130 MG
145 CAPSULE ORAL DAILY
Refills: 0 | Status: DISCONTINUED | OUTPATIENT
Start: 2021-06-23 | End: 2021-06-24

## 2021-06-23 RX ORDER — SODIUM CHLORIDE 9 MG/ML
1000 INJECTION, SOLUTION INTRAVENOUS
Refills: 0 | Status: DISCONTINUED | OUTPATIENT
Start: 2021-06-23 | End: 2021-06-23

## 2021-06-23 RX ORDER — MONTELUKAST 4 MG/1
10 TABLET, CHEWABLE ORAL DAILY
Refills: 0 | Status: DISCONTINUED | OUTPATIENT
Start: 2021-06-23 | End: 2021-06-24

## 2021-06-23 RX ADMIN — Medication 1000 MILLILITER(S): at 15:59

## 2021-06-23 RX ADMIN — SODIUM CHLORIDE 100 MILLILITER(S): 9 INJECTION, SOLUTION INTRAVENOUS at 10:05

## 2021-06-23 RX ADMIN — Medication 1000 MILLILITER(S): at 18:15

## 2021-06-23 RX ADMIN — INSULIN GLARGINE 15 UNIT(S): 100 INJECTION, SOLUTION SUBCUTANEOUS at 11:00

## 2021-06-23 RX ADMIN — Medication 1: at 11:59

## 2021-06-23 RX ADMIN — Medication 1 MILLIGRAM(S): at 12:02

## 2021-06-23 RX ADMIN — MONTELUKAST 10 MILLIGRAM(S): 4 TABLET, CHEWABLE ORAL at 12:02

## 2021-06-23 RX ADMIN — Medication 145 MILLIGRAM(S): at 12:02

## 2021-06-23 RX ADMIN — SODIUM CHLORIDE 1000 MILLILITER(S): 9 INJECTION INTRAMUSCULAR; INTRAVENOUS; SUBCUTANEOUS at 03:12

## 2021-06-23 RX ADMIN — SODIUM CHLORIDE 100 MILLILITER(S): 9 INJECTION, SOLUTION INTRAVENOUS at 04:10

## 2021-06-23 RX ADMIN — PANTOPRAZOLE SODIUM 40 MILLIGRAM(S): 20 TABLET, DELAYED RELEASE ORAL at 02:44

## 2021-06-23 RX ADMIN — DULOXETINE HYDROCHLORIDE 60 MILLIGRAM(S): 30 CAPSULE, DELAYED RELEASE ORAL at 12:05

## 2021-06-23 RX ADMIN — SODIUM CHLORIDE 2000 MILLILITER(S): 9 INJECTION INTRAMUSCULAR; INTRAVENOUS; SUBCUTANEOUS at 02:42

## 2021-06-23 NOTE — PROGRESS NOTE ADULT - ASSESSMENT
PLAN:  -npo after midnight for colonoscopy/EGD in AM  -will finish moviprep  -follow lytes in AM post prep  -trend Hb trasnfuse Hb <7  -goal euglycemia with lantus and ISS  -f/u AM labs

## 2021-06-23 NOTE — ED PROVIDER NOTE - CLINICAL SUMMARY MEDICAL DECISION MAKING FREE TEXT BOX
pt with h/o DM , diverticulosis , BB ems s/p having diarrhea with blood, pt was mildly hypotensive initially but improved with IV fluids, pt was kept NPO, given protonix, Ct angio was not possible because of pt's renal function, pt was admitted for  h/h monitoring and GI evaluation.

## 2021-06-23 NOTE — H&P ADULT - NSHPPHYSICALEXAM_GEN_ALL_CORE
Vital Signs Last 24 Hrs  T(C): 36.4 (23 Jun 2021 02:29), Max: 36.4 (23 Jun 2021 02:29)  T(F): 97.6 (23 Jun 2021 02:29), Max: 97.6 (23 Jun 2021 02:29)  HR: 64 (23 Jun 2021 03:41) (64 - 72)  BP: 113/41 (23 Jun 2021 03:41) (93/35 - 113/41)  BP(mean): 61 (23 Jun 2021 03:41) (45 - 61)  RR: 16 (23 Jun 2021 03:41) (15 - 18)  SpO2: 96% (23 Jun 2021 03:41) (95% - 97%)  Daily Height in cm: 172.72 (23 Jun 2021 02:29)    Daily   CAPILLARY BLOOD GLUCOSE        I&O's Summary      GENERAL: NAD, obese.  HEAD:  Normocephalic  EYES: EOMI, PERRLA, conjunctiva and sclera clear  ENMT: No tonsillar erythema, exudates, or enlargement; Moist mucous membranes, No lesions  NECK: Supple, No JVD, no bruit, normal thyroid  NERVOUS SYSTEM:  Alert & Oriented X3, Good concentration; grossly  Motor Strength 5/5 B/L upper and lower extremities; DTRs 2+ intact and symmetric  CHEST/LUNG: Clear to auscultation bilaterally; No rales, rhonchi, wheezing, or rubs  HEART: Regular rate and rhythm; No murmurs, rubs, or gallops  ABDOMEN: Soft, Nontender, Nondistended; Bowel sounds present  EXTREMITIES:  2+ Peripheral Pulses, No clubbing, cyanosis, or edema  LYMPH: No lymphadenopathy noted  SKIN: No rashes or lesions

## 2021-06-23 NOTE — H&P ADULT - ASSESSMENT
75M hx of HTN, HLD, T2DM on insulin, hx of LGIB, hypothyroid, RA on Humira/SSZ, asthma, remote RLE DVT off A/C pw LGIB    #LGIB  trend h/h. Transfuse if hgb <7 or if hemodynamically unstable.   Hold BP meds.   Keep NPO for now.  GI consult.     #JAN  hold losartan.   cont IVFs  renal sono, renal consult if no improvement in am.     #Hypothyroid   cont synthroid    #T2DM on insulin  NPO. cont longacting insulin at reduced dose 15 U QAM for now.   correctional insulin  check HgA1c    #DVT/GI proph  IPCs, PPI.  75M hx of HTN, HLD, T2DM on insulin, hx of LGIB, hypothyroid, RA on Humira/SSZ, asthma, remote RLE DVT off A/C pw LGIB    #LGIB  trend h/h. Transfuse if hgb <7 or if hemodynamically unstable.   Transfusion consent in chart if needed.   Hold BP meds.   Keep NPO for now.  GI consult.     #JAN/hypernatremia  hold losartan.   cont IVFs with LR instead  renal sono, renal consult if no improvement in am.     #Hypothyroid   cont synthroid    #T2DM on insulin  NPO. cont longacting insulin at reduced dose 15 U QAM for now.   correctional insulin  check HgA1c    #DVT/GI proph  IPCs, PPI.  75M hx of HTN, HLD, T2DM on insulin, hx of LGIB, hypothyroid, RA on Humira/SSZ, asthma, remote RLE DVT off A/C pw LGIB    #LGIB  trend h/h. Transfuse if hgb <7 or if hemodynamically unstable. --Addendum: BP s/p 2L NS went down to high 80s with persistent complaint of lightheadedness. Transfuse 2UPRBCs. Reconsider CT angio abd if cr improves. ICU aware.   Transfusion consent in chart if needed.   Hold BP meds.   Keep NPO for now.  GI consult.     #JAN/hypernatremia  hold losartan.   cont IVFs with LR instead  renal sono, renal consult if no improvement in am.     #Hypothyroid   cont synthroid    #T2DM on insulin  NPO. cont longacting insulin at reduced dose 15 U QAM for now.   correctional insulin  check HgA1c    #DVT/GI proph  IPCs, PPI.

## 2021-06-23 NOTE — ED PROVIDER NOTE - OBJECTIVE STATEMENT
74 yo male with h/o DM, obesity and sleep apnea BIB EMS c/o sudden onset BRBPR since mid night. as per pt around mid night he went to bathroom, had diarrhea with some blood, after some time he felt urge to go to bathroom again , this time had large blood in bowl, he fel dizzy and felt like fainting, luckily he had his phone with him , he called 911, EMS found pt was hypotensive in low 80 systolic , given lit of IV fluid and he felt better, denies any abdominal pain

## 2021-06-23 NOTE — H&P ADULT - HISTORY OF PRESENT ILLNESS
75M hx of HTN, HLD, T2DM on insulin, hx of LGIB, hypothyroid, RA on Humira/SSZ, asthma, remote RLE DVT off A/C pw LGIB. Pt related he had an episode of loose stool at about 11 PM with BRBPR. He had another episode at 1AM with sig BRBPR and felt dizzy and rolled off the toilet but no LOC and called 911. Currently still feels lightheaded but improved. Denied any HA, palps, SOB, CP, NV or abdominal pain. No melena. Denied any NSAID use other than aspirin as prescribed.  In ED, afebrile P: 65-72 BP: 93/35 s/p 2L NS now BP: 113/45. H/H: 12.1/37.3 Na: 147 BUN/cr: 30/1.94. Related similar episode in 8/2020 when he was admitted, did not require transfusion at that time. Did not have colonoscopy after that. CT angio of abd cancelled after cr noted to be 1.94   75M hx of HTN, HLD, T2DM on insulin, hx of LGIB, hypothyroid, RA on Humira/SSZ, asthma, remote RLE DVT off A/C pw LGIB. Pt related he had an episode of loose stool at about 11 PM with BRBPR. He had another episode at 1AM with sig BRBPR and felt dizzy and rolled off the toilet but no LOC and no injuries and called 911. EMS noted SBP in the 80s on the field. Currently still feels lightheaded but improved. Denied any HA, palps, SOB, CP, NV or abdominal pain. No melena. Denied any NSAID use other than aspirin as prescribed.  In ED, afebrile P: 65-72 BP: 93/35 s/p 2L NS now BP: 113/45. H/H: 12.1/37.3 Na: 147 BUN/cr: 30/1.94. Related similar episode in 8/2020 when he was admitted, did not require transfusion at that time. Did not have colonoscopy after that. CT angio of abd cancelled after cr noted to be 1.94

## 2021-06-23 NOTE — ED ADULT NURSE NOTE - NSIMPLEMENTINTERV_GEN_ALL_ED
Implemented All Universal Safety Interventions:  [place] to call system. Call bell, personal items and telephone within reach. Instruct patient to call for assistance. Room bathroom lighting operational. Non-slip footwear when patient is off stretcher. Physically safe environment: no spills, clutter or unnecessary equipment. Stretcher in lowest position, wheels locked, appropriate side rails in place.

## 2021-06-23 NOTE — ED ADULT NURSE REASSESSMENT NOTE - NS ED NURSE REASSESS COMMENT FT1
Diabetic Teachings given. Explained the importance of keeping the A1C level below 7.0 ( he's currently 6.1). Pt was shown the "A1C Stick" and explained the Cardiovascular risks of uncontrolled DM. Encouraged to lose  weight and stressed the importance of staying with the prescribed medicines, regular follow up with MD, and regular exercise. Pt commended for being a non smoker.

## 2021-06-23 NOTE — CONSULT NOTE ADULT - SUBJECTIVE AND OBJECTIVE BOX
INTERVAL HPI/OVERNIGHT EVENTS:  HPI:    74 y/o male pmh HTN, HLD, DMT2, Hypothyroid, RA, Asthma, RLE DV (not on anticoags, LGIB 8/2020 who presents with complaints of BRBPR. Patient presented with BRBPR in 8/2020 but colonoscopy held due to UTI, and bleeding resolved. Patient last colonoscopy was 2016, by Dr. Peres, only diverticulosis noted. He denies nausea, vomiting, fever, chills, unintentional weight loss, early satiety, or NSAID use.     MEDICATIONS  (STANDING):  dextrose 40% Gel 15 Gram(s) Oral once  dextrose 5%. 1000 milliLiter(s) (50 mL/Hr) IV Continuous <Continuous>  dextrose 5%. 1000 milliLiter(s) (100 mL/Hr) IV Continuous <Continuous>  dextrose 50% Injectable 25 Gram(s) IV Push once  dextrose 50% Injectable 12.5 Gram(s) IV Push once  dextrose 50% Injectable 25 Gram(s) IV Push once  DULoxetine 60 milliGRAM(s) Oral daily  fenofibrate Tablet 145 milliGRAM(s) Oral daily  folic acid 1 milliGRAM(s) Oral daily  glucagon  Injectable 1 milliGRAM(s) IntraMuscular once  insulin glargine Injectable (LANTUS) 15 Unit(s) SubCutaneous every morning  insulin lispro (ADMELOG) corrective regimen sliding scale   SubCutaneous every 6 hours  levothyroxine 75 MICROGram(s) Oral daily  montelukast 10 milliGRAM(s) Oral daily  pantoprazole    Tablet 40 milliGRAM(s) Oral before breakfast  polyethylene glycol/electrolyte Solution 1000 milliLiter(s) Oral once  polyethylene glycol/electrolyte Solution 1000 milliLiter(s) Oral once  sodium chloride 0.45%. 1000 milliLiter(s) (100 mL/Hr) IV Continuous <Continuous>    MEDICATIONS  (PRN):  acetaminophen   Tablet .. 650 milliGRAM(s) Oral every 6 hours PRN Temp greater or equal to 38C (100.4F), Mild Pain (1 - 3)      Allergies    [place] (Unknown)  No Known Drug Allergies    Intolerances        PAST MEDICAL & SURGICAL HISTORY:  Rheumatoid arthritis    Asthma    Prostate cancer  seed implant 2008    Hypertension    Type 2 diabetes mellitus    Hypothyroid    Hyperlipidemia    Seasonal allergies    DVT (deep venous thrombosis)  right leg 4-5 yrs ago    COVID-19 vaccine series completed  pfizer, last dose 3/6/21    H/O hernia repair    S/P laparoscopic cholecystectomy    S/P total hip arthroplasty  right 2011    S/P tonsillectomy    S/P LASIK surgery of both eyes    S/P lumbar laminectomy  2012    S/P foot surgery  bilateral        REVIEW OF SYSTEMS: negative unless indicated in HPI    Non smoker  No ETOH abuse      PHYSICAL EXAM:   Vital Signs:  Vital Signs Last 24 Hrs  T(C): 36.1 (23 Jun 2021 13:46), Max: 36.7 (23 Jun 2021 07:04)  T(F): 97 (23 Jun 2021 13:46), Max: 98 (23 Jun 2021 07:04)  HR: 63 (23 Jun 2021 13:46) (63 - 72)  BP: 120/58 (23 Jun 2021 13:46) (85/32 - 130/57)  BP(mean): 76 (23 Jun 2021 08:00) (45 - 76)  RR: 16 (23 Jun 2021 13:46) (15 - 18)  SpO2: 97% (23 Jun 2021 13:46) (95% - 99%)  Daily Height in cm: 172.72 (23 Jun 2021 02:29)    Daily I&O's Summary    23 Jun 2021 07:01  -  23 Jun 2021 14:37  --------------------------------------------------------  IN: 600 mL / OUT: 0 mL / NET: 600 mL        GENERAL:  Appears stated age,  HEENT:  NC/AT,  conjunctivae clear and pink,   CHEST:  Full & symmetric excursion, no increased effort, breath sounds clear  HEART:  Regular rhythm, S1, S2, no murmur  ABDOMEN:  Soft, non-tender, non-distended, normoactive bowel sounds,   EXTEREMITIES:  no edema  SKIN:  No rash/warm/dry  NEURO:  Alert, oriented    LABS:                        12.1   8.24  )-----------( 182      ( 23 Jun 2021 07:00 )             36.5     06-23    148<H>  |  113<H>  |  34<H>  ----------------------------<  179<H>  4.3   |  27  |  1.91<H>    Ca    8.4      23 Jun 2021 07:00    TPro  5.5<L>  /  Alb  2.7<L>  /  TBili  0.2  /  DBili  x   /  AST  23  /  ALT  22  /  AlkPhos  53  06-23    PT/INR - ( 23 Jun 2021 02:40 )   PT: 13.3 sec;   INR: 1.10 ratio         PTT - ( 23 Jun 2021 02:40 )  PTT:27.1 sec

## 2021-06-23 NOTE — ED PROVIDER NOTE - PHYSICAL EXAMINATION
General:     NAD, well-nourished, well-appearing  Head:     NC/AT, EOMI, oral mucosa moist  Neck:     supple  Lungs:     CTA b/l, no w/r/r  CVS:     S1S2, RRR, no m/g/r  Abd:     +BS, s/nt/nd, no organomegaly  Ext:    2+ radial and pedal pulses, no c/c/e  Neuro: grossly intact  rectal : gross blood

## 2021-06-23 NOTE — H&P ADULT - NSHPREVIEWOFSYSTEMS_GEN_ALL_CORE
CONSTITUTIONAL: No fever, weight loss, or fatigue  EYES: No eye pain, visual disturbances, or discharge  ENMT:  No difficulty hearing, tinnitus, vertigo; No sinus or throat pain  NECK: No pain or stiffness  RESPIRATORY: No cough, wheezing, chills or hemoptysis; No shortness of breath  CARDIOVASCULAR: No chest pain, palpitations, +dizziness, no  leg swelling  GASTROINTESTINAL: No abdominal or epigastric pain. No nausea, vomiting, or hematemesis; No diarrhea or constipation. No melena + hematochezia.  GENITOURINARY: No dysuria, frequency, hematuria, or incontinence  NEUROLOGICAL: No headaches, memory loss, loss of strength, numbness, or tremors  SKIN: No itching, burning, rashes, or lesions   LYMPH NODES: No enlarged glands  ENDOCRINE: No heat or cold intolerance; No hair loss  MUSCULOSKELETAL: No joint pain or swelling; No muscle, back, or extremity pain  PSYCHIATRIC: No depression, anxiety, mood swings, or difficulty sleeping  HEME/LYMPH: No easy bruising, or bleeding gums  ALLERY AND IMMUNOLOGIC: No hives or eczema    IMPROVE VTE Individual Risk Assessment          RISK                                                          Points  [ 3 ] Previous VTE                                                3  [  ] Thrombophilia                                             2  [  ] Lower limb paralysis                                   2        (unable to hold up >15 seconds)    [  ] Current Cancer                                             2         (within 6 months)  [  ] Immobilization > 24 hrs                              1  [  ] ICU/CCU stay > 24 hours                             1  [ 1 ] Age > 60                                                         1    IMPROVE VTE Score:         [   4      ]    Total Risk Factor Score:    0 - 1:   Consider IPC  >2 - 3:  Thromboprophylaxis required (enoxaparin or SQ heparin)        >4:   High Risk: Thromboprophylaxis required (enoxaparin or SQ heparin), optional add IPC  **If CONTRAINDICATION to enoxaparin or SQ heparin, USE IPCs**

## 2021-06-23 NOTE — CONSULT NOTE ADULT - PROBLEM SELECTOR RECOMMENDATION 9
Monitor stool  Monitor H/H  Keep active type and screen  Transfuse < 8 or symtomatic  Clear liquid diet  Movi prep now and at 7 pm  NPO after midnight  Colonoscopy and Upper Endoscopy tomorrow  Continue PPI for now, most likely LGIB, will d/c based on endoscopic findings

## 2021-06-23 NOTE — ED ADULT NURSE NOTE - OBJECTIVE STATEMENT
Pt is alert, brought to the ER from home by EMS due to 2 episodes of BR rectal bleeding since 11 PM. Feels lightheaded but denies any pain or nausea or vomiting. Pt had the same episode about 10 months ago, no transfusion. On ASA 81 mg daily. Obese, DMT2, HTN, Prostate CA, non smoker.

## 2021-06-23 NOTE — ED PROVIDER NOTE - INTERPRETATION
Non focal on exam
normal sinus rhythm, Normal axis, Normal NV interval and QRS complex. There are no acute ischemic ST or T-wave changes.

## 2021-06-23 NOTE — CONSULT NOTE ADULT - ATTENDING COMMENTS
Patient seen and examined with Leia Montiel NP. Agree with assessment and plan as above.    Elderly male with multiple medical problems, seen by myself in the past admitted with rectal bleeding.  No abdominal pain.  Last colonoscopy in 2018.  No nausea, vomiting, fever or chills.    VSS.  Abdomen soft, nontender to palpation    EGD & Colonoscopy tomorrow  PPI  Monitor Hgb/Hct and bowel movements

## 2021-06-23 NOTE — H&P ADULT - NSHPLABSRESULTS_GEN_ALL_CORE
12.1   6.71  )-----------( 165      ( 23 Jun 2021 02:40 )             37.3       06-23    147<H>  |  113<H>  |  30<H>  ----------------------------<  182<H>  4.3   |  32<H>  |  1.94<H>    Ca    7.8<L>      23 Jun 2021 02:40    TPro  5.5<L>  /  Alb  2.7<L>  /  TBili  0.2  /  DBili  x   /  AST  23  /  ALT  22  /  AlkPhos  53  06-23         LIVER FUNCTIONS - ( 23 Jun 2021 02:40 )  Alb: 2.7 g/dL / Pro: 5.5 g/dL / ALK PHOS: 53 U/L / ALT: 22 U/L / AST: 23 U/L / GGT: x               PT/INR - ( 23 Jun 2021 02:40 )   PT: 13.3 sec;   INR: 1.10 ratio         PTT - ( 23 Jun 2021 02:40 )  PTT:27.1 sec    EKG: personally reviewed. NSR at 64bpm, no acute ST changes      CXR: wet read personally reviewed. NAPD.

## 2021-06-23 NOTE — ED ADULT TRIAGE NOTE - CHIEF COMPLAINT QUOTE
He has 2 episodes of Bright Red rectal bleeding" He has 2 episodes of Bright Red rectal bleeding" (ISAR Positive)

## 2021-06-23 NOTE — PHARMACOTHERAPY INTERVENTION NOTE - COMMENTS
discussed current medications with patient   patient expressed understanding   all questions answered

## 2021-06-23 NOTE — ED PROVIDER NOTE - PMH
Asthma    COVID-19 vaccine series completed  pfizer, last dose 3/6/21  DVT (deep venous thrombosis)  right leg 4-5 yrs ago  Hyperlipidemia    Hypertension    Hypothyroid    Prostate cancer  seed implant 2008  Rheumatoid arthritis    Seasonal allergies    Type 2 diabetes mellitus

## 2021-06-23 NOTE — CHART NOTE - NSCHARTNOTEFT_GEN_A_CORE
Patient seen and examined.  H&P from this morning reviewed.    #Acute blood loss anemia secondary to GI bleed, likely diverticular bleed in etiology  -H/H stabilized  -Getting second unit of blood  -Vitals stable at this time  -Serial CBCs, GI consulted    #Suspect ATN from hypotension and blood loss  -Monitor renal function daily  -Hold nephrotoxic drugs    Patient understands plan of care.  Attempted to call patient's daughter, Emma, [phone] - no answer

## 2021-06-24 LAB
COVID-19 SPIKE DOMAIN AB INTERP: POSITIVE
COVID-19 SPIKE DOMAIN ANTIBODY RESULT: 14.5 U/ML — HIGH
GLUCOSE BLDC GLUCOMTR-MCNC: 109 MG/DL — HIGH (ref 70–99)
GLUCOSE BLDC GLUCOMTR-MCNC: 109 MG/DL — HIGH (ref 70–99)
GLUCOSE BLDC GLUCOMTR-MCNC: 125 MG/DL — HIGH (ref 70–99)
GLUCOSE BLDC GLUCOMTR-MCNC: 132 MG/DL — HIGH (ref 70–99)
GLUCOSE BLDC GLUCOMTR-MCNC: 139 MG/DL — HIGH (ref 70–99)
GLUCOSE BLDC GLUCOMTR-MCNC: 143 MG/DL — HIGH (ref 70–99)
HCT VFR BLD CALC: 32.5 % — LOW (ref 39–50)
HCT VFR BLD CALC: 32.7 % — LOW (ref 39–50)
HGB BLD-MCNC: 10.7 G/DL — LOW (ref 13–17)
HGB BLD-MCNC: 10.9 G/DL — LOW (ref 13–17)
MCHC RBC-ENTMCNC: 30.3 PG — SIGNIFICANT CHANGE UP (ref 27–34)
MCHC RBC-ENTMCNC: 30.4 PG — SIGNIFICANT CHANGE UP (ref 27–34)
MCHC RBC-ENTMCNC: 32.7 GM/DL — SIGNIFICANT CHANGE UP (ref 32–36)
MCHC RBC-ENTMCNC: 33.5 GM/DL — SIGNIFICANT CHANGE UP (ref 32–36)
MCV RBC AUTO: 90.8 FL — SIGNIFICANT CHANGE UP (ref 80–100)
MCV RBC AUTO: 92.6 FL — SIGNIFICANT CHANGE UP (ref 80–100)
NRBC # BLD: 0 /100 WBCS — SIGNIFICANT CHANGE UP (ref 0–0)
NRBC # BLD: 0 /100 WBCS — SIGNIFICANT CHANGE UP (ref 0–0)
PLATELET # BLD AUTO: 132 K/UL — LOW (ref 150–400)
PLATELET # BLD AUTO: 136 K/UL — LOW (ref 150–400)
RBC # BLD: 3.53 M/UL — LOW (ref 4.2–5.8)
RBC # BLD: 3.58 M/UL — LOW (ref 4.2–5.8)
RBC # FLD: 15.6 % — HIGH (ref 10.3–14.5)
RBC # FLD: 15.7 % — HIGH (ref 10.3–14.5)
SARS-COV-2 IGG+IGM SERPL QL IA: 14.5 U/ML — HIGH
SARS-COV-2 IGG+IGM SERPL QL IA: POSITIVE
WBC # BLD: 5.97 K/UL — SIGNIFICANT CHANGE UP (ref 3.8–10.5)
WBC # BLD: 7.21 K/UL — SIGNIFICANT CHANGE UP (ref 3.8–10.5)
WBC # FLD AUTO: 5.97 K/UL — SIGNIFICANT CHANGE UP (ref 3.8–10.5)
WBC # FLD AUTO: 7.21 K/UL — SIGNIFICANT CHANGE UP (ref 3.8–10.5)

## 2021-06-24 PROCEDURE — 99233 SBSQ HOSP IP/OBS HIGH 50: CPT

## 2021-06-24 RX ORDER — DEXTROSE 50 % IN WATER 50 %
12.5 SYRINGE (ML) INTRAVENOUS ONCE
Refills: 0 | Status: DISCONTINUED | OUTPATIENT
Start: 2021-06-24 | End: 2021-06-26

## 2021-06-24 RX ORDER — INSULIN LISPRO 100/ML
VIAL (ML) SUBCUTANEOUS
Refills: 0 | Status: DISCONTINUED | OUTPATIENT
Start: 2021-06-24 | End: 2021-06-26

## 2021-06-24 RX ORDER — SODIUM CHLORIDE 9 MG/ML
1000 INJECTION, SOLUTION INTRAVENOUS
Refills: 0 | Status: DISCONTINUED | OUTPATIENT
Start: 2021-06-24 | End: 2021-06-26

## 2021-06-24 RX ORDER — GLUCAGON INJECTION, SOLUTION 0.5 MG/.1ML
1 INJECTION, SOLUTION SUBCUTANEOUS ONCE
Refills: 0 | Status: DISCONTINUED | OUTPATIENT
Start: 2021-06-24 | End: 2021-06-26

## 2021-06-24 RX ORDER — DULOXETINE HYDROCHLORIDE 30 MG/1
60 CAPSULE, DELAYED RELEASE ORAL DAILY
Refills: 0 | Status: DISCONTINUED | OUTPATIENT
Start: 2021-06-24 | End: 2021-06-26

## 2021-06-24 RX ORDER — DEXTROSE 50 % IN WATER 50 %
25 SYRINGE (ML) INTRAVENOUS ONCE
Refills: 0 | Status: DISCONTINUED | OUTPATIENT
Start: 2021-06-24 | End: 2021-06-26

## 2021-06-24 RX ORDER — DEXTROSE 50 % IN WATER 50 %
15 SYRINGE (ML) INTRAVENOUS ONCE
Refills: 0 | Status: DISCONTINUED | OUTPATIENT
Start: 2021-06-24 | End: 2021-06-26

## 2021-06-24 RX ORDER — FENOFIBRATE,MICRONIZED 130 MG
145 CAPSULE ORAL DAILY
Refills: 0 | Status: DISCONTINUED | OUTPATIENT
Start: 2021-06-24 | End: 2021-06-26

## 2021-06-24 RX ORDER — FOLIC ACID 0.8 MG
1 TABLET ORAL DAILY
Refills: 0 | Status: DISCONTINUED | OUTPATIENT
Start: 2021-06-24 | End: 2021-06-26

## 2021-06-24 RX ORDER — INSULIN LISPRO 100/ML
VIAL (ML) SUBCUTANEOUS EVERY 6 HOURS
Refills: 0 | Status: DISCONTINUED | OUTPATIENT
Start: 2021-06-24 | End: 2021-06-24

## 2021-06-24 RX ORDER — ACETAMINOPHEN 500 MG
650 TABLET ORAL EVERY 6 HOURS
Refills: 0 | Status: DISCONTINUED | OUTPATIENT
Start: 2021-06-24 | End: 2021-06-26

## 2021-06-24 RX ORDER — LEVOTHYROXINE SODIUM 125 MCG
75 TABLET ORAL DAILY
Refills: 0 | Status: DISCONTINUED | OUTPATIENT
Start: 2021-06-24 | End: 2021-06-26

## 2021-06-24 RX ORDER — INSULIN GLARGINE 100 [IU]/ML
15 INJECTION, SOLUTION SUBCUTANEOUS EVERY MORNING
Refills: 0 | Status: DISCONTINUED | OUTPATIENT
Start: 2021-06-25 | End: 2021-06-25

## 2021-06-24 RX ORDER — MONTELUKAST 4 MG/1
10 TABLET, CHEWABLE ORAL DAILY
Refills: 0 | Status: DISCONTINUED | OUTPATIENT
Start: 2021-06-24 | End: 2021-06-26

## 2021-06-24 RX ADMIN — INSULIN GLARGINE 15 UNIT(S): 100 INJECTION, SOLUTION SUBCUTANEOUS at 09:53

## 2021-06-24 RX ADMIN — MONTELUKAST 10 MILLIGRAM(S): 4 TABLET, CHEWABLE ORAL at 16:53

## 2021-06-24 RX ADMIN — Medication 75 MICROGRAM(S): at 05:37

## 2021-06-24 RX ADMIN — Medication 1 MILLIGRAM(S): at 16:53

## 2021-06-24 RX ADMIN — DULOXETINE HYDROCHLORIDE 60 MILLIGRAM(S): 30 CAPSULE, DELAYED RELEASE ORAL at 16:53

## 2021-06-24 RX ADMIN — Medication 145 MILLIGRAM(S): at 16:53

## 2021-06-24 RX ADMIN — PANTOPRAZOLE SODIUM 40 MILLIGRAM(S): 20 TABLET, DELAYED RELEASE ORAL at 05:38

## 2021-06-24 NOTE — PRE-OP CHECKLIST - NOTHING BY MOUTH SINCE
Quality 110: Preventive Care And Screening: Influenza Immunization: Influenza Immunization Administered during Influenza season
Quality 131: Pain Assessment And Follow-Up: Pain assessment using a standardized tool is documented as negative, no follow-up plan required
Detail Level: Detailed
24-Jun-2021 00:00

## 2021-06-25 LAB
ANION GAP SERPL CALC-SCNC: 4 MMOL/L — LOW (ref 5–17)
BUN SERPL-MCNC: 11 MG/DL — SIGNIFICANT CHANGE UP (ref 7–23)
CALCIUM SERPL-MCNC: 8.3 MG/DL — LOW (ref 8.4–10.5)
CHLORIDE SERPL-SCNC: 111 MMOL/L — HIGH (ref 96–108)
CO2 SERPL-SCNC: 29 MMOL/L — SIGNIFICANT CHANGE UP (ref 22–31)
CREAT SERPL-MCNC: 1.45 MG/DL — HIGH (ref 0.5–1.3)
GLUCOSE BLDC GLUCOMTR-MCNC: 121 MG/DL — HIGH (ref 70–99)
GLUCOSE BLDC GLUCOMTR-MCNC: 130 MG/DL — HIGH (ref 70–99)
GLUCOSE BLDC GLUCOMTR-MCNC: 150 MG/DL — HIGH (ref 70–99)
GLUCOSE BLDC GLUCOMTR-MCNC: 199 MG/DL — HIGH (ref 70–99)
GLUCOSE SERPL-MCNC: 182 MG/DL — HIGH (ref 70–99)
HCT VFR BLD CALC: 31.7 % — LOW (ref 39–50)
HGB BLD-MCNC: 10.5 G/DL — LOW (ref 13–17)
MCHC RBC-ENTMCNC: 30.7 PG — SIGNIFICANT CHANGE UP (ref 27–34)
MCHC RBC-ENTMCNC: 33.1 GM/DL — SIGNIFICANT CHANGE UP (ref 32–36)
MCV RBC AUTO: 92.7 FL — SIGNIFICANT CHANGE UP (ref 80–100)
NRBC # BLD: 0 /100 WBCS — SIGNIFICANT CHANGE UP (ref 0–0)
PLATELET # BLD AUTO: 127 K/UL — LOW (ref 150–400)
POTASSIUM SERPL-MCNC: 3.7 MMOL/L — SIGNIFICANT CHANGE UP (ref 3.5–5.3)
POTASSIUM SERPL-SCNC: 3.7 MMOL/L — SIGNIFICANT CHANGE UP (ref 3.5–5.3)
RBC # BLD: 3.42 M/UL — LOW (ref 4.2–5.8)
RBC # FLD: 15 % — HIGH (ref 10.3–14.5)
SODIUM SERPL-SCNC: 144 MMOL/L — SIGNIFICANT CHANGE UP (ref 135–145)
WBC # BLD: 4.78 K/UL — SIGNIFICANT CHANGE UP (ref 3.8–10.5)
WBC # FLD AUTO: 4.78 K/UL — SIGNIFICANT CHANGE UP (ref 3.8–10.5)

## 2021-06-25 PROCEDURE — 99232 SBSQ HOSP IP/OBS MODERATE 35: CPT

## 2021-06-25 PROCEDURE — 99231 SBSQ HOSP IP/OBS SF/LOW 25: CPT

## 2021-06-25 RX ORDER — HYDRALAZINE HCL 50 MG
50 TABLET ORAL
Refills: 0 | Status: DISCONTINUED | OUTPATIENT
Start: 2021-06-25 | End: 2021-06-26

## 2021-06-25 RX ORDER — INSULIN GLARGINE 100 [IU]/ML
25 INJECTION, SOLUTION SUBCUTANEOUS EVERY MORNING
Refills: 0 | Status: DISCONTINUED | OUTPATIENT
Start: 2021-06-26 | End: 2021-06-26

## 2021-06-25 RX ORDER — AMLODIPINE BESYLATE 2.5 MG/1
10 TABLET ORAL DAILY
Refills: 0 | Status: DISCONTINUED | OUTPATIENT
Start: 2021-06-25 | End: 2021-06-26

## 2021-06-25 RX ADMIN — AMLODIPINE BESYLATE 10 MILLIGRAM(S): 2.5 TABLET ORAL at 12:09

## 2021-06-25 RX ADMIN — MONTELUKAST 10 MILLIGRAM(S): 4 TABLET, CHEWABLE ORAL at 12:09

## 2021-06-25 RX ADMIN — Medication 50 MILLIGRAM(S): at 17:42

## 2021-06-25 RX ADMIN — Medication 145 MILLIGRAM(S): at 12:09

## 2021-06-25 RX ADMIN — Medication 75 MICROGRAM(S): at 05:25

## 2021-06-25 RX ADMIN — Medication 1: at 12:09

## 2021-06-25 RX ADMIN — DULOXETINE HYDROCHLORIDE 60 MILLIGRAM(S): 30 CAPSULE, DELAYED RELEASE ORAL at 12:09

## 2021-06-25 RX ADMIN — INSULIN GLARGINE 15 UNIT(S): 100 INJECTION, SOLUTION SUBCUTANEOUS at 08:12

## 2021-06-25 RX ADMIN — Medication 1 MILLIGRAM(S): at 12:09

## 2021-06-25 NOTE — PROGRESS NOTE ADULT - ATTENDING COMMENTS
Pt. is a 75M hx of HTN, HLD, T2DM on insulin, hx of LGIB, hypothyroid, RA on Humira/SSZ, asthma, remote RLE DVT off A/C pw LGIB. Pt related he had an episode of loose stool at about 11 PM with BRBPR. He had another episode at 1AM with sig BRBPR and felt dizzy and rolled off the toilet but no LOC and no injuries and called 911. EMS noted SBP in the 80s on the field. Currently still feels lightheaded but improved. Denied any HA, palps, SOB, CP, NV or abdominal pain. No melena. Denied any NSAID use other than aspirin as prescribed.  Pt. was transiently hemodialysis unstable in ED now hemodialysis stable with BP: 113/45. H/H: 12.1/37.3 Na: 147 BUN/cr: 30/1.94. Related similar episode in 8/2020 when he was admitted, did not require transfusion at that time. Did not have colonoscopy after that. CT angio of abd cancelled after cr noted to be 1.94. Patient should be closely monitored and repeat CT abd angio if recurrent bleeding. Patient also needs colonoscopy (non urgent).    Santo Frazier MD  [facility]

## 2021-06-25 NOTE — DIETITIAN INITIAL EVALUATION ADULT. - PERTINENT LABORATORY DATA
Date: 25 Jun 2021 09:45  Hemoglobin 10.5   Hematocrit 31.7     Date: 06-25  Sodium 144  Potassium 3.7  Glucose Serum 182<H>  BUN 11      Creatinine    ACCUCHECK  POCT Blood Glucose.: 199 mg/dL (25 Jun 2021 11:50)  POCT Blood Glucose.: 121 mg/dL (25 Jun 2021 07:59)  POCT Blood Glucose.: 125 mg/dL (24 Jun 2021 22:06)  POCT Blood Glucose.: 143 mg/dL (24 Jun 2021 17:17)

## 2021-06-25 NOTE — DIETITIAN INITIAL EVALUATION ADULT. - PERTINENT MEDS FT
MEDICATIONS  (STANDING):  amLODIPine   Tablet 10 milliGRAM(s) Oral daily  dextrose 40% Gel 15 Gram(s) Oral once  dextrose 5%. 1000 milliLiter(s) (50 mL/Hr) IV Continuous <Continuous>  dextrose 5%. 1000 milliLiter(s) (100 mL/Hr) IV Continuous <Continuous>  dextrose 50% Injectable 25 Gram(s) IV Push once  dextrose 50% Injectable 12.5 Gram(s) IV Push once  dextrose 50% Injectable 25 Gram(s) IV Push once  DULoxetine 60 milliGRAM(s) Oral daily  fenofibrate Tablet 145 milliGRAM(s) Oral daily  folic acid 1 milliGRAM(s) Oral daily  glucagon  Injectable 1 milliGRAM(s) IntraMuscular once  insulin glargine Injectable (LANTUS) 15 Unit(s) SubCutaneous every morning  insulin lispro (ADMELOG) corrective regimen sliding scale   SubCutaneous Before meals and at bedtime  levothyroxine 75 MICROGram(s) Oral daily  montelukast 10 milliGRAM(s) Oral daily    MEDICATIONS  (PRN):  acetaminophen   Tablet .. 650 milliGRAM(s) Oral every 6 hours PRN Temp greater or equal to 38C (100.4F), Mild Pain (1 - 3)

## 2021-06-25 NOTE — PROGRESS NOTE ADULT - ASSESSMENT
This 76y Male   HPI:  75M hx of HTN, HLD, T2DM on insulin, hx of LGIB, hypothyroid, RA on Humira/SSZ, asthma, remote RLE DVT off A/C pw LGIB. Pt related he had an episode of loose stool at about 11 PM with BRBPR. He had another episode at 1AM with sig BRBPR and felt dizzy and rolled off the toilet but no LOC and no injuries and called 911. EMS noted SBP in the 80s on the field. Currently still feels lightheaded but improved. Denied any HA, palps, SOB, CP, NV or abdominal pain. No melena. Denied any NSAID use other than aspirin as prescribed.  In ED, afebrile P: 65-72 BP: 93/35 s/p 2L NS now BP: 113/45. H/H: 12.1/37.3 Na: 147 BUN/cr: 30/1.94. Related similar episode in 8/2020 when he was admitted, did not require transfusion at that time. Did not have colonoscopy after that. CT angio of abd cancelled after cr noted to be 1.94   (23 Jun 2021 03:50)    -Pt seen on rounds with Dr. Freddie Sexton, [facility]  Department of Gastroenterology  GI cell: [phone]

## 2021-06-25 NOTE — DIETITIAN INITIAL EVALUATION ADULT. - OTHER INFO
75M hx of HTN, HLD, T2DM on insulin, hx of LGIB, hypothyroid, RA on Humira/SSZ, asthma, remote RLE DVT off A/C pw LGIB.     Pt s/p EGD/colonoscopy 6/24- unremarkable. Diet advanced this morning; pt reports tolerating well. No further bleeding in stools, last moved bowels today after lunch (solid). UBW 279lbs. Pt reports incorporating healthy lifestyle changes in an effort to lose weight gradually. Low fiber diet at present, reviewed with pt. Recommend gradually resuming high fiber diet given hx diverticulosis. Increased oral hydration encouraged. A1c 5.8%. Diet education reviewed with pt. Pt agreeable to all recommendations.

## 2021-06-25 NOTE — DIETITIAN NUTRITION RISK NOTIFICATION - TREATMENT: THE FOLLOWING DIET HAS BEEN RECOMMENDED
Diet, Low Fiber:   Consistent Carbohydrate {No Snacks}  Low Sodium (06-25-21 @ 11:22) [Active]

## 2021-06-25 NOTE — PROGRESS NOTE ADULT - ASSESSMENT
75M hx of HTN, HLD, T2DM on insulin, hx of LGIB, hypothyroid, RA on Humira/SSZ, asthma, remote RLE DVT off A/C pw LGIB    Lower GI Bleed  acute blood loss anemia secondary to lower GI bleed  - hgb stable  - EGD/C-scope unremakrable for source of bleed  - Advancing diet today    Acute Kidney Injury on CKD Stage 3  - improved with fluids   - bmp daily     Hypothyroid   cont synthroid    T2DM on insulin  - cont long acting insulin at reduced dose 15 U QAM for now.   a1c 5.8 in May 2021    Hypertension  - Continue with home norvasc. Will add hydralazine if persistently uncontrolled    DVT/GI proph  IPCs, PPI.     Dispo: d/w GI, if bleeding occurs will pursue CT angio or bleeding scan. Monitor CBC in am, if stable may DC tomorrow 75M hx of HTN, HLD, T2DM on insulin, hx of LGIB, hypothyroid, RA on Humira/SSZ, asthma, remote RLE DVT off A/C pw LGIB    Lower GI Bleed  acute blood loss anemia secondary to lower GI bleed  - hgb stable  - EGD/C-scope unremakrable for source of bleed  - Advancing diet today    Acute Kidney Injury on CKD Stage 3  - improved with fluids   - bmp daily     Hypothyroid   cont synthroid    T2DM on insulin  - cont long acting insulin at reduced dose 15 U QAM for now.   a1c 5.8 in May 2021    Hypertension  - Continue with home norvasc. Will add hydralazine if persistently uncontrolled    DVT/GI proph  IPCs, PPI.     Dispo: d/w GI, if bleeding occurs will pursue CT angio or bleeding scan. Monitor CBC in am, if stable may DC tomorrow    Updated daughter Emma [phone]

## 2021-06-25 NOTE — PROGRESS NOTE ADULT - PROBLEM SELECTOR PLAN 1
s/p EGD and colonoscopy 6/24  H/H stable this am  diarrhea resolved  pt denies any dizziness or lightheadedness  d/w Hospitalist, pt for d/c today or tomorrow  We will sign off at this time, please call back if further care is needed  continue with excellent care per primary team s/p EGD and colonoscopy 6/24  H/H stable this am  diarrhea resolved  pt denies any dizziness or lightheadedness  d/w Hospitalist, pt for d/c today or tomorrow  discharge with PPI  We will sign off at this time, please call back if further care is needed  continue with excellent care per primary team

## 2021-06-26 ENCOUNTER — TRANSCRIPTION ENCOUNTER (OUTPATIENT)
Age: 76
End: 2021-06-26

## 2021-06-26 VITALS
SYSTOLIC BLOOD PRESSURE: 142 MMHG | HEART RATE: 75 BPM | RESPIRATION RATE: 15 BRPM | TEMPERATURE: 99 F | OXYGEN SATURATION: 94 % | DIASTOLIC BLOOD PRESSURE: 70 MMHG

## 2021-06-26 LAB
ANION GAP SERPL CALC-SCNC: 3 MMOL/L — LOW (ref 5–17)
BUN SERPL-MCNC: 13 MG/DL — SIGNIFICANT CHANGE UP (ref 7–23)
CALCIUM SERPL-MCNC: 8 MG/DL — LOW (ref 8.4–10.5)
CHLORIDE SERPL-SCNC: 110 MMOL/L — HIGH (ref 96–108)
CO2 SERPL-SCNC: 32 MMOL/L — HIGH (ref 22–31)
CREAT SERPL-MCNC: 1.28 MG/DL — SIGNIFICANT CHANGE UP (ref 0.5–1.3)
GLUCOSE BLDC GLUCOMTR-MCNC: 129 MG/DL — HIGH (ref 70–99)
GLUCOSE BLDC GLUCOMTR-MCNC: 155 MG/DL — HIGH (ref 70–99)
GLUCOSE SERPL-MCNC: 136 MG/DL — HIGH (ref 70–99)
HCT VFR BLD CALC: 31 % — LOW (ref 39–50)
HGB BLD-MCNC: 10.1 G/DL — LOW (ref 13–17)
MCHC RBC-ENTMCNC: 29.5 PG — SIGNIFICANT CHANGE UP (ref 27–34)
MCHC RBC-ENTMCNC: 32.6 GM/DL — SIGNIFICANT CHANGE UP (ref 32–36)
MCV RBC AUTO: 90.6 FL — SIGNIFICANT CHANGE UP (ref 80–100)
NRBC # BLD: 0 /100 WBCS — SIGNIFICANT CHANGE UP (ref 0–0)
PLATELET # BLD AUTO: 137 K/UL — LOW (ref 150–400)
POTASSIUM SERPL-MCNC: 3.7 MMOL/L — SIGNIFICANT CHANGE UP (ref 3.5–5.3)
POTASSIUM SERPL-SCNC: 3.7 MMOL/L — SIGNIFICANT CHANGE UP (ref 3.5–5.3)
RBC # BLD: 3.42 M/UL — LOW (ref 4.2–5.8)
RBC # FLD: 14.6 % — HIGH (ref 10.3–14.5)
SODIUM SERPL-SCNC: 145 MMOL/L — SIGNIFICANT CHANGE UP (ref 135–145)
WBC # BLD: 5.64 K/UL — SIGNIFICANT CHANGE UP (ref 3.8–10.5)
WBC # FLD AUTO: 5.64 K/UL — SIGNIFICANT CHANGE UP (ref 3.8–10.5)

## 2021-06-26 PROCEDURE — 87635 SARS-COV-2 COVID-19 AMP PRB: CPT

## 2021-06-26 PROCEDURE — 85730 THROMBOPLASTIN TIME PARTIAL: CPT

## 2021-06-26 PROCEDURE — P9016: CPT

## 2021-06-26 PROCEDURE — 86923 COMPATIBILITY TEST ELECTRIC: CPT

## 2021-06-26 PROCEDURE — 99285 EMERGENCY DEPT VISIT HI MDM: CPT | Mod: 25

## 2021-06-26 PROCEDURE — 36415 COLL VENOUS BLD VENIPUNCTURE: CPT

## 2021-06-26 PROCEDURE — 82962 GLUCOSE BLOOD TEST: CPT

## 2021-06-26 PROCEDURE — 71045 X-RAY EXAM CHEST 1 VIEW: CPT

## 2021-06-26 PROCEDURE — 86769 SARS-COV-2 COVID-19 ANTIBODY: CPT

## 2021-06-26 PROCEDURE — 85610 PROTHROMBIN TIME: CPT

## 2021-06-26 PROCEDURE — 96374 THER/PROPH/DIAG INJ IV PUSH: CPT

## 2021-06-26 PROCEDURE — 36430 TRANSFUSION BLD/BLD COMPNT: CPT

## 2021-06-26 PROCEDURE — 93005 ELECTROCARDIOGRAM TRACING: CPT

## 2021-06-26 PROCEDURE — 99239 HOSP IP/OBS DSCHRG MGMT >30: CPT

## 2021-06-26 PROCEDURE — 85027 COMPLETE CBC AUTOMATED: CPT

## 2021-06-26 PROCEDURE — 80053 COMPREHEN METABOLIC PANEL: CPT

## 2021-06-26 PROCEDURE — 86901 BLOOD TYPING SEROLOGIC RH(D): CPT

## 2021-06-26 PROCEDURE — 84443 ASSAY THYROID STIM HORMONE: CPT

## 2021-06-26 PROCEDURE — 85025 COMPLETE CBC W/AUTO DIFF WBC: CPT

## 2021-06-26 PROCEDURE — 86900 BLOOD TYPING SEROLOGIC ABO: CPT

## 2021-06-26 PROCEDURE — 80048 BASIC METABOLIC PNL TOTAL CA: CPT

## 2021-06-26 PROCEDURE — 86850 RBC ANTIBODY SCREEN: CPT

## 2021-06-26 RX ADMIN — DULOXETINE HYDROCHLORIDE 60 MILLIGRAM(S): 30 CAPSULE, DELAYED RELEASE ORAL at 12:40

## 2021-06-26 RX ADMIN — Medication 75 MICROGRAM(S): at 05:01

## 2021-06-26 RX ADMIN — Medication 1 MILLIGRAM(S): at 12:40

## 2021-06-26 RX ADMIN — Medication 145 MILLIGRAM(S): at 12:40

## 2021-06-26 RX ADMIN — MONTELUKAST 10 MILLIGRAM(S): 4 TABLET, CHEWABLE ORAL at 12:40

## 2021-06-26 RX ADMIN — Medication 50 MILLIGRAM(S): at 05:01

## 2021-06-26 RX ADMIN — AMLODIPINE BESYLATE 10 MILLIGRAM(S): 2.5 TABLET ORAL at 05:01

## 2021-06-26 RX ADMIN — INSULIN GLARGINE 25 UNIT(S): 100 INJECTION, SOLUTION SUBCUTANEOUS at 07:46

## 2021-06-26 RX ADMIN — Medication 1: at 12:40

## 2021-06-26 NOTE — DISCHARGE NOTE PROVIDER - NSDCFUSCHEDAPPT_GEN_ALL_CORE_FT
CK PACHECO ; 07/02/2021 ; [facility] [address]  CK PACHECO ; 07/21/2021 ; [facility] [address] [facility]  CK PACHECO ; 07/22/2021 ; [facility] [address]  TARA, CK WATSON ; 07/29/2021 ; [facility] [address] CK Jeffries ; 08/06/2021 ; [facility] [address]  TARA, CK WATSON ; 08/06/2021 ; [facility] [address]  TARA, CK WATSON ; 08/11/2021 ; [facility] [place] [facility] [address] [facility]

## 2021-06-26 NOTE — DISCHARGE NOTE PROVIDER - HOSPITAL COURSE
Hospital Course:  Per admitting physician:   HPI:  75M hx of HTN, HLD, T2DM on insulin, hx of LGIB, hypothyroid, RA on Humira/SSZ, asthma, remote RLE DVT off A/C pw LGIB. Pt related he had an episode of loose stool at about 11 PM with BRBPR. He had another episode at 1AM with sig BRBPR and felt dizzy and rolled off the toilet but no LOC and no injuries and called 911. EMS noted SBP in the 80s on the field. Currently still feels lightheaded but improved. Denied any HA, palps, SOB, CP, NV or abdominal pain. No melena. Denied any NSAID use other than aspirin as prescribed.  In ED, afebrile P: 65-72 BP: 93/35 s/p 2L NS now BP: 113/45. H/H: 12.1/37.3 Na: 147 BUN/cr: 30/1.94. Related similar episode in 8/2020 when he was admitted, did not require transfusion at that time. Did not have colonoscopy after that. CT angio of abd cancelled after cr noted to be 1.94   (23 Jun 2021 03:50)    Medical Floor Course:  CK PACHECO was admitted to medical floor under the impression of LGIB. Hemodynamics were monitored closely. He was evaluated by GI, underwent EGD and Colonoscopy which did not show active bleeding. Diet was advanced and tolerated well. Hemoglobin was monitored frequently and remained stable, patient did not require transfusions during admission.     Discharging Provider:  Fernie Rivera MD  Contact Info: Cell [phone] - Please call with any questions or concerns.    Outpatient Provider: Dr. Richard - notified     I, the attending physician, was physically present for the key portions of the evaluation and management (E/M) service provided. The total amount of time spent reviewing the hospital course, laboratory values, imaging findings, assessing/counseling the patient, discussing with consultant physicians, social work, nursing staff was 32 minutes.    Hospital Course:  Per admitting physician:   HPI:  75M hx of HTN, HLD, T2DM on insulin, hx of LGIB, hypothyroid, RA on Humira/SSZ, asthma, remote RLE DVT off A/C pw LGIB. Pt related he had an episode of loose stool at about 11 PM with BRBPR. He had another episode at 1AM with sig BRBPR and felt dizzy and rolled off the toilet but no LOC and no injuries and called 911. EMS noted SBP in the 80s on the field. Currently still feels lightheaded but improved. Denied any HA, palps, SOB, CP, NV or abdominal pain. No melena. Denied any NSAID use other than aspirin as prescribed.  In ED, afebrile P: 65-72 BP: 93/35 s/p 2L NS now BP: 113/45. H/H: 12.1/37.3 Na: 147 BUN/cr: 30/1.94. Related similar episode in 8/2020 when he was admitted, did not require transfusion at that time. Did not have colonoscopy after that. CT angio of abd cancelled after cr noted to be 1.94   (23 Jun 2021 03:50)    Medical Floor Course:  CK PACHECO was admitted to medical floor under the impression of LGIB. Hemodynamics were monitored closely. He was evaluated by GI, underwent EGD and Colonoscopy which did not show active bleeding. Diet was advanced and tolerated well. Hemoglobin was monitored frequently and remained stable, patient did not require transfusions during admission.     Discharging Provider:  Fernie Rivera MD  Contact Info: Cell [phone] - Please call with any questions or concerns.    Outpatient Provider: Dr. Richard - notified of EDUARD PEARL, the attending physician, was physically present for the key portions of the evaluation and management (E/M) service provided. The total amount of time spent reviewing the hospital course, laboratory values, imaging findings, assessing/counseling the patient, discussing with consultant physicians, social work, nursing staff was 32 minutes.

## 2021-06-26 NOTE — PROGRESS NOTE ADULT - ASSESSMENT
75M hx of HTN, HLD, T2DM on insulin, hx of LGIB, hypothyroid, RA on Humira/SSZ, asthma, remote RLE DVT off A/C pw LGIB    Lower GI Bleed  acute blood loss anemia secondary to lower GI bleed  - hgb stable  - EGD/C-scope unremakrable for source of bleed  - Tolerating advancement of diet    Acute Kidney Injury on CKD Stage 3  - improved with fluids   - bmp daily     Hypothyroid   cont synthroid    T2DM on insulin  - cont long acting insulin at reduced dose 15 U QAM for now.   a1c 5.8 in May 2021    Hypertension  - Continue with home norvasc. Will add hydralazine if persistently uncontrolled    DVT/GI proph  IPCs, PPI.     Dispo: DC today    Updated daughter Emma [phone]

## 2021-06-26 NOTE — DISCHARGE NOTE NURSING/CASE MANAGEMENT/SOCIAL WORK - NSDCFUADDAPPT_GEN_ALL_CORE_FT
Dr. Abad Richard on July 2, 2021 at 2:30 pm.  Please call [phone] to reschedule if this is not convenient for you.

## 2021-06-26 NOTE — DISCHARGE NOTE PROVIDER - DETAILS OF MALNUTRITION DIAGNOSIS/DIAGNOSES
This patient has been assessed with a concern for Malnutrition and was treated during this hospitalization for the following Nutrition diagnosis/diagnoses:     -  06/25/2021: Morbid obesity (BMI > 40)

## 2021-06-26 NOTE — DISCHARGE NOTE PROVIDER - NSDCCPCAREPLAN_GEN_ALL_CORE_FT
PRINCIPAL DISCHARGE DIAGNOSIS  Diagnosis: Gastrointestinal hemorrhage associated with intestinal diverticulosis  Assessment and Plan of Treatment: You were admitted for blood in your stool. An endoscopy and colonoscopy was performed which did not show a site of active bleeding. Your blood levels were monitored closely and remained stable, you did not require a blood transfusion during your hospital stay. Please follow up with your PCP and Dr. Orlando for continued care.

## 2021-06-26 NOTE — DISCHARGE NOTE PROVIDER - NSDCMRMEDTOKEN_GEN_ALL_CORE_FT
Alpha Lipoic Acid 200 mg oral capsule: orally once a day  amLODIPine 10 mg oral tablet: 1 tab(s) orally once a day  Bystolic 10 mg oral tablet: 1 tab(s) orally once a day  DULoxetine 60 mg oral delayed release capsule: 1 cap(s) orally once a day  fenofibrate 160 mg oral tablet: 1 tab(s) orally once a day  Fish Oil 1200 mg oral capsule: orally once a day  folic acid 1 mg oral tablet: 1 tab(s) orally once a day  Humira Pen 40 mg/0.8 mL subcutaneous kit: 1  subcutaneous every 3 weeks  hydrALAZINE 50 mg oral tablet: 1 tab(s) orally 2 times a day  Jardiance 10 mg oral tablet: 1 tab(s) orally once a day (in the morning)  levothyroxine 75 mcg (0.075 mg) oral tablet: 1 tab(s) orally once a day  montelukast 10 mg oral tablet: 1 tab(s) orally once a day  Ozempic (1 mg dose) 2 mg/1.5 mL subcutaneous solution: 1 milligram(s) subcutaneous every 7 days  sulfaSALAzine 500 mg oral tablet: 2 tab(s) orally 2 times a day  Super B Complex oral tablet: 1 tab(s) orally once a day  Tresiba FlexTouch 100 units/mL subcutaneous solution: 30 unit(s) subcutaneous once a day  Vitamin C 1000 mg oral tablet: 1 tab(s) orally once a day  Vitamin D3 1000 intl units (25 mcg) oral capsule: orally once a day

## 2021-06-26 NOTE — DISCHARGE NOTE NURSING/CASE MANAGEMENT/SOCIAL WORK - PATIENT PORTAL LINK FT
You can access the FollowMyHealth Patient Portal offered by [facility] by registering at the following website: [email or web address]. By joining [employer]’s FollowMyHealth portal, you will also be able to view your health information using other applications (apps) compatible with our system.

## 2021-06-26 NOTE — PROGRESS NOTE ADULT - NUTRITIONAL ASSESSMENT
This patient has been assessed with a concern for Malnutrition and has been determined to have a diagnosis/diagnoses of Morbid obesity (BMI > 40).    This patient is being managed with:   Diet Low Fiber-  Consistent Carbohydrate {No Snacks}  Low Sodium  Entered: Jun 25 2021 11:21AM

## 2021-06-26 NOTE — PROGRESS NOTE ADULT - SUBJECTIVE AND OBJECTIVE BOX
Patient is a 76y old  Male who presents with a chief complaint of LGIB (25 Jun 2021 09:38)    Patient seen and examined at bedside. No acute overnight events. Drowsy this morning. Denies bloody bowel movements. Tolerating liquid diet well. Denies abdominal pain, nausea, vomiting.     ALLERGIES:  [place] (Unknown)  No Known Drug Allergies    MEDICATIONS  (STANDING):  amLODIPine   Tablet 10 milliGRAM(s) Oral daily  dextrose 40% Gel 15 Gram(s) Oral once  dextrose 5%. 1000 milliLiter(s) (50 mL/Hr) IV Continuous <Continuous>  dextrose 5%. 1000 milliLiter(s) (100 mL/Hr) IV Continuous <Continuous>  dextrose 50% Injectable 25 Gram(s) IV Push once  dextrose 50% Injectable 12.5 Gram(s) IV Push once  dextrose 50% Injectable 25 Gram(s) IV Push once  DULoxetine 60 milliGRAM(s) Oral daily  fenofibrate Tablet 145 milliGRAM(s) Oral daily  folic acid 1 milliGRAM(s) Oral daily  glucagon  Injectable 1 milliGRAM(s) IntraMuscular once  insulin glargine Injectable (LANTUS) 15 Unit(s) SubCutaneous every morning  insulin lispro (ADMELOG) corrective regimen sliding scale   SubCutaneous Before meals and at bedtime  levothyroxine 75 MICROGram(s) Oral daily  montelukast 10 milliGRAM(s) Oral daily  sodium chloride 0.45%. 1000 milliLiter(s) (100 mL/Hr) IV Continuous <Continuous>    MEDICATIONS  (PRN):  acetaminophen   Tablet .. 650 milliGRAM(s) Oral every 6 hours PRN Temp greater or equal to 38C (100.4F), Mild Pain (1 - 3)    Vital Signs Last 24 Hrs  T(F): 98.2 (25 Jun 2021 08:26), Max: 98.8 (25 Jun 2021 00:00)  HR: 69 (25 Jun 2021 08:26) (68 - 87)  BP: 163/75 (25 Jun 2021 08:26) (137/45 - 168/76)  RR: 18 (25 Jun 2021 08:26) (16 - 20)  SpO2: 93% (25 Jun 2021 08:26) (92% - 96%)  I&O's Summary    25 Jun 2021 07:01  -  25 Jun 2021 11:18  --------------------------------------------------------  IN: 240 mL / OUT: 300 mL / NET: -60 mL      BMI (kg/m2): 42.3 (06-24-21 @ 11:39)    PHYSICAL EXAM:  General: NAD, A/O x 3  ENT: MMM, no tonsilar exudate  Neck: Supple, No JVD  Lungs: Clear to auscultation bilaterally, no wheezes. Good air entry bilaterally   Cardio: RRR, S1/S2, No murmurs  Abdomen: Obese, Soft, Nontender, Nondistended; Bowel sounds present  Extremities: No calf tenderness, No pitting edema    LABS:                        10.5   4.78  )-----------( 127      ( 25 Jun 2021 09:45 )             31.7       06-25    144  |  111  |  11  ----------------------------<  182  3.7   |  29  |  1.45    Ca    8.3      25 Jun 2021 09:45    TPro  5.5  /  Alb  2.7  /  TBili  0.2  /  DBili  x   /  AST  23  /  ALT  22  /  AlkPhos  53  06-23     eGFR if Non African American: 47 mL/min/1.73M2 (06-25-21 @ 09:45)  eGFR if : 54 mL/min/1.73M2 (06-25-21 @ 09:45)    PT/INR - ( 23 Jun 2021 02:40 )   PT: 13.3 sec;   INR: 1.10 ratio         PTT - ( 23 Jun 2021 02:40 )  PTT:27.1 sec     TSH 3.25   TSH with FT4 reflex --  Total T3 --    POCT Blood Glucose.: 121 mg/dL (25 Jun 2021 07:59)  POCT Blood Glucose.: 125 mg/dL (24 Jun 2021 22:06)  POCT Blood Glucose.: 143 mg/dL (24 Jun 2021 17:17)    COVID-19 PCR: NotDetec (06-23-21 @ 02:45)    RADIOLOGY & ADDITIONAL TESTS:     Care Discussed with Consultants/Other Providers: D/w NEL Trevino  
[facility] GI Progress Note    Patient is a 76y old  Male who presents with a chief complaint of LGIB (24 Jun 2021 07:40)    SUBJECTIVE/OVERNIGHT  Pt seen and examined at bedside. No acute overnight events. Denies fever, chill, chest pain, shortness of breath, abdominal or epigastric pain, nausea, vomiting, hematemesis, diarrhea, constipation, melena, or hematochezia.     Allergies    [place] (Unknown)  No Known Drug Allergies    MEDICATIONS:  MEDICATIONS  (STANDING):  dextrose 40% Gel 15 Gram(s) Oral once  dextrose 5%. 1000 milliLiter(s) (50 mL/Hr) IV Continuous <Continuous>  dextrose 5%. 1000 milliLiter(s) (100 mL/Hr) IV Continuous <Continuous>  dextrose 50% Injectable 25 Gram(s) IV Push once  dextrose 50% Injectable 12.5 Gram(s) IV Push once  dextrose 50% Injectable 25 Gram(s) IV Push once  DULoxetine 60 milliGRAM(s) Oral daily  fenofibrate Tablet 145 milliGRAM(s) Oral daily  folic acid 1 milliGRAM(s) Oral daily  glucagon  Injectable 1 milliGRAM(s) IntraMuscular once  insulin glargine Injectable (LANTUS) 15 Unit(s) SubCutaneous every morning  insulin lispro (ADMELOG) corrective regimen sliding scale   SubCutaneous Before meals and at bedtime  levothyroxine 75 MICROGram(s) Oral daily  montelukast 10 milliGRAM(s) Oral daily  sodium chloride 0.45%. 1000 milliLiter(s) (100 mL/Hr) IV Continuous <Continuous>    MEDICATIONS  (PRN):  acetaminophen   Tablet .. 650 milliGRAM(s) Oral every 6 hours PRN Temp greater or equal to 38C (100.4F), Mild Pain (1 - 3)    Vital Signs Last 24 Hrs  T(C): 36.8 (25 Jun 2021 08:26), Max: 37.1 (25 Jun 2021 00:00)  T(F): 98.2 (25 Jun 2021 08:26), Max: 98.8 (25 Jun 2021 00:00)  HR: 69 (25 Jun 2021 08:26) (68 - 87)  BP: 163/75 (25 Jun 2021 08:26) (137/45 - 168/76)  BP(mean): --  RR: 18 (25 Jun 2021 08:26) (16 - 20)  SpO2: 93% (25 Jun 2021 08:26) (92% - 96%)    06-25 @ 07:01  -  06-25 @ 09:38  --------------------------------------------------------  IN: 240 mL / OUT: 300 mL / NET: -60 mL        PHYSICAL EXAM:    General: Well developed; well nourished; in no acute distress  Neuro: A&O x 3. No asterixis.  Respiratory: No respiratory distress. No intercostal retractions  Cardiac: S1S2 + no acute arrhythmia noted,   Gastrointestinal: Soft non-tender non-distended; Normal bowel sounds; No hepatosplenomegaly. No rebound or guarding  Skin: Warm and dry. No obvious rash, spider angiomas, telangiectasias, palmar erythema  Extremities: No clubbing, cyanosis, or edema    LABS:             10.7   7.21  )-----------( 132      ( 06-24 @ 19:40 )             32.7                10.9   5.97  )-----------( 136      ( 06-24 @ 08:00 )             32.5                11.9   7.16  )-----------( 154      ( 06-23 @ 17:15 )             36.5                12.1   8.24  )-----------( 182      ( 06-23 @ 07:00 )             36.5                12.1   6.71  )-----------( 165      ( 06-23 @ 02:40 )             37.3       RADIOLOGY & ADDITIONAL STUDIES:    EGD and Colonoscopy - see brief op report    
F/U Note:    76M admitted with diverticulosis and LGIB  -is currently prepping for colonoscopy tomorrow, moviprep    ICU Vital Signs Last 24 Hrs  T(C): 36.8 (24 Jun 2021 00:00), Max: 37 (23 Jun 2021 16:56)  T(F): 98.2 (24 Jun 2021 00:00), Max: 98.6 (23 Jun 2021 16:56)  HR: 77 (24 Jun 2021 00:00) (63 - 77)  BP: 138/65 (24 Jun 2021 00:00) (85/32 - 157/73)  BP(mean): 76 (23 Jun 2021 08:00) (45 - 76)  RR: 17 (24 Jun 2021 00:00) (15 - 18)  SpO2: 96% (24 Jun 2021 00:00) (94% - 99%)                            11.9   7.16  )-----------( 154      ( 23 Jun 2021 17:15 )             36.5       06-23    148<H>  |  113<H>  |  34<H>  ----------------------------<  179<H>  4.3   |  27  |  1.91<H>    Ca    8.4      23 Jun 2021 07:00    TPro  5.5<L>  /  Alb  2.7<L>  /  TBili  0.2  /  DBili  x   /  AST  23  /  ALT  22  /  AlkPhos  53  06-23      NEURO: awake and alert  CV: (+) S1/S2, rrr, no mrg  RESP: CTA b/l  GI: soft, non tender 
Patient is a 76y old  Male who presents with a chief complaint of LGIB (23 Jun 2021 19:36)    Feels better, less abdominal pain.   plan for colonoscopy/endoscopy today.     Patient seen and examined at bedside.    ALLERGIES:  [place] (Unknown)  No Known Drug Allergies    MEDICATIONS  (STANDING):  dextrose 40% Gel 15 Gram(s) Oral once  dextrose 5%. 1000 milliLiter(s) (50 mL/Hr) IV Continuous <Continuous>  dextrose 5%. 1000 milliLiter(s) (100 mL/Hr) IV Continuous <Continuous>  dextrose 50% Injectable 25 Gram(s) IV Push once  dextrose 50% Injectable 12.5 Gram(s) IV Push once  dextrose 50% Injectable 25 Gram(s) IV Push once  DULoxetine 60 milliGRAM(s) Oral daily  fenofibrate Tablet 145 milliGRAM(s) Oral daily  folic acid 1 milliGRAM(s) Oral daily  glucagon  Injectable 1 milliGRAM(s) IntraMuscular once  insulin glargine Injectable (LANTUS) 15 Unit(s) SubCutaneous every morning  insulin lispro (ADMELOG) corrective regimen sliding scale   SubCutaneous every 6 hours  levothyroxine 75 MICROGram(s) Oral daily  montelukast 10 milliGRAM(s) Oral daily  pantoprazole    Tablet 40 milliGRAM(s) Oral before breakfast  sodium chloride 0.45%. 1000 milliLiter(s) (100 mL/Hr) IV Continuous <Continuous>    MEDICATIONS  (PRN):  acetaminophen   Tablet .. 650 milliGRAM(s) Oral every 6 hours PRN Temp greater or equal to 38C (100.4F), Mild Pain (1 - 3)    Vital Signs Last 24 Hrs  T(F): 97.9 (24 Jun 2021 05:00), Max: 98.6 (23 Jun 2021 16:56)  HR: 76 (24 Jun 2021 05:00) (63 - 77)  BP: 155/70 (24 Jun 2021 05:00) (119/56 - 157/73)  RR: 17 (24 Jun 2021 05:00) (16 - 17)  SpO2: 96% (24 Jun 2021 05:00) (94% - 99%)  I&O's Summary    23 Jun 2021 07:01  -  24 Jun 2021 07:00  --------------------------------------------------------  IN: 2040 mL / OUT: 0 mL / NET: 2040 mL    BMI (kg/m2): 42.3 (06-23-21 @ 02:29)  PHYSICAL EXAM:  General: NAD, A/O x 3  ENT: MMM, no scleral icterus  Neck: Supple, No JVD, no thyroidomegaly  Lungs: Clear to auscultation bilaterally, no wheezes, no rales, no rhonchi, good inspiratory effort  Cardio: RRR, S1/S2, No murmurs  Abdomen: Soft, Nontender, Nondistended; Bowel sounds present  Extremities: No calf tenderness, No pitting edema, no skin changes    LABS:                        11.9   7.16  )-----------( 154      ( 23 Jun 2021 17:15 )             36.5       06-23    148  |  113  |  34  ----------------------------<  179  4.3   |  27  |  1.91    Ca    8.4      23 Jun 2021 07:00    TPro  5.5  /  Alb  2.7  /  TBili  0.2  /  DBili  x   /  AST  23  /  ALT  22  /  AlkPhos  53  06-23     eGFR if Non African American: 34 mL/min/1.73M2 (06-23-21 @ 07:00)  eGFR if : 39 mL/min/1.73M2 (06-23-21 @ 07:00)    PT/INR - ( 23 Jun 2021 02:40 )   PT: 13.3 sec;   INR: 1.10 ratio       PTT - ( 23 Jun 2021 02:40 )  PTT:27.1 sec     TSH 3.25   TSH with FT4 reflex --  Total T3 --    POCT Blood Glucose.: 109 mg/dL (24 Jun 2021 05:46)  POCT Blood Glucose.: 139 mg/dL (24 Jun 2021 00:00)  POCT Blood Glucose.: 123 mg/dL (23 Jun 2021 17:00)  POCT Blood Glucose.: 177 mg/dL (23 Jun 2021 11:58)  POCT Blood Glucose.: 146 mg/dL (23 Jun 2021 09:02)  POCT Blood Glucose.: 159 mg/dL (23 Jun 2021 07:51)    COVID-19 PCR: NotDetec (06-23-21 @ 02:45)    RADIOLOGY & ADDITIONAL TESTS:  Care Discussed with Consultants/Other Providers:   
Patient is a 76y old  Male who presents with a chief complaint of LGIB (25 Jun 2021 15:45)    Patient seen and examined at bedside. No acute overnight events. Denies fever, chills, chest pain, shortness of breath, nausea, vomiting, blood in stool.     ALLERGIES:  [place] (Unknown)  No Known Drug Allergies    MEDICATIONS  (STANDING):  amLODIPine   Tablet 10 milliGRAM(s) Oral daily  dextrose 40% Gel 15 Gram(s) Oral once  dextrose 5%. 1000 milliLiter(s) (50 mL/Hr) IV Continuous <Continuous>  dextrose 5%. 1000 milliLiter(s) (100 mL/Hr) IV Continuous <Continuous>  dextrose 50% Injectable 25 Gram(s) IV Push once  dextrose 50% Injectable 12.5 Gram(s) IV Push once  dextrose 50% Injectable 25 Gram(s) IV Push once  DULoxetine 60 milliGRAM(s) Oral daily  fenofibrate Tablet 145 milliGRAM(s) Oral daily  folic acid 1 milliGRAM(s) Oral daily  glucagon  Injectable 1 milliGRAM(s) IntraMuscular once  hydrALAZINE 50 milliGRAM(s) Oral two times a day  insulin glargine Injectable (LANTUS) 25 Unit(s) SubCutaneous every morning  insulin lispro (ADMELOG) corrective regimen sliding scale   SubCutaneous Before meals and at bedtime  levothyroxine 75 MICROGram(s) Oral daily  montelukast 10 milliGRAM(s) Oral daily    MEDICATIONS  (PRN):  acetaminophen   Tablet .. 650 milliGRAM(s) Oral every 6 hours PRN Temp greater or equal to 38C (100.4F), Mild Pain (1 - 3)    Vital Signs Last 24 Hrs  T(F): 97.8 (26 Jun 2021 08:50), Max: 98.6 (25 Jun 2021 15:30)  HR: 89 (26 Jun 2021 08:50) (72 - 89)  BP: 142/65 (26 Jun 2021 08:50) (142/61 - 195/88)  RR: 16 (26 Jun 2021 08:50) (12 - 19)  SpO2: 96% (26 Jun 2021 08:50) (93% - 96%)  I&O's Summary    25 Jun 2021 07:01  -  26 Jun 2021 07:00  --------------------------------------------------------  IN: 480 mL / OUT: 300 mL / NET: 180 mL    26 Jun 2021 07:01  -  26 Jun 2021 11:05  --------------------------------------------------------  IN: 120 mL / OUT: 0 mL / NET: 120 mL    BMI (kg/m2): 42.3 (06-24-21 @ 11:39)    PHYSICAL EXAM:  General: NAD, A/O x 3  ENT: MMM, no tonsilar exudate  Neck: Supple, No JVD  Lungs: Clear to auscultation bilaterally, no wheezes. Good air entry bilaterally   Cardio: RRR, S1/S2, No murmurs  Abdomen: Soft, Nontender, Nondistended; Bowel sounds present  Extremities: No calf tenderness, No pitting edema    LABS:                        10.1   5.64  )-----------( 137      ( 26 Jun 2021 06:18 )             31.0       06-26    145  |  110  |  13  ----------------------------<  136  3.7   |  32  |  1.28    Ca    8.0      26 Jun 2021 06:18    eGFR if Non African American: 54 mL/min/1.73M2 (06-26-21 @ 06:18)  eGFR if African American: 63 mL/min/1.73M2 (06-26-21 @ 06:18)    POCT Blood Glucose.: 129 mg/dL (26 Jun 2021 07:42)  POCT Blood Glucose.: 150 mg/dL (25 Jun 2021 22:08)  POCT Blood Glucose.: 130 mg/dL (25 Jun 2021 16:41)  POCT Blood Glucose.: 199 mg/dL (25 Jun 2021 11:50)    COVID-19 PCR: NotDetec (06-23-21 @ 02:45)    RADIOLOGY & ADDITIONAL TESTS:     Care Discussed with Consultants/Other Providers:

## 2021-06-26 NOTE — DISCHARGE NOTE PROVIDER - CARE PROVIDER_API CALL
Abad Gannon)  Medicine  [address], [place] [address]  Phone: [phone]  Fax: [phone]  Follow Up Time:

## 2021-06-28 ENCOUNTER — NON-APPOINTMENT (OUTPATIENT)
Age: 76
End: 2021-06-28

## 2021-06-28 RX ORDER — METHYLPREDNISOLONE 4 MG/1
4 TABLET ORAL
Qty: 1 | Refills: 0 | Status: COMPLETED | COMMUNITY
Start: 2021-04-06 | End: 2021-06-28

## 2021-06-28 RX ORDER — CELECOXIB 100 MG/1
100 CAPSULE ORAL
Qty: 60 | Refills: 1 | Status: COMPLETED | COMMUNITY
Start: 2021-04-06 | End: 2021-06-28

## 2021-07-02 ENCOUNTER — APPOINTMENT (OUTPATIENT)
Dept: FAMILY MEDICINE | Facility: CLINIC | Age: 76
End: 2021-07-02
Payer: MEDICARE

## 2021-07-02 VITALS
WEIGHT: 282 LBS | BODY MASS INDEX: 42.74 KG/M2 | HEIGHT: 68 IN | SYSTOLIC BLOOD PRESSURE: 140 MMHG | RESPIRATION RATE: 15 BRPM | OXYGEN SATURATION: 95 % | HEART RATE: 80 BPM | DIASTOLIC BLOOD PRESSURE: 60 MMHG | TEMPERATURE: 97.3 F

## 2021-07-02 DIAGNOSIS — K92.2 GASTROINTESTINAL HEMORRHAGE, UNSPECIFIED: ICD-10-CM

## 2021-07-02 DIAGNOSIS — K76.0 FATTY (CHANGE OF) LIVER, NOT ELSEWHERE CLASSIFIED: ICD-10-CM

## 2021-07-02 PROCEDURE — 99496 TRANSJ CARE MGMT HIGH F2F 7D: CPT

## 2021-07-02 NOTE — COUNSELING

## 2021-07-02 NOTE — REVIEW OF SYSTEMS
[Fatigue] : fatigue [Joint Pain] : joint pain [Joint Stiffness] : joint stiffness [Back Pain] : back pain [Negative] : Heme/Lymph [Muscle Pain] : no muscle pain [Muscle Weakness] : no muscle weakness [Joint Swelling] : no joint swelling

## 2021-07-02 NOTE — ASSESSMENT
[FreeTextEntry1] : Assessment and plan:\par \par Patient is a 76-year-old gentleman with multiple medical problems and polypharmacy.\par \par 1.  Status post lower GI bleed status post colonoscopy and upper endoscopy both of which were unremarkable.  Patient is feeling better and has had no episodes of bleeding he will notify me immediately should that happen.\par \par 2.  Hypertension blood pressure is stable patient will continue losartan 100 mg p.o. daily, Bystolic 10 mg p.o. daily, hydralazine 50 mg twice a day, and amlodipine 10 mg p.o. daily.\par \par 3.  Advanced degenerative joint disease severe hip pain patient is scheduled for total hip replacement he will be back for medical clearance.\par \par 4.  Diabetes mellitus type 2 insulin requiring patient is followed closely by endocrinology is on multiple medications Ozempic, Tresiba, Jardiance,.\par \par 5.  Hypothyroidism continue levothyroxine 75 mcg p.o. daily.\par \par 6.  Patient gives signs and symptoms of obstructive sleep apnea he was diagnosed with obstructive sleep apnea many years ago was also given CPAP but patient has not used it and he would require retesting referral to sleep study and sleep medicine given.\par \par 7.  I reviewed with patient his most recent blood work and it shows that the patient has chronic renal insufficiency stage III I recommend ultrasound to rule out parenchymal disease and they also recommend to avoid nephrotoxic medications such as nonsteroidal anti-inflammatory drugs.

## 2021-07-02 NOTE — PHYSICAL EXAM
[Well Nourished] : well nourished [Well Developed] : well developed [Well-Appearing] : well-appearing [Normal Voice/Communication] : normal voice/communication [Normal Sclera/Conjunctiva] : normal sclera/conjunctiva [PERRL] : pupils equal round and reactive to light [EOMI] : extraocular movements intact [Normal Outer Ear/Nose] : the outer ears and nose were normal in appearance [Normal Oropharynx] : the oropharynx was normal [No JVD] : no jugular venous distention [No Lymphadenopathy] : no lymphadenopathy [Supple] : supple [Thyroid Normal, No Nodules] : the thyroid was normal and there were no nodules present [No Respiratory Distress] : no respiratory distress  [No Accessory Muscle Use] : no accessory muscle use [Clear to Auscultation] : lungs were clear to auscultation bilaterally [Normal Rate] : normal rate  [Regular Rhythm] : with a regular rhythm [Normal S1, S2] : normal S1 and S2 [No Murmur] : no murmur heard [No Carotid Bruits] : no carotid bruits [No Abdominal Bruit] : a ~M bruit was not heard ~T in the abdomen [No Varicosities] : no varicosities [Pedal Pulses Present] : the pedal pulses are present [No Palpable Aorta] : no palpable aorta [No Extremity Clubbing/Cyanosis] : no extremity clubbing/cyanosis [Normal Appearance] : normal in appearance [No Nipple Discharge] : no nipple discharge [No Axillary Lymphadenopathy] : no axillary lymphadenopathy [Soft] : abdomen soft [Non Tender] : non-tender [No Masses] : no abdominal mass palpated [Non-distended] : non-distended [No HSM] : no HSM [Normal Bowel Sounds] : normal bowel sounds [No Hernias] : no hernias [Declined Rectal Exam] : declined rectal exam [Normal Supraclavicular Nodes] : no supraclavicular lymphadenopathy [Normal Axillary Nodes] : no axillary lymphadenopathy [Normal Posterior Cervical Nodes] : no posterior cervical lymphadenopathy [Normal Anterior Cervical Nodes] : no anterior cervical lymphadenopathy [Normal Inguinal Nodes] : no inguinal lymphadenopathy [Normal Femoral Nodes] : no femoral lymphadenopathy [No CVA Tenderness] : no CVA  tenderness [No Spinal Tenderness] : no spinal tenderness [No Joint Swelling] : no joint swelling [Grossly Normal Strength/Tone] : grossly normal strength/tone [No Rash] : no rash [Coordination Grossly Intact] : coordination grossly intact [No Focal Deficits] : no focal deficits [Normal Gait] : normal gait [Speech Grossly Normal] : speech grossly normal [Memory Grossly Normal] : memory grossly normal [Normal Affect] : the affect was normal [Alert and Oriented x3] : oriented to person, place, and time [Normal Mood] : the mood was normal [Normal Insight/Judgement] : insight and judgment were intact [Comprehensive Foot Exam Normal] : Right and left foot were examined and both feet are normal. No ulcers in either foot. Toes are normal and with full ROM.  Normal tactile sensation with monofilament testing throughout both feet [Acne] : no acne [de-identified] : Stasis dermatitis, secondary to venous insufficiency, right lower extremity edematous secondary to DVT in the past [de-identified] : obese [FreeTextEntry1] : urology [de-identified] : urology [de-identified] : Lipoma left upper back stable [de-identified] : Stasis dermatitis bilateral lower extremities, excoriation bilateral knees status post fall healing well. [de-identified] : Peripheral neuropathy stable for patient, history of transverse myelitis patient has recovered

## 2021-07-02 NOTE — HISTORY OF PRESENT ILLNESS
[Post-hospitalization from ___ Hospital] : Post-hospitalization from [unfilled] Hospital [Admitted on: ___] : The patient was admitted on [unfilled] [Discharged on ___] : discharged on [unfilled] [Discharge Summary] : discharge summary [Pertinent Labs] : pertinent labs [Radiology Findings] : radiology findings [Discharge Med List] : discharge medication list [Other: ____] : [unfilled] [Med Reconciliation] : medication reconciliation has been completed [Patient Contacted By: ____] : and contacted by [unfilled] [FreeTextEntry2] : Patient is a 76-year-old gentleman who presents today status post hospitalization at [facility].  Patient was hospitalized from June 23 to June 26, 2021.  Chief complaint GI bleed patient was diagnosed with a lower GI bleed subsequently underwent upper and lower endoscopies and no acute active bleeding was found.  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  Patient is also followed by multiple subspecialist.\par \par Presently the patient is awake alert and oriented x3 in no acute distress calm and cooperative he presently denies any chest pain.

## 2021-07-02 NOTE — HEALTH RISK ASSESSMENT
[Yes] : Yes [Monthly or less (1 pt)] : Monthly or less (1 point) [1 or 2 (0 pts)] : 1 or 2 (0 points) [Never (0 pts)] : Never (0 points) [No] : In the past 12 months have you used drugs other than those required for medical reasons? No [Any fall with injury in past year] : Patient reported fall with injury in the past year [0] : 2) Feeling down, depressed, or hopeless: Not at all (0) [PHQ-2 Negative - No further assessment needed] : PHQ-2 Negative - No further assessment needed [None] : None [With Family] : lives with family [# of Members in Household ___] :  household currently consist of [unfilled] member(s) [Retired] : retired [Graduate School] : graduate school [] :  [Feels Safe at Home] : Feels safe at home [Fully functional (bathing, dressing, toileting, transferring, walking, feeding)] : Fully functional (bathing, dressing, toileting, transferring, walking, feeding) [Fully functional (using the telephone, shopping, preparing meals, housekeeping, doing laundry, using] : Fully functional and needs no help or supervision to perform IADLs (using the telephone, shopping, preparing meals, housekeeping, doing laundry, using transportation, managing medications and managing finances) [Reports normal functional visual acuity (ie: able to read med bottle)] : Reports normal functional visual acuity [Smoke Detector] : smoke detector [Carbon Monoxide Detector] : carbon monoxide detector [Safety elements used in home] : safety elements used in home [Seat Belt] :  uses seat belt [Sunscreen] : uses sunscreen [With Patient/Caregiver] : , with patient/caregiver [Designated Healthcare Proxy] : Designated healthcare proxy [Name: ___] : Health Care Proxy's Name: [unfilled]  [Relationship: ___] : Relationship: [unfilled] [Aggressive treatment] : aggressive treatment [I will adhere to the patient's wishes.] : I will adhere to the patient's wishes. [] : No [Audit-CScore] : 1 [[id number]] : 0 [Change in mental status noted] : No change in mental status noted [Language] : denies difficulty with language [Behavior] : denies difficulty with behavior [Learning/Retaining New Information] : denies difficulty learning/retaining new information [Handling Complex Tasks] : denies difficulty handling complex tasks [Reasoning] : denies difficulty with reasoning [Spatial Ability and Orientation] : denies difficulty with spatial ability and orientation [Sexually Active] : not sexually active [High Risk Behavior] : no high risk behavior [Reports changes in hearing] : Reports no changes in hearing [Reports changes in vision] : Reports no changes in vision [Reports changes in dental health] : Reports no changes in dental health [Travel to Developing Areas] : does not  travel to developing areas [TB Exposure] : is not being exposed to tuberculosis [Caregiver Concerns] : does not have caregiver concerns [AdvancecareDate] : 07/21

## 2021-07-07 ENCOUNTER — APPOINTMENT (OUTPATIENT)
Dept: ULTRASOUND IMAGING | Facility: HOSPITAL | Age: 76
End: 2021-07-07
Payer: MEDICARE

## 2021-07-07 ENCOUNTER — OUTPATIENT (OUTPATIENT)
Dept: OUTPATIENT SERVICES | Facility: HOSPITAL | Age: 76
LOS: 1 days | End: 2021-07-07
Payer: MEDICARE

## 2021-07-07 DIAGNOSIS — Z98.890 OTHER SPECIFIED POSTPROCEDURAL STATES: Chronic | ICD-10-CM

## 2021-07-07 DIAGNOSIS — Z90.89 ACQUIRED ABSENCE OF OTHER ORGANS: Chronic | ICD-10-CM

## 2021-07-07 DIAGNOSIS — Z90.49 ACQUIRED ABSENCE OF OTHER SPECIFIED PARTS OF DIGESTIVE TRACT: Chronic | ICD-10-CM

## 2021-07-07 DIAGNOSIS — N18.30 CHRONIC KIDNEY DISEASE, STAGE 3 UNSPECIFIED: ICD-10-CM

## 2021-07-07 DIAGNOSIS — Z96.649 PRESENCE OF UNSPECIFIED ARTIFICIAL HIP JOINT: Chronic | ICD-10-CM

## 2021-07-07 PROCEDURE — 76770 US EXAM ABDO BACK WALL COMP: CPT | Mod: 26

## 2021-07-07 PROCEDURE — 76770 US EXAM ABDO BACK WALL COMP: CPT

## 2021-07-13 NOTE — CDI QUERY NOTE - NSCDIOTHERTXTBX_GEN_ALL_CORE_HH
Presents with LGIB.    6/23/2021 note - #Suspect ATN from hypotension and blood loss  -Monitor renal function daily  -Hold nephrotoxic drugs    Creatinine levels - 1.94 - 1.91 - 1.45 - 1.28    ER BP - as low as 85/32.    Succeeding notes - Acute Kidney Injury on CKD Stage 3  - improved with fluids     Please clarify;    - ARF with ATN resolved;  - ARF with ATN ruled out;  - ARF with ATN clinically undetermined.    Thank you.

## 2021-07-15 ENCOUNTER — OUTPATIENT (OUTPATIENT)
Dept: OUTPATIENT SERVICES | Facility: HOSPITAL | Age: 76
LOS: 1 days | End: 2021-07-15
Payer: MEDICARE

## 2021-07-15 VITALS
SYSTOLIC BLOOD PRESSURE: 126 MMHG | HEIGHT: 68 IN | TEMPERATURE: 97 F | WEIGHT: 270.95 LBS | HEART RATE: 74 BPM | OXYGEN SATURATION: 95 % | RESPIRATION RATE: 19 BRPM | DIASTOLIC BLOOD PRESSURE: 70 MMHG

## 2021-07-15 DIAGNOSIS — Z98.890 OTHER SPECIFIED POSTPROCEDURAL STATES: Chronic | ICD-10-CM

## 2021-07-15 DIAGNOSIS — Z96.649 PRESENCE OF UNSPECIFIED ARTIFICIAL HIP JOINT: Chronic | ICD-10-CM

## 2021-07-15 DIAGNOSIS — M16.12 UNILATERAL PRIMARY OSTEOARTHRITIS, LEFT HIP: ICD-10-CM

## 2021-07-15 DIAGNOSIS — Z01.818 ENCOUNTER FOR OTHER PREPROCEDURAL EXAMINATION: ICD-10-CM

## 2021-07-15 DIAGNOSIS — Z90.49 ACQUIRED ABSENCE OF OTHER SPECIFIED PARTS OF DIGESTIVE TRACT: Chronic | ICD-10-CM

## 2021-07-15 DIAGNOSIS — Z90.89 ACQUIRED ABSENCE OF OTHER ORGANS: Chronic | ICD-10-CM

## 2021-07-15 LAB
A1C WITH ESTIMATED AVERAGE GLUCOSE RESULT: 5.5 % — SIGNIFICANT CHANGE UP (ref 4–5.6)
ALBUMIN SERPL ELPH-MCNC: 3.8 G/DL — SIGNIFICANT CHANGE UP (ref 3.3–5)
ALP SERPL-CCNC: 52 U/L — SIGNIFICANT CHANGE UP (ref 30–120)
ALT FLD-CCNC: 22 U/L DA — SIGNIFICANT CHANGE UP (ref 10–60)
ANION GAP SERPL CALC-SCNC: 7 MMOL/L — SIGNIFICANT CHANGE UP (ref 5–17)
APTT BLD: 30.4 SEC — SIGNIFICANT CHANGE UP (ref 27.5–35.5)
AST SERPL-CCNC: 21 U/L — SIGNIFICANT CHANGE UP (ref 10–40)
BILIRUB SERPL-MCNC: 0.2 MG/DL — SIGNIFICANT CHANGE UP (ref 0.2–1.2)
BLD GP AB SCN SERPL QL: SIGNIFICANT CHANGE UP
BUN SERPL-MCNC: 20 MG/DL — SIGNIFICANT CHANGE UP (ref 7–23)
CALCIUM SERPL-MCNC: 9.2 MG/DL — SIGNIFICANT CHANGE UP (ref 8.4–10.5)
CHLORIDE SERPL-SCNC: 107 MMOL/L — SIGNIFICANT CHANGE UP (ref 96–108)
CO2 SERPL-SCNC: 30 MMOL/L — SIGNIFICANT CHANGE UP (ref 22–31)
CREAT SERPL-MCNC: 1.64 MG/DL — HIGH (ref 0.5–1.3)
ESTIMATED AVERAGE GLUCOSE: 111 MG/DL — SIGNIFICANT CHANGE UP (ref 68–114)
GLUCOSE SERPL-MCNC: 115 MG/DL — HIGH (ref 70–99)
HCT VFR BLD CALC: 39.4 % — SIGNIFICANT CHANGE UP (ref 39–50)
HGB BLD-MCNC: 12.5 G/DL — LOW (ref 13–17)
INR BLD: 0.99 RATIO — SIGNIFICANT CHANGE UP (ref 0.88–1.16)
MCHC RBC-ENTMCNC: 28.7 PG — SIGNIFICANT CHANGE UP (ref 27–34)
MCHC RBC-ENTMCNC: 31.7 GM/DL — LOW (ref 32–36)
MCV RBC AUTO: 90.4 FL — SIGNIFICANT CHANGE UP (ref 80–100)
MRSA PCR RESULT.: SIGNIFICANT CHANGE UP
NRBC # BLD: 0 /100 WBCS — SIGNIFICANT CHANGE UP (ref 0–0)
PLATELET # BLD AUTO: 218 K/UL — SIGNIFICANT CHANGE UP (ref 150–400)
POTASSIUM SERPL-MCNC: 4.5 MMOL/L — SIGNIFICANT CHANGE UP (ref 3.5–5.3)
POTASSIUM SERPL-SCNC: 4.5 MMOL/L — SIGNIFICANT CHANGE UP (ref 3.5–5.3)
PROT SERPL-MCNC: 7.3 G/DL — SIGNIFICANT CHANGE UP (ref 6–8.3)
PROTHROM AB SERPL-ACNC: 12 SEC — SIGNIFICANT CHANGE UP (ref 10.6–13.6)
RBC # BLD: 4.36 M/UL — SIGNIFICANT CHANGE UP (ref 4.2–5.8)
RBC # FLD: 13.6 % — SIGNIFICANT CHANGE UP (ref 10.3–14.5)
S AUREUS DNA NOSE QL NAA+PROBE: SIGNIFICANT CHANGE UP
SODIUM SERPL-SCNC: 144 MMOL/L — SIGNIFICANT CHANGE UP (ref 135–145)
WBC # BLD: 5.45 K/UL — SIGNIFICANT CHANGE UP (ref 3.8–10.5)
WBC # FLD AUTO: 5.45 K/UL — SIGNIFICANT CHANGE UP (ref 3.8–10.5)

## 2021-07-15 PROCEDURE — G0463: CPT

## 2021-07-15 NOTE — H&P PST ADULT - NSICDXPROBLEM_GEN_ALL_CORE_FT
PROBLEM DIAGNOSES  Problem: Osteoarthritis of left hip  Assessment and Plan: Left total hip replacement is planned for 7/29/2021.  Proof of covcaden jaffe provided Diagnostic testing performed.  The following instructions were given to patient:  -obtain medical clearance and cardiac clearance  -vascular clearance  -follow up with dermatology for prior rash  -follow instructions for diabetes meds from endocrinology  -rheumatolgy instructions for humira   AM meds and chlorhexadine soap; gatorade and mupirocin treatment  Written instructions given  Best wishes offered.

## 2021-07-15 NOTE — H&P PST ADULT - RESPIRATORY AND THORAX
negative
How Severe Is Your Acne?: mild
Is This A New Presentation, Or A Follow-Up?: Acne
What Type Of Note Output Would You Prefer (Optional)?: Standard Output

## 2021-07-15 NOTE — H&P PST ADULT - MUSCULOSKELETAL
details… no joint erythema/no joint warmth/no calf tenderness/decreased ROM due to pain detailed exam

## 2021-07-15 NOTE — H&P PST ADULT - NSICDXPASTMEDICALHX_GEN_ALL_CORE_FT
PAST MEDICAL HISTORY:  Asthma     COVID-19 vaccine series completed pfizer, last dose 3/6/21    DVT (deep venous thrombosis) right leg 4-5 yrs ago    History of diverticulitis 6/2021    History of prostate cancer 2011    Hyperlipidemia     Hyperpigmentation of skin lower legs    Hypertension     Hypothyroid     Osteoarthritis of left hip     PAD (peripheral artery disease)     Prostate cancer seed implant 2008    Rheumatoid arthritis     Seasonal allergies     Severe obesity (BMI >= 40)     Sleep apnea     Type 2 diabetes mellitus     Urinary frequency

## 2021-07-15 NOTE — H&P PST ADULT - EYES
Quality 130: Documentation Of Current Medications In The Medical Record: Current Medications with Name, Dosage, Frequency, or Route not Documented, Reason not Given Detail Level: Detailed Quality 110: Preventive Care And Screening: Influenza Immunization: Influenza Immunization Administered during Influenza season Quality 226: Preventive Care And Screening: Tobacco Use: Screening And Cessation Intervention: Patient screened for tobacco use and is an ex/non-smoker EOMI; PERRL; no drainage or redness

## 2021-07-15 NOTE — H&P PST ADULT - HISTORY OF PRESENT ILLNESS
75 yo male reports left hip pain which is exacerbated with arising from sitting and weight bearing.  pain radiated to lower back.  He received a cortisone injection 4/2021 without relief.  He is scheduled for left total hip replacement on 7/29/2021 with Dr Girard.

## 2021-07-15 NOTE — H&P PST ADULT - NSICDXPASTSURGICALHX_GEN_ALL_CORE_FT
PAST SURGICAL HISTORY:  H/O hernia repair 2000's    S/P foot surgery bilateral; 2010    S/P laparoscopic cholecystectomy 2000    S/P LASIK surgery of both eyes     S/P lumbar laminectomy 2012    S/P tonsillectomy childhood    S/P total hip arthroplasty right 2011

## 2021-07-21 ENCOUNTER — APPOINTMENT (OUTPATIENT)
Dept: ORTHOPEDIC SURGERY | Facility: CLINIC | Age: 76
End: 2021-07-21
Payer: MEDICARE

## 2021-07-21 VITALS — BODY MASS INDEX: 41.37 KG/M2 | HEIGHT: 68 IN | WEIGHT: 273 LBS

## 2021-07-21 PROCEDURE — 99212 OFFICE O/P EST SF 10 MIN: CPT

## 2021-07-21 NOTE — CONSULT LETTER
[Dear  ___] : Dear  [unfilled], [Consult Letter:] : I had the pleasure of evaluating your patient, [unfilled]. [Consult Closing:] : Thank you very much for allowing me to participate in the care of this patient.  If you have any questions, please do not hesitate to contact me. [Sincerely,] : Sincerely, [FreeTextEntry2] : BESSIE WILLIAMSON [FreeTextEntry3] : Fabio Girard MD, FAAOS\par Total Hip and Total Knee Replacement \par Anterior Total Hip Replacement\par [facility]\par [address]\par [place], [place] \par [phone]\par fax [phone]\par

## 2021-07-21 NOTE — DISCUSSION/SUMMARY
[Surgical risks reviewed] : Surgical risks reviewed [de-identified] : Diagnosis end-stage osteoarthritis left hip compromise and quality life and unresponsive to comprehensive medical management.\par Plan left total hip replacement

## 2021-07-21 NOTE — HISTORY OF PRESENT ILLNESS
[de-identified] : Pain and left groin radiating down thigh to knee is provoked by weightbearing. Walking tolerance is become progressively less and pain interferes with activity or lifting such as putting on shoes and socks pants climbing stairs and getting in and out of a car. His symptoms have not responded to oral anti-inflammatory medication post steroid injection left hip 12 days ago with minimal relief He status post right anterior total hip replacement 2011 with no complaints. Has been undergoing dermatological care for left groin dermatitis has been cleared by dermatology for surgery

## 2021-07-21 NOTE — PHYSICAL EXAM
[de-identified] : Constitutional: Obese, well developed and in no acute distress\par Psychiatric: Alert and oriented to time place and person.Appropriate affect\par Respiratory: Unlabored respirations,no audible wheezing\par Cardiovascular: no leg swelling  ankle edema\par Vascular: no calf or thigh tenderness, \par Peripheral pulses; intact\par Skin:Head, neck, arms and lower extremities:no lesions or discoloration\par Lymphatics:No groin adenopathy\par Neurological: intact light touch sensation and grossly intact coordination and motor power.\par Left hip Trendelenburg gait mild shortening log roll provokes typical groin and thigh pain neurovascular intact no equal tenderness operative site skin intact [de-identified] : Prior x-ray AP pelvis left hip AP lateral reveals degenerative narrowing subchondral sclerosis marginal osteophytes satisfactory appearance right total hip replacement

## 2021-07-22 ENCOUNTER — APPOINTMENT (OUTPATIENT)
Dept: FAMILY MEDICINE | Facility: CLINIC | Age: 76
End: 2021-07-22
Payer: MEDICARE

## 2021-07-22 VITALS
TEMPERATURE: 97.1 F | WEIGHT: 278 LBS | BODY MASS INDEX: 42.13 KG/M2 | HEART RATE: 77 BPM | RESPIRATION RATE: 16 BRPM | HEIGHT: 68 IN | SYSTOLIC BLOOD PRESSURE: 136 MMHG | OXYGEN SATURATION: 93 % | DIASTOLIC BLOOD PRESSURE: 66 MMHG

## 2021-07-22 DIAGNOSIS — Z01.818 ENCOUNTER FOR OTHER PREPROCEDURAL EXAMINATION: ICD-10-CM

## 2021-07-22 PROCEDURE — 99214 OFFICE O/P EST MOD 30 MIN: CPT

## 2021-07-22 NOTE — ASSESSMENT
[High Risk Surgery - Intraperitoneal, Intrathoracic or Supringuinal Vascular Procedures] : High Risk Surgery - Intraperitoneal, Intrathoracic or Supringuinal Vascular Procedures - No (0) [Ischemic Heart Disease] : Ischemic Heart Disease - No (0) [Congestive Heart Failure] : Congestive Heart Failure - No (0) [Prior Cerebrovascular Accident or TIA] : Prior Cerebrovascular Accident or TIA - No (0) [Creatinine >= 2mg/dL (1 Point)] : Creatinine >= 2mg/dL - No (0) [Insulin-dependent Diabetic (1 Point)] : Insulin-dependent Diabetic - No (0) [0] : 0 , RCRI Class: I, Risk of Post-Op Cardiac Complications: 3.9%, 95% CI for Risk Estimate: 2.8% - 5.4% [Patient Optimized for Surgery] : Patient optimized for surgery [Echocardiogram] : echocardiogram [Cardiology consultation] : Cardiology consultation [Other: _____] : [unfilled] [As per surgery] : as per surgery [FreeTextEntry4] : Completed physical Exam, reviewed pre op labs, EKG done at cardiology \par Advised pt to avoid any NSAIDs, vitamins, aspirin for 7 days till post surgery.\par  Avoid any food or drinks past midnight, the day prior to surgery. \par HOLD the Humira/Sulfasalazine 1 week prior to surgery, and then resume 2 weeks after\par HOLD anti glycemic medications Tresiba 48 hrs prior surgery. resume 24 hrs post surgery\par Pt may take rest of the medication with small sips of water the day of the surgery.\par  RTO for a follow up appointment post surgery.\par \par RCRI: Class I for procedure\par Cardiac Clearance received\par Received clearance from rheumatology with instructions about medication prior surgery\par Endocrine, received instructions about medications prior to surgery, recent HgbA1c: 5.5, acceptable for surgery\par Medical standpoint: Pt at current time has been optimized for surgery. \par

## 2021-07-22 NOTE — HISTORY OF PRESENT ILLNESS
[No Pertinent Cardiac History] : no history of aortic stenosis, atrial fibrillation, coronary artery disease, recent myocardial infarction, or implantable device/pacemaker [Aortic Stenosis] : no aortic stenosis [Atrial Fibrillation] : no atrial fibrillation [Coronary Artery Disease] : no coronary artery disease [Recent Myocardial Infarction] : no recent myocardial infarction [Implantable Device/Pacemaker] : no implantable device/pacemaker [Asthma] : asthma [COPD] : no COPD [Sleep Apnea] : sleep apnea [Smoker] : not a smoker [Family Member] : no family member with adverse anesthesia reaction/sudden death [Self] : no previous adverse anesthesia reaction [Chronic Anticoagulation] : chronic anticoagulation [Chronic Kidney Disease] : chronic kidney disease [Diabetes] : diabetes [(Patient denies any chest pain, claudication, dyspnea on exertion, orthopnea, palpitations or syncope)] : Patient denies any chest pain, claudication, dyspnea on exertion, orthopnea, palpitations or syncope [Moderate (4-6 METs)] : Moderate (4-6 METs) [Anti-Platelet Agents: _____] : Anti-Platelet Agents: [unfilled] [NSAIDs: _____] : NSAIDs: [unfilled] [FreeTextEntry1] : Left Total Hip Replacement [FreeTextEntry2] : 07/29/2021 [FreeTextEntry3] : Dr. Girard [FreeTextEntry4] : Mr. Fabio Medina is a 77 yo male presents today for preop evaluation for a left total hip replacement to be done by Dr. Girard on 07/29/2021 at [facility]. Pt has prior hx of right THR, in 2011 with good results. Pt report lately more discomfort of his left hip, and has attempted multiple modalities of treatment with dismal results, now opting for surgery. Pt was cleared already for surgery in June however postponed since pt had received cortisone shot, now ready for surgery.  [FreeTextEntry7] : Pt seen Cardiology Dr. Scott, cardiology cleared patient

## 2021-07-22 NOTE — RESULTS/DATA
[] : results reviewed [de-identified] : 07/15/2021: Results within acceptable limits [de-identified] : 07/15/2021: Results within acceptable limits [de-identified] : 07/15/2021: Results within acceptable limits, ckd known, creatinine 1.64 [de-identified] : 05/2021: NSR 75 bpm, baseline, recently also seen cardiology, cleared for surgery [de-identified] : HgbA1c: 5.5 within acceptable limits

## 2021-07-22 NOTE — PHYSICAL EXAM
[EOMI] : extraocular movements intact [Supple] : supple [Clear to Auscultation] : lungs were clear to auscultation bilaterally [Normal S1, S2] : normal S1 and S2 [No Edema] : there was no peripheral edema [Soft] : abdomen soft [Non Tender] : non-tender [Normal Bowel Sounds] : normal bowel sounds [No Rash] : no rash [Normal Affect] : the affect was normal [de-identified] : obese, elderly male [de-identified] : left hip pain with motion, and ambulation [de-identified] : cane

## 2021-08-03 LAB
ALBUMIN SERPL ELPH-MCNC: 4.4 G/DL
ALP BLD-CCNC: 64 U/L
ALT SERPL-CCNC: 14 U/L
ANION GAP SERPL CALC-SCNC: 10 MMOL/L
AST SERPL-CCNC: 21 U/L
BASOPHILS # BLD AUTO: 0.03 K/UL
BASOPHILS NFR BLD AUTO: 0.5 %
BILIRUB SERPL-MCNC: 0.2 MG/DL
BUN SERPL-MCNC: 18 MG/DL
CALCIUM SERPL-MCNC: 9.6 MG/DL
CHLORIDE SERPL-SCNC: 104 MMOL/L
CO2 SERPL-SCNC: 29 MMOL/L
CREAT SERPL-MCNC: 1.46 MG/DL
CRP SERPL-MCNC: <3 MG/L
EOSINOPHIL # BLD AUTO: 0.19 K/UL
EOSINOPHIL NFR BLD AUTO: 3.4 %
ERYTHROCYTE [SEDIMENTATION RATE] IN BLOOD BY WESTERGREN METHOD: 26 MM/HR
GLUCOSE SERPL-MCNC: 130 MG/DL
HCT VFR BLD CALC: 44.2 %
HGB BLD-MCNC: 13.5 G/DL
IMM GRANULOCYTES NFR BLD AUTO: 0.4 %
LYMPHOCYTES # BLD AUTO: 1.33 K/UL
LYMPHOCYTES NFR BLD AUTO: 23.7 %
MAN DIFF?: NORMAL
MCHC RBC-ENTMCNC: 27.2 PG
MCHC RBC-ENTMCNC: 30.5 GM/DL
MCV RBC AUTO: 89.1 FL
MONOCYTES # BLD AUTO: 0.62 K/UL
MONOCYTES NFR BLD AUTO: 11.1 %
NEUTROPHILS # BLD AUTO: 3.42 K/UL
NEUTROPHILS NFR BLD AUTO: 60.9 %
PLATELET # BLD AUTO: 222 K/UL
POTASSIUM SERPL-SCNC: 4.8 MMOL/L
PROT SERPL-MCNC: 7.2 G/DL
RBC # BLD: 4.96 M/UL
RBC # FLD: 13.9 %
SODIUM SERPL-SCNC: 143 MMOL/L
WBC # FLD AUTO: 5.61 K/UL

## 2021-08-06 ENCOUNTER — APPOINTMENT (OUTPATIENT)
Dept: RHEUMATOLOGY | Facility: CLINIC | Age: 76
End: 2021-08-06
Payer: MEDICARE

## 2021-08-06 ENCOUNTER — APPOINTMENT (OUTPATIENT)
Dept: FAMILY MEDICINE | Facility: CLINIC | Age: 76
End: 2021-08-06
Payer: MEDICARE

## 2021-08-06 VITALS
DIASTOLIC BLOOD PRESSURE: 74 MMHG | WEIGHT: 277 LBS | HEIGHT: 68 IN | BODY MASS INDEX: 41.98 KG/M2 | TEMPERATURE: 98 F | RESPIRATION RATE: 16 BRPM | HEART RATE: 79 BPM | SYSTOLIC BLOOD PRESSURE: 139 MMHG | OXYGEN SATURATION: 92 %

## 2021-08-06 PROBLEM — I73.9 PERIPHERAL VASCULAR DISEASE, UNSPECIFIED: Chronic | Status: ACTIVE | Noted: 2021-07-15

## 2021-08-06 PROBLEM — Z85.46 PERSONAL HISTORY OF MALIGNANT NEOPLASM OF PROSTATE: Chronic | Status: ACTIVE | Noted: 2021-07-15

## 2021-08-06 PROBLEM — R35.0 FREQUENCY OF MICTURITION: Chronic | Status: ACTIVE | Noted: 2021-07-15

## 2021-08-06 PROBLEM — E66.01 MORBID (SEVERE) OBESITY DUE TO EXCESS CALORIES: Chronic | Status: ACTIVE | Noted: 2021-07-15

## 2021-08-06 PROBLEM — L81.9 DISORDER OF PIGMENTATION, UNSPECIFIED: Chronic | Status: ACTIVE | Noted: 2021-07-15

## 2021-08-06 PROBLEM — Z87.19 PERSONAL HISTORY OF OTHER DISEASES OF THE DIGESTIVE SYSTEM: Chronic | Status: ACTIVE | Noted: 2021-07-15

## 2021-08-06 PROBLEM — M16.12 UNILATERAL PRIMARY OSTEOARTHRITIS, LEFT HIP: Chronic | Status: ACTIVE | Noted: 2021-07-15

## 2021-08-06 PROCEDURE — 36415 COLL VENOUS BLD VENIPUNCTURE: CPT

## 2021-08-06 PROCEDURE — 99214 OFFICE O/P EST MOD 30 MIN: CPT

## 2021-08-06 PROCEDURE — G0439: CPT

## 2021-08-06 RX ORDER — ACETAMINOPHEN 500 MG
1000 TABLET ORAL ONCE
Refills: 0 | Status: COMPLETED | OUTPATIENT
Start: 2021-08-12 | End: 2021-08-12

## 2021-08-06 RX ORDER — CEFAZOLIN SODIUM 1 G
3000 VIAL (EA) INJECTION ONCE
Refills: 0 | Status: COMPLETED | OUTPATIENT
Start: 2021-08-12 | End: 2021-08-12

## 2021-08-06 RX ORDER — APREPITANT 80 MG/1
40 CAPSULE ORAL ONCE
Refills: 0 | Status: COMPLETED | OUTPATIENT
Start: 2021-08-12 | End: 2021-08-12

## 2021-08-06 RX ORDER — CHLORHEXIDINE GLUCONATE 213 G/1000ML
1 SOLUTION TOPICAL ONCE
Refills: 0 | Status: DISCONTINUED | OUTPATIENT
Start: 2021-08-12 | End: 2021-08-12

## 2021-08-06 RX ORDER — TRANEXAMIC ACID 100 MG/ML
1000 INJECTION, SOLUTION INTRAVENOUS ONCE
Refills: 0 | Status: COMPLETED | OUTPATIENT
Start: 2021-08-12 | End: 2021-08-12

## 2021-08-06 NOTE — COUNSELING
[Fall prevention counseling provided] : Fall prevention counseling provided [Adequate lighting] : Adequate lighting [No throw rugs] : No throw rugs [Use proper foot wear] : Use proper foot wear [Use recommended devices] : Use recommended devices [Behavioral health counseling provided] : Behavioral health counseling provided [Sleep ___ hours/day] : Sleep [unfilled] hours/day [Engage in a relaxing activity] : Engage in a relaxing activity [Plan in advance] : Plan in advance [AUDIT-C Screening administered and reviewed] : AUDIT-C Screening administered and reviewed [Potential consequences of obesity discussed] : Potential consequences of obesity discussed [Benefits of weight loss discussed] : Benefits of weight loss discussed [Structured Weight Management Program suggested:] : Structured weight management program suggested [Encouraged to maintain food diary] : Encouraged to maintain food diary [Encouraged to increase physical activity] : Encouraged to increase physical activity [Encouraged to use exercise tracking device] : Encouraged to use exercise tracking device [Target Wt Loss Goal ___] : Weight Loss Goals: Target weight loss goal [unfilled] lbs [Weigh Self Weekly] : weigh self weekly [Decrease Portions] : decrease portions [____ min/wk Activity] : [unfilled] min/wk activity [Keep Food Diary] : keep food diary [None] : None [Good understanding] : Patient has a good understanding of lifestyle changes and steps needed to achieve self management goal

## 2021-08-06 NOTE — HEALTH RISK ASSESSMENT
[Very Good] : ~his/her~  mood as very good [Yes] : Yes [Monthly or less (1 pt)] : Monthly or less (1 point) [1 or 2 (0 pts)] : 1 or 2 (0 points) [Never (0 pts)] : Never (0 points) [No falls in past year] : Patient reported no falls in the past year [0] : 2) Feeling down, depressed, or hopeless: Not at all (0) [PHQ-2 Negative - No further assessment needed] : PHQ-2 Negative - No further assessment needed [HIV test declined] : HIV test declined [None] : None [Alone] : lives alone [With Family] : lives with family [# of Members in Household ___] :  household currently consist of [unfilled] member(s) [Retired] : retired [College] : College [] :  [Feels Safe at Home] : Feels safe at home [Fully functional (bathing, dressing, toileting, transferring, walking, feeding)] : Fully functional (bathing, dressing, toileting, transferring, walking, feeding) [Fully functional (using the telephone, shopping, preparing meals, housekeeping, doing laundry, using] : Fully functional and needs no help or supervision to perform IADLs (using the telephone, shopping, preparing meals, housekeeping, doing laundry, using transportation, managing medications and managing finances) [Reports normal functional visual acuity (ie: able to read med bottle)] : Reports normal functional visual acuity [Smoke Detector] : smoke detector [Carbon Monoxide Detector] : carbon monoxide detector [Safety elements used in home] : safety elements used in home [Seat Belt] :  uses seat belt [Sunscreen] : uses sunscreen [Reviewed no changes] : Reviewed, no changes [Relationship: ___] : Relationship: [unfilled] [] : No [Audit-CScore] : 1 [CPT4Uhzmr] : 0 [Change in mental status noted] : No change in mental status noted [Language] : denies difficulty with language [Behavior] : denies difficulty with behavior [Learning/Retaining New Information] : denies difficulty learning/retaining new information [Handling Complex Tasks] : denies difficulty handling complex tasks [Reasoning] : denies difficulty with reasoning [Spatial Ability and Orientation] : denies difficulty with spatial ability and orientation [Reports changes in hearing] : Reports no changes in hearing [Reports changes in vision] : Reports no changes in vision [Reports changes in dental health] : Reports no changes in dental health [Travel to Developing Areas] : does not  travel to developing areas [TB Exposure] : is not being exposed to tuberculosis [Caregiver Concerns] : does not have caregiver concerns [de-identified] : Glasses [AdvancecareDate] : 08/21

## 2021-08-06 NOTE — ASSESSMENT
[FreeTextEntry1] : Assessment and plan:\par \par Patient is a 76-year-old gentleman with multiple medical problems and polypharmacy.  Apprehensive health maintenance physical exam shows no acute findings stable for patient, electrocardiogram was done at preop testing shows no acute ST-T wave changes and comprehensive blood work was drawn in office by examiner at today's visit.\par \par 1.  Status post lower GI bleed status post colonoscopy and upper endoscopy both of which were unremarkable.  Patient is feeling better and has had no episodes of bleeding he will notify me immediately should that happen.\par \par 2.  Hypertension blood pressure is stable patient will continue losartan 100 mg p.o. daily, Bystolic 10 mg p.o. daily, hydralazine 50 mg twice a day, and amlodipine 10 mg p.o. daily.\par \par 3.  Advanced degenerative joint disease severe hip pain patient is scheduled for total hip replacement he will be back for medical clearance.\par \par 4.  Diabetes mellitus type 2 insulin requiring patient is followed closely by endocrinology is on multiple medications Ozempic, Tresiba, Jardiance,.\par \par 5.  Hypothyroidism continue levothyroxine 75 mcg p.o. daily.\par \par 6.  Patient gives signs and symptoms of obstructive sleep apnea he was diagnosed with obstructive sleep apnea many years ago was also given CPAP but patient has not used it and he would require retesting referral to sleep study and sleep medicine given.\par \par 7.  I reviewed with patient his most recent blood work and it shows that the patient has chronic renal insufficiency stage III I recommend ultrasound to rule out parenchymal disease and they also recommend to avoid nephrotoxic medications such as nonsteroidal anti-inflammatory drugs.

## 2021-08-06 NOTE — HISTORY OF PRESENT ILLNESS
[FreeTextEntry1] : See HPI [de-identified] : It is a 76-year-old gentleman who presents today for annual wellness exam patient was scheduled for total hip replacement unfortunately it has been postponed patient will be having it on August 12, 2021.  Medical history is significant for anemia secondary to GI bleed totally resolved, chronic renal insufficiency stage III which is stable, elevated BMI, hypertension, diabetes mellitus type 2, hypothyroidism, rheumatoid arthritis which is also being followed by rheumatology diabetic neuropathy and osteoarthritis.\par \par Presently the patient is awake alert and oriented x3 he is in no acute distress calm and cooperative he presently denies any chest pain or shortness of breath.

## 2021-08-06 NOTE — PHYSICAL EXAM
[No Acute Distress] : no acute distress [Well Nourished] : well nourished [Well Developed] : well developed [Well-Appearing] : well-appearing [Normal Voice/Communication] : normal voice/communication [Normal Sclera/Conjunctiva] : normal sclera/conjunctiva [PERRL] : pupils equal round and reactive to light [EOMI] : extraocular movements intact [Normal Outer Ear/Nose] : the outer ears and nose were normal in appearance [Normal Oropharynx] : the oropharynx was normal [Normal TMs] : both tympanic membranes were normal [No JVD] : no jugular venous distention [No Lymphadenopathy] : no lymphadenopathy [Supple] : supple [Thyroid Normal, No Nodules] : the thyroid was normal and there were no nodules present [No Respiratory Distress] : no respiratory distress  [No Accessory Muscle Use] : no accessory muscle use [Clear to Auscultation] : lungs were clear to auscultation bilaterally [Normal Rate] : normal rate  [Regular Rhythm] : with a regular rhythm [Normal S1, S2] : normal S1 and S2 [No Murmur] : no murmur heard [No Carotid Bruits] : no carotid bruits [No Abdominal Bruit] : a ~M bruit was not heard ~T in the abdomen [No Varicosities] : no varicosities [Pedal Pulses Present] : the pedal pulses are present [No Palpable Aorta] : no palpable aorta [No Extremity Clubbing/Cyanosis] : no extremity clubbing/cyanosis [Normal Appearance] : normal in appearance [No Nipple Discharge] : no nipple discharge [No Axillary Lymphadenopathy] : no axillary lymphadenopathy [Soft] : abdomen soft [Non Tender] : non-tender [Non-distended] : non-distended [No Masses] : no abdominal mass palpated [No HSM] : no HSM [Normal Bowel Sounds] : normal bowel sounds [No Hernias] : no hernias [Declined Rectal Exam] : declined rectal exam [Normal Supraclavicular Nodes] : no supraclavicular lymphadenopathy [Normal Axillary Nodes] : no axillary lymphadenopathy [Normal Posterior Cervical Nodes] : no posterior cervical lymphadenopathy [Normal Anterior Cervical Nodes] : no anterior cervical lymphadenopathy [Normal Inguinal Nodes] : no inguinal lymphadenopathy [Normal Femoral Nodes] : no femoral lymphadenopathy [No CVA Tenderness] : no CVA  tenderness [No Spinal Tenderness] : no spinal tenderness [No Joint Swelling] : no joint swelling [Grossly Normal Strength/Tone] : grossly normal strength/tone [No Rash] : no rash [Coordination Grossly Intact] : coordination grossly intact [No Focal Deficits] : no focal deficits [Normal Gait] : normal gait [Speech Grossly Normal] : speech grossly normal [Memory Grossly Normal] : memory grossly normal [Normal Affect] : the affect was normal [Alert and Oriented x3] : oriented to person, place, and time [Normal Mood] : the mood was normal [Normal Insight/Judgement] : insight and judgment were intact [Comprehensive Foot Exam Normal] : Right and left foot were examined and both feet are normal. No ulcers in either foot. Toes are normal and with full ROM.  Normal tactile sensation with monofilament testing throughout both feet [Acne] : no acne [de-identified] : Stasis dermatitis, secondary to venous insufficiency, right lower extremity edematous secondary to DVT in the past [de-identified] : obese [FreeTextEntry1] : urology [de-identified] : urology [de-identified] : Lipoma left upper back stable [de-identified] : Stasis dermatitis bilateral lower extremities, excoriation bilateral knees status post fall healing well. [de-identified] : Peripheral neuropathy stable for patient, history of transverse myelitis patient has recovered

## 2021-08-06 NOTE — HISTORY OF PRESENT ILLNESS
[FreeTextEntry1] : CK PACHECO is a 76 year old man with chronic, well controlled RA on Humira and SSZ 1000mg BID. \par \par RA hx - dx in 2007, few flares since but no flares and has been off steroids x years. Never had rashes, eye inflammation, C spine involvement, inflammatory low back pain, IBD, lung involvement, SQ nodules, paresthesias, muscle weakness.  \par \par + RLE DVT in 2016, off A/C\par + R shoulder OA\par + R hip replacement \par + hx of TM in 2006, fully resolved \par + intentional weight loss of approx 30lbs which has improved LE edema, HBA1c\par + recent retinal swelling, self-resolved, following with optho, reportedly related to DM2 and not RA\par \par Labs - chronically mild elevation in CPK since 2016\par Preimm -- Hep B/C, Quant - negative 3/2020\par DEXA 2018 - normal \par ----------------\par \par 7/30/20-- No complaints today -- no synovitis, prolonged AM stiffness, loss of ROM. No recent F/C, infections, cough, CP, SOB. Feels well. No SE from meds. Observing covid recommendations of social distancing and using a mask when outside. Recent improvement in HBA1c. \par \par 10/29/20 -- No joint complaints, R hip replacement mild discomfort with positional change but able to ambulate/sit/sleep comfortably. No extra-articular sx, F/C, infectious sx. HBA1c markedly improved and endo adjusted DM2 meds, mild worsening of renal fxn, endo added ARB. \par \par 1/28/21 -- No joint complaints today. Has been spacing out Humira, now up to q26 days without recurrence of sx. Feels well otherwise, no infectious sx, no extra-articular sx. Improving DM2 control. \par \par 4/6/21 -- L hip acute pain x 5 days, no preceding issues, radiating to groin with weight bearing, no sciatica pain or back pain. Up to Humira q4 weeks, some stiffness creeping back into hands and shoulders but no pain or synovitis. Feels well otherwise. \par \par 5/20/21 -- L hip replacement scheduled for 6/10/21. Currently on Humira q3 weeks, daily SSZ and no active inflammatory sx, prior stiffness resolved. No infectious sx, feels fine otherwise. \par \par 8/6/21 -- Total LHR postponed, now scheduled for 8/12, has already been holding Humira, taking SSZ, no current inflammatory joint sx or extra-articular manifestations. Feels well otherwise.

## 2021-08-06 NOTE — ASSESSMENT
[FreeTextEntry1] : CK PACHECO is a 76 year old man with stable, well controlled RA on Humira and SSZ. Prior mild inflammatory sx resolved with increasing frequency of Humira dosing. L hip pain OA, upcoming THR. \par \par -  reviewed labs in detail with patient, stable \par - no rheumatologic contraindication to planned THR \par - c/w Humira q3 weeks -- will hold 1 dose prior to surgery and for at least 2 weeks after surgery then can resume as long as he is healing well \par - c/w SSZ 1000mg BID -- ok to skip the day of the sx and up to 1 week before and 2 weeks after sx if preferred by ortho but no issue to have the sx while on it from a rheumatologic standpoint. \par - RTC in 3 months, sooner if new/worsening sx

## 2021-08-09 LAB
ALBUMIN SERPL ELPH-MCNC: 4.4 G/DL
ALP BLD-CCNC: 64 U/L
ALT SERPL-CCNC: 15 U/L
ANION GAP SERPL CALC-SCNC: 16 MMOL/L
APPEARANCE: ABNORMAL
AST SERPL-CCNC: 21 U/L
BACTERIA: ABNORMAL
BILIRUB SERPL-MCNC: 0.2 MG/DL
BILIRUBIN URINE: NEGATIVE
BLOOD URINE: NEGATIVE
BUN SERPL-MCNC: 22 MG/DL
CALCIUM SERPL-MCNC: 9.2 MG/DL
CHLORIDE SERPL-SCNC: 102 MMOL/L
CHOLEST SERPL-MCNC: 165 MG/DL
CO2 SERPL-SCNC: 23 MMOL/L
COLOR: YELLOW
CREAT SERPL-MCNC: 1.38 MG/DL
CREAT SPEC-SCNC: 105 MG/DL
ESTIMATED AVERAGE GLUCOSE: 114 MG/DL
GLUCOSE QUALITATIVE U: ABNORMAL
GLUCOSE SERPL-MCNC: 153 MG/DL
HBA1C MFR BLD HPLC: 5.6 %
HDLC SERPL-MCNC: 49 MG/DL
HYALINE CASTS: 0 /LPF
KETONES URINE: NEGATIVE
LDLC SERPL CALC-MCNC: 85 MG/DL
LEUKOCYTE ESTERASE URINE: ABNORMAL
MICROALBUMIN 24H UR DL<=1MG/L-MCNC: 93.1 MG/DL
MICROALBUMIN/CREAT 24H UR-RTO: 888 MG/G
MICROSCOPIC-UA: NORMAL
NITRITE URINE: NEGATIVE
NONHDLC SERPL-MCNC: 116 MG/DL
PH URINE: 6
POTASSIUM SERPL-SCNC: 4 MMOL/L
PROT SERPL-MCNC: 7.1 G/DL
PROTEIN URINE: ABNORMAL
PSA SERPL-MCNC: 0.02 NG/ML
RED BLOOD CELLS URINE: 3 /HPF
SODIUM SERPL-SCNC: 141 MMOL/L
SPECIFIC GRAVITY URINE: 1.02
SQUAMOUS EPITHELIAL CELLS: 0 /HPF
T4 FREE SERPL-MCNC: 1 NG/DL
TRIGL SERPL-MCNC: 155 MG/DL
TSH SERPL-ACNC: 3.11 UIU/ML
UROBILINOGEN URINE: NORMAL
WHITE BLOOD CELLS URINE: 233 /HPF

## 2021-08-09 RX ORDER — MOXIFLOXACIN HYDROCHLORIDE TABLETS, 400 MG 400 MG/1
1 TABLET, FILM COATED ORAL
Qty: 0 | Refills: 0 | DISCHARGE
Start: 2021-08-09

## 2021-08-10 ENCOUNTER — OUTPATIENT (OUTPATIENT)
Dept: OUTPATIENT SERVICES | Facility: HOSPITAL | Age: 76
LOS: 1 days | End: 2021-08-10

## 2021-08-10 DIAGNOSIS — Z90.89 ACQUIRED ABSENCE OF OTHER ORGANS: Chronic | ICD-10-CM

## 2021-08-10 DIAGNOSIS — Z96.649 PRESENCE OF UNSPECIFIED ARTIFICIAL HIP JOINT: Chronic | ICD-10-CM

## 2021-08-10 DIAGNOSIS — Z98.890 OTHER SPECIFIED POSTPROCEDURAL STATES: Chronic | ICD-10-CM

## 2021-08-10 DIAGNOSIS — Z90.49 ACQUIRED ABSENCE OF OTHER SPECIFIED PARTS OF DIGESTIVE TRACT: Chronic | ICD-10-CM

## 2021-08-10 DIAGNOSIS — Z20.828 CONTACT WITH AND (SUSPECTED) EXPOSURE TO OTHER VIRAL COMMUNICABLE DISEASES: ICD-10-CM

## 2021-08-10 LAB — SARS-COV-2 RNA SPEC QL NAA+PROBE: SIGNIFICANT CHANGE UP

## 2021-08-11 ENCOUNTER — FORM ENCOUNTER (OUTPATIENT)
Age: 76
End: 2021-08-11

## 2021-08-12 ENCOUNTER — OUTPATIENT (OUTPATIENT)
Dept: INPATIENT UNIT | Facility: HOSPITAL | Age: 76
LOS: 1 days | End: 2021-08-12
Payer: MEDICARE

## 2021-08-12 ENCOUNTER — APPOINTMENT (OUTPATIENT)
Dept: ORTHOPEDIC SURGERY | Facility: HOSPITAL | Age: 76
End: 2021-08-12

## 2021-08-12 ENCOUNTER — NON-APPOINTMENT (OUTPATIENT)
Age: 76
End: 2021-08-12

## 2021-08-12 VITALS
HEART RATE: 78 BPM | OXYGEN SATURATION: 96 % | TEMPERATURE: 99 F | DIASTOLIC BLOOD PRESSURE: 64 MMHG | HEIGHT: 68 IN | RESPIRATION RATE: 20 BRPM | SYSTOLIC BLOOD PRESSURE: 156 MMHG | WEIGHT: 274.26 LBS

## 2021-08-12 DIAGNOSIS — Z98.890 OTHER SPECIFIED POSTPROCEDURAL STATES: Chronic | ICD-10-CM

## 2021-08-12 DIAGNOSIS — Z96.649 PRESENCE OF UNSPECIFIED ARTIFICIAL HIP JOINT: Chronic | ICD-10-CM

## 2021-08-12 DIAGNOSIS — Z90.89 ACQUIRED ABSENCE OF OTHER ORGANS: Chronic | ICD-10-CM

## 2021-08-12 DIAGNOSIS — M16.12 UNILATERAL PRIMARY OSTEOARTHRITIS, LEFT HIP: ICD-10-CM

## 2021-08-12 DIAGNOSIS — Z90.49 ACQUIRED ABSENCE OF OTHER SPECIFIED PARTS OF DIGESTIVE TRACT: Chronic | ICD-10-CM

## 2021-08-12 PROCEDURE — 86850 RBC ANTIBODY SCREEN: CPT

## 2021-08-12 PROCEDURE — 86901 BLOOD TYPING SEROLOGIC RH(D): CPT

## 2021-08-12 PROCEDURE — U0005: CPT

## 2021-08-12 PROCEDURE — U0003: CPT

## 2021-08-12 PROCEDURE — 82962 GLUCOSE BLOOD TEST: CPT

## 2021-08-12 PROCEDURE — 36415 COLL VENOUS BLD VENIPUNCTURE: CPT

## 2021-08-12 PROCEDURE — 86900 BLOOD TYPING SEROLOGIC ABO: CPT

## 2021-08-12 RX ADMIN — APREPITANT 40 MILLIGRAM(S): 80 CAPSULE ORAL at 07:13

## 2021-08-12 NOTE — PRE-OP CHECKLIST - BP NONINVASIVE DIASTOLIC (MM HG)
Dariana Lambert  1975  [id number]    Situation:  Verbal report received from: Vannesa RN  Procedure: Procedure(s):  ESOPHAGOGASTRODUODENOSCOPY (EGD)    Background:    Preoperative diagnosis: GI Bleed  Postoperative diagnosis: Acute and chronic blood loss; normal egd    :  Dr. Demario Mcbride  Assistant(s): Endoscopy RN-1: Kane Patel RN  Endoscopy RN-2: Cheng Ashby RN    Specimens: * No specimens in log *  H. Pylori  no    Assessment:  Intra-procedure medications   Anesthesia gave intra-procedure sedation and medications, see anesthesia flow sheet yes    Intravenous fluids: NS@ KVO     Vital signs stable     Abdominal assessment: round and soft     Recommendation:    Return to floor  Family or Friend   Permission to share finding with family or friend yes 65

## 2021-08-12 NOTE — PRE-OP CHECKLIST - SITE MARKED BY ANESTHESIOLOGIST
n/a
1. I was told the name of the physician that took care of my child while in the hospital.    2. I have been told about any important findings on my child's physical exam and my child's plan of care.    3. The doctor clearly explained my child's diagnosis and other possible diagnoses that were considered.    4. My child's doctor explained all the tests that were done and their results (if available). I understand that some of the test results may not be ready before we go home and I was told how I can get these results. I understand that a summary of my child's hospitalization and important test results will be shared with my child's outpatient doctor.    5. My child's doctor talked to me about what I need to do when we go home.    6. I understand what signs and symptoms to watch for. I understand what symptoms I would need to call my doctor for and/or return to the hospital.    7. I have the phone number to call the hospital for results and/or questions after I leave the hospital.

## 2021-08-24 RX ORDER — CIPROFLOXACIN HYDROCHLORIDE 500 MG/1
500 TABLET, FILM COATED ORAL
Qty: 14 | Refills: 0 | Status: COMPLETED | COMMUNITY
Start: 2021-08-09 | End: 2021-08-24

## 2021-08-25 ENCOUNTER — APPOINTMENT (OUTPATIENT)
Dept: ORTHOPEDIC SURGERY | Facility: CLINIC | Age: 76
End: 2021-08-25

## 2021-08-25 LAB
APPEARANCE: CLEAR
BACTERIA: NEGATIVE
BILIRUBIN URINE: NEGATIVE
BLOOD URINE: NEGATIVE
COLOR: YELLOW
GLUCOSE QUALITATIVE U: ABNORMAL
HYALINE CASTS: 0 /LPF
KETONES URINE: NEGATIVE
LEUKOCYTE ESTERASE URINE: NEGATIVE
MICROSCOPIC-UA: NORMAL
NITRITE URINE: NEGATIVE
PH URINE: 6
PROTEIN URINE: ABNORMAL
RED BLOOD CELLS URINE: 0 /HPF
SPECIFIC GRAVITY URINE: 1.01
SQUAMOUS EPITHELIAL CELLS: 0 /HPF
UROBILINOGEN URINE: NORMAL
WHITE BLOOD CELLS URINE: 1 /HPF

## 2021-08-26 ENCOUNTER — APPOINTMENT (OUTPATIENT)
Dept: SLEEP CENTER | Facility: CLINIC | Age: 76
End: 2021-08-26
Payer: MEDICARE

## 2021-08-26 ENCOUNTER — OUTPATIENT (OUTPATIENT)
Dept: OUTPATIENT SERVICES | Facility: HOSPITAL | Age: 76
LOS: 1 days | End: 2021-08-26
Payer: MEDICARE

## 2021-08-26 DIAGNOSIS — Z98.890 OTHER SPECIFIED POSTPROCEDURAL STATES: Chronic | ICD-10-CM

## 2021-08-26 DIAGNOSIS — Z90.49 ACQUIRED ABSENCE OF OTHER SPECIFIED PARTS OF DIGESTIVE TRACT: Chronic | ICD-10-CM

## 2021-08-26 DIAGNOSIS — Z90.89 ACQUIRED ABSENCE OF OTHER ORGANS: Chronic | ICD-10-CM

## 2021-08-26 DIAGNOSIS — Z96.649 PRESENCE OF UNSPECIFIED ARTIFICIAL HIP JOINT: Chronic | ICD-10-CM

## 2021-08-26 LAB — BACTERIA UR CULT: NORMAL

## 2021-08-26 PROCEDURE — 95810 POLYSOM 6/> YRS 4/> PARAM: CPT | Mod: 26

## 2021-08-26 PROCEDURE — 95810 POLYSOM 6/> YRS 4/> PARAM: CPT

## 2021-08-30 DIAGNOSIS — G47.33 OBSTRUCTIVE SLEEP APNEA (ADULT) (PEDIATRIC): ICD-10-CM

## 2021-11-02 ENCOUNTER — APPOINTMENT (OUTPATIENT)
Dept: RHEUMATOLOGY | Facility: CLINIC | Age: 76
End: 2021-11-02
Payer: MEDICARE

## 2021-11-02 VITALS
OXYGEN SATURATION: 96 % | SYSTOLIC BLOOD PRESSURE: 148 MMHG | DIASTOLIC BLOOD PRESSURE: 78 MMHG | HEART RATE: 86 BPM | BODY MASS INDEX: 41.98 KG/M2 | RESPIRATION RATE: 15 BRPM | TEMPERATURE: 96.3 F | WEIGHT: 277 LBS | HEIGHT: 68 IN

## 2021-11-02 DIAGNOSIS — M25.511 PAIN IN RIGHT SHOULDER: ICD-10-CM

## 2021-11-02 DIAGNOSIS — M25.512 PAIN IN RIGHT SHOULDER: ICD-10-CM

## 2021-11-02 DIAGNOSIS — Z71.85 ENCOUNTER FOR IMMUNIZATION SAFETY COUNSELING: ICD-10-CM

## 2021-11-02 PROCEDURE — 99214 OFFICE O/P EST MOD 30 MIN: CPT

## 2021-11-02 NOTE — HISTORY OF PRESENT ILLNESS
[FreeTextEntry1] : CK PACHECO is a 76 year old man with chronic, well controlled RA on Humira and SSZ 1000mg BID. \par \par RA hx - dx in 2007, few flares since but no flares and has been off steroids x years. Never had rashes, eye inflammation, C spine involvement, inflammatory low back pain, IBD, lung involvement, SQ nodules, paresthesias, muscle weakness.  \par \par + RLE DVT in 2016, off A/C\par + R shoulder OA\par + R hip replacement \par + hx of TM in 2006, fully resolved \par + intentional weight loss of approx 30lbs which has improved LE edema, HBA1c\par + recent retinal swelling, self-resolved, following with optho, reportedly related to DM2 and not RA\par \par Labs - chronically mild elevation in CPK since 2016\par Preimm -- Hep B/C, Quant - negative 3/2020\par DEXA 2018 - normal \par ----------------\par \par 7/30/20-- No complaints today -- no synovitis, prolonged AM stiffness, loss of ROM. No recent F/C, infections, cough, CP, SOB. Feels well. No SE from meds. Observing covid recommendations of social distancing and using a mask when outside. Recent improvement in HBA1c. \par \par 10/29/20 -- No joint complaints, R hip replacement mild discomfort with positional change but able to ambulate/sit/sleep comfortably. No extra-articular sx, F/C, infectious sx. HBA1c markedly improved and endo adjusted DM2 meds, mild worsening of renal fxn, endo added ARB. \par \par 1/28/21 -- No joint complaints today. Has been spacing out Humira, now up to q26 days without recurrence of sx. Feels well otherwise, no infectious sx, no extra-articular sx. Improving DM2 control. \par \par 4/6/21 -- L hip acute pain x 5 days, no preceding issues, radiating to groin with weight bearing, no sciatica pain or back pain. Up to Humira q4 weeks, some stiffness creeping back into hands and shoulders but no pain or synovitis. Feels well otherwise. \par \par 5/20/21 -- L hip replacement scheduled for 6/10/21. Currently on Humira q3 weeks, daily SSZ and no active inflammatory sx, prior stiffness resolved. No infectious sx, feels fine otherwise. \par \par 8/6/21 -- Total LHR postponed, now scheduled for 8/12, has already been holding Humira, taking SSZ, no current inflammatory joint sx or extra-articular manifestations. Feels well otherwise. \par \par 11/2/21 -- Total LHR postponed again, now scheduled for late Jan 2022. TRA stable, no SE with meds, no recent infectious sx. Some MSK related shoulder pain and low back pain arising from seated as trying not to hurt his hip, but intact ROM, not needing OTC pain meds. Feels well otherwise. S/p Covid booster, asking about flu vaccine.

## 2021-11-02 NOTE — REVIEW OF SYSTEMS
[Arthralgias] : arthralgias [Joint Pain] : joint pain [As Noted in HPI] : as noted in HPI [Negative] : Heme/Lymph [de-identified] : chronic peripheral neuropathy

## 2021-11-02 NOTE — ASSESSMENT
[FreeTextEntry1] : CK PACHECO is a 76 year old man with stable, well controlled RA on Humira and SSZ. L hip pain OA, upcoming THR. Some MSK mediated b/l shoulder and low back pain likely changing mechanics to offload hip, no limitation to ROM. \par \par - repeat labs as below \par - no rheumatologic contraindication to planned THR \par - c/w Humira q3 weeks -- reviewed perisx recommendations --will hold 1 dose prior to surgery and for at least 2 weeks after surgery then can resume as long as he is healing well \par - c/w SSZ 1000mg BID -- ok to skip the day of the sx and up to 1 week before and 2 weeks after sx if preferred by ortho but no issue to have the sx while on it from a rheumatologic standpoint. \par - given immunosuppressed status, pt knows to call if febrile or unwell, knows to hold medication while on Abx \par - wait 2 weeks after covid booster for flu vaccine, s/p PNA vaccines, also advised to get shingles vaccine after checking it is covered by insurance \par - home exercises for shoulder pain and low back pain reviewed with him\par - prn Tylenol, sparing Advil with food \par - RTC 1 month postsx, sooner if new/worsening sx

## 2021-11-03 LAB
ALBUMIN SERPL ELPH-MCNC: 4.5 G/DL
ALP BLD-CCNC: 70 U/L
ALT SERPL-CCNC: 27 U/L
ANION GAP SERPL CALC-SCNC: 19 MMOL/L
AST SERPL-CCNC: 33 U/L
BASOPHILS # BLD AUTO: 0.04 K/UL
BASOPHILS NFR BLD AUTO: 0.6 %
BILIRUB SERPL-MCNC: 0.3 MG/DL
BUN SERPL-MCNC: 15 MG/DL
CALCIUM SERPL-MCNC: 9.5 MG/DL
CHLORIDE SERPL-SCNC: 102 MMOL/L
CO2 SERPL-SCNC: 22 MMOL/L
CREAT SERPL-MCNC: 1.55 MG/DL
CRP SERPL-MCNC: 3 MG/L
EOSINOPHIL # BLD AUTO: 0.15 K/UL
EOSINOPHIL NFR BLD AUTO: 2.2 %
ERYTHROCYTE [SEDIMENTATION RATE] IN BLOOD BY WESTERGREN METHOD: 28 MM/HR
GLUCOSE SERPL-MCNC: 160 MG/DL
HCT VFR BLD CALC: 51.6 %
HGB BLD-MCNC: 16.2 G/DL
IMM GRANULOCYTES NFR BLD AUTO: 0.3 %
LYMPHOCYTES # BLD AUTO: 1.6 K/UL
LYMPHOCYTES NFR BLD AUTO: 23.9 %
MAN DIFF?: NORMAL
MCHC RBC-ENTMCNC: 27.2 PG
MCHC RBC-ENTMCNC: 31.4 GM/DL
MCV RBC AUTO: 86.7 FL
MONOCYTES # BLD AUTO: 0.71 K/UL
MONOCYTES NFR BLD AUTO: 10.6 %
NEUTROPHILS # BLD AUTO: 4.18 K/UL
NEUTROPHILS NFR BLD AUTO: 62.4 %
PLATELET # BLD AUTO: 194 K/UL
POTASSIUM SERPL-SCNC: 5 MMOL/L
PROT SERPL-MCNC: 7.4 G/DL
RBC # BLD: 5.95 M/UL
RBC # FLD: 19.7 %
SODIUM SERPL-SCNC: 143 MMOL/L
WBC # FLD AUTO: 6.7 K/UL

## 2022-01-03 ENCOUNTER — OUTPATIENT (OUTPATIENT)
Dept: OUTPATIENT SERVICES | Facility: HOSPITAL | Age: 77
LOS: 1 days | End: 2022-01-03
Payer: MEDICARE

## 2022-01-03 VITALS
TEMPERATURE: 98 F | DIASTOLIC BLOOD PRESSURE: 78 MMHG | HEART RATE: 72 BPM | HEIGHT: 68 IN | RESPIRATION RATE: 14 BRPM | SYSTOLIC BLOOD PRESSURE: 132 MMHG | OXYGEN SATURATION: 95 % | WEIGHT: 270.07 LBS

## 2022-01-03 DIAGNOSIS — M16.12 UNILATERAL PRIMARY OSTEOARTHRITIS, LEFT HIP: ICD-10-CM

## 2022-01-03 DIAGNOSIS — Z98.890 OTHER SPECIFIED POSTPROCEDURAL STATES: Chronic | ICD-10-CM

## 2022-01-03 DIAGNOSIS — Z90.49 ACQUIRED ABSENCE OF OTHER SPECIFIED PARTS OF DIGESTIVE TRACT: Chronic | ICD-10-CM

## 2022-01-03 DIAGNOSIS — Z90.89 ACQUIRED ABSENCE OF OTHER ORGANS: Chronic | ICD-10-CM

## 2022-01-03 DIAGNOSIS — M25.552 PAIN IN LEFT HIP: ICD-10-CM

## 2022-01-03 DIAGNOSIS — Z96.649 PRESENCE OF UNSPECIFIED ARTIFICIAL HIP JOINT: Chronic | ICD-10-CM

## 2022-01-03 DIAGNOSIS — G47.30 SLEEP APNEA, UNSPECIFIED: ICD-10-CM

## 2022-01-03 LAB
A1C WITH ESTIMATED AVERAGE GLUCOSE RESULT: 5.8 % — HIGH (ref 4–5.6)
ALBUMIN SERPL ELPH-MCNC: 3.6 G/DL — SIGNIFICANT CHANGE UP (ref 3.3–5)
ALP SERPL-CCNC: 76 U/L — SIGNIFICANT CHANGE UP (ref 30–120)
ALT FLD-CCNC: 28 U/L DA — SIGNIFICANT CHANGE UP (ref 10–60)
ANION GAP SERPL CALC-SCNC: 8 MMOL/L — SIGNIFICANT CHANGE UP (ref 5–17)
APTT BLD: 30.5 SEC — SIGNIFICANT CHANGE UP (ref 27.5–35.5)
AST SERPL-CCNC: 23 U/L — SIGNIFICANT CHANGE UP (ref 10–40)
BILIRUB SERPL-MCNC: 0.2 MG/DL — SIGNIFICANT CHANGE UP (ref 0.2–1.2)
BUN SERPL-MCNC: 21 MG/DL — SIGNIFICANT CHANGE UP (ref 7–23)
CALCIUM SERPL-MCNC: 8.3 MG/DL — LOW (ref 8.4–10.5)
CHLORIDE SERPL-SCNC: 106 MMOL/L — SIGNIFICANT CHANGE UP (ref 96–108)
CO2 SERPL-SCNC: 29 MMOL/L — SIGNIFICANT CHANGE UP (ref 22–31)
CREAT SERPL-MCNC: 1.57 MG/DL — HIGH (ref 0.5–1.3)
ESTIMATED AVERAGE GLUCOSE: 120 MG/DL — HIGH (ref 68–114)
GLUCOSE SERPL-MCNC: 122 MG/DL — HIGH (ref 70–99)
HCT VFR BLD CALC: 46.4 % — SIGNIFICANT CHANGE UP (ref 39–50)
HGB BLD-MCNC: 15.2 G/DL — SIGNIFICANT CHANGE UP (ref 13–17)
INR BLD: 1.03 RATIO — SIGNIFICANT CHANGE UP (ref 0.88–1.16)
MCHC RBC-ENTMCNC: 29.6 PG — SIGNIFICANT CHANGE UP (ref 27–34)
MCHC RBC-ENTMCNC: 32.8 GM/DL — SIGNIFICANT CHANGE UP (ref 32–36)
MCV RBC AUTO: 90.3 FL — SIGNIFICANT CHANGE UP (ref 80–100)
MRSA PCR RESULT.: SIGNIFICANT CHANGE UP
NRBC # BLD: 0 /100 WBCS — SIGNIFICANT CHANGE UP (ref 0–0)
PLATELET # BLD AUTO: 171 K/UL — SIGNIFICANT CHANGE UP (ref 150–400)
POTASSIUM SERPL-MCNC: 3.8 MMOL/L — SIGNIFICANT CHANGE UP (ref 3.5–5.3)
POTASSIUM SERPL-SCNC: 3.8 MMOL/L — SIGNIFICANT CHANGE UP (ref 3.5–5.3)
PROT SERPL-MCNC: 7.4 G/DL — SIGNIFICANT CHANGE UP (ref 6–8.3)
PROTHROM AB SERPL-ACNC: 12.5 SEC — SIGNIFICANT CHANGE UP (ref 10.6–13.6)
RBC # BLD: 5.14 M/UL — SIGNIFICANT CHANGE UP (ref 4.2–5.8)
RBC # FLD: 15.6 % — HIGH (ref 10.3–14.5)
S AUREUS DNA NOSE QL NAA+PROBE: SIGNIFICANT CHANGE UP
SODIUM SERPL-SCNC: 143 MMOL/L — SIGNIFICANT CHANGE UP (ref 135–145)
WBC # BLD: 5.3 K/UL — SIGNIFICANT CHANGE UP (ref 3.8–10.5)
WBC # FLD AUTO: 5.3 K/UL — SIGNIFICANT CHANGE UP (ref 3.8–10.5)

## 2022-01-03 PROCEDURE — 93005 ELECTROCARDIOGRAM TRACING: CPT

## 2022-01-03 PROCEDURE — G0463: CPT

## 2022-01-03 PROCEDURE — 93010 ELECTROCARDIOGRAM REPORT: CPT

## 2022-01-03 NOTE — H&P PST ADULT - PROBLEM SELECTOR PLAN 1
left anterior total hip replacement, covid appt 1/18/22-8 am, preop instructions given, to go for medical, cardiac and vascular clearance

## 2022-01-03 NOTE — H&P PST ADULT - HISTORY OF PRESENT ILLNESS
75 yo male reports left hip pain which is exacerbated with arising from sitting and weight bearing.  pain radiated to lower back.  He received a cortisone injection 4/2021 without relief.  He is scheduled for left total hip replacement on 1/20/22-with Dr Girard.  H was cancelled due to having a cortisone injection too close to the surgery and was also cancelled due to a left groin rash

## 2022-01-03 NOTE — H&P PST ADULT - NSICDXPASTMEDICALHX_GEN_ALL_CORE_FT
PAST MEDICAL HISTORY:  Asthma     COVID-19 vaccine series completed pfizer, last dose 3/6/21    DVT (deep venous thrombosis) right leg 4-5 yrs ago    History of diverticulitis 6/2021    History of prostate cancer 2011    Hyperlipidemia     Hyperpigmentation of skin lower legs    Hypertension     Hypothyroid     Osteoarthritis of left hip     PAD (peripheral artery disease)     Prostate cancer seed implant 2008    Rheumatoid arthritis     Seasonal allergies     Severe obesity (BMI >= 40)     Sleep apnea diagnosed in past, unable to tolerate CPAP at that time; repeat sleep study done 8/21-awaiting results    Type 2 diabetes mellitus     Urinary frequency

## 2022-01-11 ENCOUNTER — APPOINTMENT (OUTPATIENT)
Dept: FAMILY MEDICINE | Facility: CLINIC | Age: 77
End: 2022-01-11
Payer: MEDICARE

## 2022-01-11 VITALS
WEIGHT: 272 LBS | DIASTOLIC BLOOD PRESSURE: 84 MMHG | RESPIRATION RATE: 15 BRPM | HEIGHT: 68 IN | BODY MASS INDEX: 41.22 KG/M2 | OXYGEN SATURATION: 95 % | HEART RATE: 78 BPM | SYSTOLIC BLOOD PRESSURE: 140 MMHG | TEMPERATURE: 98 F

## 2022-01-11 DIAGNOSIS — M25.552 PAIN IN LEFT HIP: ICD-10-CM

## 2022-01-11 DIAGNOSIS — Z01.818 ENCOUNTER FOR OTHER PREPROCEDURAL EXAMINATION: ICD-10-CM

## 2022-01-11 DIAGNOSIS — E66.01 MORBID (SEVERE) OBESITY DUE TO EXCESS CALORIES: ICD-10-CM

## 2022-01-11 PROBLEM — G47.30 SLEEP APNEA, UNSPECIFIED: Chronic | Status: ACTIVE | Noted: 2021-07-15

## 2022-01-11 PROCEDURE — 99215 OFFICE O/P EST HI 40 MIN: CPT

## 2022-01-11 NOTE — ASSESSMENT
[High Risk Surgery - Intraperitoneal, Intrathoracic or Supringuinal Vascular Procedures] : High Risk Surgery - Intraperitoneal, Intrathoracic or Supringuinal Vascular Procedures - No (0) [Ischemic Heart Disease] : Ischemic Heart Disease - No (0) [Congestive Heart Failure] : Congestive Heart Failure - No (0) [Prior Cerebrovascular Accident or TIA] : Prior Cerebrovascular Accident or TIA - No (0) [Creatinine >= 2mg/dL (1 Point)] : Creatinine >= 2mg/dL - No (0) [Insulin-dependent Diabetic (1 Point)] : Insulin-dependent Diabetic - No (0) [0] : 0 , RCRI Class: I, Risk of Post-Op Cardiac Complications: 3.9%, 95% CI for Risk Estimate: 2.8% - 5.4% [Patient Optimized for Surgery] : Patient optimized for surgery [Echocardiogram] : echocardiogram [Cardiology consultation] : Cardiology consultation [Other: _____] : [unfilled] [As per surgery] : as per surgery [FreeTextEntry4] : Completed physical Exam, reviewed pre op labs, EKG baseline\par Advised pt to avoid any NSAIDs, vitamins, aspirin for 7 days till post surgery.\par  Avoid any food or drinks past midnight, the day prior to surgery. \par HOLD the Humira/Sulfasalazine 1 week prior to surgery, and then resume 2 weeks after\par HOLD anti glycemic medications Jardiance day of surgery, half dose of the Tresiba day of surgery, discuss further with Endocrinology. \par Pt may take rest of the medication with small sips of water the day of the surgery.\par  RTO for a follow up appointment post surgery.\par \par RCRI: Class I for procedure\par Cardiac Clearance received, pending vascular clearance\par Received clearance from rheumatology with instructions about medication prior surgery\par Endocrine, received instructions about medications prior to surgery, recent HgbA1c: 5.8, acceptable for surgery\par \par Medical standpoint: Pt at current time has been optimized for surgery. \par

## 2022-01-11 NOTE — RESULTS/DATA
[] : results reviewed [de-identified] : 01/03/2022: Result with acceptable limits [de-identified] : 01/03/2022: Result with acceptable limits [de-identified] : 01/03/2022: Result with acceptable limits creat slight elevation 1.57, baseline [de-identified] : 01/03/2022: NSR 75 bpm, compared July 2021, baseline [de-identified] : HgbA1c: 5.8, <7.0 goal

## 2022-01-11 NOTE — HISTORY OF PRESENT ILLNESS
[No Pertinent Cardiac History] : no history of aortic stenosis, atrial fibrillation, coronary artery disease, recent myocardial infarction, or implantable device/pacemaker [Aortic Stenosis] : no aortic stenosis [Atrial Fibrillation] : no atrial fibrillation [Coronary Artery Disease] : no coronary artery disease [Recent Myocardial Infarction] : no recent myocardial infarction [Implantable Device/Pacemaker] : no implantable device/pacemaker [Asthma] : asthma [COPD] : no COPD [Sleep Apnea] : sleep apnea [Smoker] : not a smoker [Family Member] : no family member with adverse anesthesia reaction/sudden death [Self] : no previous adverse anesthesia reaction [Chronic Anticoagulation] : chronic anticoagulation [Chronic Kidney Disease] : chronic kidney disease [Diabetes] : diabetes [(Patient denies any chest pain, claudication, dyspnea on exertion, orthopnea, palpitations or syncope)] : Patient denies any chest pain, claudication, dyspnea on exertion, orthopnea, palpitations or syncope [Moderate (4-6 METs)] : Moderate (4-6 METs) [Anti-Platelet Agents: _____] : Anti-Platelet Agents: [unfilled] [NSAIDs: _____] : NSAIDs: [unfilled] [FreeTextEntry1] : Left Total Hip Replacement [FreeTextEntry2] : 01/20/2022 [FreeTextEntry3] : Dr. Girard [FreeTextEntry4] : Mr. Fabio Medina is a 75 yo male presents today for preop evaluation for a left total hip replacement to be done by Dr. Girard on 01/20/20220at [facility]. Pt has prior hx of right THR, in 2011 with good results. Pt report lately more discomfort of his left hip, and has attempted multiple modalities of treatment with dismal results, now opting for surgery. Pt was cleared already for surgery in July 2021 however postponed since pt was recovering from rash around left hip.  [FreeTextEntry7] : Pt seen Cardiology Dr. Scott, cardiology cleared patient\par 01/03/2022: NSR 75 bpm, compared July 2021, baseline

## 2022-01-11 NOTE — PHYSICAL EXAM
[EOMI] : extraocular movements intact [Supple] : supple [Clear to Auscultation] : lungs were clear to auscultation bilaterally [Normal S1, S2] : normal S1 and S2 [No Edema] : there was no peripheral edema [Soft] : abdomen soft [Non Tender] : non-tender [Normal Bowel Sounds] : normal bowel sounds [No Rash] : no rash [Normal Affect] : the affect was normal [de-identified] : obese, elderly male [de-identified] : left hip pain with motion, and ambulation [de-identified] : cane

## 2022-01-12 ENCOUNTER — APPOINTMENT (OUTPATIENT)
Dept: ORTHOPEDIC SURGERY | Facility: CLINIC | Age: 77
End: 2022-01-12
Payer: MEDICARE

## 2022-01-12 VITALS — BODY MASS INDEX: 41.22 KG/M2 | WEIGHT: 272 LBS | HEIGHT: 68 IN

## 2022-01-12 PROCEDURE — 99212 OFFICE O/P EST SF 10 MIN: CPT

## 2022-01-12 NOTE — PHYSICAL EXAM
[de-identified] : Well-nourished no distress\par Operative site skin reveals superficial dehiscence lateral inguinal fold

## 2022-01-12 NOTE — HISTORY OF PRESENT ILLNESS
[de-identified] : Patient seen for preop skin check prior to scheduled left total hip replacement 1/20/2022.  Undergoing dermatological skin care for operative site skin lesions

## 2022-01-19 ENCOUNTER — FORM ENCOUNTER (OUTPATIENT)
Age: 77
End: 2022-01-19

## 2022-01-25 ENCOUNTER — INPATIENT (INPATIENT)
Facility: HOSPITAL | Age: 77
LOS: 2 days | Discharge: HOME | DRG: 871 | End: 2022-01-28
Attending: STUDENT IN AN ORGANIZED HEALTH CARE EDUCATION/TRAINING PROGRAM | Admitting: STUDENT IN AN ORGANIZED HEALTH CARE EDUCATION/TRAINING PROGRAM
Payer: MEDICARE

## 2022-01-25 VITALS
DIASTOLIC BLOOD PRESSURE: 108 MMHG | TEMPERATURE: 99 F | HEIGHT: 68 IN | WEIGHT: 268.96 LBS | SYSTOLIC BLOOD PRESSURE: 230 MMHG | OXYGEN SATURATION: 98 % | RESPIRATION RATE: 18 BRPM

## 2022-01-25 DIAGNOSIS — Z96.649 PRESENCE OF UNSPECIFIED ARTIFICIAL HIP JOINT: Chronic | ICD-10-CM

## 2022-01-25 DIAGNOSIS — Z98.890 OTHER SPECIFIED POSTPROCEDURAL STATES: Chronic | ICD-10-CM

## 2022-01-25 DIAGNOSIS — N39.0 URINARY TRACT INFECTION, SITE NOT SPECIFIED: ICD-10-CM

## 2022-01-25 DIAGNOSIS — Z90.49 ACQUIRED ABSENCE OF OTHER SPECIFIED PARTS OF DIGESTIVE TRACT: Chronic | ICD-10-CM

## 2022-01-25 DIAGNOSIS — Z90.89 ACQUIRED ABSENCE OF OTHER ORGANS: Chronic | ICD-10-CM

## 2022-01-25 LAB
ALBUMIN SERPL ELPH-MCNC: 3 G/DL — LOW (ref 3.3–5)
ALBUMIN SERPL ELPH-MCNC: 3.4 G/DL — SIGNIFICANT CHANGE UP (ref 3.3–5)
ALP SERPL-CCNC: 45 U/L — SIGNIFICANT CHANGE UP (ref 40–120)
ALP SERPL-CCNC: 57 U/L — SIGNIFICANT CHANGE UP (ref 40–120)
ALT FLD-CCNC: 31 U/L — SIGNIFICANT CHANGE UP (ref 10–45)
ALT FLD-CCNC: 47 U/L — HIGH (ref 10–45)
ANION GAP SERPL CALC-SCNC: 12 MMOL/L — SIGNIFICANT CHANGE UP (ref 5–17)
APPEARANCE UR: CLEAR — SIGNIFICANT CHANGE UP
APTT BLD: 30.1 SEC — SIGNIFICANT CHANGE UP (ref 27.5–35.5)
AST SERPL-CCNC: 39 U/L — SIGNIFICANT CHANGE UP (ref 10–40)
AST SERPL-CCNC: 48 U/L — HIGH (ref 10–40)
BACTERIA # UR AUTO: ABNORMAL /HPF
BASOPHILS # BLD AUTO: 0.04 K/UL — SIGNIFICANT CHANGE UP (ref 0–0.2)
BASOPHILS NFR BLD AUTO: 0.3 % — SIGNIFICANT CHANGE UP (ref 0–2)
BILIRUB DIRECT SERPL-MCNC: 0.2 MG/DL — SIGNIFICANT CHANGE UP (ref 0–0.3)
BILIRUB INDIRECT FLD-MCNC: 0.3 MG/DL — SIGNIFICANT CHANGE UP (ref 0.2–1)
BILIRUB SERPL-MCNC: 0.5 MG/DL — SIGNIFICANT CHANGE UP (ref 0.2–1.2)
BILIRUB SERPL-MCNC: 0.6 MG/DL — SIGNIFICANT CHANGE UP (ref 0.2–1.2)
BILIRUB UR-MCNC: NEGATIVE — SIGNIFICANT CHANGE UP
BUN SERPL-MCNC: 22 MG/DL — SIGNIFICANT CHANGE UP (ref 7–23)
CALCIUM SERPL-MCNC: 8.8 MG/DL — SIGNIFICANT CHANGE UP (ref 8.4–10.5)
CHLORIDE SERPL-SCNC: 105 MMOL/L — SIGNIFICANT CHANGE UP (ref 96–108)
CO2 SERPL-SCNC: 25 MMOL/L — SIGNIFICANT CHANGE UP (ref 22–31)
COLOR SPEC: YELLOW — SIGNIFICANT CHANGE UP
CREAT SERPL-MCNC: 1.49 MG/DL — HIGH (ref 0.5–1.3)
CREAT SERPL-MCNC: 1.64 MG/DL — HIGH (ref 0.5–1.3)
DIFF PNL FLD: ABNORMAL
EOSINOPHIL # BLD AUTO: 0.03 K/UL — SIGNIFICANT CHANGE UP (ref 0–0.5)
EOSINOPHIL NFR BLD AUTO: 0.2 % — SIGNIFICANT CHANGE UP (ref 0–6)
EPI CELLS # UR: SIGNIFICANT CHANGE UP
FLUAV AG NPH QL: SIGNIFICANT CHANGE UP
FLUBV AG NPH QL: SIGNIFICANT CHANGE UP
GLUCOSE BLDC GLUCOMTR-MCNC: 118 MG/DL — HIGH (ref 70–99)
GLUCOSE BLDC GLUCOMTR-MCNC: 219 MG/DL — HIGH (ref 70–99)
GLUCOSE SERPL-MCNC: 142 MG/DL — HIGH (ref 70–99)
GLUCOSE UR QL: 1000 MG/DL
GRAM STN FLD: SIGNIFICANT CHANGE UP
HCT VFR BLD CALC: 42.6 % — SIGNIFICANT CHANGE UP (ref 39–50)
HCT VFR BLD CALC: 46.6 % — SIGNIFICANT CHANGE UP (ref 39–50)
HGB BLD-MCNC: 14 G/DL — SIGNIFICANT CHANGE UP (ref 13–17)
HGB BLD-MCNC: 15.4 G/DL — SIGNIFICANT CHANGE UP (ref 13–17)
IMM GRANULOCYTES NFR BLD AUTO: 0.7 % — SIGNIFICANT CHANGE UP (ref 0–1.5)
INR BLD: 1.1 RATIO — SIGNIFICANT CHANGE UP (ref 0.88–1.16)
KETONES UR-MCNC: NEGATIVE — SIGNIFICANT CHANGE UP
LACTATE SERPL-SCNC: 1.4 MMOL/L — SIGNIFICANT CHANGE UP (ref 0.7–2)
LEUKOCYTE ESTERASE UR-ACNC: ABNORMAL
LYMPHOCYTES # BLD AUTO: 0.21 K/UL — LOW (ref 1–3.3)
LYMPHOCYTES # BLD AUTO: 1.7 % — LOW (ref 13–44)
MCHC RBC-ENTMCNC: 30.3 PG — SIGNIFICANT CHANGE UP (ref 27–34)
MCHC RBC-ENTMCNC: 33 GM/DL — SIGNIFICANT CHANGE UP (ref 32–36)
MCV RBC AUTO: 91.7 FL — SIGNIFICANT CHANGE UP (ref 80–100)
MONOCYTES # BLD AUTO: 0.91 K/UL — HIGH (ref 0–0.9)
MONOCYTES NFR BLD AUTO: 7.4 % — SIGNIFICANT CHANGE UP (ref 2–14)
NEUTROPHILS # BLD AUTO: 10.96 K/UL — HIGH (ref 1.8–7.4)
NEUTROPHILS NFR BLD AUTO: 89.7 % — HIGH (ref 43–77)
NITRITE UR-MCNC: POSITIVE
NRBC # BLD: 0 /100 WBCS — SIGNIFICANT CHANGE UP (ref 0–0)
PH UR: 6.5 — SIGNIFICANT CHANGE UP (ref 5–8)
PLATELET # BLD AUTO: 119 K/UL — LOW (ref 150–400)
POTASSIUM SERPL-MCNC: 3.2 MMOL/L — LOW (ref 3.5–5.3)
POTASSIUM SERPL-SCNC: 3.2 MMOL/L — LOW (ref 3.5–5.3)
PROT SERPL-MCNC: 6.4 G/DL — SIGNIFICANT CHANGE UP (ref 6–8.3)
PROT SERPL-MCNC: 7 G/DL — SIGNIFICANT CHANGE UP (ref 6–8.3)
PROT UR-MCNC: 100
PROTHROM AB SERPL-ACNC: 13.3 SEC — SIGNIFICANT CHANGE UP (ref 10.6–13.6)
RBC # BLD: 5.08 M/UL — SIGNIFICANT CHANGE UP (ref 4.2–5.8)
RBC # FLD: 14.9 % — HIGH (ref 10.3–14.5)
RBC CASTS # UR COMP ASSIST: ABNORMAL /HPF (ref 0–4)
RSV RNA NPH QL NAA+NON-PROBE: SIGNIFICANT CHANGE UP
SARS-COV-2 RNA SPEC QL NAA+PROBE: DETECTED
SODIUM SERPL-SCNC: 142 MMOL/L — SIGNIFICANT CHANGE UP (ref 135–145)
SP GR SPEC: 1.01 — SIGNIFICANT CHANGE UP (ref 1.01–1.02)
SPECIMEN SOURCE: SIGNIFICANT CHANGE UP
UROBILINOGEN FLD QL: NEGATIVE — SIGNIFICANT CHANGE UP
WBC # BLD: 12.23 K/UL — HIGH (ref 3.8–10.5)
WBC # FLD AUTO: 12.23 K/UL — HIGH (ref 3.8–10.5)
WBC UR QL: >50 /HPF (ref 0–5)

## 2022-01-25 PROCEDURE — 99223 1ST HOSP IP/OBS HIGH 75: CPT | Mod: AI

## 2022-01-25 PROCEDURE — 99053 MED SERV 10PM-8AM 24 HR FAC: CPT

## 2022-01-25 PROCEDURE — 99285 EMERGENCY DEPT VISIT HI MDM: CPT | Mod: CS

## 2022-01-25 PROCEDURE — 74177 CT ABD & PELVIS W/CONTRAST: CPT | Mod: 26,MA

## 2022-01-25 PROCEDURE — 71045 X-RAY EXAM CHEST 1 VIEW: CPT | Mod: 26

## 2022-01-25 PROCEDURE — 71260 CT THORAX DX C+: CPT | Mod: 26,MA

## 2022-01-25 RX ORDER — ACETAMINOPHEN 500 MG
650 TABLET ORAL EVERY 6 HOURS
Refills: 0 | Status: DISCONTINUED | OUTPATIENT
Start: 2022-01-25 | End: 2022-01-28

## 2022-01-25 RX ORDER — ENOXAPARIN SODIUM 100 MG/ML
40 INJECTION SUBCUTANEOUS EVERY 12 HOURS
Refills: 0 | Status: DISCONTINUED | OUTPATIENT
Start: 2022-01-25 | End: 2022-01-27

## 2022-01-25 RX ORDER — AMLODIPINE BESYLATE 2.5 MG/1
10 TABLET ORAL DAILY
Refills: 0 | Status: DISCONTINUED | OUTPATIENT
Start: 2022-01-26 | End: 2022-01-28

## 2022-01-25 RX ORDER — METOCLOPRAMIDE HCL 10 MG
10 TABLET ORAL ONCE
Refills: 0 | Status: COMPLETED | OUTPATIENT
Start: 2022-01-25 | End: 2022-01-25

## 2022-01-25 RX ORDER — SULFASALAZINE 500 MG
1000 TABLET ORAL
Refills: 0 | Status: DISCONTINUED | OUTPATIENT
Start: 2022-01-25 | End: 2022-01-28

## 2022-01-25 RX ORDER — FOLIC ACID 0.8 MG
1 TABLET ORAL DAILY
Refills: 0 | Status: DISCONTINUED | OUTPATIENT
Start: 2022-01-25 | End: 2022-01-28

## 2022-01-25 RX ORDER — SODIUM CHLORIDE 9 MG/ML
1000 INJECTION, SOLUTION INTRAVENOUS
Refills: 0 | Status: DISCONTINUED | OUTPATIENT
Start: 2022-01-25 | End: 2022-01-28

## 2022-01-25 RX ORDER — REMDESIVIR 5 MG/ML
200 INJECTION INTRAVENOUS EVERY 24 HOURS
Refills: 0 | Status: COMPLETED | OUTPATIENT
Start: 2022-01-25 | End: 2022-01-25

## 2022-01-25 RX ORDER — CHOLECALCIFEROL (VITAMIN D3) 125 MCG
400 CAPSULE ORAL DAILY
Refills: 0 | Status: DISCONTINUED | OUTPATIENT
Start: 2022-01-25 | End: 2022-01-28

## 2022-01-25 RX ORDER — LANOLIN ALCOHOL/MO/W.PET/CERES
3 CREAM (GRAM) TOPICAL AT BEDTIME
Refills: 0 | Status: DISCONTINUED | OUTPATIENT
Start: 2022-01-25 | End: 2022-01-28

## 2022-01-25 RX ORDER — REMDESIVIR 5 MG/ML
100 INJECTION INTRAVENOUS EVERY 24 HOURS
Refills: 0 | Status: DISCONTINUED | OUTPATIENT
Start: 2022-01-26 | End: 2022-01-26

## 2022-01-25 RX ORDER — SODIUM CHLORIDE 9 MG/ML
2100 INJECTION INTRAMUSCULAR; INTRAVENOUS; SUBCUTANEOUS ONCE
Refills: 0 | Status: COMPLETED | OUTPATIENT
Start: 2022-01-25 | End: 2022-01-25

## 2022-01-25 RX ORDER — DEXTROSE 50 % IN WATER 50 %
15 SYRINGE (ML) INTRAVENOUS ONCE
Refills: 0 | Status: DISCONTINUED | OUTPATIENT
Start: 2022-01-25 | End: 2022-01-28

## 2022-01-25 RX ORDER — INSULIN LISPRO 100/ML
VIAL (ML) SUBCUTANEOUS AT BEDTIME
Refills: 0 | Status: DISCONTINUED | OUTPATIENT
Start: 2022-01-25 | End: 2022-01-28

## 2022-01-25 RX ORDER — ONDANSETRON 8 MG/1
4 TABLET, FILM COATED ORAL EVERY 8 HOURS
Refills: 0 | Status: DISCONTINUED | OUTPATIENT
Start: 2022-01-25 | End: 2022-01-28

## 2022-01-25 RX ORDER — GLUCAGON INJECTION, SOLUTION 0.5 MG/.1ML
1 INJECTION, SOLUTION SUBCUTANEOUS ONCE
Refills: 0 | Status: DISCONTINUED | OUTPATIENT
Start: 2022-01-25 | End: 2022-01-28

## 2022-01-25 RX ORDER — POTASSIUM CHLORIDE 20 MEQ
40 PACKET (EA) ORAL ONCE
Refills: 0 | Status: COMPLETED | OUTPATIENT
Start: 2022-01-25 | End: 2022-01-26

## 2022-01-25 RX ORDER — ASCORBIC ACID 60 MG
500 TABLET,CHEWABLE ORAL DAILY
Refills: 0 | Status: DISCONTINUED | OUTPATIENT
Start: 2022-01-25 | End: 2022-01-28

## 2022-01-25 RX ORDER — ONDANSETRON 8 MG/1
4 TABLET, FILM COATED ORAL ONCE
Refills: 0 | Status: COMPLETED | OUTPATIENT
Start: 2022-01-25 | End: 2022-01-25

## 2022-01-25 RX ORDER — REMDESIVIR 5 MG/ML
INJECTION INTRAVENOUS
Refills: 0 | Status: DISCONTINUED | OUTPATIENT
Start: 2022-01-25 | End: 2022-01-26

## 2022-01-25 RX ORDER — ATORVASTATIN CALCIUM 80 MG/1
20 TABLET, FILM COATED ORAL AT BEDTIME
Refills: 0 | Status: DISCONTINUED | OUTPATIENT
Start: 2022-01-25 | End: 2022-01-28

## 2022-01-25 RX ORDER — DEXAMETHASONE 0.5 MG/5ML
6 ELIXIR ORAL DAILY
Refills: 0 | Status: DISCONTINUED | OUTPATIENT
Start: 2022-01-25 | End: 2022-01-28

## 2022-01-25 RX ORDER — DEXTROSE 50 % IN WATER 50 %
12.5 SYRINGE (ML) INTRAVENOUS ONCE
Refills: 0 | Status: DISCONTINUED | OUTPATIENT
Start: 2022-01-25 | End: 2022-01-28

## 2022-01-25 RX ORDER — INSULIN LISPRO 100/ML
VIAL (ML) SUBCUTANEOUS
Refills: 0 | Status: DISCONTINUED | OUTPATIENT
Start: 2022-01-25 | End: 2022-01-28

## 2022-01-25 RX ORDER — LEVOTHYROXINE SODIUM 125 MCG
75 TABLET ORAL DAILY
Refills: 0 | Status: DISCONTINUED | OUTPATIENT
Start: 2022-01-26 | End: 2022-01-28

## 2022-01-25 RX ORDER — DEXTROSE 50 % IN WATER 50 %
25 SYRINGE (ML) INTRAVENOUS ONCE
Refills: 0 | Status: DISCONTINUED | OUTPATIENT
Start: 2022-01-25 | End: 2022-01-28

## 2022-01-25 RX ORDER — MONTELUKAST 4 MG/1
10 TABLET, CHEWABLE ORAL DAILY
Refills: 0 | Status: DISCONTINUED | OUTPATIENT
Start: 2022-01-25 | End: 2022-01-28

## 2022-01-25 RX ORDER — INSULIN DEGLUDEC 100 U/ML
30 INJECTION, SOLUTION SUBCUTANEOUS
Qty: 0 | Refills: 0 | DISCHARGE

## 2022-01-25 RX ORDER — INSULIN GLARGINE 100 [IU]/ML
20 INJECTION, SOLUTION SUBCUTANEOUS AT BEDTIME
Refills: 0 | Status: DISCONTINUED | OUTPATIENT
Start: 2022-01-25 | End: 2022-01-28

## 2022-01-25 RX ORDER — CEFTRIAXONE 500 MG/1
1000 INJECTION, POWDER, FOR SOLUTION INTRAMUSCULAR; INTRAVENOUS ONCE
Refills: 0 | Status: COMPLETED | OUTPATIENT
Start: 2022-01-25 | End: 2022-01-25

## 2022-01-25 RX ORDER — LOSARTAN POTASSIUM 100 MG/1
100 TABLET, FILM COATED ORAL DAILY
Refills: 0 | Status: DISCONTINUED | OUTPATIENT
Start: 2022-01-25 | End: 2022-01-28

## 2022-01-25 RX ORDER — HYDRALAZINE HCL 50 MG
50 TABLET ORAL
Refills: 0 | Status: DISCONTINUED | OUTPATIENT
Start: 2022-01-25 | End: 2022-01-28

## 2022-01-25 RX ORDER — CEFTRIAXONE 500 MG/1
1000 INJECTION, POWDER, FOR SOLUTION INTRAMUSCULAR; INTRAVENOUS EVERY 24 HOURS
Refills: 0 | Status: COMPLETED | OUTPATIENT
Start: 2022-01-26 | End: 2022-01-28

## 2022-01-25 RX ORDER — DULOXETINE HYDROCHLORIDE 30 MG/1
60 CAPSULE, DELAYED RELEASE ORAL DAILY
Refills: 0 | Status: DISCONTINUED | OUTPATIENT
Start: 2022-01-25 | End: 2022-01-28

## 2022-01-25 RX ADMIN — Medication 3 MILLIGRAM(S): at 21:13

## 2022-01-25 RX ADMIN — CEFTRIAXONE 100 MILLIGRAM(S): 500 INJECTION, POWDER, FOR SOLUTION INTRAMUSCULAR; INTRAVENOUS at 06:30

## 2022-01-25 RX ADMIN — ONDANSETRON 4 MILLIGRAM(S): 8 TABLET, FILM COATED ORAL at 05:30

## 2022-01-25 RX ADMIN — ENOXAPARIN SODIUM 40 MILLIGRAM(S): 100 INJECTION SUBCUTANEOUS at 20:10

## 2022-01-25 RX ADMIN — REMDESIVIR 500 MILLIGRAM(S): 5 INJECTION INTRAVENOUS at 15:07

## 2022-01-25 RX ADMIN — Medication 650 MILLIGRAM(S): at 20:10

## 2022-01-25 RX ADMIN — Medication 650 MILLIGRAM(S): at 21:08

## 2022-01-25 RX ADMIN — Medication 1000 MILLIGRAM(S): at 20:11

## 2022-01-25 RX ADMIN — Medication 6 MILLIGRAM(S): at 15:07

## 2022-01-25 RX ADMIN — CEFTRIAXONE 1000 MILLIGRAM(S): 500 INJECTION, POWDER, FOR SOLUTION INTRAMUSCULAR; INTRAVENOUS at 07:08

## 2022-01-25 RX ADMIN — Medication 500 MILLIGRAM(S): at 15:07

## 2022-01-25 RX ADMIN — Medication 50 MILLIGRAM(S): at 20:10

## 2022-01-25 RX ADMIN — ATORVASTATIN CALCIUM 20 MILLIGRAM(S): 80 TABLET, FILM COATED ORAL at 21:08

## 2022-01-25 RX ADMIN — SODIUM CHLORIDE 2100 MILLILITER(S): 9 INJECTION INTRAMUSCULAR; INTRAVENOUS; SUBCUTANEOUS at 05:33

## 2022-01-25 RX ADMIN — INSULIN GLARGINE 20 UNIT(S): 100 INJECTION, SOLUTION SUBCUTANEOUS at 21:08

## 2022-01-25 RX ADMIN — Medication 10 MILLIGRAM(S): at 05:34

## 2022-01-25 NOTE — H&P ADULT - NSHPREVIEWOFSYSTEMS_GEN_ALL_CORE
REVIEW OF SYSTEMS:  CONSTITUTIONAL: No fever, weight loss, or fatigue  EYES: No eye pain, visual disturbances, or discharge  ENMT:  No difficulty hearing, tinnitus, vertigo; No sinus or throat pain  NECK: No pain or stiffness  BREASTS: No pain, masses, or nipple discharge  RESPIRATORY: No cough, wheezing, chills or hemoptysis; No shortness of breath  CARDIOVASCULAR: No chest pain, palpitations, dizziness, or leg swelling  GASTROINTESTINAL: No abdominal or epigastric pain. No nausea, vomiting, or hematemesis; No diarrhea or constipation. No melena or hematochezia.  GENITOURINARY: No dysuria, frequency, hematuria, or incontinence  NEUROLOGICAL: No headaches, memory loss, loss of strength, numbness, or tremors  SKIN: No itching, burning, rashes, or lesions   LYMPH NODES: No enlarged glands  ENDOCRINE: No heat or cold intolerance; No hair loss  MUSCULOSKELETAL: No joint pain or swelling; No muscle, back, or extremity pain  PSYCHIATRIC: No depression, anxiety, mood swings, or difficulty sleeping  HEME/LYMPH: No easy bruising, or bleeding gums  ALLERGY AND IMMUNOLOGIC: No hives or eczema    ALL ROS REVIEWED AND NORMAL EXCEPT AS STATED ABOVE REVIEW OF SYSTEMS:  CONSTITUTIONAL: No fever, weight loss, +fatigue, +subjective fever  EYES: No eye pain, visual disturbances, or discharge  ENMT:  No difficulty hearing, tinnitus, vertigo; No sinus or throat pain  NECK: No pain or stiffness  BREASTS: No pain, masses, or nipple discharge  RESPIRATORY: No cough, wheezing, chills or hemoptysis; No shortness of breath  CARDIOVASCULAR: No chest pain, palpitations, dizziness, or leg swelling  GASTROINTESTINAL: + vomiting x 4, No abdominal or epigastric pain. No nausea or hematemesis; No diarrhea or constipation. No melena or hematochezia.  GENITOURINARY: +dysuria, no frequency, hematuria, or incontinence  NEUROLOGICAL: No headaches, memory loss, loss of strength, numbness, or tremors  SKIN: No itching, burning, rashes, or lesions   LYMPH NODES: No enlarged glands  ENDOCRINE: No heat or cold intolerance; No hair loss  MUSCULOSKELETAL: No joint pain or swelling; No muscle, back, or extremity pain  PSYCHIATRIC: No depression, anxiety, mood swings, or difficulty sleeping  HEME/LYMPH: No easy bruising, or bleeding gums  ALLERGY AND IMMUNOLOGIC: No hives or eczema    ALL ROS REVIEWED AND NORMAL EXCEPT AS STATED ABOVE

## 2022-01-25 NOTE — H&P ADULT - HISTORY OF PRESENT ILLNESS
76M hx of HTN, HLD, T2DM on insulin, hx of LGIB, hypothyroid, RA on Humira/SSZ, asthma, remote RLE DVT off A/C pw vomiting, fever. Pt found to have UA positive for UTI.     Denied any HA, palps, SOB, CP, NV or abdominal pain. No melena.    76M hx of HTN, HLD, T2DM on insulin, hx of LGIB, hypothyroid, RA on Humira/SSZ, asthma, remote RLE DVT off A/C pw vomiting, fever. Pt lives at home with daughter, Lenore. Had 4 episodes of vomitus and subjective fever at home. Admits to dysuria. UA found to be positive. Given ceftriaxone in ED. Sepsis fluids given. Denied any HA, cough, myalgias, fever, chills, cp, palps, abd pain, n/v/d/c, hematuria. VSS at time of admission.

## 2022-01-25 NOTE — H&P ADULT - NSHPLABSRESULTS_GEN_ALL_CORE
LABS:                        15.4   12.23 )-----------( 119      ( 2022 05:05 )             46.6         142  |  105  |  22  ----------------------------<  142<H>  3.2<L>   |  25  |  1.49<H>    Ca    8.8      2022 05:05    TPro  7.0  /  Alb  3.4  /  TBili  0.6  /  DBili  x   /  AST  39  /  ALT  31  /  AlkPhos  57      PT/INR - ( 2022 07:25 )   PT: 13.3 sec;   INR: 1.10 ratio         PTT - ( 2022 07:25 )  PTT:30.1 sec  Urinalysis Basic - ( 2022 05:05 )    Color: Yellow / Appearance: Clear / S.010 / pH: x  Gluc: x / Ketone: Negative  / Bili: Negative / Urobili: Negative   Blood: x / Protein: 100 / Nitrite: Positive   Leuk Esterase: Moderate / RBC: 11-25 /HPF / WBC >50 /HPF   Sq Epi: x / Non Sq Epi: Neg.-Few / Bacteria: TNTC /HPF       CAPILLARY BLOOD GLUCOSE      POCT Blood Glucose.: 118 mg/dL (2022 10:19)        Urinalysis Basic - ( 2022 05:05 )    Color: Yellow / Appearance: Clear / S.010 / pH: x  Gluc: x / Ketone: Negative  / Bili: Negative / Urobili: Negative   Blood: x / Protein: 100 / Nitrite: Positive   Leuk Esterase: Moderate / RBC: 11-25 /HPF / WBC >50 /HPF   Sq Epi: x / Non Sq Epi: Neg.-Few / Bacteria: TNTC /HPF        RADIOLOGY & ADDITIONAL TESTS:    Consultant(s) Notes Reviewed:  [x ] YES  [ ] NO  Care Discussed with Consultants/Other Providers [ x] YES  [ ] NO  Imaging Personally Reviewed:  [ ] YES  [ ] NO

## 2022-01-25 NOTE — PATIENT PROFILE ADULT - FALL HARM RISK - HARM RISK INTERVENTIONS
Assistance with ambulation/Assistance OOB with selected safe patient handling equipment/Communicate Risk of Fall with Harm to all staff/Discuss with provider need for PT consult/Monitor gait and stability/Reinforce activity limits and safety measures with patient and family/Tailored Fall Risk Interventions/Visual Cue: Yellow wristband and red socks/Bed in lowest position, wheels locked, appropriate side rails in place/Call bell, personal items and telephone in reach/Instruct patient to call for assistance before getting out of bed or chair/Non-slip footwear when patient is out of bed/[place] to call system/Physically safe environment - no spills, clutter or unnecessary equipment/Purposeful Proactive Rounding/Room/bathroom lighting operational, light cord in reach

## 2022-01-25 NOTE — ED PROVIDER NOTE - OBJECTIVE STATEMENT
pt woke up in the middle of the night with suddne onset of sweats and chills and then vomited 6 times and felt feverish so comes to the ER, denies any cp/sob or abd pain.

## 2022-01-25 NOTE — ED PROVIDER NOTE - CLINICAL SUMMARY MEDICAL DECISION MAKING FREE TEXT BOX
Received s/o. Pt vomiting, fever, abd pain. + UTI. Pt with complicated UTI. CT rsults reviewed. Will admit for IV abx. D/W Hospitalist. Kervin: Received s/o. Pt vomiting, fever, abd pain. + UTI. Pt with complicated UTI. CT rsults reviewed. Will admit for IV abx. D/W Hospitalist.

## 2022-01-25 NOTE — ED ADULT NURSE NOTE - OBJECTIVE STATEMENT
Pt is alert, brought to the ER by EMS after he vomited 4 rimes and he felt warm and took Tylenol at 1 AM, Denies any chest pain or abdominal pain Pt is A&Ox4, brought to the ER by EMS after he vomited 4 times and he "felt warm". He took Tylenol at 1 AM. Denies any chest pain or abdominal pain.

## 2022-01-25 NOTE — ED PROVIDER NOTE - CARE PLAN
1 Principal Discharge DX:	Complicated UTI (urinary tract infection)  Secondary Diagnosis:	Vomiting in adult

## 2022-01-25 NOTE — H&P ADULT - ASSESSMENT
76M hx of HTN, HLD, T2DM on insulin, hx of LGIB, hypothyroid, RA on Humira/SSZ, asthma, remote RLE DVT off A/C pw vomiting, admitted for UTI.    #UTI  #Sepsis  - UA positive  - IVF  - ceftriaxone in ED  - follow up cultures  - CT C/A/P: Bibasilar atelectasis, left greater than right. Otherwise the visualized lung fields are clear. Thickened bladder wall is again demonstrated. Question secondary to radiation treatment. However underlying infectious process cannot be   excluded. Recommend clinical and lab correlation.    #JAN on CKD III  - Cr. 1.5  - baseline Cr 1.2  - IVF  - monitor renal function  - renal sono if no improvement    #Hypokalemia  - replete PO    #T2DM on insulin  - home meds: Jardiance 10mg daily, Ozempic 1mg weekly, Tresiba 20 units daily  - will change tresiba to lantus 15 units daily  - ISS  - accucheks  - check hba1c    #HTN  - amlodipine 10mg  - Bystolic 10 mg  - Losartan 100mg  - hydralazine 50mg BID  - monitor vitals    #Rheumatoid Arthritis  - Humira subq every 3 weeks  - Sulfasalazine 500mg 2 tabs BID  - Duloxetine 60mg daily - for arthritis?    #Thrombocytopenia  - monitor for now    #Hypothyroid   cont synthroid 75 mcg daily    #HLD  - lipitor 20mg  - fenofibrate 160mg  - fish oil 1200mg daily    #Supplements  - MVI  - folic acid  - Vit C daily  - Vit D3 daily    #GI ppx  - PPI    #DVT ppx  - IPC  - will hold on pharm pxx due to hx of LGIB at this time    will call family 76M hx of HTN, HLD, T2DM on insulin, hx of LGIB, hypothyroid, RA on Humira/SSZ, asthma, remote RLE DVT off A/C pw vomiting, admitted for UTI.    #UTI  #Sepsis  - UA positive  - sepsis fluids given  - ceftriaxone in ED - continue   - CT C/A/P: Bibasilar atelectasis, left greater than right. Otherwise the visualized lung fields are clear. Thickened bladder wall is again demonstrated. Question secondary to radiation treatment. However underlying infectious process cannot be   excluded. Recommend clinical and lab correlation.  - follow up cultures    #Covid-19 positive  - initiate remdesivir and decadron  - monitor renal/hepatic function  - O2 via NC 2L  - unclear as to why pt is hypoxic  - has hx of remote DVT  - will scan LE for DVT  - obtain CT angio when JAN resolves  - will place patient on ppx dose lovenox at this time considering LGIB hx in June 2021  - H/H [id number]    #JAN on CKD III  - Cr. 1.5  - baseline Cr 1.2  - IVF  - monitor renal function  - renal sono if no improvement    #Hypokalemia  - replete PO    #T2DM on insulin  - home meds: Jardiance 10mg daily, Ozempic 1mg weekly, Tresiba 20 units daily  - will change tresiba to lantus 15 units daily  - ISS  - accucheks  - check hba1c    #HTN  - amlodipine 10mg  - Losartan 100mg  - hydralazine 50mg BID  - Bystolic 10 mg - nonformulary  - monitor vitals    #Rheumatoid Arthritis  - Humira subq every 3 weeks - unknown when last dose  - Sulfasalazine 500mg 2 tabs BID - continued  - Duloxetine 60mg daily - continued    #Thrombocytopenia  - monitor for now    #Hypothyroid   cont synthroid 75 mcg daily    #HLD  - lipitor 20mg  - fenofibrate 160mg - held  - fish oil 1200mg daily - held    #Supplements  - MVI  - folic acid  - Vit C daily  - Vit D3 daily    #GI ppx  - PPI    #DVT ppx  - IPC  - lovenox 40mg BID  - LE dopplers pending    Call placed to brody Carter, no answer. Will attempt to call again later in the day.

## 2022-01-25 NOTE — H&P ADULT - NSHPPHYSICALEXAM_GEN_ALL_CORE
T(C): 38.2 (01-25-22 @ 04:39), Max: 38.2 (01-25-22 @ 04:39)  HR: 83 (01-25-22 @ 07:25) (83 - 83)  BP: 135/64 (01-25-22 @ 07:25) (135/64 - 230/108)  RR: 18 (01-25-22 @ 07:25) (18 - 18)  SpO2: 97% (01-25-22 @ 07:25) (96% - 98%)  Wt(kg): --Vital Signs Last 24 Hrs  T(C): 38.2 (25 Jan 2022 04:39), Max: 38.2 (25 Jan 2022 04:39)  T(F): 100.7 (25 Jan 2022 04:39), Max: 100.7 (25 Jan 2022 04:39)  HR: 83 (25 Jan 2022 07:25) (83 - 83)  BP: 135/64 (25 Jan 2022 07:25) (135/64 - 230/108)  BP(mean): 81 (25 Jan 2022 07:25) (81 - 81)  RR: 18 (25 Jan 2022 07:25) (18 - 18)  SpO2: 97% (25 Jan 2022 07:25) (96% - 98%)    PHYSICAL EXAM:  GENERAL: NAD, well-groomed, well-developed  HEAD:  Atraumatic, Normocephalic  EYES: EOMI, PERRLA, conjunctiva and sclera clear  ENMT: No tonsillar erythema, exudates, or enlargement; Moist mucous membranes, Good dentition, No lesions  NECK: Supple, No JVD, Normal thyroid  NERVOUS SYSTEM:  Alert & Oriented X3, Good concentration; Motor Strength 5/5 B/L upper and lower extremities; DTRs 2+ intact and symmetric  CHEST/LUNG: Clear to percussion bilaterally; No rales, rhonchi, wheezing, or rubs  HEART: Regular rate and rhythm; No murmurs, rubs, or gallops  ABDOMEN: Soft, Nontender, Nondistended; Bowel sounds present  EXTREMITIES:  2+ Peripheral Pulses, No clubbing, cyanosis, or edema  LYMPH: No lymphadenopathy noted  SKIN: No rashes or lesions T(C): 38.2 (01-25-22 @ 04:39), Max: 38.2 (01-25-22 @ 04:39)  HR: 83 (01-25-22 @ 07:25) (83 - 83)  BP: 135/64 (01-25-22 @ 07:25) (135/64 - 230/108)  RR: 18 (01-25-22 @ 07:25) (18 - 18)  SpO2: 97% (01-25-22 @ 07:25) (96% - 98%)  Wt(kg): --Vital Signs Last 24 Hrs  T(C): 38.2 (25 Jan 2022 04:39), Max: 38.2 (25 Jan 2022 04:39)  T(F): 100.7 (25 Jan 2022 04:39), Max: 100.7 (25 Jan 2022 04:39)  HR: 83 (25 Jan 2022 07:25) (83 - 83)  BP: 135/64 (25 Jan 2022 07:25) (135/64 - 230/108)  BP(mean): 81 (25 Jan 2022 07:25) (81 - 81)  RR: 18 (25 Jan 2022 07:25) (18 - 18)  SpO2: 97% (25 Jan 2022 07:25) (96% - 98%)    PHYSICAL EXAM:  GENERAL: NAD, well-groomed, well-developed  HEAD:  Atraumatic, Normocephalic  EYES: EOMI, PERRLA, conjunctiva and sclera clear  ENMT: No tonsillar erythema, exudates, or enlargement; Moist mucous membranes, Good dentition, No lesions  NECK: Supple, No JVD, Normal thyroid  NERVOUS SYSTEM:  Alert & Oriented X3, Good concentration; Motor Strength 5/5 B/L upper and lower extremities; DTRs 2+ intact and symmetric  CHEST/LUNG: Clear to percussion bilaterally; No rales, rhonchi, wheezing, or rubs  HEART: Regular rate and rhythm; No murmurs, rubs, or gallops  ABDOMEN: morbidly obese, Soft, Nontender, Nondistended; Bowel sounds present  EXTREMITIES:  2+ Peripheral Pulses, No clubbing, cyanosis, or 1+ edema  LYMPH: No lymphadenopathy noted  SKIN: No rashes or lesions

## 2022-01-26 LAB
A1C WITH ESTIMATED AVERAGE GLUCOSE RESULT: 5.9 % — HIGH (ref 4–5.6)
ALBUMIN SERPL ELPH-MCNC: 3 G/DL — LOW (ref 3.3–5)
ALBUMIN SERPL ELPH-MCNC: 3.1 G/DL — LOW (ref 3.3–5)
ALP SERPL-CCNC: 51 U/L — SIGNIFICANT CHANGE UP (ref 40–120)
ALP SERPL-CCNC: 52 U/L — SIGNIFICANT CHANGE UP (ref 40–120)
ALT FLD-CCNC: 43 U/L — SIGNIFICANT CHANGE UP (ref 10–45)
ALT FLD-CCNC: 43 U/L — SIGNIFICANT CHANGE UP (ref 10–45)
ANION GAP SERPL CALC-SCNC: 7 MMOL/L — SIGNIFICANT CHANGE UP (ref 5–17)
AST SERPL-CCNC: 40 U/L — SIGNIFICANT CHANGE UP (ref 10–40)
AST SERPL-CCNC: 42 U/L — HIGH (ref 10–40)
BASOPHILS # BLD AUTO: 0.02 K/UL — SIGNIFICANT CHANGE UP (ref 0–0.2)
BASOPHILS NFR BLD AUTO: 0.2 % — SIGNIFICANT CHANGE UP (ref 0–2)
BILIRUB DIRECT SERPL-MCNC: 0.1 MG/DL — SIGNIFICANT CHANGE UP (ref 0–0.3)
BILIRUB INDIRECT FLD-MCNC: 0.3 MG/DL — SIGNIFICANT CHANGE UP (ref 0.2–1)
BILIRUB SERPL-MCNC: 0.4 MG/DL — SIGNIFICANT CHANGE UP (ref 0.2–1.2)
BILIRUB SERPL-MCNC: 0.4 MG/DL — SIGNIFICANT CHANGE UP (ref 0.2–1.2)
BUN SERPL-MCNC: 27 MG/DL — HIGH (ref 7–23)
CALCIUM SERPL-MCNC: 8.8 MG/DL — SIGNIFICANT CHANGE UP (ref 8.4–10.5)
CHLORIDE SERPL-SCNC: 104 MMOL/L — SIGNIFICANT CHANGE UP (ref 96–108)
CHOLEST SERPL-MCNC: 108 MG/DL — SIGNIFICANT CHANGE UP
CO2 SERPL-SCNC: 29 MMOL/L — SIGNIFICANT CHANGE UP (ref 22–31)
CREAT SERPL-MCNC: 1.92 MG/DL — HIGH (ref 0.5–1.3)
CREAT SERPL-MCNC: 1.96 MG/DL — HIGH (ref 0.5–1.3)
E COLI DNA BLD POS QL NAA+NON-PROBE: SIGNIFICANT CHANGE UP
EOSINOPHIL # BLD AUTO: 0 K/UL — SIGNIFICANT CHANGE UP (ref 0–0.5)
EOSINOPHIL NFR BLD AUTO: 0 % — SIGNIFICANT CHANGE UP (ref 0–6)
ESTIMATED AVERAGE GLUCOSE: 123 MG/DL — HIGH (ref 68–114)
GLUCOSE BLDC GLUCOMTR-MCNC: 144 MG/DL — HIGH (ref 70–99)
GLUCOSE BLDC GLUCOMTR-MCNC: 157 MG/DL — HIGH (ref 70–99)
GLUCOSE BLDC GLUCOMTR-MCNC: 171 MG/DL — HIGH (ref 70–99)
GLUCOSE BLDC GLUCOMTR-MCNC: 194 MG/DL — HIGH (ref 70–99)
GLUCOSE SERPL-MCNC: 123 MG/DL — HIGH (ref 70–99)
HCT VFR BLD CALC: 43.7 % — SIGNIFICANT CHANGE UP (ref 39–50)
HCT VFR BLD CALC: 44.2 % — SIGNIFICANT CHANGE UP (ref 39–50)
HCT VFR BLD CALC: 46.6 % — SIGNIFICANT CHANGE UP (ref 39–50)
HDLC SERPL-MCNC: 41 MG/DL — SIGNIFICANT CHANGE UP
HGB BLD-MCNC: 14.2 G/DL — SIGNIFICANT CHANGE UP (ref 13–17)
HGB BLD-MCNC: 14.3 G/DL — SIGNIFICANT CHANGE UP (ref 13–17)
HGB BLD-MCNC: 15.3 G/DL — SIGNIFICANT CHANGE UP (ref 13–17)
IMM GRANULOCYTES NFR BLD AUTO: 0.4 % — SIGNIFICANT CHANGE UP (ref 0–1.5)
LIPID PNL WITH DIRECT LDL SERPL: 30 MG/DL — SIGNIFICANT CHANGE UP
LYMPHOCYTES # BLD AUTO: 0.75 K/UL — LOW (ref 1–3.3)
LYMPHOCYTES # BLD AUTO: 7.5 % — LOW (ref 13–44)
MCHC RBC-ENTMCNC: 30.2 PG — SIGNIFICANT CHANGE UP (ref 27–34)
MCHC RBC-ENTMCNC: 32.4 GM/DL — SIGNIFICANT CHANGE UP (ref 32–36)
MCV RBC AUTO: 93.4 FL — SIGNIFICANT CHANGE UP (ref 80–100)
METHOD TYPE: SIGNIFICANT CHANGE UP
MONOCYTES # BLD AUTO: 0.88 K/UL — SIGNIFICANT CHANGE UP (ref 0–0.9)
MONOCYTES NFR BLD AUTO: 8.8 % — SIGNIFICANT CHANGE UP (ref 2–14)
NEUTROPHILS # BLD AUTO: 8.27 K/UL — HIGH (ref 1.8–7.4)
NEUTROPHILS NFR BLD AUTO: 83.1 % — HIGH (ref 43–77)
NON HDL CHOLESTEROL: 67 MG/DL — SIGNIFICANT CHANGE UP
NRBC # BLD: 0 /100 WBCS — SIGNIFICANT CHANGE UP (ref 0–0)
PLATELET # BLD AUTO: 130 K/UL — LOW (ref 150–400)
POTASSIUM SERPL-MCNC: 4.7 MMOL/L — SIGNIFICANT CHANGE UP (ref 3.5–5.3)
POTASSIUM SERPL-SCNC: 4.7 MMOL/L — SIGNIFICANT CHANGE UP (ref 3.5–5.3)
PROT SERPL-MCNC: 6.8 G/DL — SIGNIFICANT CHANGE UP (ref 6–8.3)
PROT SERPL-MCNC: 6.8 G/DL — SIGNIFICANT CHANGE UP (ref 6–8.3)
RBC # BLD: 4.73 M/UL — SIGNIFICANT CHANGE UP (ref 4.2–5.8)
RBC # FLD: 15.1 % — HIGH (ref 10.3–14.5)
SODIUM SERPL-SCNC: 140 MMOL/L — SIGNIFICANT CHANGE UP (ref 135–145)
TRIGL SERPL-MCNC: 181 MG/DL — HIGH
WBC # BLD: 9.96 K/UL — SIGNIFICANT CHANGE UP (ref 3.8–10.5)
WBC # FLD AUTO: 9.96 K/UL — SIGNIFICANT CHANGE UP (ref 3.8–10.5)

## 2022-01-26 PROCEDURE — 99233 SBSQ HOSP IP/OBS HIGH 50: CPT

## 2022-01-26 PROCEDURE — 93970 EXTREMITY STUDY: CPT | Mod: 26

## 2022-01-26 RX ADMIN — ATORVASTATIN CALCIUM 20 MILLIGRAM(S): 80 TABLET, FILM COATED ORAL at 21:03

## 2022-01-26 RX ADMIN — Medication 50 MILLIGRAM(S): at 05:33

## 2022-01-26 RX ADMIN — Medication 500 MILLIGRAM(S): at 12:29

## 2022-01-26 RX ADMIN — MONTELUKAST 10 MILLIGRAM(S): 4 TABLET, CHEWABLE ORAL at 12:29

## 2022-01-26 RX ADMIN — DULOXETINE HYDROCHLORIDE 60 MILLIGRAM(S): 30 CAPSULE, DELAYED RELEASE ORAL at 12:29

## 2022-01-26 RX ADMIN — Medication 400 UNIT(S): at 12:29

## 2022-01-26 RX ADMIN — Medication 1000 MILLIGRAM(S): at 05:33

## 2022-01-26 RX ADMIN — Medication 6 MILLIGRAM(S): at 05:34

## 2022-01-26 RX ADMIN — Medication 50 MILLIGRAM(S): at 17:22

## 2022-01-26 RX ADMIN — INSULIN GLARGINE 20 UNIT(S): 100 INJECTION, SOLUTION SUBCUTANEOUS at 21:03

## 2022-01-26 RX ADMIN — Medication 2: at 17:22

## 2022-01-26 RX ADMIN — Medication 75 MICROGRAM(S): at 05:33

## 2022-01-26 RX ADMIN — ENOXAPARIN SODIUM 40 MILLIGRAM(S): 100 INJECTION SUBCUTANEOUS at 17:22

## 2022-01-26 RX ADMIN — Medication 3 MILLIGRAM(S): at 21:03

## 2022-01-26 RX ADMIN — AMLODIPINE BESYLATE 10 MILLIGRAM(S): 2.5 TABLET ORAL at 05:34

## 2022-01-26 RX ADMIN — Medication 1 TABLET(S): at 12:29

## 2022-01-26 RX ADMIN — Medication 1000 MILLIGRAM(S): at 17:22

## 2022-01-26 RX ADMIN — CEFTRIAXONE 100 MILLIGRAM(S): 500 INJECTION, POWDER, FOR SOLUTION INTRAMUSCULAR; INTRAVENOUS at 05:33

## 2022-01-26 RX ADMIN — Medication 1 MILLIGRAM(S): at 12:29

## 2022-01-26 RX ADMIN — ENOXAPARIN SODIUM 40 MILLIGRAM(S): 100 INJECTION SUBCUTANEOUS at 06:21

## 2022-01-26 RX ADMIN — Medication 2: at 12:29

## 2022-01-26 RX ADMIN — LOSARTAN POTASSIUM 100 MILLIGRAM(S): 100 TABLET, FILM COATED ORAL at 05:33

## 2022-01-26 RX ADMIN — Medication 40 MILLIEQUIVALENT(S): at 06:22

## 2022-01-26 NOTE — DIETITIAN INITIAL EVALUATION ADULT. - OTHER INFO
pt. admitted w/ UTI/Covid 19.  eating very well; tolerates dash diet well. unable to interview pt. at this time. no n/v/ diarrhea noted. last bm was 1/24. no edema noted. skin intact. weight stable. no dysphagia noted. pt is obese

## 2022-01-26 NOTE — DIETITIAN INITIAL EVALUATION ADULT. - PERTINENT MEDS FT
lovenox rocephin lantus admelog norvasc synthroid lipitor decadron cymbalta apresoline cozaar singulair

## 2022-01-27 LAB
-  AMIKACIN: SIGNIFICANT CHANGE UP
-  AMIKACIN: SIGNIFICANT CHANGE UP
-  AMOXICILLIN/CLAVULANIC ACID: SIGNIFICANT CHANGE UP
-  AMPICILLIN/SULBACTAM: SIGNIFICANT CHANGE UP
-  AMPICILLIN/SULBACTAM: SIGNIFICANT CHANGE UP
-  AMPICILLIN: SIGNIFICANT CHANGE UP
-  AMPICILLIN: SIGNIFICANT CHANGE UP
-  AZTREONAM: SIGNIFICANT CHANGE UP
-  AZTREONAM: SIGNIFICANT CHANGE UP
-  CEFAZOLIN: SIGNIFICANT CHANGE UP
-  CEFAZOLIN: SIGNIFICANT CHANGE UP
-  CEFEPIME: SIGNIFICANT CHANGE UP
-  CEFEPIME: SIGNIFICANT CHANGE UP
-  CEFOXITIN: SIGNIFICANT CHANGE UP
-  CEFOXITIN: SIGNIFICANT CHANGE UP
-  CEFTRIAXONE: SIGNIFICANT CHANGE UP
-  CEFTRIAXONE: SIGNIFICANT CHANGE UP
-  CIPROFLOXACIN: SIGNIFICANT CHANGE UP
-  CIPROFLOXACIN: SIGNIFICANT CHANGE UP
-  ERTAPENEM: SIGNIFICANT CHANGE UP
-  ERTAPENEM: SIGNIFICANT CHANGE UP
-  GENTAMICIN: SIGNIFICANT CHANGE UP
-  GENTAMICIN: SIGNIFICANT CHANGE UP
-  IMIPENEM: SIGNIFICANT CHANGE UP
-  IMIPENEM: SIGNIFICANT CHANGE UP
-  LEVOFLOXACIN: SIGNIFICANT CHANGE UP
-  LEVOFLOXACIN: SIGNIFICANT CHANGE UP
-  MEROPENEM: SIGNIFICANT CHANGE UP
-  MEROPENEM: SIGNIFICANT CHANGE UP
-  NITROFURANTOIN: SIGNIFICANT CHANGE UP
-  PIPERACILLIN/TAZOBACTAM: SIGNIFICANT CHANGE UP
-  PIPERACILLIN/TAZOBACTAM: SIGNIFICANT CHANGE UP
-  TIGECYCLINE: SIGNIFICANT CHANGE UP
-  TOBRAMYCIN: SIGNIFICANT CHANGE UP
-  TOBRAMYCIN: SIGNIFICANT CHANGE UP
-  TRIMETHOPRIM/SULFAMETHOXAZOLE: SIGNIFICANT CHANGE UP
-  TRIMETHOPRIM/SULFAMETHOXAZOLE: SIGNIFICANT CHANGE UP
ALBUMIN SERPL ELPH-MCNC: 3 G/DL — LOW (ref 3.3–5)
ALP SERPL-CCNC: 48 U/L — SIGNIFICANT CHANGE UP (ref 40–120)
ALT FLD-CCNC: 37 U/L — SIGNIFICANT CHANGE UP (ref 10–45)
ANION GAP SERPL CALC-SCNC: 7 MMOL/L — SIGNIFICANT CHANGE UP (ref 5–17)
AST SERPL-CCNC: 33 U/L — SIGNIFICANT CHANGE UP (ref 10–40)
BASOPHILS # BLD AUTO: 0.02 K/UL — SIGNIFICANT CHANGE UP (ref 0–0.2)
BASOPHILS NFR BLD AUTO: 0.3 % — SIGNIFICANT CHANGE UP (ref 0–2)
BILIRUB DIRECT SERPL-MCNC: 0.1 MG/DL — SIGNIFICANT CHANGE UP (ref 0–0.3)
BILIRUB INDIRECT FLD-MCNC: 0.1 MG/DL — LOW (ref 0.2–1)
BILIRUB SERPL-MCNC: 0.2 MG/DL — SIGNIFICANT CHANGE UP (ref 0.2–1.2)
BUN SERPL-MCNC: 27 MG/DL — HIGH (ref 7–23)
CALCIUM SERPL-MCNC: 8.5 MG/DL — SIGNIFICANT CHANGE UP (ref 8.4–10.5)
CHLORIDE SERPL-SCNC: 103 MMOL/L — SIGNIFICANT CHANGE UP (ref 96–108)
CO2 SERPL-SCNC: 30 MMOL/L — SIGNIFICANT CHANGE UP (ref 22–31)
CREAT SERPL-MCNC: 1.56 MG/DL — HIGH (ref 0.5–1.3)
CREAT SERPL-MCNC: 1.59 MG/DL — HIGH (ref 0.5–1.3)
CULTURE RESULTS: SIGNIFICANT CHANGE UP
CULTURE RESULTS: SIGNIFICANT CHANGE UP
EOSINOPHIL # BLD AUTO: 0.03 K/UL — SIGNIFICANT CHANGE UP (ref 0–0.5)
EOSINOPHIL NFR BLD AUTO: 0.4 % — SIGNIFICANT CHANGE UP (ref 0–6)
GLUCOSE BLDC GLUCOMTR-MCNC: 110 MG/DL — HIGH (ref 70–99)
GLUCOSE BLDC GLUCOMTR-MCNC: 171 MG/DL — HIGH (ref 70–99)
GLUCOSE BLDC GLUCOMTR-MCNC: 174 MG/DL — HIGH (ref 70–99)
GLUCOSE BLDC GLUCOMTR-MCNC: 186 MG/DL — HIGH (ref 70–99)
GLUCOSE SERPL-MCNC: 157 MG/DL — HIGH (ref 70–99)
HCT VFR BLD CALC: 42.1 % — SIGNIFICANT CHANGE UP (ref 39–50)
HCT VFR BLD CALC: 43.8 % — SIGNIFICANT CHANGE UP (ref 39–50)
HGB BLD-MCNC: 13.8 G/DL — SIGNIFICANT CHANGE UP (ref 13–17)
HGB BLD-MCNC: 14.5 G/DL — SIGNIFICANT CHANGE UP (ref 13–17)
IMM GRANULOCYTES NFR BLD AUTO: 1 % — SIGNIFICANT CHANGE UP (ref 0–1.5)
LYMPHOCYTES # BLD AUTO: 0.77 K/UL — LOW (ref 1–3.3)
LYMPHOCYTES # BLD AUTO: 10.5 % — LOW (ref 13–44)
MCHC RBC-ENTMCNC: 30.5 PG — SIGNIFICANT CHANGE UP (ref 27–34)
MCHC RBC-ENTMCNC: 33.1 GM/DL — SIGNIFICANT CHANGE UP (ref 32–36)
MCV RBC AUTO: 92.2 FL — SIGNIFICANT CHANGE UP (ref 80–100)
METHOD TYPE: SIGNIFICANT CHANGE UP
METHOD TYPE: SIGNIFICANT CHANGE UP
MONOCYTES # BLD AUTO: 0.49 K/UL — SIGNIFICANT CHANGE UP (ref 0–0.9)
MONOCYTES NFR BLD AUTO: 6.7 % — SIGNIFICANT CHANGE UP (ref 2–14)
NEUTROPHILS # BLD AUTO: 5.93 K/UL — SIGNIFICANT CHANGE UP (ref 1.8–7.4)
NEUTROPHILS NFR BLD AUTO: 81.1 % — HIGH (ref 43–77)
NRBC # BLD: 0 /100 WBCS — SIGNIFICANT CHANGE UP (ref 0–0)
ORGANISM # SPEC MICROSCOPIC CNT: SIGNIFICANT CHANGE UP
PLATELET # BLD AUTO: 141 K/UL — LOW (ref 150–400)
POTASSIUM SERPL-MCNC: 4 MMOL/L — SIGNIFICANT CHANGE UP (ref 3.5–5.3)
POTASSIUM SERPL-SCNC: 4 MMOL/L — SIGNIFICANT CHANGE UP (ref 3.5–5.3)
PROT SERPL-MCNC: 6.4 G/DL — SIGNIFICANT CHANGE UP (ref 6–8.3)
RBC # BLD: 4.75 M/UL — SIGNIFICANT CHANGE UP (ref 4.2–5.8)
RBC # FLD: 15.3 % — HIGH (ref 10.3–14.5)
SODIUM SERPL-SCNC: 140 MMOL/L — SIGNIFICANT CHANGE UP (ref 135–145)
SPECIMEN SOURCE: SIGNIFICANT CHANGE UP
SPECIMEN SOURCE: SIGNIFICANT CHANGE UP
WBC # BLD: 7.31 K/UL — SIGNIFICANT CHANGE UP (ref 3.8–10.5)
WBC # FLD AUTO: 7.31 K/UL — SIGNIFICANT CHANGE UP (ref 3.8–10.5)

## 2022-01-27 PROCEDURE — 99233 SBSQ HOSP IP/OBS HIGH 50: CPT

## 2022-01-27 RX ORDER — BENZOCAINE AND MENTHOL 5; 1 G/100ML; G/100ML
1 LIQUID ORAL ONCE
Refills: 0 | Status: COMPLETED | OUTPATIENT
Start: 2022-01-27 | End: 2022-01-27

## 2022-01-27 RX ORDER — APIXABAN 2.5 MG/1
10 TABLET, FILM COATED ORAL EVERY 12 HOURS
Refills: 0 | Status: DISCONTINUED | OUTPATIENT
Start: 2022-01-27 | End: 2022-01-28

## 2022-01-27 RX ADMIN — Medication 50 MILLIGRAM(S): at 17:17

## 2022-01-27 RX ADMIN — Medication 1000 MILLIGRAM(S): at 17:17

## 2022-01-27 RX ADMIN — Medication 6 MILLIGRAM(S): at 06:10

## 2022-01-27 RX ADMIN — ATORVASTATIN CALCIUM 20 MILLIGRAM(S): 80 TABLET, FILM COATED ORAL at 21:23

## 2022-01-27 RX ADMIN — ENOXAPARIN SODIUM 40 MILLIGRAM(S): 100 INJECTION SUBCUTANEOUS at 05:30

## 2022-01-27 RX ADMIN — AMLODIPINE BESYLATE 10 MILLIGRAM(S): 2.5 TABLET ORAL at 05:30

## 2022-01-27 RX ADMIN — Medication 50 MILLIGRAM(S): at 05:30

## 2022-01-27 RX ADMIN — DULOXETINE HYDROCHLORIDE 60 MILLIGRAM(S): 30 CAPSULE, DELAYED RELEASE ORAL at 11:26

## 2022-01-27 RX ADMIN — LOSARTAN POTASSIUM 100 MILLIGRAM(S): 100 TABLET, FILM COATED ORAL at 05:30

## 2022-01-27 RX ADMIN — Medication 3 MILLIGRAM(S): at 21:23

## 2022-01-27 RX ADMIN — Medication 2: at 11:34

## 2022-01-27 RX ADMIN — Medication 400 UNIT(S): at 11:25

## 2022-01-27 RX ADMIN — Medication 500 MILLIGRAM(S): at 11:26

## 2022-01-27 RX ADMIN — Medication 1 MILLIGRAM(S): at 11:27

## 2022-01-27 RX ADMIN — MONTELUKAST 10 MILLIGRAM(S): 4 TABLET, CHEWABLE ORAL at 11:25

## 2022-01-27 RX ADMIN — BENZOCAINE AND MENTHOL 1 LOZENGE: 5; 1 LIQUID ORAL at 13:23

## 2022-01-27 RX ADMIN — Medication 75 MICROGRAM(S): at 05:30

## 2022-01-27 RX ADMIN — INSULIN GLARGINE 20 UNIT(S): 100 INJECTION, SOLUTION SUBCUTANEOUS at 21:23

## 2022-01-27 RX ADMIN — Medication 1 TABLET(S): at 11:25

## 2022-01-27 RX ADMIN — Medication 1000 MILLIGRAM(S): at 05:30

## 2022-01-27 RX ADMIN — CEFTRIAXONE 100 MILLIGRAM(S): 500 INJECTION, POWDER, FOR SOLUTION INTRAMUSCULAR; INTRAVENOUS at 05:29

## 2022-01-27 RX ADMIN — Medication 2: at 17:18

## 2022-01-27 RX ADMIN — APIXABAN 10 MILLIGRAM(S): 2.5 TABLET, FILM COATED ORAL at 17:17

## 2022-01-28 ENCOUNTER — TRANSCRIPTION ENCOUNTER (OUTPATIENT)
Age: 77
End: 2022-01-28

## 2022-01-28 VITALS
TEMPERATURE: 98 F | SYSTOLIC BLOOD PRESSURE: 152 MMHG | HEART RATE: 95 BPM | RESPIRATION RATE: 16 BRPM | DIASTOLIC BLOOD PRESSURE: 82 MMHG | OXYGEN SATURATION: 95 %

## 2022-01-28 DIAGNOSIS — I82.401 ACUTE EMBOLISM AND THROMBOSIS OF UNSPECIFIED DEEP VEINS OF RIGHT LOWER EXTREMITY: ICD-10-CM

## 2022-01-28 LAB
ALBUMIN SERPL ELPH-MCNC: 3 G/DL — LOW (ref 3.3–5)
ALP SERPL-CCNC: 47 U/L — SIGNIFICANT CHANGE UP (ref 40–120)
ALT FLD-CCNC: 35 U/L — SIGNIFICANT CHANGE UP (ref 10–45)
ANION GAP SERPL CALC-SCNC: 6 MMOL/L — SIGNIFICANT CHANGE UP (ref 5–17)
AST SERPL-CCNC: 27 U/L — SIGNIFICANT CHANGE UP (ref 10–40)
BASOPHILS # BLD AUTO: 0.03 K/UL — SIGNIFICANT CHANGE UP (ref 0–0.2)
BASOPHILS NFR BLD AUTO: 0.5 % — SIGNIFICANT CHANGE UP (ref 0–2)
BILIRUB SERPL-MCNC: 0.2 MG/DL — SIGNIFICANT CHANGE UP (ref 0.2–1.2)
BUN SERPL-MCNC: 26 MG/DL — HIGH (ref 7–23)
CALCIUM SERPL-MCNC: 8.6 MG/DL — SIGNIFICANT CHANGE UP (ref 8.4–10.5)
CHLORIDE SERPL-SCNC: 105 MMOL/L — SIGNIFICANT CHANGE UP (ref 96–108)
CO2 SERPL-SCNC: 29 MMOL/L — SIGNIFICANT CHANGE UP (ref 22–31)
CREAT SERPL-MCNC: 1.4 MG/DL — HIGH (ref 0.5–1.3)
EOSINOPHIL # BLD AUTO: 0.05 K/UL — SIGNIFICANT CHANGE UP (ref 0–0.5)
EOSINOPHIL NFR BLD AUTO: 0.8 % — SIGNIFICANT CHANGE UP (ref 0–6)
GLUCOSE BLDC GLUCOMTR-MCNC: 122 MG/DL — HIGH (ref 70–99)
GLUCOSE BLDC GLUCOMTR-MCNC: 281 MG/DL — HIGH (ref 70–99)
GLUCOSE SERPL-MCNC: 120 MG/DL — HIGH (ref 70–99)
HCT VFR BLD CALC: 41.2 % — SIGNIFICANT CHANGE UP (ref 39–50)
HGB BLD-MCNC: 13.9 G/DL — SIGNIFICANT CHANGE UP (ref 13–17)
IMM GRANULOCYTES NFR BLD AUTO: 1.6 % — HIGH (ref 0–1.5)
LYMPHOCYTES # BLD AUTO: 1.49 K/UL — SIGNIFICANT CHANGE UP (ref 1–3.3)
LYMPHOCYTES # BLD AUTO: 24.3 % — SIGNIFICANT CHANGE UP (ref 13–44)
MCHC RBC-ENTMCNC: 30.8 PG — SIGNIFICANT CHANGE UP (ref 27–34)
MCHC RBC-ENTMCNC: 33.7 GM/DL — SIGNIFICANT CHANGE UP (ref 32–36)
MCV RBC AUTO: 91.4 FL — SIGNIFICANT CHANGE UP (ref 80–100)
MONOCYTES # BLD AUTO: 0.64 K/UL — SIGNIFICANT CHANGE UP (ref 0–0.9)
MONOCYTES NFR BLD AUTO: 10.5 % — SIGNIFICANT CHANGE UP (ref 2–14)
NEUTROPHILS # BLD AUTO: 3.81 K/UL — SIGNIFICANT CHANGE UP (ref 1.8–7.4)
NEUTROPHILS NFR BLD AUTO: 62.3 % — SIGNIFICANT CHANGE UP (ref 43–77)
NRBC # BLD: 0 /100 WBCS — SIGNIFICANT CHANGE UP (ref 0–0)
PLATELET # BLD AUTO: 148 K/UL — LOW (ref 150–400)
POTASSIUM SERPL-MCNC: 4.1 MMOL/L — SIGNIFICANT CHANGE UP (ref 3.5–5.3)
POTASSIUM SERPL-SCNC: 4.1 MMOL/L — SIGNIFICANT CHANGE UP (ref 3.5–5.3)
PROT SERPL-MCNC: 6.6 G/DL — SIGNIFICANT CHANGE UP (ref 6–8.3)
RBC # BLD: 4.51 M/UL — SIGNIFICANT CHANGE UP (ref 4.2–5.8)
RBC # FLD: 15 % — HIGH (ref 10.3–14.5)
SODIUM SERPL-SCNC: 140 MMOL/L — SIGNIFICANT CHANGE UP (ref 135–145)
WBC # BLD: 6.12 K/UL — SIGNIFICANT CHANGE UP (ref 3.8–10.5)
WBC # FLD AUTO: 6.12 K/UL — SIGNIFICANT CHANGE UP (ref 3.8–10.5)

## 2022-01-28 PROCEDURE — 85025 COMPLETE CBC W/AUTO DIFF WBC: CPT

## 2022-01-28 PROCEDURE — 80053 COMPREHEN METABOLIC PANEL: CPT

## 2022-01-28 PROCEDURE — 99255 IP/OBS CONSLTJ NEW/EST HI 80: CPT

## 2022-01-28 PROCEDURE — 71045 X-RAY EXAM CHEST 1 VIEW: CPT

## 2022-01-28 PROCEDURE — 96375 TX/PRO/DX INJ NEW DRUG ADDON: CPT

## 2022-01-28 PROCEDURE — 36415 COLL VENOUS BLD VENIPUNCTURE: CPT

## 2022-01-28 PROCEDURE — 87086 URINE CULTURE/COLONY COUNT: CPT

## 2022-01-28 PROCEDURE — 85018 HEMOGLOBIN: CPT

## 2022-01-28 PROCEDURE — 99239 HOSP IP/OBS DSCHRG MGMT >30: CPT

## 2022-01-28 PROCEDURE — 87637 SARSCOV2&INF A&B&RSV AMP PRB: CPT

## 2022-01-28 PROCEDURE — 82565 ASSAY OF CREATININE: CPT

## 2022-01-28 PROCEDURE — 85730 THROMBOPLASTIN TIME PARTIAL: CPT

## 2022-01-28 PROCEDURE — 87040 BLOOD CULTURE FOR BACTERIA: CPT

## 2022-01-28 PROCEDURE — 93970 EXTREMITY STUDY: CPT

## 2022-01-28 PROCEDURE — 71260 CT THORAX DX C+: CPT | Mod: MA

## 2022-01-28 PROCEDURE — 80076 HEPATIC FUNCTION PANEL: CPT

## 2022-01-28 PROCEDURE — 80061 LIPID PANEL: CPT

## 2022-01-28 PROCEDURE — 87186 SC STD MICRODIL/AGAR DIL: CPT

## 2022-01-28 PROCEDURE — 87077 CULTURE AEROBIC IDENTIFY: CPT

## 2022-01-28 PROCEDURE — 85610 PROTHROMBIN TIME: CPT

## 2022-01-28 PROCEDURE — 83605 ASSAY OF LACTIC ACID: CPT

## 2022-01-28 PROCEDURE — 99223 1ST HOSP IP/OBS HIGH 75: CPT

## 2022-01-28 PROCEDURE — 74177 CT ABD & PELVIS W/CONTRAST: CPT | Mod: MA

## 2022-01-28 PROCEDURE — 80048 BASIC METABOLIC PNL TOTAL CA: CPT

## 2022-01-28 PROCEDURE — 99285 EMERGENCY DEPT VISIT HI MDM: CPT

## 2022-01-28 PROCEDURE — 83036 HEMOGLOBIN GLYCOSYLATED A1C: CPT

## 2022-01-28 PROCEDURE — 81001 URINALYSIS AUTO W/SCOPE: CPT

## 2022-01-28 PROCEDURE — 96365 THER/PROPH/DIAG IV INF INIT: CPT

## 2022-01-28 PROCEDURE — 82962 GLUCOSE BLOOD TEST: CPT

## 2022-01-28 PROCEDURE — 85014 HEMATOCRIT: CPT

## 2022-01-28 PROCEDURE — 87150 DNA/RNA AMPLIFIED PROBE: CPT

## 2022-01-28 PROCEDURE — 93005 ELECTROCARDIOGRAM TRACING: CPT

## 2022-01-28 RX ORDER — APIXABAN 2.5 MG/1
1 TABLET, FILM COATED ORAL
Qty: 66 | Refills: 0
Start: 2022-01-28

## 2022-01-28 RX ORDER — CEFPODOXIME PROXETIL 100 MG
200 TABLET ORAL EVERY 12 HOURS
Refills: 0 | Status: DISCONTINUED | OUTPATIENT
Start: 2022-01-29 | End: 2022-01-28

## 2022-01-28 RX ORDER — LANOLIN ALCOHOL/MO/W.PET/CERES
3 CREAM (GRAM) TOPICAL ONCE
Refills: 0 | Status: COMPLETED | OUTPATIENT
Start: 2022-01-28 | End: 2022-01-28

## 2022-01-28 RX ADMIN — Medication 1000 MILLIGRAM(S): at 05:53

## 2022-01-28 RX ADMIN — Medication 3 MILLIGRAM(S): at 00:37

## 2022-01-28 RX ADMIN — APIXABAN 10 MILLIGRAM(S): 2.5 TABLET, FILM COATED ORAL at 05:53

## 2022-01-28 RX ADMIN — Medication 6 MILLIGRAM(S): at 05:53

## 2022-01-28 RX ADMIN — DULOXETINE HYDROCHLORIDE 60 MILLIGRAM(S): 30 CAPSULE, DELAYED RELEASE ORAL at 11:53

## 2022-01-28 RX ADMIN — MONTELUKAST 10 MILLIGRAM(S): 4 TABLET, CHEWABLE ORAL at 11:53

## 2022-01-28 RX ADMIN — Medication 50 MILLIGRAM(S): at 05:53

## 2022-01-28 RX ADMIN — Medication 75 MICROGRAM(S): at 05:53

## 2022-01-28 RX ADMIN — AMLODIPINE BESYLATE 10 MILLIGRAM(S): 2.5 TABLET ORAL at 05:53

## 2022-01-28 RX ADMIN — LOSARTAN POTASSIUM 100 MILLIGRAM(S): 100 TABLET, FILM COATED ORAL at 05:53

## 2022-01-28 RX ADMIN — CEFTRIAXONE 100 MILLIGRAM(S): 500 INJECTION, POWDER, FOR SOLUTION INTRAMUSCULAR; INTRAVENOUS at 05:53

## 2022-01-28 RX ADMIN — Medication 1 TABLET(S): at 11:53

## 2022-01-28 RX ADMIN — Medication 1 MILLIGRAM(S): at 11:53

## 2022-01-28 RX ADMIN — Medication 500 MILLIGRAM(S): at 11:53

## 2022-01-28 RX ADMIN — Medication 6: at 11:53

## 2022-01-28 RX ADMIN — Medication 400 UNIT(S): at 11:53

## 2022-01-28 NOTE — PROGRESS NOTE ADULT - NUTRITIONAL ASSESSMENT
This patient has been assessed with a concern for Malnutrition and has been determined to have a diagnosis/diagnoses of Morbid obesity (BMI > 40).    This patient is being managed with:   Diet DASH/TLC-  Sodium & Cholesterol Restricted  Entered: Jan 25 2022  9:46AM    
This patient has been assessed with a concern for Malnutrition and has been determined to have a diagnosis/diagnoses of Morbid obesity (BMI > 40).    This patient is being managed with:   Diet DASH/TLC-  Sodium & Cholesterol Restricted  Entered: Jan 25 2022  9:46AM

## 2022-01-28 NOTE — DISCHARGE NOTE NURSING/CASE MANAGEMENT/SOCIAL WORK - NSDCFUADDAPPT_GEN_ALL_CORE_FT
We made you a hospital follow-up appointment with Dr. Richard [phone] on 2/4/22 at 1:00pm, this is a TELE-Health appointment, please call the office prior to the appointment so everything is set for 2/4/22.

## 2022-01-28 NOTE — DISCHARGE NOTE PROVIDER - NSDCMRMEDTOKEN_GEN_ALL_CORE_FT
Alpha Lipoic Acid 200 mg oral capsule: orally once a day  amLODIPine 10 mg oral tablet: 1 tab(s) orally once a day  apixaban 5 mg oral tablet: 2 tab(s) orally every 12 hours  x 6 days then  1 tab orally every 12 hours   aspirin 81 mg oral tablet: orally once a day  atorvastatin 20 mg oral tablet: 1 tab(s) orally once a day  Bystolic 10 mg oral tablet: 1 tab(s) orally once a day  cefpodoxime 200 mg oral tablet: 1 tab(s) orally every 12 hours  DULoxetine 60 mg oral delayed release capsule: 1 cap(s) orally once a day  fenofibrate 160 mg oral tablet: 1 tab(s) orally once a day  Fish Oil 1200 mg oral capsule: orally once a day  folic acid 1 mg oral tablet: 1 tab(s) orally once a day  Humira Pen 40 mg/0.8 mL subcutaneous kit: 1  subcutaneous every 3 weeks  hydrALAZINE 50 mg oral tablet: 1 tab(s) orally 2 times a day  Jardiance 10 mg oral tablet: 1 tab(s) orally once a day (in the morning)  levothyroxine 75 mcg (0.075 mg) oral tablet: 1 tab(s) orally once a day  losartan 100 mg oral tablet: 1 tab(s) orally once a day  montelukast 10 mg oral tablet: 1 tab(s) orally once a day  Ozempic (1 mg dose) 2 mg/1.5 mL subcutaneous solution: 1 milligram(s) subcutaneous every 7 days  sulfaSALAzine 500 mg oral tablet: 2 tab(s) orally 2 times a day  Super B Complex oral tablet: 1 tab(s) orally once a day  Tresiba FlexTouch 100 units/mL subcutaneous solution: 20 unit(s) subcutaneous once a day  Vitamin C 1000 mg oral tablet: 1 tab(s) orally once a day  Vitamin D3 1000 intl units (25 mcg) oral capsule: orally once a day

## 2022-01-28 NOTE — DISCHARGE NOTE PROVIDER - DETAILS OF MALNUTRITION DIAGNOSIS/DIAGNOSES
This patient has been assessed with a concern for Malnutrition and was treated during this hospitalization for the following Nutrition diagnosis/diagnoses:     -  01/26/2022: Morbid obesity (BMI > 40)

## 2022-01-28 NOTE — DISCHARGE NOTE PROVIDER - NSDCFUSCHEDAPPT_GEN_ALL_CORE_FT
[facility] ; 01/31/2022 ; [facility] PAT-Pre-Surgical Testing  [facility] ; 02/18/2022 ; [place]  [facility] ; 03/09/2022 ; [facility] [address] [facility]  [facility] ; 03/17/2022 ; [facility] [address] [place]  [facility] ; 03/18/2022 ; [facility] [address]  [facility] ; 03/18/2022 ; [facility] [address]  [facility] ; 03/30/2022 ; [facility] [address] [facility]

## 2022-01-28 NOTE — CONSULT NOTE ADULT - PROBLEM SELECTOR RECOMMENDATION 9
Patient with h/o RLE DVT 2015-right lower extremity duplex/ultrasound at that time revealed extensive deep venous thrombosis involving the right lower extremity from the level of posterior tibial vein to the common femoral vein. Patient begun on Eliquis, discontinuing med in 8/2017. No clear precipitant by history. Prothrombin gene mutation study normal and Factor V Leiden gene mutation study was normal. He had been following with Dr. Brush (vascular).   Now with possible acute component to chronic clot. Patient to receive therapeutic anticoagulation and f/u with Dr. Brush as planned.

## 2022-01-28 NOTE — PROGRESS NOTE ADULT - ASSESSMENT
76M hx of HTN, HLD, T2DM on insulin, hx of LGIB, hypothyroid, RA on Humira/SSZ, asthma, remote RLE DVT off A/C pw vomiting, admitted for UTI.    #UTI  #Sepsis  - UA positive  - sepsis fluids given  - continue ceftriaxone day 2   - CT C/A/P: Bibasilar atelectasis, left greater than right. Otherwise the visualized lung fields are clear. Thickened bladder wall is again demonstrated. Question secondary to radiation treatment. However underlying infectious process cannot be   excluded. Recommend clinical and lab correlation.  - follow up cultures    #Covid-19 positive  - initiate remdesivir and decadron - remdesivir discontinued due to JAN  - monitor renal/hepatic function  - O2 via NC 2L  - unclear as to why pt is hypoxic  - CT chest w IV contrast done yesterday -- if no improvement in oxygenation, considering CT angio chest  - has hx of remote DVT  - will scan LE for DVT  - will place patient on ppx dose lovenox at this time considering LGIB hx in June 2021  - H/H [id number]    #JAN on CKD III  - Cr. 1.5  - baseline Cr 1.2  - IVF  - monitor renal function  - renal sono if no improvement    #Hypokalemia  - replete PO    #T2DM on insulin  - home meds: Jardiance 10mg daily, Ozempic 1mg weekly, Tresiba 20 units daily  - will change tresiba to lantus 15 units daily  - ISS  - accucheks  - check hba1c    #HTN  - amlodipine 10mg  - Losartan 100mg  - hydralazine 50mg BID  - Bystolic 10 mg - nonformulary  - monitor vitals    #Rheumatoid Arthritis  - Humira subq every 3 weeks - unknown when last dose  - Sulfasalazine 500mg 2 tabs BID - continued  - Duloxetine 60mg daily - continued    #Thrombocytopenia  - monitor for now    #Hypothyroid   cont synthroid 75 mcg daily    #HLD  - lipitor 20mg  - fenofibrate 160mg - held  - fish oil 1200mg daily - held    #Supplements  - MVI  - folic acid  - Vit C daily  - Vit D3 daily    #GI ppx  - PPI    #DVT ppx  - IPC  - lovenox 40mg BID  - LE dopplers pending    Spoke to son, Joni, today to give update.
76M hx of HTN, HLD, T2DM on insulin, hx of LGIB, hypothyroid, RA on Humira/SSZ, asthma, remote RLE DVT off A/C pw vomiting, admitted for UTI.    #UTI  #Sepsis  - UA positive  - sepsis fluids given  - continue ceftriaxone day 3  - CT C/A/P: Bibasilar atelectasis, left greater than right. Otherwise the visualized lung fields are clear. Thickened bladder wall is again demonstrated. Question secondary to radiation treatment. However underlying infectious process cannot be   excluded. Recommend clinical and lab correlation.  - follow up cultures    #Covid-19 positive  #Acute on Chronic RLE DVT  - initiate remdesivir and decadron - remdesivir discontinued due to JAN  - monitor renal/hepatic function  - O2 via NC 2L  - unclear as to why pt is hypoxic  - If no improvement in oxygenation, considering CT angio chest - however, question whether CTA chest may not be necessary if pt is already going to be anticoagulated for DVT  - has hx of remote DVT  - 1/26 LE doppler: Evidence of DVT involving the RLE from the level of proximal common femoral vein down to the popliteal vein. Segments of the thrombus appears chronic as described above.  - case discussed with outpatient vascular surgeon, Dr. Brush - instructed to place patient on eliquis and he will follow up as outpatient. Pt has hx of post-phlebitic leg and is well known to his practice.  - AC with caution as pt has hx of diverticulitis in 8/2021 with LGIB  - H/H [id number]    #JAN on CKD III  - Cr. 1.5  - baseline Cr 1.2  - IVF  - monitor renal function    #Hypokalemia - improved  - replete PO    #T2DM on insulin  - home meds: Jardiance 10mg daily, Ozempic 1mg weekly, Tresiba 20 units daily  - will change tresiba to lantus 15 units daily  - ISS  - accucheks  - hba1c 5.9 -- needs med adjustment as outpatient and A1c goal approx 7    #HTN  - amlodipine 10mg  - Losartan 100mg  - hydralazine 50mg BID  - Bystolic 10 mg - nonformulary  - monitor vitals    #Rheumatoid Arthritis  - Humira subq every 3 weeks - unknown when last dose  - Sulfasalazine 500mg 2 tabs BID - continued  - Duloxetine 60mg daily - continued    #Thrombocytopenia  - monitor for now    #Hypothyroid  cont synthroid 75 mcg daily    #HLD  - lipitor 20mg  - fenofibrate 160mg - held  - fish oil 1200mg daily - held    #Supplements  - MVI  - folic acid  - Vit C daily  - Vit D3 daily    #GI ppx  - PPI    #DVT ppx  - IPC  - started on eliquis  - LE dopplers: Evidence of DVT involving the RLE from the level of proximal common femoral vein down to the popliteal vein. Segments of the thrombus appears chronic as described above.    Call placed to brody Emma, with no answer. Left message to call back.
76M hx of HTN, HLD, T2DM on insulin, hx of LGIB, hypothyroid, RA on Humira/SSZ, asthma, remote RLE DVT off A/C pw vomiting, admitted for UTI.    #UTI  #Sepsis  - UA positive  - sepsis fluids given  - continue ceftriaxone day 4 - transition to cefpodoxime 200mg BID x 1 day to complete 5 day course  - CT C/A/P: Bibasilar atelectasis, left greater than right. Otherwise the visualized lung fields are clear. Thickened bladder wall is again demonstrated. Question secondary to radiation treatment. However underlying infectious process cannot be   excluded. Recommend clinical and lab correlation.  - follow up cultures    #Covid-19 positive  #Acute on Chronic RLE DVT  - initiate remdesivir and decadron - remdesivir discontinued due to JAN  - monitor renal/hepatic function  - O2 via NC 2L  - unclear as to why pt is hypoxic  - If no improvement in oxygenation, considering CT angio chest - however, question whether CTA chest may not be necessary if pt is already going to be anticoagulated for DVT  - has hx of remote DVT  - 1/26 LE doppler: Evidence of DVT involving the RLE from the level of proximal common femoral vein down to the popliteal vein. Segments of the thrombus appears chronic as described above.  - case discussed with outpatient vascular surgeon, Dr. Brush - instructed to place patient on eliquis and he will follow up as outpatient. Pt has hx of post-phlebitic leg and is well known to his practice.  - AC with caution as pt has hx of diverticulitis in 8/2021 with LGIB  - H/H [id number]    #JAN on CKD III  - Cr. 1.5  - baseline Cr 1.2  - IVF  - monitor renal function    #Hypokalemia - improved  - replete PO    #T2DM on insulin  - home meds: Jardiance 10mg daily, Ozempic 1mg weekly, Tresiba 20 units daily  - will change tresiba to lantus 15 units daily  - ISS  - accucheks  - hba1c 5.9 -- needs med adjustment as outpatient and A1c goal approx 7    #HTN  - amlodipine 10mg  - Losartan 100mg  - hydralazine 50mg BID  - Bystolic 10 mg - nonformulary  - monitor vitals    #Rheumatoid Arthritis  - Humira subq every 3 weeks - unknown when last dose  - Sulfasalazine 500mg 2 tabs BID - continued  - Duloxetine 60mg daily - continued    #Thrombocytopenia  - monitor for now    #Hypothyroid  cont synthroid 75 mcg daily    #HLD  - lipitor 20mg  - fenofibrate 160mg - held  - fish oil 1200mg daily - held    #Supplements  - MVI  - folic acid  - Vit C daily  - Vit D3 daily    #GI ppx  - PPI    #DVT ppx  - IPC  - started on eliquis  - LE dopplers: Evidence of DVT involving the RLE from the level of proximal common femoral vein down to the popliteal vein. Segments of the thrombus appears chronic as described above.    will call daughter Emma today

## 2022-01-28 NOTE — DISCHARGE NOTE PROVIDER - HOSPITAL COURSE
HPI:  76M hx of HTN, HLD, T2DM on insulin, hx of LGIB, hypothyroid, RA on Humira/SSZ, asthma, remote RLE DVT off A/C pw vomiting, fever. Pt lives at home with daughter, Lenore. Had 4 episodes of vomitus and subjective fever at home. Admits to dysuria. UA found to be positive. Given ceftriaxone in ED. Sepsis fluids given. Denied any HA, cough, myalgias, fever, chills, cp, palps, abd pain, n/v/d/c, hematuria. VSS at time of admission.      Hospital Course:  Pt admitted for urosepsis and treated with IV ceftriaxone for E coli UTI/bacteremia x 4 days. Switched to cefpodoxime 200mg BID x 1 day as outpatient to complete 5 days of antibiotics. Pt was also covid positive however was not hypoxic secondary to viral causes. CT chest showed atelectasis which may have been causing initial hypoxia on admission which has now improved. Pt is comfortable on room air with SpO2 98% on room air. Pt also found to have acute on chronic DVT in RLE extrending from proximal common femoral mode to popliteal vein. Called Dr. Joaquin Brush, pt's vascular surgeon he follows with, and made plan to initiate eliquis for DVT. Pt to follow with Dr. Brush as outpatient for management of AC. Also seen by hematology as inpatient. To follow with Dr. Richard within 5 days of discharge.

## 2022-01-28 NOTE — PROGRESS NOTE ADULT - SUBJECTIVE AND OBJECTIVE BOX
Patient is a 76y old  Male who presents with a chief complaint of UTI (2022 10:24)      Patient seen and examined at bedside.  Denies chest pain, dyspnea, abd pain.    ALLERGIES:  [place] (Unknown)  No Known Drug Allergies    MEDICATIONS  (STANDING):  amLODIPine   Tablet 10 milliGRAM(s) Oral daily  ascorbic acid 500 milliGRAM(s) Oral daily  atorvastatin 20 milliGRAM(s) Oral at bedtime  cefTRIAXone   IVPB 1000 milliGRAM(s) IV Intermittent every 24 hours  cholecalciferol 400 Unit(s) Oral daily  dexAMETHasone     Tablet 6 milliGRAM(s) Oral daily  dextrose 40% Gel 15 Gram(s) Oral once  dextrose 5%. 1000 milliLiter(s) (50 mL/Hr) IV Continuous <Continuous>  dextrose 5%. 1000 milliLiter(s) (100 mL/Hr) IV Continuous <Continuous>  dextrose 50% Injectable 25 Gram(s) IV Push once  dextrose 50% Injectable 12.5 Gram(s) IV Push once  dextrose 50% Injectable 25 Gram(s) IV Push once  DULoxetine 60 milliGRAM(s) Oral daily  enoxaparin Injectable 40 milliGRAM(s) SubCutaneous every 12 hours  folic acid 1 milliGRAM(s) Oral daily  glucagon  Injectable 1 milliGRAM(s) IntraMuscular once  hydrALAZINE 50 milliGRAM(s) Oral two times a day  insulin glargine Injectable (LANTUS) 20 Unit(s) SubCutaneous at bedtime  insulin lispro (ADMELOG) corrective regimen sliding scale   SubCutaneous three times a day before meals  insulin lispro (ADMELOG) corrective regimen sliding scale   SubCutaneous at bedtime  levothyroxine 75 MICROGram(s) Oral daily  losartan 100 milliGRAM(s) Oral daily  montelukast 10 milliGRAM(s) Oral daily  multivitamin 1 Tablet(s) Oral daily  sulfaSALAzine 1000 milliGRAM(s) Oral two times a day    MEDICATIONS  (PRN):  acetaminophen     Tablet .. 650 milliGRAM(s) Oral every 6 hours PRN Temp greater or equal to 38C (100.4F), Mild Pain (1 - 3)  aluminum hydroxide/magnesium hydroxide/simethicone Suspension 30 milliLiter(s) Oral every 4 hours PRN Dyspepsia  melatonin 3 milliGRAM(s) Oral at bedtime PRN Insomnia  ondansetron Injectable 4 milliGRAM(s) IV Push every 8 hours PRN Nausea and/or Vomiting    Vital Signs Last 24 Hrs  T(F): 97.5 (2022 05:12), Max: 101.4 (2022 19:03)  HR: 68 (2022 05:12) (68 - 90)  BP: 120/56 (2022 05:12) (118/56 - 140/71)  RR: 18 (2022 05:12) (18 - 20)  SpO2: 96% (2022 05:12) (89% - 98%)  I&O's Summary    BMI (kg/m2): 40.9 (22 @ 19:03)  PHYSICAL EXAM:  General: NAD, A/O x 3  ENT: MMM  Neck: Supple, No JVD  Lungs: Clear to auscultation bilaterally  Cardio: RRR, S1/S2, No murmurs  Abdomen: Soft, Nontender, Nondistended; Bowel sounds present  Extremities: No calf tenderness, No pitting edema    LABS:                        14.3   9.96  )-----------( 130      ( 2022 05:30 )             44.2           140  |  104  |  27  ----------------------------<  123  4.7   |  29  |  1.96    Ca    8.8      2022 05:30    TPro  6.8  /  Alb  3.0  /  TBili  0.4  /  DBili  0.1  /  AST  42  /  ALT  43  /  AlkPhos  51       eGFR if Non African American: 33 mL/min/1.73M2 (22 @ 05:30)  eGFR if African American: 38 mL/min/1.73M2 (22 @ 05:30)    PT/INR - ( 2022 07:25 )   PT: 13.3 sec;   INR: 1.10 ratio         PTT - ( 2022 07:25 )  PTT:30.1 sec   Lactate, Blood: 1.4 mmol/L ( @ 05:05)         Chol 108 mg/dL LDL -- HDL 41 mg/dL Trig 181 mg/dL              POCT Blood Glucose.: 144 mg/dL (2022 08:26)  POCT Blood Glucose.: 219 mg/dL (2022 21:06)  POCT Blood Glucose.: 118 mg/dL (2022 10:19)      Urinalysis Basic - ( 2022 05:05 )    Color: Yellow / Appearance: Clear / S.010 / pH: x  Gluc: x / Ketone: Negative  / Bili: Negative / Urobili: Negative   Blood: x / Protein: 100 / Nitrite: Positive   Leuk Esterase: Moderate / RBC: 11-25 /HPF / WBC >50 /HPF   Sq Epi: x / Non Sq Epi: Neg.-Few / Bacteria: TNTC /HPF        Culture - Blood (collected 2022 05:05)  Source: .Blood Blood-Peripheral  Gram Stain (2022 21:59):    Growth in anaerobic bottle: Gram Negative Rods  Preliminary Report (2022 21:59):    Growth in anaerobic bottle: Gram Negative Rods    ***Blood Panel PCR results on this specimen are available    approximately 3 hours after the Gram stain result.***    Gram stain, PCR, and/or culture results may not always    correspond due to difference in methodologies.    ************************************************************    This PCR assay was performed by multiplex PCR. This    Assay tests for 66 bacterial and resistance gene targets.    Please refer to the [facility] Labs test directory    at [email or web address] for details.  Organism: Blood Culture PCR (2022 00:33)  Organism: Blood Culture PCR (2022 00:33)      -  Escherichia coli: Detec      Method Type: PCR    Culture - Blood (collected 2022 05:05)  Source: .Blood Blood-Peripheral  Preliminary Report (2022 09:01):    No growth to date.          RADIOLOGY & ADDITIONAL TESTS:    Care Discussed with Consultants/Other Providers:   
Patient is a 76y old  Male who presents with a chief complaint of UTI (28 Jan 2022 09:41)      Patient seen and examined at bedside.  Denies chest pain, dyspnea, abd pain.    ALLERGIES:  [place] (Unknown)  No Known Drug Allergies    MEDICATIONS  (STANDING):  amLODIPine   Tablet 10 milliGRAM(s) Oral daily  apixaban 10 milliGRAM(s) Oral every 12 hours  ascorbic acid 500 milliGRAM(s) Oral daily  atorvastatin 20 milliGRAM(s) Oral at bedtime  cholecalciferol 400 Unit(s) Oral daily  dexAMETHasone     Tablet 6 milliGRAM(s) Oral daily  dextrose 40% Gel 15 Gram(s) Oral once  dextrose 5%. 1000 milliLiter(s) (50 mL/Hr) IV Continuous <Continuous>  dextrose 5%. 1000 milliLiter(s) (100 mL/Hr) IV Continuous <Continuous>  dextrose 50% Injectable 25 Gram(s) IV Push once  dextrose 50% Injectable 12.5 Gram(s) IV Push once  dextrose 50% Injectable 25 Gram(s) IV Push once  DULoxetine 60 milliGRAM(s) Oral daily  folic acid 1 milliGRAM(s) Oral daily  glucagon  Injectable 1 milliGRAM(s) IntraMuscular once  hydrALAZINE 50 milliGRAM(s) Oral two times a day  insulin glargine Injectable (LANTUS) 20 Unit(s) SubCutaneous at bedtime  insulin lispro (ADMELOG) corrective regimen sliding scale   SubCutaneous three times a day before meals  insulin lispro (ADMELOG) corrective regimen sliding scale   SubCutaneous at bedtime  levothyroxine 75 MICROGram(s) Oral daily  losartan 100 milliGRAM(s) Oral daily  montelukast 10 milliGRAM(s) Oral daily  multivitamin 1 Tablet(s) Oral daily  sulfaSALAzine 1000 milliGRAM(s) Oral two times a day    MEDICATIONS  (PRN):  acetaminophen     Tablet .. 650 milliGRAM(s) Oral every 6 hours PRN Temp greater or equal to 38C (100.4F), Mild Pain (1 - 3)  aluminum hydroxide/magnesium hydroxide/simethicone Suspension 30 milliLiter(s) Oral every 4 hours PRN Dyspepsia  melatonin 3 milliGRAM(s) Oral at bedtime PRN Insomnia  ondansetron Injectable 4 milliGRAM(s) IV Push every 8 hours PRN Nausea and/or Vomiting    Vital Signs Last 24 Hrs  T(F): 97.6 (28 Jan 2022 05:16), Max: 98.3 (27 Jan 2022 15:53)  HR: 72 (28 Jan 2022 05:16) (72 - 88)  BP: 127/84 (28 Jan 2022 05:16) (127/84 - 178/96)  RR: 16 (28 Jan 2022 05:16) (16 - 19)  SpO2: 98% (28 Jan 2022 05:16) (94% - 98%)  I&O's Summary    27 Jan 2022 07:01  -  28 Jan 2022 07:00  --------------------------------------------------------  IN: 500 mL / OUT: 0 mL / NET: 500 mL      BMI (kg/m2): 40.9 (01-25-22 @ 19:03)  PHYSICAL EXAM:  General: NAD, A/O x 3  ENT: MMM  Neck: Supple, No JVD  Lungs: Clear to auscultation bilaterally  Cardio: RRR, S1/S2, No murmurs  Abdomen: Soft, Nontender, Nondistended; Bowel sounds present  Extremities: No calf tenderness, No pitting edema    LABS:                        13.9   6.12  )-----------( 148      ( 28 Jan 2022 06:24 )             41.2       01-28    140  |  105  |  26  ----------------------------<  120  4.1   |  29  |  1.40    Ca    8.6      28 Jan 2022 06:24    TPro  6.6  /  Alb  3.0  /  TBili  0.2  /  DBili  x   /  AST  27  /  ALT  35  /  AlkPhos  47  01-28     eGFR if Non African American: 49 mL/min/1.73M2 (01-28-22 @ 06:24)  eGFR if : 57 mL/min/1.73M2 (01-28-22 @ 06:24)             01-25 Chol 108 mg/dL LDL -- HDL 41 mg/dL Trig 181 mg/dL              POCT Blood Glucose.: 122 mg/dL (28 Jan 2022 07:37)  POCT Blood Glucose.: 174 mg/dL (27 Jan 2022 21:22)  POCT Blood Glucose.: 171 mg/dL (27 Jan 2022 17:16)  POCT Blood Glucose.: 186 mg/dL (27 Jan 2022 11:31)          Culture - Urine (collected 25 Jan 2022 05:05)  Source: Clean Catch Clean Catch (Midstream)  Final Report (27 Jan 2022 10:31):    >100,000 CFU/ml Escherichia coli  Organism: Escherichia coli (27 Jan 2022 10:31)  Organism: Escherichia coli (27 Jan 2022 10:31)      -  Amikacin: S <=16      -  Amoxicillin/Clavulanic Acid: S <=8/4      -  Ampicillin: R >16 These ampicillin results predict results for amoxicillin      -  Ampicillin/Sulbactam: I 16/8 Enterobacter, Klebsiella aerogenes, Citrobacter, and Serratia may develop resistance during prolonged therapy (3-4 days)      -  Aztreonam: S <=4      -  Cefazolin: S <=2 (MIC_CL_COM_ENTERIC_CEFAZU) For uncomplicated UTI with K. pneumoniae, E. coli, or P. mirablis: TITA <=16 is sensitive and TITA >=32 is resistant. This also predicts results for oral agents cefaclor, cefdinir, cefpodoxime, cefprozil, cefuroxime axetil, cephalexin and locarbef for uncomplicated UTI. Note that some isolates may be susceptible to these agents while testing resistant to cefazolin.      -  Cefepime: S <=2      -  Cefoxitin: S <=8      -  Ceftriaxone: S <=1 Enterobacter, Klebsiella aerogenes, Citrobacter, and Serratia may develop resistance during prolonged therapy      -  Ciprofloxacin: I 0.5      -  Ertapenem: S <=0.5      -  Gentamicin: R >8      -  Imipenem: S <=1      -  Levofloxacin: S <=0.5      -  Meropenem: S <=1      -  Nitrofurantoin: S <=32 Should not be used to treat pyelonephritis      -  Piperacillin/Tazobactam: S <=8      -  Tigecycline: S <=2      -  Tobramycin: I 8      -  Trimethoprim/Sulfamethoxazole: R >2/38      Method Type: TITA    Culture - Blood (collected 25 Jan 2022 05:05)  Source: .Blood Blood-Peripheral  Gram Stain (25 Jan 2022 21:59):    Growth in anaerobic bottle: Gram Negative Rods  Final Report (27 Jan 2022 17:23):    Growth in anaerobic bottle: Escherichia coli    ***Blood Panel PCR results on this specimen are available    approximately 3 hours after the Gram stain result.***    Gram stain, PCR, and/or culture results may not always    correspond due to difference in methodologies.    ************************************************************    This PCR assay was performed by multiplex PCR. This    Assay tests for 66 bacterial and resistance gene targets.    Please refer to the [facility] Labs test directory    at [email or web address] for details.  Organism: Blood Culture PCR  Escherichia coli (27 Jan 2022 17:23)  Organism: Escherichia coli (27 Jan 2022 17:23)      -  Amikacin: S <=16      -  Ampicillin: R >16 These ampicillin results predict results for amoxicillin      -  Ampicillin/Sulbactam: I 16/8 Enterobacter, Klebsiella aerogenes, Citrobacter, and Serratia may develop resistance during prolonged therapy (3-4 days)      -  Aztreonam: S <=4      -  Cefazolin: S <=2 Enterobacter, Klebsiella aerogenes, Citrobacter, and Serratia may develop resistance during prolonged therapy (3-4 days)      -  Cefepime: S <=2      -  Cefoxitin: S <=8      -  Ceftriaxone: S <=1 Enterobacter, Klebsiella aerogenes, Citrobacter, and Serratia may develop resistance during prolonged therapy      -  Ciprofloxacin: I 0.5      -  Ertapenem: S <=0.5      -  Gentamicin: R >8      -  Imipenem: S <=1      -  Levofloxacin: S <=0.5      -  Meropenem: S <=1      -  Piperacillin/Tazobactam: S <=8      -  Tobramycin: I 8      -  Trimethoprim/Sulfamethoxazole: R >2/38      Method Type: TITA  Organism: Blood Culture PCR (27 Jan 2022 17:23)      -  Escherichia coli: Detec      Method Type: PCR    Culture - Blood (collected 25 Jan 2022 05:05)  Source: .Blood Blood-Peripheral  Preliminary Report (26 Jan 2022 09:01):    No growth to date.          RADIOLOGY & ADDITIONAL TESTS:    Care Discussed with Consultants/Other Providers:   
Patient is a 76y old  Male who presents with a chief complaint of UTI (2022 10:17)      Patient seen and examined at bedside.  Denies chest pain, dyspnea, abd pain.    ALLERGIES:  [place] (Unknown)  No Known Drug Allergies    MEDICATIONS  (STANDING):  amLODIPine   Tablet 10 milliGRAM(s) Oral daily  ascorbic acid 500 milliGRAM(s) Oral daily  atorvastatin 20 milliGRAM(s) Oral at bedtime  benzocaine 15 mG/menthol 3.6 mG Lozenge 1 Lozenge Oral once  cefTRIAXone   IVPB 1000 milliGRAM(s) IV Intermittent every 24 hours  cholecalciferol 400 Unit(s) Oral daily  dexAMETHasone     Tablet 6 milliGRAM(s) Oral daily  dextrose 40% Gel 15 Gram(s) Oral once  dextrose 5%. 1000 milliLiter(s) (50 mL/Hr) IV Continuous <Continuous>  dextrose 5%. 1000 milliLiter(s) (100 mL/Hr) IV Continuous <Continuous>  dextrose 50% Injectable 25 Gram(s) IV Push once  dextrose 50% Injectable 12.5 Gram(s) IV Push once  dextrose 50% Injectable 25 Gram(s) IV Push once  DULoxetine 60 milliGRAM(s) Oral daily  enoxaparin Injectable 40 milliGRAM(s) SubCutaneous every 12 hours  folic acid 1 milliGRAM(s) Oral daily  glucagon  Injectable 1 milliGRAM(s) IntraMuscular once  hydrALAZINE 50 milliGRAM(s) Oral two times a day  insulin glargine Injectable (LANTUS) 20 Unit(s) SubCutaneous at bedtime  insulin lispro (ADMELOG) corrective regimen sliding scale   SubCutaneous three times a day before meals  insulin lispro (ADMELOG) corrective regimen sliding scale   SubCutaneous at bedtime  levothyroxine 75 MICROGram(s) Oral daily  losartan 100 milliGRAM(s) Oral daily  montelukast 10 milliGRAM(s) Oral daily  multivitamin 1 Tablet(s) Oral daily  sulfaSALAzine 1000 milliGRAM(s) Oral two times a day    MEDICATIONS  (PRN):  acetaminophen     Tablet .. 650 milliGRAM(s) Oral every 6 hours PRN Temp greater or equal to 38C (100.4F), Mild Pain (1 - 3)  aluminum hydroxide/magnesium hydroxide/simethicone Suspension 30 milliLiter(s) Oral every 4 hours PRN Dyspepsia  melatonin 3 milliGRAM(s) Oral at bedtime PRN Insomnia  ondansetron Injectable 4 milliGRAM(s) IV Push every 8 hours PRN Nausea and/or Vomiting    Vital Signs Last 24 Hrs  T(F): 97.7 (2022 11:39), Max: 98.5 (2022 20:02)  HR: 73 (2022 11:39) (71 - 83)  BP: 150/76 (2022 11:39) (122/60 - 150/76)  RR: 18 (2022 11:39) (15 - 18)  SpO2: 94% (2022 11:39) (94% - 99%)  I&O's Summary    2022 07:01  -  2022 07:00  --------------------------------------------------------  IN: 500 mL / OUT: 0 mL / NET: 500 mL    2022 07:01  -  2022 11:59  --------------------------------------------------------  IN: 500 mL / OUT: 0 mL / NET: 500 mL      BMI (kg/m2): 40.9 (22 @ 19:03)  PHYSICAL EXAM:  General: NAD, A/O x 3  ENT: MMM  Neck: Supple, No JVD  Lungs: Clear to auscultation bilaterally  Cardio: RRR, S1/S2, No murmurs  Abdomen: Soft, Nontender, Nondistended; Bowel sounds present  Extremities: No calf tenderness, No pitting edema    LABS:                        14.5   7.31  )-----------( 141      ( 2022 08:59 )             43.8           140  |  103  |  27  ----------------------------<  157  4.0   |  30  |  1.56    Ca    8.5      2022 08:59    TPro  6.4  /  Alb  3.0  /  TBili  0.2  /  DBili  0.1  /  AST  33  /  ALT  37  /  AlkPhos  48       eGFR if Non African American: 43 mL/min/1.73M2 (22 @ 08:59)  eGFR if : 49 mL/min/1.73M2 (22 @ 08:59)    PT/INR - ( 2022 07:25 )   PT: 13.3 sec;   INR: 1.10 ratio         PTT - ( 2022 07:25 )  PTT:30.1 sec   Lactate, Blood: 1.4 mmol/L ( @ 05:05)         Chol 108 mg/dL LDL -- HDL 41 mg/dL Trig 181 mg/dL              POCT Blood Glucose.: 186 mg/dL (2022 11:31)  POCT Blood Glucose.: 110 mg/dL (2022 08:05)  POCT Blood Glucose.: 171 mg/dL (2022 20:58)  POCT Blood Glucose.: 157 mg/dL (2022 17:21)  POCT Blood Glucose.: 194 mg/dL (2022 12:26)      Urinalysis Basic - ( 2022 05:05 )    Color: Yellow / Appearance: Clear / S.010 / pH: x  Gluc: x / Ketone: Negative  / Bili: Negative / Urobili: Negative   Blood: x / Protein: 100 / Nitrite: Positive   Leuk Esterase: Moderate / RBC: 11-25 /HPF / WBC >50 /HPF   Sq Epi: x / Non Sq Epi: Neg.-Few / Bacteria: TNTC /HPF        Culture - Urine (collected 2022 05:05)  Source: Clean Catch Clean Catch (Midstream)  Final Report (2022 10:31):    >100,000 CFU/ml Escherichia coli  Organism: Escherichia coli (2022 10:31)  Organism: Escherichia coli (2022 10:31)      -  Amikacin: S <=16      -  Amoxicillin/Clavulanic Acid: S <=8/4      -  Ampicillin: R >16 These ampicillin results predict results for amoxicillin      -  Ampicillin/Sulbactam: I 16/8 Enterobacter, Klebsiella aerogenes, Citrobacter, and Serratia may develop resistance during prolonged therapy (3-4 days)      -  Aztreonam: S <=4      -  Cefazolin: S <=2 (MIC_CL_COM_ENTERIC_CEFAZU) For uncomplicated UTI with K. pneumoniae, E. coli, or P. mirablis: TITA <=16 is sensitive and TITA >=32 is resistant. This also predicts results for oral agents cefaclor, cefdinir, cefpodoxime, cefprozil, cefuroxime axetil, cephalexin and locarbef for uncomplicated UTI. Note that some isolates may be susceptible to these agents while testing resistant to cefazolin.      -  Cefepime: S <=2      -  Cefoxitin: S <=8      -  Ceftriaxone: S <=1 Enterobacter, Klebsiella aerogenes, Citrobacter, and Serratia may develop resistance during prolonged therapy      -  Ciprofloxacin: I 0.5      -  Ertapenem: S <=0.5      -  Gentamicin: R >8      -  Imipenem: S <=1      -  Levofloxacin: S <=0.5      -  Meropenem: S <=1      -  Nitrofurantoin: S <=32 Should not be used to treat pyelonephritis      -  Piperacillin/Tazobactam: S <=8      -  Tigecycline: S <=2      -  Tobramycin: I 8      -  Trimethoprim/Sulfamethoxazole: R >2/38      Method Type: TITA    Culture - Blood (collected 2022 05:05)  Source: .Blood Blood-Peripheral  Gram Stain (2022 21:59):    Growth in anaerobic bottle: Gram Negative Rods  Preliminary Report (2022 19:25):    Growth in anaerobic bottle: Escherichia coli    ***Blood Panel PCR results on this specimen are available    approximately 3 hours after the Gram stain result.***    Gram stain, PCR, and/or culture results may not always    correspond due to difference in methodologies.    ************************************************************    This PCR assay was performed by multiplex PCR. This    Assay tests for 66 bacterial and resistance gene targets.    Please refer to the [facility] test directory    at [email or web address] for details.  Organism: Blood Culture PCR (2022 00:33)  Organism: Blood Culture PCR (2022 00:33)      -  Escherichia coli: Detec      Method Type: PCR    Culture - Blood (collected 2022 05:05)  Source: .Blood Blood-Peripheral  Preliminary Report (2022 09:01):    No growth to date.          RADIOLOGY & ADDITIONAL TESTS:    Care Discussed with Consultants/Other Providers:

## 2022-01-28 NOTE — CONSULT NOTE ADULT - ASSESSMENT
76M hx of HTN, HLD, T2DM on insulin, hx of LGIB, hypothyroid, RA , asthma, remote RLE DVT off A/C pw vomiting, fever.  Had 4 episodes of vomitus and subjective fever at home. +Dysuria.    B/L LE venous duplex study- evidence of DVT involving the right lower extremity from the level of proximal common femoral vein down to the popliteal vein. Segments of the   thrombus appears chronic (has followed with Dr. Brush).

## 2022-01-28 NOTE — DISCHARGE NOTE NURSING/CASE MANAGEMENT/SOCIAL WORK - NSDCPEFALRISK_GEN_ALL_CORE
For information on Fall & Injury Prevention, visit: [email or web address] OR  [email or web address] OR  https://www.cdc.gov/steadi/patient.html

## 2022-01-28 NOTE — DISCHARGE NOTE NURSING/CASE MANAGEMENT/SOCIAL WORK - PATIENT PORTAL LINK FT
You can access the FollowMyHealth Patient Portal offered by [facility] by registering at the following website: [email or web address]. By joining [facility]’s FollowMyHealth portal, you will also be able to view your health information using other applications (apps) compatible with our system.

## 2022-01-28 NOTE — DISCHARGE NOTE PROVIDER - CARE PROVIDER_API CALL
Abad Gannon)  Medicine  [address], [place] [address]  Phone: [phone]  Fax: [phone]  Follow Up Time:

## 2022-01-28 NOTE — PHARMACOTHERAPY INTERVENTION NOTE - COMMENTS
Counseled pt on new medication Eliquis. Reviewed reasons for use, directions for use and possible side effects. Discussed signs and symptoms of bleeding to watch for and when to seek medical attention. Pt states he took Eliquis last summer and tolerated it. All questions answered.

## 2022-01-28 NOTE — DISCHARGE NOTE PROVIDER - NSDCCPCAREPLAN_GEN_ALL_CORE_FT
PRINCIPAL DISCHARGE DIAGNOSIS  Diagnosis: Complicated UTI (urinary tract infection)  Assessment and Plan of Treatment:       SECONDARY DISCHARGE DIAGNOSES  Diagnosis: Vomiting in adult  Assessment and Plan of Treatment:

## 2022-01-28 NOTE — CONSULT NOTE ADULT - SUBJECTIVE AND OBJECTIVE BOX
CK PACHECO  76y  Male  Admitting: ENZO Hutchinson    HPI:  76M hx of HTN, HLD, T2DM on insulin, hx of LGIB, hypothyroid, RA , asthma, remote RLE DVT off A/C pw vomiting, fever.  Had 4 episodes of vomitus and subjective fever at home. +Dysuria.    B/L LE venous duplex study- evidence of DVT involving the right lower extremity from the level of proximal common femoral vein down to the popliteal vein. Segments of the   thrombus appears chronic (has followed with Dr. Brush).      PAST MEDICAL & SURGICAL HISTORY:  Rheumatoid arthritis    Asthma    Prostate cancer  seed implant 2008    Hypertension    Type 2 diabetes mellitus    Hypothyroid    Hyperlipidemia    Seasonal allergies    DVT (deep venous thrombosis)  right leg 4-5 yrs ago    COVID-19 vaccine series completed  pfizer, last dose 3/6/21    Osteoarthritis of left hip    Sleep apnea  diagnosed in past, unable to tolerate CPAP at that time; repeat sleep study done 8/21-awaiting results    History of prostate cancer  2011    PAD (peripheral artery disease)    Hyperpigmentation of skin  lower legs    History of diverticulitis  6/2021    Urinary frequency    Severe obesity (BMI &gt;= 40)    H/O hernia repair  2000&#x27;s    S/P laparoscopic cholecystectomy  2000    S/P total hip arthroplasty  right 2011    S/P tonsillectomy  childhood    S/P LASIK surgery of both eyes    S/P lumbar laminectomy  2012    S/P foot surgery  bilateral; 2010      MEDICATIONS  (STANDING):  amLODIPine   Tablet 10 milliGRAM(s) Oral daily  apixaban 10 milliGRAM(s) Oral every 12 hours  ascorbic acid 500 milliGRAM(s) Oral daily  atorvastatin 20 milliGRAM(s) Oral at bedtime  cholecalciferol 400 Unit(s) Oral daily  dexAMETHasone     Tablet 6 milliGRAM(s) Oral daily  dextrose 40% Gel 15 Gram(s) Oral once  dextrose 5%. 1000 milliLiter(s) (50 mL/Hr) IV Continuous <Continuous>  dextrose 5%. 1000 milliLiter(s) (100 mL/Hr) IV Continuous <Continuous>  dextrose 50% Injectable 25 Gram(s) IV Push once  dextrose 50% Injectable 12.5 Gram(s) IV Push once  dextrose 50% Injectable 25 Gram(s) IV Push once  DULoxetine 60 milliGRAM(s) Oral daily  folic acid 1 milliGRAM(s) Oral daily  glucagon  Injectable 1 milliGRAM(s) IntraMuscular once  hydrALAZINE 50 milliGRAM(s) Oral two times a day  insulin glargine Injectable (LANTUS) 20 Unit(s) SubCutaneous at bedtime  insulin lispro (ADMELOG) corrective regimen sliding scale   SubCutaneous three times a day before meals  insulin lispro (ADMELOG) corrective regimen sliding scale   SubCutaneous at bedtime  levothyroxine 75 MICROGram(s) Oral daily  losartan 100 milliGRAM(s) Oral daily  montelukast 10 milliGRAM(s) Oral daily  multivitamin 1 Tablet(s) Oral daily  sulfaSALAzine 1000 milliGRAM(s) Oral two times a day    MEDICATIONS  (PRN):  acetaminophen     Tablet .. 650 milliGRAM(s) Oral every 6 hours PRN Temp greater or equal to 38C (100.4F), Mild Pain (1 - 3)  aluminum hydroxide/magnesium hydroxide/simethicone Suspension 30 milliLiter(s) Oral every 4 hours PRN Dyspepsia  melatonin 3 milliGRAM(s) Oral at bedtime PRN Insomnia  ondansetron Injectable 4 milliGRAM(s) IV Push every 8 hours PRN Nausea and/or Vomiting    Allergies    Osito (Unknown)  No Known Drug Allergies    FAMILY HISTORY:  Family history of prostate cancer in father (Father)  stomach cancer also    Family history- stomach cancer (Mother)  No FH of VTE.        SOCIAL HISTORY: No EtOH, no tobacco    REVIEW OF SYSTEMS:    CONSTITUTIONAL: No chills  EYES/ENT: No visual changes;  No vertigo or throat pain   NECK: No pain or stiffness  RESPIRATORY: No hemoptysis  CARDIOVASCULAR: No chest pain or palpitations  GASTROINTESTINAL: No hematemesis; No melena or hematochezia.  SKIN: No itching  All other review of systems is negative unless indicated above.    T(F): 97.6 (01-28-22 @ 05:16), Max: 98.3 (01-27-22 @ 15:53)  HR: 72 (01-28-22 @ 05:16)  BP: 127/84 (01-28-22 @ 05:16)  RR: 16 (01-28-22 @ 05:16)  SpO2: 98% (01-28-22 @ 05:16)    GENERAL: NAD, well-developed  HEAD:  Atraumatic, Normocephalic  EYES: EOMI, PERRLA, conjunctiva and sclera clear  NECK: Supple, No JVD  CHEST/LUNG: Clear to auscultation ant.; No wheeze  HEART: Regular rate and rhythm; No murmurs, rubs, or gallops  ABDOMEN: Soft, Nontender, Nondistended; Bowel sounds present  EXTREMITIES:  chronic venous stasis changes LE, no calf tenderness  NEUROLOGY: awake alert      Labs:             13.9   6.12  )-----------( 148      ( 01-28 @ 06:24 )             41.2                14.5   7.31  )-----------( 141      ( 01-27 @ 08:59 )             43.8                13.8   x     )-----------( x        ( 01-27 @ 06:30 )             42.1                15.3   x     )-----------( x        ( 01-26 @ 17:10 )             46.6                14.3   9.96  )-----------( 130      ( 01-26 @ 05:30 )             44.2                14.0   x     )-----------( x        ( 01-25 @ 19:15 )             42.6       01-28    140  |  105  |  26<H>  ----------------------------<  120<H>  4.1   |  29  |  1.40<H>    Ca    8.6      28 Jan 2022 06:24    TPro  6.6  /  Alb  3.0<L>  /  TBili  0.2  /  DBili  x   /  AST  27  /  ALT  35  /  AlkPhos  47  01-28    Consultant notes reviewed : YES [x ] ; NO [ ]      Radiology and additional tests: CK PACHECO  76y  Male  Admitting: ENZO Hutchinson    HPI:  76M hx of HTN, HLD, T2DM on insulin, hx of LGIB, hypothyroid, RA , asthma, remote RLE DVT off A/C pw vomiting, fever.  Had 4 episodes of vomitus and subjective fever at home. +Dysuria.    B/L LE venous duplex study- evidence of DVT involving the right lower extremity from the level of proximal common femoral vein down to the popliteal vein. Segments of the   thrombus appears chronic (has followed with Dr. Brush).      PAST MEDICAL & SURGICAL HISTORY:  Rheumatoid arthritis    Asthma    Prostate cancer  seed implant 2008    Hypertension    Type 2 diabetes mellitus    Hypothyroid    Hyperlipidemia    Seasonal allergies    DVT (deep venous thrombosis)  right leg 4-5 yrs ago    COVID-19 vaccine series completed  pfizer, last dose 3/6/21    Osteoarthritis of left hip    Sleep apnea  diagnosed in past, unable to tolerate CPAP at that time; repeat sleep study done 8/21-awaiting results    History of prostate cancer  2011    PAD (peripheral artery disease)    Hyperpigmentation of skin  lower legs    History of diverticulitis  6/2021    Urinary frequency    Severe obesity (BMI &gt;= 40)    H/O hernia repair  2000&#x27;s    S/P laparoscopic cholecystectomy  2000    S/P total hip arthroplasty  right 2011    S/P tonsillectomy  childhood    S/P LASIK surgery of both eyes    S/P lumbar laminectomy  2012    S/P foot surgery  bilateral; 2010      MEDICATIONS  (STANDING):  amLODIPine   Tablet 10 milliGRAM(s) Oral daily  apixaban 10 milliGRAM(s) Oral every 12 hours  ascorbic acid 500 milliGRAM(s) Oral daily  atorvastatin 20 milliGRAM(s) Oral at bedtime  cholecalciferol 400 Unit(s) Oral daily  dexAMETHasone     Tablet 6 milliGRAM(s) Oral daily  dextrose 40% Gel 15 Gram(s) Oral once  dextrose 5%. 1000 milliLiter(s) (50 mL/Hr) IV Continuous <Continuous>  dextrose 5%. 1000 milliLiter(s) (100 mL/Hr) IV Continuous <Continuous>  dextrose 50% Injectable 25 Gram(s) IV Push once  dextrose 50% Injectable 12.5 Gram(s) IV Push once  dextrose 50% Injectable 25 Gram(s) IV Push once  DULoxetine 60 milliGRAM(s) Oral daily  folic acid 1 milliGRAM(s) Oral daily  glucagon  Injectable 1 milliGRAM(s) IntraMuscular once  hydrALAZINE 50 milliGRAM(s) Oral two times a day  insulin glargine Injectable (LANTUS) 20 Unit(s) SubCutaneous at bedtime  insulin lispro (ADMELOG) corrective regimen sliding scale   SubCutaneous three times a day before meals  insulin lispro (ADMELOG) corrective regimen sliding scale   SubCutaneous at bedtime  levothyroxine 75 MICROGram(s) Oral daily  losartan 100 milliGRAM(s) Oral daily  montelukast 10 milliGRAM(s) Oral daily  multivitamin 1 Tablet(s) Oral daily  sulfaSALAzine 1000 milliGRAM(s) Oral two times a day    MEDICATIONS  (PRN):  acetaminophen     Tablet .. 650 milliGRAM(s) Oral every 6 hours PRN Temp greater or equal to 38C (100.4F), Mild Pain (1 - 3)  aluminum hydroxide/magnesium hydroxide/simethicone Suspension 30 milliLiter(s) Oral every 4 hours PRN Dyspepsia  melatonin 3 milliGRAM(s) Oral at bedtime PRN Insomnia  ondansetron Injectable 4 milliGRAM(s) IV Push every 8 hours PRN Nausea and/or Vomiting    Allergies    Osito (Unknown)  No Known Drug Allergies    FAMILY HISTORY:  Family history of prostate cancer in father (Father)  stomach cancer also    Family history- stomach cancer (Mother)  No FH of VTE.        SOCIAL HISTORY: No EtOH, no tobacco    REVIEW OF SYSTEMS:    CONSTITUTIONAL: No chills  EYES/ENT: No visual changes;  No vertigo or throat pain   NECK: No pain or stiffness  RESPIRATORY: No hemoptysis  CARDIOVASCULAR: No chest pain or palpitations  GASTROINTESTINAL: No hematemesis; No melena or hematochezia.  SKIN: No itching  All other review of systems is negative unless indicated above.    T(F): 97.6 (01-28-22 @ 05:16), Max: 98.3 (01-27-22 @ 15:53)  HR: 72 (01-28-22 @ 05:16)  BP: 127/84 (01-28-22 @ 05:16)  RR: 16 (01-28-22 @ 05:16)  SpO2: 98% (01-28-22 @ 05:16)    GENERAL: NAD, well-developed  HEAD:  Atraumatic, Normocephalic  EYES: EOMI, PERRLA, conjunctiva and sclera clear  NECK: Supple, No JVD  CHEST/LUNG: Clear to auscultation ant.; No wheeze  HEART: Regular rate and rhythm; No murmurs, rubs, or gallops  ABDOMEN: Soft, Nontender, Nondistended; Bowel sounds present  EXTREMITIES:  chronic venous stasis changes LE, no calf tenderness  NEUROLOGY: awake alert      Labs:             13.9   6.12  )-----------( 148      ( 01-28 @ 06:24 )             41.2                14.5   7.31  )-----------( 141      ( 01-27 @ 08:59 )             43.8                13.8   x     )-----------( x        ( 01-27 @ 06:30 )             42.1                15.3   x     )-----------( x        ( 01-26 @ 17:10 )             46.6                14.3   9.96  )-----------( 130      ( 01-26 @ 05:30 )             44.2                14.0   x     )-----------( x        ( 01-25 @ 19:15 )             42.6       01-28    140  |  105  |  26<H>  ----------------------------<  120<H>  4.1   |  29  |  1.40<H>    Ca    8.6      28 Jan 2022 06:24    TPro  6.6  /  Alb  3.0<L>  /  TBili  0.2  /  DBili  x   /  AST  27  /  ALT  35  /  AlkPhos  47  01-28    Consultant notes reviewed : YES [x ] ; NO [ ]      Radiology and additional tests:  < from: US Duplex Venous Lower Ext Complete, Bilateral (01.26.22 @ 17:10) >  IMPRESSION:  Evidence of DVT involving the right lower extremity from the level of  proximal common femoral vein down to the popliteal vein. Segments of the   thrombus appears chronic as described above.      Left a message for Dr. Hutchinson on telephone number [phone].      < end of copied text >

## 2022-01-29 RX ORDER — CEFPODOXIME PROXETIL 100 MG
1 TABLET ORAL
Qty: 2 | Refills: 0
Start: 2022-01-29 | End: 2022-01-29

## 2022-01-30 LAB
CULTURE RESULTS: SIGNIFICANT CHANGE UP
SPECIMEN SOURCE: SIGNIFICANT CHANGE UP

## 2022-01-31 RX ORDER — ASPIRIN 81 MG/1
81 TABLET ORAL
Qty: 100 | Refills: 1 | Status: ACTIVE | COMMUNITY
Start: 2022-01-31

## 2022-02-04 ENCOUNTER — APPOINTMENT (OUTPATIENT)
Dept: FAMILY MEDICINE | Facility: CLINIC | Age: 77
End: 2022-02-04

## 2022-02-04 ENCOUNTER — APPOINTMENT (OUTPATIENT)
Dept: FAMILY MEDICINE | Facility: CLINIC | Age: 77
End: 2022-02-04
Payer: MEDICARE

## 2022-02-04 DIAGNOSIS — Z86.16 PERSONAL HISTORY OF COVID-19: ICD-10-CM

## 2022-02-04 PROCEDURE — 99496 TRANSJ CARE MGMT HIGH F2F 7D: CPT | Mod: 95

## 2022-02-04 NOTE — HEALTH RISK ASSESSMENT
[Never] : Never [Yes] : Yes [Monthly or less (1 pt)] : Monthly or less (1 point) [1 or 2 (0 pts)] : 1 or 2 (0 points) [Never (0 pts)] : Never (0 points) [No] : In the past 12 months have you used drugs other than those required for medical reasons? No [No falls in past year] : Patient reported no falls in the past year [0] : 2) Feeling down, depressed, or hopeless: Not at all (0) [PHQ-2 Negative - No further assessment needed] : PHQ-2 Negative - No further assessment needed [Audit-CScore] : 1 [[id number]] : 0 [Change in mental status noted] : No change in mental status noted [Language] : denies difficulty with language [Behavior] : denies difficulty with behavior [Learning/Retaining New Information] : denies difficulty learning/retaining new information [Handling Complex Tasks] : denies difficulty handling complex tasks [Reasoning] : denies difficulty with reasoning [Spatial Ability and Orientation] : denies difficulty with spatial ability and orientation [None] : None [Alone] : lives alone [Retired] : retired [Graduate School] : graduate school [] :  [Sexually Active] : not sexually active [High Risk Behavior] : no high risk behavior [Feels Safe at Home] : Feels safe at home [Fully functional (bathing, dressing, toileting, transferring, walking, feeding)] : Fully functional (bathing, dressing, toileting, transferring, walking, feeding) [Fully functional (using the telephone, shopping, preparing meals, housekeeping, doing laundry, using] : Fully functional and needs no help or supervision to perform IADLs (using the telephone, shopping, preparing meals, housekeeping, doing laundry, using transportation, managing medications and managing finances) [Reports changes in hearing] : Reports no changes in hearing [Reports changes in vision] : Reports no changes in vision [Reports normal functional visual acuity (ie: able to read med bottle)] : Reports normal functional visual acuity [Reports changes in dental health] : Reports no changes in dental health [Smoke Detector] : smoke detector [Carbon Monoxide Detector] : carbon monoxide detector [Safety elements used in home] : safety elements used in home [Seat Belt] :  uses seat belt [Sunscreen] : uses sunscreen [Travel to Developing Areas] : does not  travel to developing areas [TB Exposure] : is not being exposed to tuberculosis [Caregiver Concerns] : does not have caregiver concerns

## 2022-02-04 NOTE — REVIEW OF SYSTEMS
[Joint Pain] : joint pain [Joint Stiffness] : joint stiffness [Muscle Pain] : no muscle pain [Muscle Weakness] : no muscle weakness [Back Pain] : back pain [Joint Swelling] : no joint swelling [Negative] : Heme/Lymph [FreeTextEntry2] : Patient states that he is feeling well

## 2022-02-04 NOTE — ASSESSMENT
[FreeTextEntry1] : Assessment and plan:\par \par 1.  Status post hospitalization patient states that he is feeling well he presently denies any chest pain or shortness of breath.\par \par 2.  Urinary tract infection with E. coli bacteremia patient is on his last day of oral antibiotics at home and is feeling well.\par \par 3.  Status post COVID-19 infection patient states that he was symptomatic for approximately 24 hours and then it all resolved.\par \par 4.  DVT right lower extremity continue Eliquis follow-up with vascular surgery Dr. Alex Brush.\par \par 5.  Diabetes mellitus type 2 patient is on multiple medications with good control follows up with endocrinology patient will continue in the entire Jardiance, Ozempic, Tresiba and no concentrated sweets consistent carbohydrate diet.\par \par 6.  Diabetic neuropathy patient will continue duloxetine 60 mg p.o. daily.\par \par 7.  Hypertension no changes were made in treatment continue amlodipine 10 mg p.o. daily, diastolic 10 mg p.o. daily, hydralazine 50 mg p.o. twice a day, losartan 100 mg p.o. daily and continue lifestyle changes low-sodium diet.\par \par 8.  Hypothyroidism at his next office visit I will be obtaining comprehensive blood work continue levothyroxine 75 mcg daily.\par \par 9.  Hyperlipidemia continue present medical management with atorvastatin 20 mg and fenofibrate 160 mg daily discussed with patient low-fat low-cholesterol diet.\par \par 10.  Rheumatoid arthritis follow-up with rheumatology continue Humira patient injects 1 pen 40 mg weekly.

## 2022-02-09 NOTE — CONSULT NOTE ADULT - PROBLEM/RECOMMENDATION-1
February 10, 2022    Yoel Martinez  [address]  [place] [address]             [facility]  [address]  [facility] [address]  Phone: [phone] Dear Yoel,         Thank you for allowing me to serve you and perform your [facility] exam on 2/9/2022. This letter will serve as a brief summary of the physical findings and laboratory/studies performed and recommendations at this time.  Your blood sugar 10 points above normal and it is imperative that you lose 10 pounds and this will correct, all other parameters are normal.  Good luck with the recovery from [facility].          If you have any questions or concerns, please don't hesitate to call.    Sincerely,        Jay Lee MD     
DISPLAY PLAN FREE TEXT

## 2022-02-25 ENCOUNTER — OUTPATIENT (OUTPATIENT)
Dept: OUTPATIENT SERVICES | Facility: HOSPITAL | Age: 77
LOS: 1 days | End: 2022-02-25
Payer: MEDICARE

## 2022-02-25 VITALS
RESPIRATION RATE: 16 BRPM | WEIGHT: 265 LBS | OXYGEN SATURATION: 94 % | SYSTOLIC BLOOD PRESSURE: 161 MMHG | TEMPERATURE: 98 F | HEIGHT: 68.5 IN | HEART RATE: 75 BPM | DIASTOLIC BLOOD PRESSURE: 71 MMHG

## 2022-02-25 DIAGNOSIS — E11.9 TYPE 2 DIABETES MELLITUS WITHOUT COMPLICATIONS: ICD-10-CM

## 2022-02-25 DIAGNOSIS — M25.552 PAIN IN LEFT HIP: ICD-10-CM

## 2022-02-25 DIAGNOSIS — Z98.890 OTHER SPECIFIED POSTPROCEDURAL STATES: Chronic | ICD-10-CM

## 2022-02-25 DIAGNOSIS — U07.1 COVID-19: ICD-10-CM

## 2022-02-25 DIAGNOSIS — Z96.649 PRESENCE OF UNSPECIFIED ARTIFICIAL HIP JOINT: Chronic | ICD-10-CM

## 2022-02-25 DIAGNOSIS — Z90.49 ACQUIRED ABSENCE OF OTHER SPECIFIED PARTS OF DIGESTIVE TRACT: Chronic | ICD-10-CM

## 2022-02-25 DIAGNOSIS — M06.9 RHEUMATOID ARTHRITIS, UNSPECIFIED: ICD-10-CM

## 2022-02-25 DIAGNOSIS — Z91.89 OTHER SPECIFIED PERSONAL RISK FACTORS, NOT ELSEWHERE CLASSIFIED: ICD-10-CM

## 2022-02-25 DIAGNOSIS — Z90.89 ACQUIRED ABSENCE OF OTHER ORGANS: Chronic | ICD-10-CM

## 2022-02-25 DIAGNOSIS — M16.12 UNILATERAL PRIMARY OSTEOARTHRITIS, LEFT HIP: ICD-10-CM

## 2022-02-25 DIAGNOSIS — Z79.01 LONG TERM (CURRENT) USE OF ANTICOAGULANTS: ICD-10-CM

## 2022-02-25 LAB
A1C WITH ESTIMATED AVERAGE GLUCOSE RESULT: 5.7 % — HIGH (ref 4–5.6)
ALBUMIN SERPL ELPH-MCNC: 3.5 G/DL — SIGNIFICANT CHANGE UP (ref 3.3–5)
ALP SERPL-CCNC: 61 U/L — SIGNIFICANT CHANGE UP (ref 30–120)
ALT FLD-CCNC: 29 U/L DA — SIGNIFICANT CHANGE UP (ref 10–60)
ANION GAP SERPL CALC-SCNC: 6 MMOL/L — SIGNIFICANT CHANGE UP (ref 5–17)
APTT BLD: 35.9 SEC — HIGH (ref 27.5–35.5)
AST SERPL-CCNC: 23 U/L — SIGNIFICANT CHANGE UP (ref 10–40)
BILIRUB SERPL-MCNC: 0.3 MG/DL — SIGNIFICANT CHANGE UP (ref 0.2–1.2)
BUN SERPL-MCNC: 19 MG/DL — SIGNIFICANT CHANGE UP (ref 7–23)
CALCIUM SERPL-MCNC: 8.5 MG/DL — SIGNIFICANT CHANGE UP (ref 8.4–10.5)
CHLORIDE SERPL-SCNC: 104 MMOL/L — SIGNIFICANT CHANGE UP (ref 96–108)
CO2 SERPL-SCNC: 30 MMOL/L — SIGNIFICANT CHANGE UP (ref 22–31)
CREAT SERPL-MCNC: 1.42 MG/DL — HIGH (ref 0.5–1.3)
ESTIMATED AVERAGE GLUCOSE: 117 MG/DL — HIGH (ref 68–114)
GLUCOSE SERPL-MCNC: 157 MG/DL — HIGH (ref 70–99)
HCT VFR BLD CALC: 47.6 % — SIGNIFICANT CHANGE UP (ref 39–50)
HGB BLD-MCNC: 15.9 G/DL — SIGNIFICANT CHANGE UP (ref 13–17)
INR BLD: 1.19 RATIO — HIGH (ref 0.88–1.16)
MCHC RBC-ENTMCNC: 30.4 PG — SIGNIFICANT CHANGE UP (ref 27–34)
MCHC RBC-ENTMCNC: 33.4 GM/DL — SIGNIFICANT CHANGE UP (ref 32–36)
MCV RBC AUTO: 91 FL — SIGNIFICANT CHANGE UP (ref 80–100)
MRSA PCR RESULT.: SIGNIFICANT CHANGE UP
NRBC # BLD: 0 /100 WBCS — SIGNIFICANT CHANGE UP (ref 0–0)
PLATELET # BLD AUTO: 164 K/UL — SIGNIFICANT CHANGE UP (ref 150–400)
POTASSIUM SERPL-MCNC: 4 MMOL/L — SIGNIFICANT CHANGE UP (ref 3.5–5.3)
POTASSIUM SERPL-SCNC: 4 MMOL/L — SIGNIFICANT CHANGE UP (ref 3.5–5.3)
PROT SERPL-MCNC: 7.3 G/DL — SIGNIFICANT CHANGE UP (ref 6–8.3)
PROTHROM AB SERPL-ACNC: 14.1 SEC — HIGH (ref 10.5–13.4)
RBC # BLD: 5.23 M/UL — SIGNIFICANT CHANGE UP (ref 4.2–5.8)
RBC # FLD: 14.5 % — SIGNIFICANT CHANGE UP (ref 10.3–14.5)
S AUREUS DNA NOSE QL NAA+PROBE: SIGNIFICANT CHANGE UP
SODIUM SERPL-SCNC: 140 MMOL/L — SIGNIFICANT CHANGE UP (ref 135–145)
WBC # BLD: 5.11 K/UL — SIGNIFICANT CHANGE UP (ref 3.8–10.5)
WBC # FLD AUTO: 5.11 K/UL — SIGNIFICANT CHANGE UP (ref 3.8–10.5)

## 2022-02-25 PROCEDURE — G0463: CPT

## 2022-02-25 NOTE — H&P PST ADULT - HISTORY OF PRESENT ILLNESS
77 yo male reports left hip pain which is exacerbated with arising from sitting and weight bearing.  pain radiated to lower back.  He received a cortisone injection 4/2021 without relief.  He is scheduled for left total hip replacement on 1/20/22-with Dr Girard.  H was cancelled due to having a cortisone injection too close to the surgery and was also cancelled due to a left groin rash, then he was rescheduled due to pos covid 77 yo male reports left hip pain which is exacerbated with arising from sitting and weight bearing.  pain radiated to lower back.  He received a cortisone injection 4/2021 without relief.  He is scheduled for left total hip replacement on 1/20/22-with Dr Girard.  H was cancelled due to having a cortisone injection too close to the surgery and was also cancelled due to a left groin rash, then he was cancelled due to pos covid, now he rescheduled

## 2022-02-25 NOTE — H&P PST ADULT - AGENT'S NAME
LEATHA Montoya  Circumcision Procedure Note      Date of Service:  {2017  Time of Service:  12:30 PM  Patient Name: Bradley Villalpando  :  2016  MRN:  [id number]    Informed consent:  We have discussed the proposed procedure (risks, benefits, complications, medications and alternatives) of the circumcision with the parent(s)/legal guardian: Yes    Time out performed: Yes    Procedure Details:  Informed consent was obtained. Examination of the external anatomical structures was normal and pt has a normal examination by pediatrics. Analgesia was obtained by using 24% Sucrose solution PO and 1% Plain Lidocaine (0.8cc) administered by using a 27 g needle at 10 and 2 o'clock. Penis and surrounding area prepped w/betadine in sterile fashion, fenestrated drape used. Hemostat clamps applied, adhesions released with hemostats. PLastibell 1.2  was applied and string secured.  Foreskin removed above ring with scissors.  The plastibell stem  was removed . Hemostasis was obtained.    Complications:  None; patient tolerated the procedure well.    Plan: Local care d/w parents and plastibell information book was given.     Procedure performed by: MD Daisy Pink MD  2017  12:30 PM       dante Medina

## 2022-02-25 NOTE — H&P PST ADULT - PROBLEM SELECTOR PLAN 1
left anterior total hip replacement, covid appt 1/18/22-8 am, preop instructions given, to go for medical, cardiac and vascular clearance left anterior hip replacement, no need for covid swab preop, preop instructions given, to go for updated medical clearance since patient had covid recently

## 2022-02-25 NOTE — H&P PST ADULT - PROBLEM SELECTOR PLAN 4
mari carlos told patient to stop humira 1 wk before surgery and resume 2 weeks after;  and to stop sulfasalazine 1 day before surgery

## 2022-02-25 NOTE — H&P PST ADULT - PROBLEM SELECTOR PLAN 5
patient to check with endo re whether to stop diabetes meds preop; I advised patient to take tresiba 16 units day of surgery, take ozempic normally, hold jardiance day of surgery

## 2022-02-25 NOTE — H&P PST ADULT - NSICDXPASTMEDICALHX_GEN_ALL_CORE_FT
PAST MEDICAL HISTORY:  2019 novel coronavirus disease (COVID-19) 1/22-hosp for a few days-fine    Asthma     COVID-19 vaccine series completed pfizer, last dose 3/6/21    DVT (deep venous thrombosis) right leg 4-5 yrs ago    History of diverticulitis 6/2021    History of prostate cancer 2011    Hyperlipidemia     Hyperpigmentation of skin lower legs    Hypertension     Hypothyroid     Osteoarthritis of left hip     PAD (peripheral artery disease)     Prostate cancer seed implant 2008    Rheumatoid arthritis     Seasonal allergies     Severe obesity (BMI >= 40)     Sleep apnea diagnosed in past, unable to tolerate CPAP at that time; repeat sleep study done 8/21-awaiting results    Type 2 diabetes mellitus     Urinary frequency

## 2022-02-28 ENCOUNTER — APPOINTMENT (OUTPATIENT)
Dept: FAMILY MEDICINE | Facility: CLINIC | Age: 77
End: 2022-02-28
Payer: MEDICARE

## 2022-02-28 VITALS
BODY MASS INDEX: 41.37 KG/M2 | WEIGHT: 273 LBS | SYSTOLIC BLOOD PRESSURE: 146 MMHG | OXYGEN SATURATION: 92 % | TEMPERATURE: 96.4 F | HEIGHT: 68 IN | DIASTOLIC BLOOD PRESSURE: 76 MMHG | HEART RATE: 63 BPM

## 2022-02-28 DIAGNOSIS — I82.401 ACUTE EMBOLISM AND THROMBOSIS OF UNSPECIFIED DEEP VEINS OF RIGHT LOWER EXTREMITY: ICD-10-CM

## 2022-02-28 DIAGNOSIS — E08.21 DIABETES MELLITUS DUE TO UNDERLYING CONDITION WITH DIABETIC NEPHROPATHY: ICD-10-CM

## 2022-02-28 DIAGNOSIS — D63.1 CHRONIC KIDNEY DISEASE, STAGE 3A: ICD-10-CM

## 2022-02-28 DIAGNOSIS — Z01.818 ENCOUNTER FOR OTHER PREPROCEDURAL EXAMINATION: ICD-10-CM

## 2022-02-28 DIAGNOSIS — N18.31 CHRONIC KIDNEY DISEASE, STAGE 3A: ICD-10-CM

## 2022-02-28 PROCEDURE — 99214 OFFICE O/P EST MOD 30 MIN: CPT

## 2022-02-28 NOTE — RESULTS/DATA
[] : results reviewed [de-identified] : 02/25/2022: Results within acceptable limits [de-identified] : 02/25/2022: Results within acceptable limits, mild elevation only, acceptable for surgery, HOLD Eliquis prior 48 hrs prior to surgery [de-identified] : 02/25/2022: Results within acceptable limits [de-identified] : 01/03/2022: NSR 75 bpm, compared July 2021, baseline

## 2022-02-28 NOTE — PHYSICAL EXAM
[EOMI] : extraocular movements intact [Supple] : supple [Clear to Auscultation] : lungs were clear to auscultation bilaterally [Normal S1, S2] : normal S1 and S2 [No Edema] : there was no peripheral edema [Soft] : abdomen soft [Non Tender] : non-tender [Normal Bowel Sounds] : normal bowel sounds [No Rash] : no rash [Normal Affect] : the affect was normal [de-identified] : obese, elderly male [de-identified] : left hip pain with motion, and ambulation [de-identified] : cane

## 2022-02-28 NOTE — HISTORY OF PRESENT ILLNESS
[No Pertinent Cardiac History] : no history of aortic stenosis, atrial fibrillation, coronary artery disease, recent myocardial infarction, or implantable device/pacemaker [Asthma] : asthma [Sleep Apnea] : sleep apnea [Chronic Anticoagulation] : chronic anticoagulation [Chronic Kidney Disease] : chronic kidney disease [Diabetes] : diabetes [(Patient denies any chest pain, claudication, dyspnea on exertion, orthopnea, palpitations or syncope)] : Patient denies any chest pain, claudication, dyspnea on exertion, orthopnea, palpitations or syncope [Moderate (4-6 METs)] : Moderate (4-6 METs) [Anti-Platelet Agents: _____] : Anti-Platelet Agents: [unfilled] [NSAIDs: _____] : NSAIDs: [unfilled] [Aortic Stenosis] : no aortic stenosis [Atrial Fibrillation] : no atrial fibrillation [Coronary Artery Disease] : no coronary artery disease [Recent Myocardial Infarction] : no recent myocardial infarction [Implantable Device/Pacemaker] : no implantable device/pacemaker [COPD] : no COPD [Smoker] : not a smoker [Family Member] : no family member with adverse anesthesia reaction/sudden death [Self] : no previous adverse anesthesia reaction [FreeTextEntry1] : Left Total Hip Replacement [FreeTextEntry2] : 03/17/2022 [FreeTextEntry3] : Dr. Girard [FreeTextEntry4] : Mr. Fabio Medina is a 77 yo male presents today for preop evaluation for a left total hip replacement to be done by Dr. Girard on 03/17/2022 at [facility]. Pt has prior hx of right THR, in 2011 with good results. Pt report lately more discomfort of his left hip, and has attempted multiple modalities of treatment with dismal results, now opting for surgery. Pt was cleared already for surgery in July 2021 and later in Jan 2022 however postponed since pt was recovering from rash around left hip the first and second time contracted COVID-19. Pt now today, recovered, opting to proceed with this surgery.  [FreeTextEntry7] : Pt seen Cardiology Dr. Scott, cardiology cleared patient\par 01/03/2022: NSR 75 bpm, compared July 2021, baseline

## 2022-02-28 NOTE — REVIEW OF SYSTEMS
[Joint Pain] : joint pain [Back Pain] : back pain [Negative] : Heme/Lymph [FreeTextEntry9] : hip pain

## 2022-02-28 NOTE — ASSESSMENT
[High Risk Surgery - Intraperitoneal, Intrathoracic or Supringuinal Vascular Procedures] : High Risk Surgery - Intraperitoneal, Intrathoracic or Supringuinal Vascular Procedures - No (0) [Ischemic Heart Disease] : Ischemic Heart Disease - No (0) [Congestive Heart Failure] : Congestive Heart Failure - No (0) [Prior Cerebrovascular Accident or TIA] : Prior Cerebrovascular Accident or TIA - No (0) [Creatinine >= 2mg/dL (1 Point)] : Creatinine >= 2mg/dL - No (0) [Insulin-dependent Diabetic (1 Point)] : Insulin-dependent Diabetic - No (0) [0] : 0 , RCRI Class: I, Risk of Post-Op Cardiac Complications: 3.9%, 95% CI for Risk Estimate: 2.8% - 5.4% [Patient Optimized for Surgery] : Patient optimized for surgery [Echocardiogram] : echocardiogram [Cardiology consultation] : Cardiology consultation [Other: _____] : [unfilled] [As per surgery] : as per surgery [FreeTextEntry4] : Completed physical Exam, reviewed pre op labs, EKG baseline\par Advised pt to avoid any NSAIDs, vitamins, aspirin for 7 days till post surgery.\par  Avoid any food or drinks past midnight, the day prior to surgery. \par HOLD the Humira 1 week prior to surgery, Sulfasalazine 1 day prior to surgery and then resume 2 weeks after\par HOLD anti glycemic medications Jardiance day of surgery,  Tresiba 16 units day of surgery, discuss further with Endocrinology. \par HOLD Eliquis 48 hrs prior to surgery and then resume 24-48 hrs after surgery, discuss with Vascular\par Pt may take rest of the medication with small sips of water the day of the surgery.\par  RTO for a follow up appointment post surgery.\par \par RCRI: Class I for procedure\par Cardiac and Vascular Clearance in Jan 2022\par Rheumatology cleared with instructions about medication prior surgery\par Endocrine, received instructions about medications prior to surgery, recent HgbA1c: 5.8, acceptable for surgery\par \par Medical standpoint: Pt at current time has been optimized for surgery. \par

## 2022-03-04 PROBLEM — U07.1 COVID-19: Chronic | Status: ACTIVE | Noted: 2022-02-25

## 2022-03-07 ENCOUNTER — OUTPATIENT (OUTPATIENT)
Dept: OUTPATIENT SERVICES | Facility: HOSPITAL | Age: 77
LOS: 1 days | Discharge: HOME | End: 2022-03-07

## 2022-03-07 DIAGNOSIS — Z98.890 OTHER SPECIFIED POSTPROCEDURAL STATES: Chronic | ICD-10-CM

## 2022-03-07 DIAGNOSIS — Z90.49 ACQUIRED ABSENCE OF OTHER SPECIFIED PARTS OF DIGESTIVE TRACT: Chronic | ICD-10-CM

## 2022-03-07 DIAGNOSIS — Z96.649 PRESENCE OF UNSPECIFIED ARTIFICIAL HIP JOINT: Chronic | ICD-10-CM

## 2022-03-07 DIAGNOSIS — Z90.89 ACQUIRED ABSENCE OF OTHER ORGANS: Chronic | ICD-10-CM

## 2022-03-09 ENCOUNTER — APPOINTMENT (OUTPATIENT)
Dept: RADIATION ONCOLOGY | Facility: CLINIC | Age: 77
End: 2022-03-09
Payer: MEDICARE

## 2022-03-09 ENCOUNTER — APPOINTMENT (OUTPATIENT)
Dept: ORTHOPEDIC SURGERY | Facility: CLINIC | Age: 77
End: 2022-03-09
Payer: MEDICARE

## 2022-03-09 DIAGNOSIS — M16.12 UNILATERAL PRIMARY OSTEOARTHRITIS, LEFT HIP: ICD-10-CM

## 2022-03-09 PROCEDURE — 99212 OFFICE O/P EST SF 10 MIN: CPT

## 2022-03-09 NOTE — HISTORY OF PRESENT ILLNESS
[de-identified] : Patient seen for preop skin check prior to scheduled left total hip replacement .  Undergoing dermatological skin care for operative site skin lesions

## 2022-03-10 ENCOUNTER — APPOINTMENT (OUTPATIENT)
Dept: PULMONOLOGY | Facility: CLINIC | Age: 77
End: 2022-03-10
Payer: MEDICARE

## 2022-03-10 VITALS
BODY MASS INDEX: 41.07 KG/M2 | HEART RATE: 75 BPM | WEIGHT: 271 LBS | TEMPERATURE: 97.7 F | OXYGEN SATURATION: 98 % | HEIGHT: 68 IN | DIASTOLIC BLOOD PRESSURE: 80 MMHG | SYSTOLIC BLOOD PRESSURE: 130 MMHG

## 2022-03-10 DIAGNOSIS — G47.30 SLEEP APNEA, UNSPECIFIED: ICD-10-CM

## 2022-03-10 DIAGNOSIS — U09.9 POST COVID-19 CONDITION, UNSPECIFIED: ICD-10-CM

## 2022-03-10 PROCEDURE — 99204 OFFICE O/P NEW MOD 45 MIN: CPT

## 2022-03-10 NOTE — PHYSICAL EXAM
[No Acute Distress] : no acute distress [Elongated Uvula] : elongated uvula [Enlarged Base of the Tongue] : enlarged base of the tongue [2+] : Right Tonsil: 2+ [Normal Appearance] : normal appearance [No Neck Mass] : no neck mass [Normal Rate/Rhythm] : normal rate/rhythm [Normal S1, S2] : normal s1, s2 [No Murmurs] : no murmurs [No Resp Distress] : no resp distress [Clear to Auscultation Bilaterally] : clear to auscultation bilaterally [No Abnormalities] : no abnormalities [Benign] : benign [Normal Gait] : normal gait [No Clubbing] : no clubbing [No Cyanosis] : no cyanosis [No Edema] : no edema [FROM] : FROM [Normal Color/ Pigmentation] : normal color/ pigmentation [No Focal Deficits] : no focal deficits [Oriented x3] : oriented x3 [Normal Affect] : normal affect

## 2022-03-10 NOTE — ASSESSMENT
[FreeTextEntry1] : Patient has documented obstructive sleep apnea with nocturnal hypoxemia.  Patient should be treated with supplemental oxygen while hospitalized along with continuous oxygen monitoring.  No absolute pulmonary contraindication to proposed surgery.

## 2022-03-10 NOTE — REVIEW OF SYSTEMS
[Diabetes] : diabetes [Negative] : Endocrine [TextBox_44] : Hypertension [TextBox_91] : Arthritis [TextBox_110] : Prostate cancer

## 2022-03-10 NOTE — REASON FOR VISIT
[Initial] : an initial visit [Sleep Apnea] : sleep apnea [Pre-op Risk Stratification] : pre-op risk stratification [TextBox_44] : Post COVID

## 2022-03-11 ENCOUNTER — NON-APPOINTMENT (OUTPATIENT)
Age: 77
End: 2022-03-11

## 2022-03-14 ENCOUNTER — APPOINTMENT (OUTPATIENT)
Dept: RADIATION ONCOLOGY | Facility: CLINIC | Age: 77
End: 2022-03-14

## 2022-03-14 ENCOUNTER — NON-APPOINTMENT (OUTPATIENT)
Age: 77
End: 2022-03-14

## 2022-03-14 DIAGNOSIS — M89.8X9 OTHER SPECIFIED DISORDERS OF BONE, UNSPECIFIED SITE: ICD-10-CM

## 2022-03-14 PROCEDURE — 99442: CPT | Mod: 95

## 2022-03-14 NOTE — HISTORY OF PRESENT ILLNESS
[Home] : at home, [unfilled] , at the time of the visit. [Medical Office: ([facility])___] : at the medical office located in  [Verbal consent obtained from patient] : the patient, [unfilled] [FreeTextEntry1] : 77yo M with OA planning left hip replacement 3/17/22 with Dr Girard. \par \par Prior H/o prostate cancer and I-125 brachytherapy at [facility] 10/29/2007. \par \par Denies other RT, PPM/AICD. able to lay flat. \par \par 3/14/2022: Pt presents for consultation. He is feeling well overall. States symptoms began about 1 year ago, but surgery often had to be postponed. Primarily complaining of difficulty ambulating. Denies pain, numbness, falls, or muscle weakness. Notes prior RT (appx 12 years ago) for Prostate CA.

## 2022-03-15 ENCOUNTER — OUTPATIENT (OUTPATIENT)
Dept: OUTPATIENT SERVICES | Facility: HOSPITAL | Age: 77
LOS: 1 days | Discharge: HOME | End: 2022-03-15
Payer: MEDICARE

## 2022-03-15 DIAGNOSIS — Z98.890 OTHER SPECIFIED POSTPROCEDURAL STATES: Chronic | ICD-10-CM

## 2022-03-15 DIAGNOSIS — Z96.649 PRESENCE OF UNSPECIFIED ARTIFICIAL HIP JOINT: Chronic | ICD-10-CM

## 2022-03-15 DIAGNOSIS — Z90.89 ACQUIRED ABSENCE OF OTHER ORGANS: Chronic | ICD-10-CM

## 2022-03-15 DIAGNOSIS — Z90.49 ACQUIRED ABSENCE OF OTHER SPECIFIED PARTS OF DIGESTIVE TRACT: Chronic | ICD-10-CM

## 2022-03-15 PROCEDURE — 77261 THER RADIOLOGY TX PLNG SMPL: CPT

## 2022-03-16 ENCOUNTER — TRANSCRIPTION ENCOUNTER (OUTPATIENT)
Age: 77
End: 2022-03-16

## 2022-03-16 PROCEDURE — 77280 THER RAD SIMULAJ FIELD SMPL: CPT | Mod: 26

## 2022-03-16 PROCEDURE — 77431 RADIATION THERAPY MANAGEMENT: CPT

## 2022-03-16 PROCEDURE — 77300 RADIATION THERAPY DOSE PLAN: CPT | Mod: 26

## 2022-03-16 RX ORDER — GLUCAGON INJECTION, SOLUTION 0.5 MG/.1ML
1 INJECTION, SOLUTION SUBCUTANEOUS ONCE
Refills: 0 | Status: DISCONTINUED | OUTPATIENT
Start: 2022-03-17 | End: 2022-03-18

## 2022-03-16 RX ORDER — POLYETHYLENE GLYCOL 3350 17 G/17G
17 POWDER, FOR SOLUTION ORAL AT BEDTIME
Refills: 0 | Status: DISCONTINUED | OUTPATIENT
Start: 2022-03-17 | End: 2022-03-18

## 2022-03-16 RX ORDER — DEXTROSE 50 % IN WATER 50 %
25 SYRINGE (ML) INTRAVENOUS ONCE
Refills: 0 | Status: DISCONTINUED | OUTPATIENT
Start: 2022-03-17 | End: 2022-03-18

## 2022-03-16 RX ORDER — INSULIN LISPRO 100/ML
VIAL (ML) SUBCUTANEOUS
Refills: 0 | Status: DISCONTINUED | OUTPATIENT
Start: 2022-03-17 | End: 2022-03-18

## 2022-03-16 RX ORDER — OXYCODONE HYDROCHLORIDE 5 MG/1
10 TABLET ORAL
Refills: 0 | Status: DISCONTINUED | OUTPATIENT
Start: 2022-03-17 | End: 2022-03-18

## 2022-03-16 RX ORDER — SENNA PLUS 8.6 MG/1
2 TABLET ORAL AT BEDTIME
Refills: 0 | Status: DISCONTINUED | OUTPATIENT
Start: 2022-03-17 | End: 2022-03-18

## 2022-03-16 RX ORDER — ONDANSETRON 8 MG/1
4 TABLET, FILM COATED ORAL EVERY 6 HOURS
Refills: 0 | Status: DISCONTINUED | OUTPATIENT
Start: 2022-03-17 | End: 2022-03-18

## 2022-03-16 RX ORDER — SODIUM CHLORIDE 9 MG/ML
1000 INJECTION, SOLUTION INTRAVENOUS
Refills: 0 | Status: DISCONTINUED | OUTPATIENT
Start: 2022-03-17 | End: 2022-03-18

## 2022-03-16 RX ORDER — DEXTROSE 50 % IN WATER 50 %
15 SYRINGE (ML) INTRAVENOUS ONCE
Refills: 0 | Status: DISCONTINUED | OUTPATIENT
Start: 2022-03-17 | End: 2022-03-18

## 2022-03-16 RX ORDER — INSULIN LISPRO 100/ML
VIAL (ML) SUBCUTANEOUS AT BEDTIME
Refills: 0 | Status: DISCONTINUED | OUTPATIENT
Start: 2022-03-17 | End: 2022-03-18

## 2022-03-16 RX ORDER — OXYCODONE HYDROCHLORIDE 5 MG/1
5 TABLET ORAL
Refills: 0 | Status: DISCONTINUED | OUTPATIENT
Start: 2022-03-17 | End: 2022-03-18

## 2022-03-16 RX ORDER — APIXABAN 2.5 MG/1
2.5 TABLET, FILM COATED ORAL EVERY 12 HOURS
Refills: 0 | Status: DISCONTINUED | OUTPATIENT
Start: 2022-03-18 | End: 2022-03-18

## 2022-03-16 RX ORDER — APIXABAN 2.5 MG/1
5 TABLET, FILM COATED ORAL EVERY 12 HOURS
Refills: 0 | Status: DISCONTINUED | OUTPATIENT
Start: 2022-03-19 | End: 2022-03-18

## 2022-03-16 RX ORDER — CELECOXIB 200 MG/1
100 CAPSULE ORAL EVERY 12 HOURS
Refills: 0 | Status: DISCONTINUED | OUTPATIENT
Start: 2022-03-17 | End: 2022-03-17

## 2022-03-16 RX ORDER — MAGNESIUM HYDROXIDE 400 MG/1
30 TABLET, CHEWABLE ORAL DAILY
Refills: 0 | Status: DISCONTINUED | OUTPATIENT
Start: 2022-03-17 | End: 2022-03-18

## 2022-03-16 RX ORDER — DEXTROSE 50 % IN WATER 50 %
12.5 SYRINGE (ML) INTRAVENOUS ONCE
Refills: 0 | Status: DISCONTINUED | OUTPATIENT
Start: 2022-03-17 | End: 2022-03-18

## 2022-03-16 RX ORDER — HYDROMORPHONE HYDROCHLORIDE 2 MG/ML
0.5 INJECTION INTRAMUSCULAR; INTRAVENOUS; SUBCUTANEOUS
Refills: 0 | Status: DISCONTINUED | OUTPATIENT
Start: 2022-03-17 | End: 2022-03-18

## 2022-03-16 RX ORDER — ACETAMINOPHEN 500 MG
1000 TABLET ORAL EVERY 8 HOURS
Refills: 0 | Status: DISCONTINUED | OUTPATIENT
Start: 2022-03-17 | End: 2022-03-18

## 2022-03-16 NOTE — PATIENT PROFILE ADULT - OVER THE PAST TWO WEEKS, HAVE YOU FELT LITTLE INTEREST OR PLEASURE IN DOING THINGS?
[facility] Normal  Admission Note    Baby Cortez Luke is a 2 days old male born on 2017    Prenatal history and labs are:    Information for the patient's mother:  Juan Smith [[id number]]   29 y.o.  OB History      Para Term  AB Living    3 3 3     3    SAB TAB Ectopic Molar Multiple Live Births            0 3        37w3d  Mother: A Neg, RI, VDRL NR, HIV NR, Hep B neg, GC/chlamydia neg      GBS Positive    Delivery Information:     Information for the patient's mother:  Juan Smith [[id number]]        Callicoon Information:                                       Weight - Scale: 6 lb 5.4 oz (2.875 kg) (Filed from Delivery Summary)    Feeding Method: Bottle    Pregnancy history, family history and nursing notes reviewed. .  Vital Signs:  Birth Weight: 6 lb 5.4 oz (2.875 kg)  Pulse 120   Temp 98.1 °F (36.7 °C) (Axillary)   Resp 24   Ht 20\" (50.8 cm) Comment: Filed from Delivery Summary  Wt 6 lb 5.4 oz (2.875 kg) Comment: Filed from Delivery Summary  HC 35 cm (13.78\") Comment: Filed from Delivery Summary  SpO2 100%   BMI 11.14 kg/m²       Wt Readings from Last 3 Encounters:   18 6 lb 5.4 oz (2.875 kg) (16 %, Z= -1.01)*     * Growth percentiles are based on WHO (Boys, 0-2 years) data. Physical Exam:    Constitutional: Alert, vigorous. No distress. Head: Normocephalic. Normal fontanelles. No facial anomaly. Ears: External ears normal.   Nose: Nostrils without airway obstruction. Mouth/Throat: Mucous membranes are moist. Palate intact. Oropharynx is clear. Eyes: Red reflex is present bilaterally. Neck: Full passive range of motion. Clavicles: Intact  Cardiovascular: Normal rate, regular rhythm, S1 and S2 normal, no murmur. Pulses are palpable. Pulmonary/Chest: Clear to ausculation bilaterally. No respiratory distress. Abdominal: Soft. Bowel sounds are normal. No distension, masses or organomegaly.  Umbilicus normal. No tenderness, rigidity or guarding. No hernia. Genitourinary: Normal male genitalia. Musculoskeletal: Normal ROM. Hips stable. Back: Straight, no defects   Neurological: Alert during exam. Tone normal for gestation. Normal grasp, suck, symmetric [place]. Skin: Skin is warm and dry. Capillary refill less than 3 seconds. Turgor is normal. No rash noted. No cyanosis, mottling, or pallor. No jaundice. Extensive bruising of skin over back. Recent Labs:   No results found for any previous visit. Baby's blood type: A+ Jalen neg    There is no immunization history for the selected administration types on file for this patient. Patient Active Problem List    Diagnosis Date Noted    Single liveborn, born in hospital, delivered by  delivery 2018    Born by  section 2018     affected by breech delivery 2018    Normal  (single liveborn) 2018       Nutrition: breast and bottle    Assessment:  Term AGA infant male doing well. Breech/transverse    Plan: Routine  care. Recommend hip u/s as OP. Discussed care with parents.     Electronically signed at 10:39 AM by Kennedy Morrison MD no

## 2022-03-16 NOTE — PATIENT PROFILE ADULT - FALL HARM RISK - UNIVERSAL INTERVENTIONS
Bed in lowest position, wheels locked, appropriate side rails in place/Call bell, personal items and telephone in reach/Instruct patient to call for assistance before getting out of bed or chair/Non-slip footwear when patient is out of bed/[place] to call system/Physically safe environment - no spills, clutter or unnecessary equipment/Purposeful Proactive Rounding/Room/bathroom lighting operational, light cord in reach

## 2022-03-16 NOTE — PATIENT PROFILE ADULT - NSPROPASSIVESMOKEEXPOSURE_GEN_A_NUR
Hi Dr. Manzanares,  Please see message below and confirm that it is OK for pt to take Cipro (per allergie reports notation, he had rhabdomylosis 4/21). Thanks, mercedes   No

## 2022-03-17 ENCOUNTER — INPATIENT (INPATIENT)
Facility: HOSPITAL | Age: 77
LOS: 0 days | Discharge: HOME | DRG: 470 | End: 2022-03-18
Attending: ORTHOPAEDIC SURGERY | Admitting: ORTHOPAEDIC SURGERY
Payer: COMMERCIAL

## 2022-03-17 ENCOUNTER — TRANSCRIPTION ENCOUNTER (OUTPATIENT)
Age: 77
End: 2022-03-17

## 2022-03-17 ENCOUNTER — RESULT REVIEW (OUTPATIENT)
Age: 77
End: 2022-03-17

## 2022-03-17 ENCOUNTER — APPOINTMENT (OUTPATIENT)
Dept: ORTHOPEDIC SURGERY | Facility: HOSPITAL | Age: 77
End: 2022-03-17

## 2022-03-17 VITALS
HEART RATE: 76 BPM | HEIGHT: 68 IN | WEIGHT: 258.38 LBS | SYSTOLIC BLOOD PRESSURE: 153 MMHG | RESPIRATION RATE: 15 BRPM | DIASTOLIC BLOOD PRESSURE: 79 MMHG | TEMPERATURE: 99 F | OXYGEN SATURATION: 98 %

## 2022-03-17 DIAGNOSIS — Z98.890 OTHER SPECIFIED POSTPROCEDURAL STATES: Chronic | ICD-10-CM

## 2022-03-17 DIAGNOSIS — Z96.649 PRESENCE OF UNSPECIFIED ARTIFICIAL HIP JOINT: Chronic | ICD-10-CM

## 2022-03-17 DIAGNOSIS — M16.12 UNILATERAL PRIMARY OSTEOARTHRITIS, LEFT HIP: ICD-10-CM

## 2022-03-17 DIAGNOSIS — Z90.89 ACQUIRED ABSENCE OF OTHER ORGANS: Chronic | ICD-10-CM

## 2022-03-17 DIAGNOSIS — Z90.49 ACQUIRED ABSENCE OF OTHER SPECIFIED PARTS OF DIGESTIVE TRACT: Chronic | ICD-10-CM

## 2022-03-17 LAB
ANION GAP SERPL CALC-SCNC: 10 MMOL/L — SIGNIFICANT CHANGE UP (ref 5–17)
APTT BLD: 28.3 SEC — SIGNIFICANT CHANGE UP (ref 27.5–35.5)
BUN SERPL-MCNC: 24 MG/DL — HIGH (ref 7–23)
CALCIUM SERPL-MCNC: 7.6 MG/DL — LOW (ref 8.4–10.5)
CHLORIDE SERPL-SCNC: 103 MMOL/L — SIGNIFICANT CHANGE UP (ref 96–108)
CO2 SERPL-SCNC: 26 MMOL/L — SIGNIFICANT CHANGE UP (ref 22–31)
CREAT SERPL-MCNC: 1.74 MG/DL — HIGH (ref 0.5–1.3)
EGFR: 40 ML/MIN/1.73M2 — LOW
GLUCOSE SERPL-MCNC: 193 MG/DL — HIGH (ref 70–99)
HCT VFR BLD CALC: 42.1 % — SIGNIFICANT CHANGE UP (ref 39–50)
HGB BLD-MCNC: 13.8 G/DL — SIGNIFICANT CHANGE UP (ref 13–17)
INR BLD: 1.02 RATIO — SIGNIFICANT CHANGE UP (ref 0.88–1.16)
MCHC RBC-ENTMCNC: 30.4 PG — SIGNIFICANT CHANGE UP (ref 27–34)
MCHC RBC-ENTMCNC: 32.8 GM/DL — SIGNIFICANT CHANGE UP (ref 32–36)
MCV RBC AUTO: 92.7 FL — SIGNIFICANT CHANGE UP (ref 80–100)
NRBC # BLD: 0 /100 WBCS — SIGNIFICANT CHANGE UP (ref 0–0)
PLATELET # BLD AUTO: 150 K/UL — SIGNIFICANT CHANGE UP (ref 150–400)
POTASSIUM SERPL-MCNC: 4.1 MMOL/L — SIGNIFICANT CHANGE UP (ref 3.5–5.3)
POTASSIUM SERPL-SCNC: 4.1 MMOL/L — SIGNIFICANT CHANGE UP (ref 3.5–5.3)
PROTHROM AB SERPL-ACNC: 12 SEC — SIGNIFICANT CHANGE UP (ref 10.5–13.4)
RBC # BLD: 4.54 M/UL — SIGNIFICANT CHANGE UP (ref 4.2–5.8)
RBC # FLD: 14.5 % — SIGNIFICANT CHANGE UP (ref 10.3–14.5)
SODIUM SERPL-SCNC: 139 MMOL/L — SIGNIFICANT CHANGE UP (ref 135–145)
WBC # BLD: 9.55 K/UL — SIGNIFICANT CHANGE UP (ref 3.8–10.5)
WBC # FLD AUTO: 9.55 K/UL — SIGNIFICANT CHANGE UP (ref 3.8–10.5)

## 2022-03-17 PROCEDURE — 88311 DECALCIFY TISSUE: CPT | Mod: 26

## 2022-03-17 PROCEDURE — 88305 TISSUE EXAM BY PATHOLOGIST: CPT | Mod: 26

## 2022-03-17 PROCEDURE — 27130 TOTAL HIP ARTHROPLASTY: CPT | Mod: LT

## 2022-03-17 PROCEDURE — 99222 1ST HOSP IP/OBS MODERATE 55: CPT

## 2022-03-17 DEVICE — LINER ACET ALTEON XLE NEUT GRP 6 36MM: Type: IMPLANTABLE DEVICE | Site: LEFT | Status: FUNCTIONAL

## 2022-03-17 DEVICE — BONE WAX 2.5GM: Type: IMPLANTABLE DEVICE | Site: LEFT | Status: FUNCTIONAL

## 2022-03-17 DEVICE — IMPLANTABLE DEVICE: Type: IMPLANTABLE DEVICE | Site: LEFT | Status: FUNCTIONAL

## 2022-03-17 DEVICE — HEAD FEM BIOLOX DELTA 36MM -3.5MM: Type: IMPLANTABLE DEVICE | Site: LEFT | Status: FUNCTIONAL

## 2022-03-17 DEVICE — HEMOCLIP LG ORANGE 6 CARTRIDGE TITANIUM: Type: IMPLANTABLE DEVICE | Site: LEFT | Status: FUNCTIONAL

## 2022-03-17 DEVICE — ROD REAMING W/BALL TIP 2.5X650MM STRL: Type: IMPLANTABLE DEVICE | Site: LEFT | Status: FUNCTIONAL

## 2022-03-17 DEVICE — CUP ACET ALTEON CLUS HOLE POROUS COAT GRP 6 54MM: Type: IMPLANTABLE DEVICE | Site: LEFT | Status: FUNCTIONAL

## 2022-03-17 DEVICE — BLLN PTA ANGIOSCULPT 4X40: Type: IMPLANTABLE DEVICE | Site: LEFT | Status: FUNCTIONAL

## 2022-03-17 DEVICE — COIL NIT OCCLD PDA 6X5MM: Type: IMPLANTABLE DEVICE | Site: LEFT | Status: FUNCTIONAL

## 2022-03-17 DEVICE — HEMOCLIP MED BLUE 6 CARTRIDGE TITANIUM: Type: IMPLANTABLE DEVICE | Site: LEFT | Status: FUNCTIONAL

## 2022-03-17 RX ORDER — SODIUM CHLORIDE 9 MG/ML
500 INJECTION INTRAMUSCULAR; INTRAVENOUS; SUBCUTANEOUS ONCE
Refills: 0 | Status: COMPLETED | OUTPATIENT
Start: 2022-03-17 | End: 2022-03-17

## 2022-03-17 RX ORDER — LOSARTAN POTASSIUM 100 MG/1
100 TABLET, FILM COATED ORAL DAILY
Refills: 0 | Status: DISCONTINUED | OUTPATIENT
Start: 2022-03-19 | End: 2022-03-18

## 2022-03-17 RX ORDER — DEXAMETHASONE 0.5 MG/5ML
8 ELIXIR ORAL ONCE
Refills: 0 | Status: COMPLETED | OUTPATIENT
Start: 2022-03-18 | End: 2022-03-18

## 2022-03-17 RX ORDER — CHLORHEXIDINE GLUCONATE 213 G/1000ML
1 SOLUTION TOPICAL ONCE
Refills: 0 | Status: COMPLETED | OUTPATIENT
Start: 2022-03-17 | End: 2022-03-17

## 2022-03-17 RX ORDER — APREPITANT 80 MG/1
40 CAPSULE ORAL ONCE
Refills: 0 | Status: COMPLETED | OUTPATIENT
Start: 2022-03-17 | End: 2022-03-17

## 2022-03-17 RX ORDER — SODIUM CHLORIDE 9 MG/ML
1000 INJECTION, SOLUTION INTRAVENOUS
Refills: 0 | Status: DISCONTINUED | OUTPATIENT
Start: 2022-03-17 | End: 2022-03-18

## 2022-03-17 RX ORDER — ONDANSETRON 8 MG/1
4 TABLET, FILM COATED ORAL ONCE
Refills: 0 | Status: DISCONTINUED | OUTPATIENT
Start: 2022-03-17 | End: 2022-03-18

## 2022-03-17 RX ORDER — TRANEXAMIC ACID 100 MG/ML
1000 INJECTION, SOLUTION INTRAVENOUS ONCE
Refills: 0 | Status: COMPLETED | OUTPATIENT
Start: 2022-03-17 | End: 2022-03-17

## 2022-03-17 RX ORDER — ACETAMINOPHEN 500 MG
2 TABLET ORAL
Qty: 0 | Refills: 0 | DISCHARGE
Start: 2022-03-17

## 2022-03-17 RX ORDER — ACETAMINOPHEN 500 MG
1000 TABLET ORAL ONCE
Refills: 0 | Status: COMPLETED | OUTPATIENT
Start: 2022-03-17 | End: 2022-03-17

## 2022-03-17 RX ORDER — PANTOPRAZOLE SODIUM 20 MG/1
40 TABLET, DELAYED RELEASE ORAL
Refills: 0 | Status: DISCONTINUED | OUTPATIENT
Start: 2022-03-17 | End: 2022-03-17

## 2022-03-17 RX ORDER — FENOFIBRATE,MICRONIZED 130 MG
145 CAPSULE ORAL DAILY
Refills: 0 | Status: DISCONTINUED | OUTPATIENT
Start: 2022-03-17 | End: 2022-03-18

## 2022-03-17 RX ORDER — LANOLIN ALCOHOL/MO/W.PET/CERES
3 CREAM (GRAM) TOPICAL ONCE
Refills: 0 | Status: COMPLETED | OUTPATIENT
Start: 2022-03-17 | End: 2022-03-17

## 2022-03-17 RX ORDER — HYDROMORPHONE HYDROCHLORIDE 2 MG/ML
0.5 INJECTION INTRAMUSCULAR; INTRAVENOUS; SUBCUTANEOUS
Refills: 0 | Status: DISCONTINUED | OUTPATIENT
Start: 2022-03-17 | End: 2022-03-18

## 2022-03-17 RX ORDER — ASPIRIN/CALCIUM CARB/MAGNESIUM 324 MG
81 TABLET ORAL DAILY
Refills: 0 | Status: DISCONTINUED | OUTPATIENT
Start: 2022-03-18 | End: 2022-03-18

## 2022-03-17 RX ORDER — AMLODIPINE BESYLATE 2.5 MG/1
10 TABLET ORAL DAILY
Refills: 0 | Status: DISCONTINUED | OUTPATIENT
Start: 2022-03-19 | End: 2022-03-18

## 2022-03-17 RX ORDER — POLYETHYLENE GLYCOL 3350 17 G/17G
17 POWDER, FOR SOLUTION ORAL
Qty: 0 | Refills: 0 | DISCHARGE
Start: 2022-03-17

## 2022-03-17 RX ORDER — MONTELUKAST 4 MG/1
10 TABLET, CHEWABLE ORAL DAILY
Refills: 0 | Status: DISCONTINUED | OUTPATIENT
Start: 2022-03-17 | End: 2022-03-18

## 2022-03-17 RX ORDER — CEFAZOLIN SODIUM 1 G
2000 VIAL (EA) INJECTION EVERY 8 HOURS
Refills: 0 | Status: COMPLETED | OUTPATIENT
Start: 2022-03-17 | End: 2022-03-18

## 2022-03-17 RX ORDER — SULFASALAZINE 500 MG
1000 TABLET ORAL
Refills: 0 | Status: DISCONTINUED | OUTPATIENT
Start: 2022-03-19 | End: 2022-03-18

## 2022-03-17 RX ORDER — LEVOTHYROXINE SODIUM 125 MCG
75 TABLET ORAL DAILY
Refills: 0 | Status: DISCONTINUED | OUTPATIENT
Start: 2022-03-17 | End: 2022-03-18

## 2022-03-17 RX ORDER — CEFAZOLIN SODIUM 1 G
2000 VIAL (EA) INJECTION EVERY 8 HOURS
Refills: 0 | Status: DISCONTINUED | OUTPATIENT
Start: 2022-03-17 | End: 2022-03-17

## 2022-03-17 RX ORDER — NEBIVOLOL HYDROCHLORIDE 5 MG/1
10 TABLET ORAL DAILY
Refills: 0 | Status: DISCONTINUED | OUTPATIENT
Start: 2022-03-17 | End: 2022-03-18

## 2022-03-17 RX ORDER — SENNA PLUS 8.6 MG/1
2 TABLET ORAL
Qty: 0 | Refills: 0 | DISCHARGE
Start: 2022-03-17

## 2022-03-17 RX ORDER — FOLIC ACID 0.8 MG
1 TABLET ORAL DAILY
Refills: 0 | Status: DISCONTINUED | OUTPATIENT
Start: 2022-03-17 | End: 2022-03-18

## 2022-03-17 RX ORDER — CEFAZOLIN SODIUM 1 G
2000 VIAL (EA) INJECTION ONCE
Refills: 0 | Status: COMPLETED | OUTPATIENT
Start: 2022-03-17 | End: 2022-03-17

## 2022-03-17 RX ORDER — HYDRALAZINE HCL 50 MG
50 TABLET ORAL
Refills: 0 | Status: DISCONTINUED | OUTPATIENT
Start: 2022-03-19 | End: 2022-03-18

## 2022-03-17 RX ORDER — ATORVASTATIN CALCIUM 80 MG/1
20 TABLET, FILM COATED ORAL AT BEDTIME
Refills: 0 | Status: DISCONTINUED | OUTPATIENT
Start: 2022-03-17 | End: 2022-03-18

## 2022-03-17 RX ORDER — OMEPRAZOLE 10 MG/1
1 CAPSULE, DELAYED RELEASE ORAL
Qty: 30 | Refills: 1
Start: 2022-03-17 | End: 2022-05-15

## 2022-03-17 RX ORDER — CHOLECALCIFEROL (VITAMIN D3) 125 MCG
1000 CAPSULE ORAL DAILY
Refills: 0 | Status: DISCONTINUED | OUTPATIENT
Start: 2022-03-17 | End: 2022-03-18

## 2022-03-17 RX ORDER — SULFASALAZINE 500 MG
1000 TABLET ORAL
Refills: 0 | Status: DISCONTINUED | OUTPATIENT
Start: 2022-03-17 | End: 2022-03-17

## 2022-03-17 RX ORDER — DULOXETINE HYDROCHLORIDE 30 MG/1
60 CAPSULE, DELAYED RELEASE ORAL DAILY
Refills: 0 | Status: DISCONTINUED | OUTPATIENT
Start: 2022-03-17 | End: 2022-03-18

## 2022-03-17 RX ADMIN — Medication 1000 MILLIGRAM(S): at 23:18

## 2022-03-17 RX ADMIN — SODIUM CHLORIDE 500 MILLILITER(S): 9 INJECTION INTRAMUSCULAR; INTRAVENOUS; SUBCUTANEOUS at 11:22

## 2022-03-17 RX ADMIN — Medication 1000 MILLIGRAM(S): at 18:26

## 2022-03-17 RX ADMIN — APREPITANT 40 MILLIGRAM(S): 80 CAPSULE ORAL at 07:00

## 2022-03-17 RX ADMIN — Medication 100 MILLIGRAM(S): at 16:32

## 2022-03-17 RX ADMIN — ATORVASTATIN CALCIUM 20 MILLIGRAM(S): 80 TABLET, FILM COATED ORAL at 22:18

## 2022-03-17 RX ADMIN — Medication 2: at 17:33

## 2022-03-17 RX ADMIN — SODIUM CHLORIDE 75 MILLILITER(S): 9 INJECTION, SOLUTION INTRAVENOUS at 11:41

## 2022-03-17 RX ADMIN — Medication 1000 MILLIGRAM(S): at 22:18

## 2022-03-17 RX ADMIN — CHLORHEXIDINE GLUCONATE 1 APPLICATION(S): 213 SOLUTION TOPICAL at 07:00

## 2022-03-17 RX ADMIN — Medication 3 MILLIGRAM(S): at 22:17

## 2022-03-17 RX ADMIN — SODIUM CHLORIDE 500 MILLILITER(S): 9 INJECTION INTRAMUSCULAR; INTRAVENOUS; SUBCUTANEOUS at 15:31

## 2022-03-17 RX ADMIN — Medication 400 MILLIGRAM(S): at 15:30

## 2022-03-17 NOTE — DISCHARGE NOTE PROVIDER - INSTRUCTIONS
For Constipation :   • Increase your water intake. Drink at least 8 glasses of water daily.  • Try adding fiber to your diet by eating fruits, vegetables and foods that are rich in grains.  • If you do experience constipation, you may take an over-the-counter stool softener/laxative such as Nancy Colace, Sen[place], miralax or  Milk of Magnesia.

## 2022-03-17 NOTE — DISCHARGE NOTE NURSING/CASE MANAGEMENT/SOCIAL WORK - NSDCPEFALRISK_GEN_ALL_CORE
For information on Fall & Injury Prevention, visit: [email or web address] OR  [email or web address] OR  https://www.cdc.gov/steadi/patient.html

## 2022-03-17 NOTE — DISCHARGE NOTE PROVIDER - CARE PROVIDER_API CALL
DAE Girard (MD)  Orthopaedic Surgery  [address], [address]  [place] [address]  Phone: [phone]  Fax: [phone]  Established Patient  Scheduled Appointment: 03/30/2022 08:30 AM

## 2022-03-17 NOTE — DISCHARGE NOTE PROVIDER - HOSPITAL COURSE
The patient is a 76 y.o.  male with severe osteoarthritis of the left hip.  He was evaluated by the PST dept at [facility] and obtained the appropriate medical clearances.  After admission on 3/17/22 and receiving pre-operative parenteral prophylactic antibiotics (ancef), the patient  underwent an  uncomplicated left anterior CODY by orthopedic surgeon Dr. Fabio Girard.    A medical consultation from the Hospitalist service was obtained for post-operative medical co-management. Typical Physical & occupational therapy modalities post anterior CODY were performed including ambulation training, range of motion, ADL's, and transfers. eliquis 2.5 mg every 12 hrs for 1 day, then 5 mg every 12 hrs thereafter, along w/ bilat venodynes was given for DVT prophylaxis (hx dvt/PE).  The patient had a clean appearing surgical incision with no sign of surgical site infections and had a stable neuro / vascular exam of the operated extremity.  After progression of mobility guided by the PT/ OT staff,  the patient was felt to benefit from further rehabilitative care for restoration to level of function. This was felt to best be accomplished at  _home.  Discharge and Orthopedic Care instructions were delineated in the Discharge Plan and reviewed with the patient. All medications were delineated in the medication reconciliation tool and key points were reviewed with the patient. They were deemed stable from an Orthopedic & medical standpoint for discharge today.  Upon completion of Home care he will be  following up with Dr. Girard for office  follow up Orthopedic care.

## 2022-03-17 NOTE — DISCHARGE NOTE NURSING/CASE MANAGEMENT/SOCIAL WORK - PATIENT PORTAL LINK FT
You can access the FollowMyHealth Patient Portal offered by [facility] by registering at the following website: [email or web address]. By joining [employer]’s FollowMyHealth portal, you will also be able to view your health information using other applications (apps) compatible with our system.

## 2022-03-17 NOTE — DISCHARGE NOTE PROVIDER - NSDCMRMEDTOKEN_GEN_ALL_CORE_FT
Alpha Lipoic Acid 200 mg oral capsule: orally once a day  amLODIPine 10 mg oral tablet: 1 tab(s) orally once a day  aspirin 81 mg oral tablet: orally once a day  atorvastatin 20 mg oral tablet: 1 tab(s) orally once a day  Bystolic 10 mg oral tablet: 1 tab(s) orally once a day  DULoxetine 60 mg oral delayed release capsule: 1 cap(s) orally once a day  Eliquis 5 mg oral tablet: 1 tab(s) orally 2 times a day  fenofibrate 160 mg oral tablet: 1 tab(s) orally once a day  Fish Oil 1200 mg oral capsule: orally once a day  folic acid 1 mg oral tablet: 1 tab(s) orally once a day  Humira Pen 40 mg/0.8 mL subcutaneous kit: 1  subcutaneous every 3 weeks  hydrALAZINE 50 mg oral tablet: 1 tab(s) orally 2 times a day  Jardiance 10 mg oral tablet: 1 tab(s) orally once a day (in the morning)  levothyroxine 75 mcg (0.075 mg) oral tablet: 1 tab(s) orally once a day  losartan 100 mg oral tablet: 1 tab(s) orally once a day  montelukast 10 mg oral tablet: 1 tab(s) orally once a day  Ozempic (1 mg dose) 2 mg/1.5 mL subcutaneous solution: 1 milligram(s) subcutaneous every 7 days  sulfaSALAzine 500 mg oral tablet: 2 tab(s) orally 2 times a day  Super B Complex oral tablet: 1 tab(s) orally once a day  Tresiba FlexTouch 100 units/mL subcutaneous solution: 20 unit(s) subcutaneous once a day  Vitamin C 1000 mg oral tablet: 1 tab(s) orally once a day  Vitamin D3 1000 intl units (25 mcg) oral capsule: orally once a day   acetaminophen 500 mg oral tablet: 2 tab(s) orally every 8 hours  Alpha Lipoic Acid 200 mg oral capsule: orally once a day  amLODIPine 10 mg oral tablet: 1 tab(s) orally once a day  apixaban 2.5 mg oral tablet: 1 tab(s) orally once a day   aspirin 81 mg oral tablet: orally once a day  atorvastatin 20 mg oral tablet: 1 tab(s) orally once a day  Bystolic 10 mg oral tablet: 1 tab(s) orally once a day  DULoxetine 60 mg oral delayed release capsule: 1 cap(s) orally once a day  Eliquis 5 mg oral tablet: 1 tab(s) orally 2 times a day  fenofibrate 160 mg oral tablet: 1 tab(s) orally once a day  Fish Oil 1200 mg oral capsule: orally once a day  folic acid 1 mg oral tablet: 1 tab(s) orally once a day  Humira Pen 40 mg/0.8 mL subcutaneous kit: 1  subcutaneous every 3 weeks  hydrALAZINE 50 mg oral tablet: 1 tab(s) orally 2 times a day  Jardiance 10 mg oral tablet: 1 tab(s) orally once a day (in the morning)  levothyroxine 75 mcg (0.075 mg) oral tablet: 1 tab(s) orally once a day  losartan 100 mg oral tablet: 1 tab(s) orally once a day  montelukast 10 mg oral tablet: 1 tab(s) orally once a day  Ozempic (1 mg dose) 2 mg/1.5 mL subcutaneous solution: 1 milligram(s) subcutaneous every 7 days  polyethylene glycol 3350 oral powder for reconstitution: 17 gram(s) orally once a day (at bedtime)  senna oral tablet: 2 tab(s) orally once a day (at bedtime)  sulfaSALAzine 500 mg oral tablet: 2 tab(s) orally 2 times a day  Super B Complex oral tablet: 1 tab(s) orally once a day  Tresiba FlexTouch 100 units/mL subcutaneous solution: 20 unit(s) subcutaneous once a day  Vitamin C 1000 mg oral tablet: 1 tab(s) orally once a day  Vitamin D3 1000 intl units (25 mcg) oral capsule: orally once a day   acetaminophen 500 mg oral tablet: 2 tab(s) orally every 8 hours  Alpha Lipoic Acid 200 mg oral capsule: orally once a day  amLODIPine 10 mg oral tablet: 1 tab(s) orally once a day  apixaban 2.5 mg oral tablet: 1 tab(s) orally once a day   aspirin 81 mg oral tablet: orally once a day  atorvastatin 20 mg oral tablet: 1 tab(s) orally once a day  Bystolic 10 mg oral tablet: 1 tab(s) orally once a day  DULoxetine 60 mg oral delayed release capsule: 1 cap(s) orally once a day  Eliquis 5 mg oral tablet: 1 tab(s) orally 2 times a day  fenofibrate 160 mg oral tablet: 1 tab(s) orally once a day  Fish Oil 1200 mg oral capsule: orally once a day  folic acid 1 mg oral tablet: 1 tab(s) orally once a day  Humira Pen 40 mg/0.8 mL subcutaneous kit: 1  subcutaneous every 3 weeks  hydrALAZINE 50 mg oral tablet: 1 tab(s) orally 2 times a day  Jardiance 10 mg oral tablet: 1 tab(s) orally once a day (in the morning)  levothyroxine 75 mcg (0.075 mg) oral tablet: 1 tab(s) orally once a day  losartan 100 mg oral tablet: 1 tab(s) orally once a day  montelukast 10 mg oral tablet: 1 tab(s) orally once a day  Ozempic (1 mg dose) 2 mg/1.5 mL subcutaneous solution: 1 milligram(s) subcutaneous every 7 days  polyethylene glycol 3350 oral powder for reconstitution: 17 gram(s) orally once a day (at bedtime), As Needed  senna oral tablet: 2 tab(s) orally once a day (at bedtime)  sulfaSALAzine 500 mg oral tablet: 2 tab(s) orally 2 times a day  Super B Complex oral tablet: 1 tab(s) orally once a day  Tresiba FlexTouch 100 units/mL subcutaneous solution: 20 unit(s) subcutaneous once a day  Vitamin C 1000 mg oral tablet: 1 tab(s) orally once a day  Vitamin D3 1000 intl units (25 mcg) oral capsule: orally once a day

## 2022-03-17 NOTE — BRIEF OPERATIVE NOTE - COMMENTS
implants: acet 54 mm alteon cluster cuip w/ 0 degree XLE vit E liner, femur #7 extended  offset alteon HA collared stem w/ 36=3.5 biolox head

## 2022-03-17 NOTE — DISCHARGE NOTE PROVIDER - NSDCCPTREATMENT_GEN_ALL_CORE_FT
PRINCIPAL PROCEDURE  Procedure: Arthroplasty of left hip by anterior approach  Findings and Treatment: osteoarthritis left hip

## 2022-03-17 NOTE — DISCHARGE NOTE PROVIDER - NSDCFUSCHEDAPPT_GEN_ALL_CORE_FT
[facility] ; 03/30/2022 ; NPP OrthoSurg [address]  [facility] [place] ; 04/08/2022 ; NP Rheum [address]  [facility] ; 04/27/2022 ; [facility] [address]

## 2022-03-17 NOTE — DISCHARGE NOTE NURSING/CASE MANAGEMENT/SOCIAL WORK - NSSCNAMETXT_GEN_ALL_CORE
[facility] - [phone]  Nurse or physical therapist  to visit the day after hospital discharge. Please contact the home care agency at the above phone number if you have not heard from them by 12 noon on the day after your hospital discharge.

## 2022-03-17 NOTE — DISCHARGE NOTE PROVIDER - PROVIDER TOKENS
PROVIDER:[TOKEN:[[id number]],SCHEDULEDAPPT:[03/30/2022],SCHEDULEDAPPTTIME:[08:30 AM],ESTABLISHEDPATIENT:[T]]

## 2022-03-17 NOTE — CONSULT NOTE ADULT - SUBJECTIVE AND OBJECTIVE BOX
HPI: 76M HTN, HLD, PAD, Hypothyroidism, DVT, Prostate CA, Asthma and RA  has been combatting pain in left hip for several years which has progressively worsened.  Patient has tried multiple options for pain relief including OTC medication and as well as PT with minimal relief and has undergone elective replacement of left hip successfully.  Patient is currently resting in bed comfortable with good pain control.     REVIEW OF SYSTEMS:  CONSTITUTIONAL: No fever, weight loss, or fatigue  EYES: No eye pain, visual disturbances, or discharge  ENMT:  No difficulty hearing, tinnitus, vertigo; No sinus or throat pain  NECK: No pain or stiffness  RESPIRATORY: No cough, wheezing, chills or hemoptysis; No shortness of breath  CARDIOVASCULAR: No chest pain, palpitations, dizziness, or leg swelling  GASTROINTESTINAL: No abdominal or epigastric pain. No nausea, vomiting, or hematemesis; No diarrhea or constipation. No melena or hematochezia.  GENITOURINARY: No dysuria, frequency, hematuria, or incontinence  NEUROLOGICAL: No headaches, memory loss, loss of strength, numbness, or tremors  MUSCULOSKELETAL: No muscle or back pain      PAST MEDICAL & SURGICAL HISTORY:  Rheumatoid arthritis    Asthma    Prostate cancer  seed implant 2008    Hypertension    Type 2 diabetes mellitus    Hypothyroid    Hyperlipidemia    Seasonal allergies    DVT (deep venous thrombosis)  right leg 4-5 yrs ago    COVID-19 vaccine series completed  pfizer, last dose 3/6/21    Osteoarthritis of left hip    Sleep apnea  diagnosed in past, unable to tolerate CPAP at that time; repeat sleep study done 8/21-awaiting results    History of prostate cancer  2011    PAD (peripheral artery disease)    Hyperpigmentation of skin  lower legs    History of diverticulitis  6/2021    Urinary frequency    Severe obesity (BMI &gt;= 40)    2019 novel coronavirus disease (COVID-19)  1/22-hosp for a few days-fine    H/O hernia repair  2000&#x27;s    S/P laparoscopic cholecystectomy  2000    S/P total hip arthroplasty  right 2011    S/P tonsillectomy  childhood    S/P LASIK surgery of both eyes    S/P lumbar laminectomy  2012    S/P foot surgery  bilateral; 2010        SOCIAL HISTORY:  Tobacco; EtOH; Illicit Drugs    Allergies    [place] (Unknown)  No Known Drug Allergies    Intolerances        MEDICATIONS  (STANDING):  acetaminophen     Tablet .. 1000 milliGRAM(s) Oral every 8 hours  atorvastatin 20 milliGRAM(s) Oral at bedtime  ceFAZolin   IVPB 2000 milliGRAM(s) IV Intermittent every 8 hours  cholecalciferol 1000 Unit(s) Oral daily  dextrose 40% Gel 15 Gram(s) Oral once  dextrose 5%. 1000 milliLiter(s) (50 mL/Hr) IV Continuous <Continuous>  dextrose 5%. 1000 milliLiter(s) (100 mL/Hr) IV Continuous <Continuous>  dextrose 50% Injectable 25 Gram(s) IV Push once  dextrose 50% Injectable 12.5 Gram(s) IV Push once  dextrose 50% Injectable 25 Gram(s) IV Push once  DULoxetine 60 milliGRAM(s) Oral daily  fenofibrate Tablet 145 milliGRAM(s) Oral daily  folic acid 1 milliGRAM(s) Oral daily  glucagon  Injectable 1 milliGRAM(s) IntraMuscular once  insulin lispro (ADMELOG) corrective regimen sliding scale   SubCutaneous three times a day before meals  insulin lispro (ADMELOG) corrective regimen sliding scale   SubCutaneous at bedtime  lactated ringers. 1000 milliLiter(s) (125 mL/Hr) IV Continuous <Continuous>  lactated ringers. 1000 milliLiter(s) (75 mL/Hr) IV Continuous <Continuous>  levothyroxine 75 MICROGram(s) Oral daily  montelukast 10 milliGRAM(s) Oral daily  nebivolol 10 milliGRAM(s) Oral daily  polyethylene glycol 3350 17 Gram(s) Oral at bedtime  senna 2 Tablet(s) Oral at bedtime  sulfaSALAzine 1000 milliGRAM(s) Oral two times a day    MEDICATIONS  (PRN):  HYDROmorphone  Injectable 0.5 milliGRAM(s) IV Push every 10 minutes PRN Moderate Pain (4 - 6)  HYDROmorphone  Injectable 0.5 milliGRAM(s) IV Push every 3 hours PRN breakthrough pain  magnesium hydroxide Suspension 30 milliLiter(s) Oral daily PRN Constipation  ondansetron Injectable 4 milliGRAM(s) IV Push once PRN Nausea and/or Vomiting  ondansetron Injectable 4 milliGRAM(s) IV Push every 6 hours PRN Nausea and/or Vomiting  oxyCODONE    IR 5 milliGRAM(s) Oral every 3 hours PRN Moderate Pain (4 - 6)  oxyCODONE    IR 10 milliGRAM(s) Oral every 3 hours PRN Severe Pain (7 - 10)      FAMILY HISTORY:  Family history of prostate cancer in father (Father)  stomach cancer also    Family history- stomach cancer (Mother)        Vital Signs Last 24 Hrs  T(C): 36.2 (17 Mar 2022 14:34), Max: 37.1 (17 Mar 2022 06:56)  T(F): 97.2 (17 Mar 2022 14:34), Max: 98.7 (17 Mar 2022 06:56)  HR: 70 (17 Mar 2022 14:34) (62 - 76)  BP: 142/68 (17 Mar 2022 14:34) (106/57 - 153/79)  BP(mean): --  RR: 17 (17 Mar 2022 14:34) (14 - 17)  SpO2: 98% (17 Mar 2022 14:34) (96% - 99%)    PHYSICAL EXAM:    GENERAL: NAD, well-developed  HEAD:  Atraumatic, Normocephalic  EYES: EOMI, PERRLA, conjunctiva and sclera clear  ENMT: No tonsillar erythema, exudates, or enlargement; Moist mucous membranes, Good dentition, No lesions  NECK: Supple, No JVD, Normal thyroid  NERVOUS SYSTEM:  Alert & Oriented X3, Good concentration;  CHEST/LUNG: Clear to auscultation bilaterally; No rales, rhonchi, wheezing, or rubs  HEART: Regular rate and rhythm; No murmurs, rubs, or gallops  ABDOMEN: Soft, Nontender, Nondistended; Bowel sounds present  EXTREMITIES:  2+ Peripheral Pulses, No clubbing, cyanosis, or edema      LABS:                        13.8   9.55  )-----------( 150      ( 17 Mar 2022 16:06 )             42.1     03-17    139  |  103  |  24<H>  ----------------------------<  193<H>  4.1   |  26  |  1.74<H>    Ca    7.6<L>      17 Mar 2022 16:06      PT/INR - ( 17 Mar 2022 06:47 )   PT: 12.0 sec;   INR: 1.02 ratio         PTT - ( 17 Mar 2022 06:47 )  PTT:28.3 sec    CAPILLARY BLOOD GLUCOSE      POCT Blood Glucose.: 155 mg/dL (17 Mar 2022 11:14)      RADIOLOGY & ADDITIONAL STUDIES:    EKG:   Personally Reviewed:  [ ] YES     Imaging:   Personally Reviewed:  [ ] YES     Consultant(s) Notes Reviewed:      Care Discussed with Consultants/Other Providers:

## 2022-03-17 NOTE — DISCHARGE NOTE PROVIDER - NSDCCPCAREPLAN_GEN_ALL_CORE_FT
PRINCIPAL DISCHARGE DIAGNOSIS  Diagnosis: Primary osteoarthritis of left hip  Assessment and Plan of Treatment: left anterior CODY  Physical Therapy/Occupational Therapy for: Ambulation, transfers, stairs, & ADLs, isometrics.   Full weight bearing on both legs; Walker/cane use as instructed by Physical therapy/Occupational therapy. Anterior Total Hip Replacement precautions for  6 weeks: No straight leg raise; No external rotation of hip when extended-standing or lying flat; No hyperextension of hip when standing (kickback).  Keep incision clean and dry. You have a silverlon dressing .  it is water resistant and may get slightly wet in the shower. Remove dressing post op day 7. May shower  if no drainage from   Suture removal on/near after Post Op Day # 14 in Surgeon's office.  • Do not take a tub bath yet.   • Do not resume driving until you have your surgeon’s permission.  Use Ice packs for 20 minutes w/ at least 1 hr between sessions         PRINCIPAL DISCHARGE DIAGNOSIS  Diagnosis: Primary osteoarthritis of left hip  Assessment and Plan of Treatment: left anterior CODY  Physical Therapy/Occupational Therapy for: Ambulation, transfers, stairs, & ADLs, isometrics.   Full weight bearing on both legs; Walker/cane use as instructed by Physical therapy/Occupational therapy. Anterior Total Hip Replacement precautions for  6 weeks: No straight leg raise; No external rotation of hip when extended-standing or lying flat; No hyperextension of hip when standing (kickback).  Keep incision clean and dry. You have a silverlon dressing .  it is water resistant and may get slightly wet in the shower. Remove dressing post op day 7. May shower  if no drainage from   Suture removal on/near after Post Op Day # 14 in Surgeon's office.  • Do not take a tub bath yet.   • Do not resume driving until you have your surgeon’s permission.  Use Ice packs for 20 minutes w/ at least 1 hr between sessions        SECONDARY DISCHARGE DIAGNOSES  Diagnosis: Hypocalcemia  Assessment and Plan of Treatment: Mildly low calcium  This can be cause by post surgical state or due to your history of chronic kidney disease  Please follow up with your primary docotr in 3-4 days to recheck your calcium levels, phosphorus levels and vitamin D level.

## 2022-03-17 NOTE — BRIEF OPERATIVE NOTE - NSICDXBRIEFPROCEDURE_GEN_ALL_CORE_FT
PROCEDURES:  Total replacement of left hip joint by anterior approach 17-Mar-2022 11:18:39  Mandi Persaud

## 2022-03-17 NOTE — PHYSICAL THERAPY INITIAL EVALUATION ADULT - REHAB POTENTIAL, PT EVAL
Chief Complaint:   Chief Complaint   Patient presents with    Urinary Tract Infection       HPI:  This is a done with  in office  The baby is here with mom  Mom says 2 months ago baby had fever and was evaluated for the flu.  Pediatricians office also checked the urine because it seemed malodorous.  They used a bag to collect the urine and told mom later it was infected.  She went back in 2 weeks and again another bag collection was done.  Mom says she was told again the urine was infected and the baby was given more antibiotics and try to me.  She has not had any imaging.  Mom says prenatally there were no renal abnormalities to her knowledge.  She denies any family urologic history to her knowledge.  She says baby has soft the movements daily.    Allergies:  Review of patient's allergies indicates:  No Known Allergies    Medications:  No current outpatient medications on file.     No current facility-administered medications for this visit.        Review of Systems:  General: No fever, chills, fatigability, or weight loss.  Skin: No rashes, itching, or changes in color or texture of skin.  Chest: Denies MEEHAN, cyanosis, wheezing, cough, and sputum production.  Abdomen: Appetite fine. No weight loss. Denies diarrhea, abdominal pain, hematemesis, or blood in stool.  Musculoskeletal: No joint stiffness or swelling. Denies back pain.  : As above.  All other review of systems negative.    PMH:  History reviewed. No pertinent past medical history.    PSH:  History reviewed. No pertinent surgical history.    FamHx:  Family History   Problem Relation Age of Onset    Hypertension Mother         Copied from mother's history at birth       SocHx:  Social History     Socioeconomic History    Marital status: Single     Spouse name: Not on file    Number of children: Not on file    Years of education: Not on file    Highest education level: Not on file   Occupational History    Not on file   Social  Needs    Financial resource strain: Not on file    Food insecurity     Worry: Not on file     Inability: Not on file    Transportation needs     Medical: Not on file     Non-medical: Not on file   Tobacco Use    Smoking status: Not on file   Substance and Sexual Activity    Alcohol use: Not on file    Drug use: Not on file    Sexual activity: Not on file   Lifestyle    Physical activity     Days per week: Not on file     Minutes per session: Not on file    Stress: Not on file   Relationships    Social connections     Talks on phone: Not on file     Gets together: Not on file     Attends Nondenominational service: Not on file     Active member of club or organization: Not on file     Attends meetings of clubs or organizations: Not on file     Relationship status: Not on file   Other Topics Concern    Not on file   Social History Narrative    Not on file       Physical Exam:  Vitals:   Vitals:    07/20/20 0812   Temp: 98.6 °F (37 °C)     General: A&Ox3. No apparent distress. No deformities.  Neck: No masses. Normal thyroid.  Lungs: CTA ehsan. No use of accessory muscles.  Heart: RRR. No arrhythmias.  Abdomen: Soft. NT. ND. No masses. No hernias. No hepatosplenomegaly.  Lymphatic: Neck and groin nodes negative.  Skin: The skin is warm and dry. No jaundice.  Ext: No c/c/e.  : External genitalia normal. No lesions. Meatus normal size and location. Urethra normal. No masses. Bladder normal. No fullness or masses. Vagina normal with no discharge or lesions. Anus/perineum normal.       Procedure note:  Sterile- 6 [place] catheter use after sterile technique to collect urine.  Baby tolerated well.    Labs/Studies:  Urinalysis today was normal today in office      Impression/Plan:   1. History of UTI  US Retroperitoneal Complete (Kidney and    Urine culture    Urinalysis    POCT URINE DIPSTICK WITHOUT MICROSCOPE     Told mother that using a bagged specimen is unreliable.  I told mother that some little girls are at risk  for UTIs by nature of being female however we do need to screen her anatomy.  She has good bowel function and no significant prenatal concerns.  I told her we need to get the PCPs records.  I also told her ideally we preferred not to have any type of bag specimen because of the cross contamination risk.  Will send urine culture and recommend she get a screening ultrasound.  Told mom to wait at least another week to get the ultrasound in case her urine is infected I want her to be treated 1st.  We will call her with her urinary culture results   good, to achieve stated therapy goals

## 2022-03-17 NOTE — DISCHARGE NOTE PROVIDER - NSDCFUADDAPPT_GEN_ALL_CORE_FT
It is advisable to follow up w/ your pcp in 2-3 weeks to be sure there are no underlying problems.

## 2022-03-17 NOTE — PHYSICAL THERAPY INITIAL EVALUATION ADULT - ADDITIONAL COMMENTS
Pt lives with daughter in a house with 4 steps to enter with rail.  12 steps inside with rail.  Pt has rolling walker and straight cane

## 2022-03-17 NOTE — CONSULT NOTE ADULT - ASSESSMENT
76M HTN, HLD, PAD, DM2,  Hypothyroidism, DVT, Prostate CA, Asthma and RA admitted for aftercare following Left THR     S/P Left THR 3/17/22  - Continue Bowel and pain control regimen;  Incentive Spirometer for lung expansion; Monitor Hgb and follow up electrolytes.      PAD / HTN / HLD - Aspirin + Bystolic + Amlodipine + Hydralazine + Atorvastatin + Fenofibrate; Hold Losartan until POD 2     History of DVT - Eliquis     Hypothyroidism - Synthroid     DM 2 - Lantus + ISS to maintain BS < 180; Hold oral agents     RA - Takes Humira outpatient     Diverticular Disease - Continue Sulfasalzine     Diet - Regular    DVT Prophylaxis - Eliquis    Disposition - Discharge planning pending hospital course

## 2022-03-18 ENCOUNTER — APPOINTMENT (OUTPATIENT)
Dept: FAMILY MEDICINE | Facility: CLINIC | Age: 77
End: 2022-03-18

## 2022-03-18 VITALS
DIASTOLIC BLOOD PRESSURE: 64 MMHG | OXYGEN SATURATION: 98 % | HEART RATE: 68 BPM | SYSTOLIC BLOOD PRESSURE: 138 MMHG | TEMPERATURE: 98 F | RESPIRATION RATE: 18 BRPM

## 2022-03-18 LAB
24R-OH-CALCIDIOL SERPL-MCNC: 29.7 NG/ML — LOW (ref 30–80)
ANION GAP SERPL CALC-SCNC: 6 MMOL/L — SIGNIFICANT CHANGE UP (ref 5–17)
BUN SERPL-MCNC: 26 MG/DL — HIGH (ref 7–23)
CALCIUM SERPL-MCNC: 7.6 MG/DL — LOW (ref 8.4–10.5)
CHLORIDE SERPL-SCNC: 106 MMOL/L — SIGNIFICANT CHANGE UP (ref 96–108)
CO2 SERPL-SCNC: 28 MMOL/L — SIGNIFICANT CHANGE UP (ref 22–31)
CREAT SERPL-MCNC: 1.54 MG/DL — HIGH (ref 0.5–1.3)
EGFR: 47 ML/MIN/1.73M2 — LOW
GLUCOSE SERPL-MCNC: 106 MG/DL — HIGH (ref 70–99)
HCT VFR BLD CALC: 39.9 % — SIGNIFICANT CHANGE UP (ref 39–50)
HGB BLD-MCNC: 13 G/DL — SIGNIFICANT CHANGE UP (ref 13–17)
MCHC RBC-ENTMCNC: 30.4 PG — SIGNIFICANT CHANGE UP (ref 27–34)
MCHC RBC-ENTMCNC: 32.6 GM/DL — SIGNIFICANT CHANGE UP (ref 32–36)
MCV RBC AUTO: 93.4 FL — SIGNIFICANT CHANGE UP (ref 80–100)
NRBC # BLD: 0 /100 WBCS — SIGNIFICANT CHANGE UP (ref 0–0)
PLATELET # BLD AUTO: 159 K/UL — SIGNIFICANT CHANGE UP (ref 150–400)
POTASSIUM SERPL-MCNC: 3.9 MMOL/L — SIGNIFICANT CHANGE UP (ref 3.5–5.3)
POTASSIUM SERPL-SCNC: 3.9 MMOL/L — SIGNIFICANT CHANGE UP (ref 3.5–5.3)
RBC # BLD: 4.27 M/UL — SIGNIFICANT CHANGE UP (ref 4.2–5.8)
RBC # FLD: 14.4 % — SIGNIFICANT CHANGE UP (ref 10.3–14.5)
SODIUM SERPL-SCNC: 140 MMOL/L — SIGNIFICANT CHANGE UP (ref 135–145)
WBC # BLD: 7.61 K/UL — SIGNIFICANT CHANGE UP (ref 3.8–10.5)
WBC # FLD AUTO: 7.61 K/UL — SIGNIFICANT CHANGE UP (ref 3.8–10.5)

## 2022-03-18 PROCEDURE — 99233 SBSQ HOSP IP/OBS HIGH 50: CPT

## 2022-03-18 PROCEDURE — 88305 TISSUE EXAM BY PATHOLOGIST: CPT

## 2022-03-18 PROCEDURE — 76000 FLUOROSCOPY <1 HR PHYS/QHP: CPT

## 2022-03-18 PROCEDURE — 82330 ASSAY OF CALCIUM: CPT

## 2022-03-18 PROCEDURE — 97110 THERAPEUTIC EXERCISES: CPT

## 2022-03-18 PROCEDURE — 97165 OT EVAL LOW COMPLEX 30 MIN: CPT

## 2022-03-18 PROCEDURE — 85730 THROMBOPLASTIN TIME PARTIAL: CPT

## 2022-03-18 PROCEDURE — 99232 SBSQ HOSP IP/OBS MODERATE 35: CPT

## 2022-03-18 PROCEDURE — 85610 PROTHROMBIN TIME: CPT

## 2022-03-18 PROCEDURE — C1889: CPT

## 2022-03-18 PROCEDURE — 85027 COMPLETE CBC AUTOMATED: CPT

## 2022-03-18 PROCEDURE — 83735 ASSAY OF MAGNESIUM: CPT

## 2022-03-18 PROCEDURE — 97530 THERAPEUTIC ACTIVITIES: CPT

## 2022-03-18 PROCEDURE — 97535 SELF CARE MNGMENT TRAINING: CPT

## 2022-03-18 PROCEDURE — 82040 ASSAY OF SERUM ALBUMIN: CPT

## 2022-03-18 PROCEDURE — 88311 DECALCIFY TISSUE: CPT

## 2022-03-18 PROCEDURE — 84100 ASSAY OF PHOSPHORUS: CPT

## 2022-03-18 PROCEDURE — 82306 VITAMIN D 25 HYDROXY: CPT

## 2022-03-18 PROCEDURE — 82962 GLUCOSE BLOOD TEST: CPT

## 2022-03-18 PROCEDURE — C1776: CPT

## 2022-03-18 PROCEDURE — 27130 TOTAL HIP ARTHROPLASTY: CPT

## 2022-03-18 PROCEDURE — 97116 GAIT TRAINING THERAPY: CPT

## 2022-03-18 PROCEDURE — 36415 COLL VENOUS BLD VENIPUNCTURE: CPT

## 2022-03-18 PROCEDURE — 80048 BASIC METABOLIC PNL TOTAL CA: CPT

## 2022-03-18 PROCEDURE — 97161 PT EVAL LOW COMPLEX 20 MIN: CPT

## 2022-03-18 RX ORDER — APIXABAN 2.5 MG/1
1 TABLET, FILM COATED ORAL
Qty: 1 | Refills: 0
Start: 2022-03-18

## 2022-03-18 RX ADMIN — APIXABAN 2.5 MILLIGRAM(S): 2.5 TABLET, FILM COATED ORAL at 09:40

## 2022-03-18 RX ADMIN — DULOXETINE HYDROCHLORIDE 60 MILLIGRAM(S): 30 CAPSULE, DELAYED RELEASE ORAL at 12:33

## 2022-03-18 RX ADMIN — Medication 145 MILLIGRAM(S): at 12:33

## 2022-03-18 RX ADMIN — Medication 1000 MILLIGRAM(S): at 07:04

## 2022-03-18 RX ADMIN — Medication 1000 UNIT(S): at 12:33

## 2022-03-18 RX ADMIN — Medication 75 MICROGRAM(S): at 06:26

## 2022-03-18 RX ADMIN — Medication 101.6 MILLIGRAM(S): at 06:26

## 2022-03-18 RX ADMIN — Medication 1 MILLIGRAM(S): at 12:33

## 2022-03-18 RX ADMIN — Medication 100 MILLIGRAM(S): at 00:54

## 2022-03-18 RX ADMIN — Medication 2: at 12:33

## 2022-03-18 RX ADMIN — Medication 1000 MILLIGRAM(S): at 06:26

## 2022-03-18 RX ADMIN — Medication 81 MILLIGRAM(S): at 06:26

## 2022-03-18 RX ADMIN — Medication 1000 MILLIGRAM(S): at 14:12

## 2022-03-18 RX ADMIN — NEBIVOLOL HYDROCHLORIDE 10 MILLIGRAM(S): 5 TABLET ORAL at 06:26

## 2022-03-18 NOTE — OCCUPATIONAL THERAPY INITIAL EVALUATION ADULT - DIAGNOSIS, OT EVAL
Patient states that she is no longer using the mail order and her preferred pharmacy is the [facility]. Message confirmed with caller.      Routed to Dr Florence's Pool     Patient with decreased ADL status and impairments with functional mobility.

## 2022-03-18 NOTE — PROGRESS NOTE ADULT - ASSESSMENT
76M HTN, HLD, PAD, DM2,  Hypothyroidism, DVT, Prostate CA, Asthma and RA admitted for aftercare following Left THR   Medically stable for discharge, with close outpatient follow up and repeat labs within 3-5 days from discharge.    S/P Left THR 3/17/22  - Continue Bowel and pain control regimen;  Incentive Spirometer for lung expansion; Monitor Hgb and follow up electrolytes. Hip pain controlled at this time.       PAD / HTN / HLD - Aspirin + Bystolic + Amlodipine + Hydralazine + Atorvastatin + Fenofibrate, + Losartan     Hypocalcemia, mild: corrected Ca  is greaer than 8, asymptomatic, Suspect this is due to recent surgery and Renal disease given mild hyperphosphatemia. Will advise outpatient f/u, checking vit D levels (of note- he is on vitamin D supplement) , repeat Calcium and phosphorus levels in 3-5 days.    Chronic kidney disease, stage 3 - based on chart review, appears to be around baseline  Avoid nephrotoxic meds, monitor electrolytes    History of DVT - Eliquis     Hypothyroidism - Synthroid     DM 2 - Lantus + ISS to maintain BS < 180; Hold oral agents     RA - Takes Humira outpatient     Diverticular Disease - Continue Sulfasalzine     Diet - Regular    DVT Prophylaxis - Eliquis    Disposition - Discharge planning pending hospital course

## 2022-03-18 NOTE — OCCUPATIONAL THERAPY INITIAL EVALUATION ADULT - ADDITIONAL COMMENTS
Pt lives with daughter in a house with 4 steps to enter with rail.  12 steps inside with rail.  +stall shower Pt has rolling walker and straight cane

## 2022-03-18 NOTE — PROGRESS NOTE ADULT - SUBJECTIVE AND OBJECTIVE BOX
CK PACHECO                                                               [id number]                                                     Allergies---Osito (Unknown)  No Known Drug Allergies        Pt is a 76y year old Male s/p left THR.   The patient is resting comfortably in bed, with no complaints.   Denies any chest pain / shortness of breath / dyspnea / nausea/ vomiting / heeadaches or light headed ness.   Pain is tolerable.   Pain is 1/10.           T(C): 36.2 (03-17-22 @ 14:34), Max: 37.1 (03-17-22 @ 06:56)  HR: 70 (03-17-22 @ 14:34) (62 - 76)  BP: 142/68 (03-17-22 @ 14:34) (106/57 - 153/79)  RR: 17 (03-17-22 @ 14:34) (14 - 17)  SpO2: 98% (03-17-22 @ 14:34) (96% - 99%)  Wt(kg): --    I&O's Detail    17 Mar 2022 07:01  -  17 Mar 2022 15:07  --------------------------------------------------------  IN:    Lactated Ringers: 2150 mL    Oral Fluid: 340 mL    Sodium Chloride 0.9% Bolus: 500 mL  Total IN: 2990 mL    OUT:    Accordian (mL): 50 mL    Blood Loss (mL): 400 mL    Voided (mL): 300 mL  Total OUT: 750 mL    Total NET: 2240 mL        PE:   Left Lower Extremity:   Silverlon Dressing is C/D/I, fixated well on the patient.   Neurovascularly intact.   No gross evidence of motor or sensory deficits.   EHL/FHL/TA intact.   Toes are pink and mobile.   +2 DP/PT pulses.   Capillary refill < 2 seconds.   PAS in place.    HV in place        A:   Pt is a 76y year old Male s/p left Total Hip Replacement, Post Op Day 0.        Plan:    - Follow up with Medicine    - OOB with PT/OT   - Anterior Hip precautions   - Pain control    - Continue antibiotics as per SCIP protocol   - Incentive spirometry   - PAS stockings   - Following Lab results including Labs in A.M.   - Close monitoring   - DVT ppx =                                                                                                                                                                            Catracho CASTILLO      
Procedure:Left Anterior THR  POD#: 1    S: Pt without complaints. No SOB,CP, N/V. Tolerated Fluids / Diet well.   Pain comfortable  Interval Rx  + Tylenol . No BM yet  + flatus,   No abdominal pain.  No oxy used as yet.    Pain Rx:   acetaminophen     Tablet .. 1000 milliGRAM(s) Oral every 8 hours  DULoxetine 60 milliGRAM(s) Oral daily  HYDROmorphone  Injectable 0.5 milliGRAM(s) IV Push every 10 minutes PRN  HYDROmorphone  Injectable 0.5 milliGRAM(s) IV Push every 3 hours PRN  ondansetron Injectable 4 milliGRAM(s) IV Push once PRN  ondansetron Injectable 4 milliGRAM(s) IV Push every 6 hours PRN  oxyCODONE    IR 5 milliGRAM(s) Oral every 3 hours PRN  oxyCODONE    IR 10 milliGRAM(s) Oral every 3 hours PRN    O: General: Pt Alert and oriented, On exam NAD,   VS: Vital Signs Last 24 Hrs  T(C): 36.5 (18 Mar 2022 07:56), Max: 36.9 (17 Mar 2022 23:30)  T(F): 97.7 (18 Mar 2022 07:56), Max: 98.5 (17 Mar 2022 23:30)  HR: 68 (18 Mar 2022 07:56) (62 - 81)  BP: 138/64 (18 Mar 2022 07:56) (106/57 - 158/74)  BP(mean): --  RR: 18 (18 Mar 2022 07:56) (14 - 18)  SpO2: 98% (18 Mar 2022 07:56) (93% - 99%)  Heart: RRR  Lungs: BS clear bilat.  Abdomen: Soft; no distention, benign exam    Ext: Left Ant. Hip silverlon dressing clean and dry.  hemovac removed-pt tolerated well.  Neurologic: Has sensation bilat. feet & toes ;  Full AROM bilat feet & toes. EHL / AT  = Bilat: 5/5 ; Sensation Present mid lateral thigh  Vascular: Feet toes warm, pink. DP = 2+. Calves soft ; w/o tenderness bilat..  VTEP: On Bilat. Venodynes +   apixaban 2.5 milliGRAM(s) Oral every 12 hours  aspirin enteric coated 81 milliGRAM(s) Oral daily       Activity in PT yesterday:  Walked 100'                          13.0   7.61  )-----------( 159      ( 18 Mar 2022 07:15 )             39.9     03-18    140  |  106  |  26<H>  ----------------------------<  106<H>  3.9   |  28  |  1.54<H>    Ca    7.6<L>      18 Mar 2022 07:15        Hospitalist input noted    Primary Orthopedic Assessment:  • Stable from Orthopedic perspective  • Neuro motor exam stable:   • Labs: stable.  chronic elevation of creatinine--no celebrex      Plan:   • Continue:  PT/OT/Weightbearing as tolerated with assistance of a walker/Anterior THR precautions/Ice to hip/          Incentive spirometry encouraged   • Continue DVT prophylaxis as prescribed, including use of compression devices and ankle pumps  • Continue Pain Rx--tylenol only.  does not wish to go home w/ any oxycodone.  • Anterior hip precautions reviewed with patient  • Plans per Medicine   • Discharge planning – anticipated discharge is Home D/C with home care & home PT when medically stable & cleared by PT/OT--today    
Patient is a 76y old  Male who presents with a chief complaint of left hip pain-- for left anterior CODY (18 Mar 2022 08:58)      INTERVAL HPI/OVERNIGHT EVENTS: Patient seen and examined at bedside. No overnight events occurred. Patient has no complaints at this time.   Denies fevers, chills, headache, lightheadedness, chest pain, dyspnea, abdominal pain, n/v/d/c.    MEDICATIONS  (STANDING):  acetaminophen     Tablet .. 1000 milliGRAM(s) Oral every 8 hours  apixaban 2.5 milliGRAM(s) Oral every 12 hours  aspirin enteric coated 81 milliGRAM(s) Oral daily  atorvastatin 20 milliGRAM(s) Oral at bedtime  cholecalciferol 1000 Unit(s) Oral daily  dextrose 40% Gel 15 Gram(s) Oral once  dextrose 5%. 1000 milliLiter(s) (50 mL/Hr) IV Continuous <Continuous>  dextrose 5%. 1000 milliLiter(s) (100 mL/Hr) IV Continuous <Continuous>  dextrose 50% Injectable 25 Gram(s) IV Push once  dextrose 50% Injectable 12.5 Gram(s) IV Push once  dextrose 50% Injectable 25 Gram(s) IV Push once  DULoxetine 60 milliGRAM(s) Oral daily  fenofibrate Tablet 145 milliGRAM(s) Oral daily  folic acid 1 milliGRAM(s) Oral daily  glucagon  Injectable 1 milliGRAM(s) IntraMuscular once  insulin lispro (ADMELOG) corrective regimen sliding scale   SubCutaneous three times a day before meals  insulin lispro (ADMELOG) corrective regimen sliding scale   SubCutaneous at bedtime  lactated ringers. 1000 milliLiter(s) (125 mL/Hr) IV Continuous <Continuous>  lactated ringers. 1000 milliLiter(s) (75 mL/Hr) IV Continuous <Continuous>  levothyroxine 75 MICROGram(s) Oral daily  montelukast 10 milliGRAM(s) Oral daily  nebivolol 10 milliGRAM(s) Oral daily  polyethylene glycol 3350 17 Gram(s) Oral at bedtime  senna 2 Tablet(s) Oral at bedtime    MEDICATIONS  (PRN):  HYDROmorphone  Injectable 0.5 milliGRAM(s) IV Push every 10 minutes PRN Moderate Pain (4 - 6)  HYDROmorphone  Injectable 0.5 milliGRAM(s) IV Push every 3 hours PRN breakthrough pain  magnesium hydroxide Suspension 30 milliLiter(s) Oral daily PRN Constipation  ondansetron Injectable 4 milliGRAM(s) IV Push once PRN Nausea and/or Vomiting  ondansetron Injectable 4 milliGRAM(s) IV Push every 6 hours PRN Nausea and/or Vomiting  oxyCODONE    IR 5 milliGRAM(s) Oral every 3 hours PRN Moderate Pain (4 - 6)  oxyCODONE    IR 10 milliGRAM(s) Oral every 3 hours PRN Severe Pain (7 - 10)      Allergies    Osito (Unknown)  No Known Drug Allergies    Intolerances        REVIEW OF SYSTEMS:  CONSTITUTIONAL: No fever or chills  HEENT:  No headache, no sore throat  RESPIRATORY: No cough, wheezing, or shortness of breath  CARDIOVASCULAR: No chest pain, palpitations  GASTROINTESTINAL: No abd pain, nausea, vomiting, or diarrhea  GENITOURINARY: No dysuria, frequency, or hematuria  NEUROLOGICAL: no focal weakness or dizziness  MUSCULOSKELETAL: no myalgias     Vital Signs Last 24 Hrs  T(C): 36.5 (18 Mar 2022 07:56), Max: 36.9 (17 Mar 2022 23:30)  T(F): 97.7 (18 Mar 2022 07:56), Max: 98.5 (17 Mar 2022 23:30)  HR: 68 (18 Mar 2022 07:56) (62 - 81)  BP: 138/64 (18 Mar 2022 07:56) (115/60 - 158/74)  BP(mean): --  RR: 18 (18 Mar 2022 07:56) (16 - 18)  SpO2: 98% (18 Mar 2022 07:56) (93% - 99%)    PHYSICAL EXAM:  GENERAL: NAD  HEENT:  anicteric, moist mucous membranes  CHEST/LUNG:  CTA b/l, no rales, wheezes, or rhonchi  HEART:  RRR, S1, S2  ABDOMEN:  BS+, soft, nontender, nondistended  EXTREMITIES: no cyanosis, or calf tenderness. Left anterior hip dressing intact, dry  NERVOUS SYSTEM: answers questions and follows commands appropriately    LABS:                        13.0   7.61  )-----------( 159      ( 18 Mar 2022 07:15 )             39.9     CBC Full  -  ( 18 Mar 2022 07:15 )  WBC Count : 7.61 K/uL  Hemoglobin : 13.0 g/dL  Hematocrit : 39.9 %  Platelet Count - Automated : 159 K/uL  Mean Cell Volume : 93.4 fl  Mean Cell Hemoglobin : 30.4 pg  Mean Cell Hemoglobin Concentration : 32.6 gm/dL  Auto Neutrophil # : x  Auto Lymphocyte # : x  Auto Monocyte # : x  Auto Eosinophil # : x  Auto Basophil # : x  Auto Neutrophil % : x  Auto Lymphocyte % : x  Auto Monocyte % : x  Auto Eosinophil % : x  Auto Basophil % : x    18 Mar 2022 07:15    140    |  106    |  26     ----------------------------<  106    3.9     |  28     |  1.54     Ca    7.6        18 Mar 2022 07:15  Phos  4.9       18 Mar 2022 07:15  Mg     2.0       18 Mar 2022 07:15    TPro  x      /  Alb  2.9    /  TBili  x      /  DBili  x      /  AST  x      /  ALT  x      /  AlkPhos  x      18 Mar 2022 07:15    PT/INR - ( 17 Mar 2022 06:47 )   PT: 12.0 sec;   INR: 1.02 ratio         PTT - ( 17 Mar 2022 06:47 )  PTT:28.3 sec    CAPILLARY BLOOD GLUCOSE      POCT Blood Glucose.: 131 mg/dL (18 Mar 2022 07:58)  POCT Blood Glucose.: 171 mg/dL (17 Mar 2022 21:36)  POCT Blood Glucose.: 176 mg/dL (17 Mar 2022 17:11)          RADIOLOGY & ADDITIONAL TESTS:    Personally reviewed.     Consultant(s) Notes Reviewed:  [x] YES  [ ] NO

## 2022-03-19 LAB — CA-I BLD-SCNC: 4.9 MG/DL — SIGNIFICANT CHANGE UP (ref 4.5–5.6)

## 2022-03-21 ENCOUNTER — NON-APPOINTMENT (OUTPATIENT)
Age: 77
End: 2022-03-21

## 2022-03-21 NOTE — PATIENT PROFILE ADULT - AGENT'S NAME
Patient is concerned about pain and swelling in her leg with history of DVT would like re-evaluation now and ultrasound  Right LE pain gastroc likely strain injury but check vennous US  Continue Xarelto  dante

## 2022-03-30 ENCOUNTER — APPOINTMENT (OUTPATIENT)
Dept: ORTHOPEDIC SURGERY | Facility: CLINIC | Age: 77
End: 2022-03-30
Payer: MEDICARE

## 2022-03-30 VITALS — HEIGHT: 68 IN | BODY MASS INDEX: 41.22 KG/M2 | WEIGHT: 272 LBS

## 2022-03-30 PROCEDURE — 73502 X-RAY EXAM HIP UNI 2-3 VIEWS: CPT | Mod: LT

## 2022-03-30 PROCEDURE — 99024 POSTOP FOLLOW-UP VISIT: CPT

## 2022-03-30 NOTE — HISTORY OF PRESENT ILLNESS
[de-identified] : 13 postoperative day left total hip [de-identified] : Denies fever chills groin or thigh pain.  Ambulating with cane [de-identified] : Well-nourished no distress left hip satisfactory gait mild Trendelenburg leg lengths equal incision healed sutures intact passive motion pain-free neurovascular intact [de-identified] : X-ray AP pelvis left hip AP lateral satisfactory postop component alignment total hip [de-identified] : Left total hip replacement [de-identified] : Suture removal, physical therapy, follow-up 1 month

## 2022-04-08 ENCOUNTER — APPOINTMENT (OUTPATIENT)
Dept: RHEUMATOLOGY | Facility: CLINIC | Age: 77
End: 2022-04-08
Payer: MEDICARE

## 2022-04-08 VITALS
DIASTOLIC BLOOD PRESSURE: 66 MMHG | HEIGHT: 68 IN | BODY MASS INDEX: 41.52 KG/M2 | OXYGEN SATURATION: 92 % | HEART RATE: 81 BPM | WEIGHT: 274 LBS | SYSTOLIC BLOOD PRESSURE: 134 MMHG | TEMPERATURE: 97.2 F | RESPIRATION RATE: 18 BRPM

## 2022-04-08 PROCEDURE — 99213 OFFICE O/P EST LOW 20 MIN: CPT

## 2022-04-08 NOTE — REVIEW OF SYSTEMS
[Arthralgias] : arthralgias [Joint Pain] : joint pain [As Noted in HPI] : as noted in HPI [Negative] : Heme/Lymph [Joint Swelling] : no joint swelling [Joint Stiffness] : no joint stiffness [de-identified] : chronic peripheral neuropathy

## 2022-04-08 NOTE — ASSESSMENT
[FreeTextEntry1] : CK PACHECO is a 76 year old man with stable, well controlled RA on Humira and SSZ. L hip pain OA, s/p THR with marked improvement in pain and mobility. Some MSK mediated low back pain 2/2 mechanics, no limitation to ROM. \par \par # RA\par - repeat labs as below \par - c/w Humira q3 weeks -- may trial off meds at next visit given his overall stability even when he held it in the perisx period. \par - c/w SSZ 1000mg BID \par \par # immunosuppressed status\par - pt knows to call if febrile or unwell, knows to hold medication while on Abx \par - s/p covid booster, s/p 2021 flu vaccine, s/p PNA vaccines, s/p shingles vaccine\par \par # chronic LBP, stable\par - prn tylenol \par \par RTC 3 months, sooner if new/worsening sx

## 2022-04-08 NOTE — PHYSICAL EXAM
[General Appearance - Alert] : alert [General Appearance - In No Acute Distress] : in no acute distress [General Appearance - Well Nourished] : well nourished [Sclera] : the sclera and conjunctiva were normal [PERRL With Normal Accommodation] : pupils were equal in size, round, and reactive to light [Extraocular Movements] : extraocular movements were intact [Oropharynx] : the oropharynx was normal [Neck Appearance] : the appearance of the neck was normal [Auscultation Breath Sounds / Voice Sounds] : lungs were clear to auscultation bilaterally [Heart Rate And Rhythm] : heart rate was normal and rhythm regular [Heart Sounds] : normal S1 and S2 [Murmurs] : no murmurs [Abdomen Soft] : soft [Abdomen Tenderness] : non-tender [No CVA Tenderness] : no ~M costovertebral angle tenderness [No Spinal Tenderness] : no spinal tenderness [Nail Clubbing] : no clubbing  or cyanosis of the fingernails [Musculoskeletal - Swelling] : no joint swelling seen [Motor Tone] : muscle strength and tone were normal [] : no rash [Motor Exam] : the motor exam was normal [No Focal Deficits] : no focal deficits [Oriented To Time, Place, And Person] : oriented to person, place, and time [Impaired Insight] : insight and judgment were intact [Affect] : the affect was normal [FreeTextEntry1] : No synovitis, contractures, ROM intact diffusely including in shoulders. Ambulating with cane but less painful

## 2022-04-08 NOTE — HISTORY OF PRESENT ILLNESS
[FreeTextEntry1] : CK PACHECO is a 76 year old man with chronic, well controlled RA on Humira and SSZ 1000mg BID. \par \par RA hx - dx in 2007, few flares since but no flares and has been off steroids x years. Never had rashes, eye inflammation, C spine involvement, inflammatory low back pain, IBD, lung involvement, SQ nodules, paresthesias, muscle weakness.  \par \par + RLE DVT in 2016, off A/C\par + R shoulder OA\par + R hip replacement \par + hx of TM in 2006, fully resolved \par + intentional weight loss of approx 30lbs which has improved LE edema, HBA1c\par + recent retinal swelling, self-resolved, following with optho, reportedly related to DM2 and not RA\par \par Labs - chronically mild elevation in CPK since 2016\par Preimm -- Hep B/C, Quant - negative 3/2020\par DEXA 2018 - normal \par ----------------\par \par 7/30/20-- No complaints today -- no synovitis, prolonged AM stiffness, loss of ROM. No recent F/C, infections, cough, CP, SOB. Feels well. No SE from meds. Observing covid recommendations of social distancing and using a mask when outside. Recent improvement in HBA1c. \par \par 10/29/20 -- No joint complaints, R hip replacement mild discomfort with positional change but able to ambulate/sit/sleep comfortably. No extra-articular sx, F/C, infectious sx. HBA1c markedly improved and endo adjusted DM2 meds, mild worsening of renal fxn, endo added ARB. \par \par 1/28/21 -- No joint complaints today. Has been spacing out Humira, now up to q26 days without recurrence of sx. Feels well otherwise, no infectious sx, no extra-articular sx. Improving DM2 control. \par \par 4/6/21 -- L hip acute pain x 5 days, no preceding issues, radiating to groin with weight bearing, no sciatica pain or back pain. Up to Humira q4 weeks, some stiffness creeping back into hands and shoulders but no pain or synovitis. Feels well otherwise. \par \par 5/20/21 -- L hip replacement scheduled for 6/10/21. Currently on Humira q3 weeks, daily SSZ and no active inflammatory sx, prior stiffness resolved. No infectious sx, feels fine otherwise. \par \par 8/6/21 -- Total LHR postponed, now scheduled for 8/12, has already been holding Humira, taking SSZ, no current inflammatory joint sx or extra-articular manifestations. Feels well otherwise. \par \par 11/2/21 -- Total LHR postponed again, now scheduled for late Jan 2022. TRA stable, no SE with meds, no recent infectious sx. Some MSK related shoulder pain and low back pain arising from seated as trying not to hurt his hip, but intact ROM, not needing OTC pain meds. Feels well otherwise. S/p Covid booster, asking about flu vaccine. \par \par 4/8/22 -- S/p L THR, healing well, doing well with PT, not needing pain meds. RA well controlled, no flares while holding meds and has now resumed. Prior b/l shoulder pain resolved, some mild midline LBP now but not limiting him. No infectious sx, mild covid in jan, no monoclonals, feels well today.

## 2022-04-15 LAB
ALBUMIN SERPL ELPH-MCNC: 4.4 G/DL
ALP BLD-CCNC: 98 U/L
ALT SERPL-CCNC: 17 U/L
ANION GAP SERPL CALC-SCNC: 13 MMOL/L
AST SERPL-CCNC: 23 U/L
BASOPHILS # BLD AUTO: 0.07 K/UL
BASOPHILS NFR BLD AUTO: 1.1 %
BILIRUB SERPL-MCNC: 0.4 MG/DL
BUN SERPL-MCNC: 21 MG/DL
CALCIUM SERPL-MCNC: 9.5 MG/DL
CHLORIDE SERPL-SCNC: 105 MMOL/L
CO2 SERPL-SCNC: 26 MMOL/L
CREAT SERPL-MCNC: 1.54 MG/DL
CRP SERPL-MCNC: 6 MG/L
EGFR: 47 ML/MIN/1.73M2
EOSINOPHIL # BLD AUTO: 0.59 K/UL
EOSINOPHIL NFR BLD AUTO: 9.2 %
ERYTHROCYTE [SEDIMENTATION RATE] IN BLOOD BY WESTERGREN METHOD: 40 MM/HR
GLUCOSE SERPL-MCNC: 122 MG/DL
HCT VFR BLD CALC: 48.1 %
HGB BLD-MCNC: 15.3 G/DL
IMM GRANULOCYTES NFR BLD AUTO: 0.3 %
LYMPHOCYTES # BLD AUTO: 1.28 K/UL
LYMPHOCYTES NFR BLD AUTO: 20 %
MAN DIFF?: NORMAL
MCHC RBC-ENTMCNC: 30.3 PG
MCHC RBC-ENTMCNC: 31.8 GM/DL
MCV RBC AUTO: 95.2 FL
MONOCYTES # BLD AUTO: 0.59 K/UL
MONOCYTES NFR BLD AUTO: 9.2 %
NEUTROPHILS # BLD AUTO: 3.84 K/UL
NEUTROPHILS NFR BLD AUTO: 60.2 %
PLATELET # BLD AUTO: 215 K/UL
POTASSIUM SERPL-SCNC: 4.9 MMOL/L
PROT SERPL-MCNC: 7.1 G/DL
RBC # BLD: 5.05 M/UL
RBC # FLD: 14.6 %
SODIUM SERPL-SCNC: 144 MMOL/L
WBC # FLD AUTO: 6.39 K/UL

## 2022-04-27 ENCOUNTER — APPOINTMENT (OUTPATIENT)
Dept: FAMILY MEDICINE | Facility: CLINIC | Age: 77
End: 2022-04-27
Payer: MEDICARE

## 2022-04-27 VITALS
DIASTOLIC BLOOD PRESSURE: 70 MMHG | BODY MASS INDEX: 40.92 KG/M2 | HEART RATE: 73 BPM | TEMPERATURE: 97.3 F | OXYGEN SATURATION: 93 % | RESPIRATION RATE: 16 BRPM | WEIGHT: 270 LBS | HEIGHT: 68 IN | SYSTOLIC BLOOD PRESSURE: 124 MMHG

## 2022-04-27 PROCEDURE — 99215 OFFICE O/P EST HI 40 MIN: CPT | Mod: 25

## 2022-04-27 RX ORDER — DULOXETINE HYDROCHLORIDE 60 MG/1
60 CAPSULE, DELAYED RELEASE PELLETS ORAL
Qty: 90 | Refills: 2 | Status: COMPLETED | COMMUNITY
Start: 2019-07-26 | End: 2022-04-27

## 2022-04-27 RX ORDER — LANCETS 33 GAUGE
EACH MISCELLANEOUS
Qty: 300 | Refills: 0 | Status: COMPLETED | COMMUNITY
Start: 2020-11-24

## 2022-04-27 RX ORDER — APIXABAN 5 MG/1
5 TABLET, FILM COATED ORAL
Qty: 60 | Refills: 0 | Status: COMPLETED | COMMUNITY
Start: 2022-02-28 | End: 2022-04-27

## 2022-04-27 RX ORDER — CEFPODOXIME PROXETIL 200 MG/1
200 TABLET, FILM COATED ORAL
Qty: 2 | Refills: 0 | Status: COMPLETED | COMMUNITY
Start: 2022-01-28

## 2022-04-27 RX ORDER — SEMAGLUTIDE 1.34 MG/ML
4 INJECTION, SOLUTION SUBCUTANEOUS
Qty: 9 | Refills: 0 | Status: COMPLETED | COMMUNITY
Start: 2022-04-19

## 2022-04-27 RX ORDER — SEMAGLUTIDE 1.34 MG/ML
2 INJECTION, SOLUTION SUBCUTANEOUS
Qty: 1 | Refills: 0 | Status: COMPLETED | COMMUNITY
Start: 2018-07-23 | End: 2022-04-27

## 2022-04-27 RX ORDER — FLUOCINOLONE ACETONIDE 0.25 MG/G
0.03 CREAM TOPICAL
Qty: 15 | Refills: 0 | Status: COMPLETED | COMMUNITY
Start: 2022-02-17

## 2022-04-27 RX ORDER — BLOOD SUGAR DIAGNOSTIC
STRIP MISCELLANEOUS
Qty: 300 | Refills: 0 | Status: COMPLETED | COMMUNITY
Start: 2022-04-19

## 2022-04-27 RX ORDER — BLOOD SUGAR DIAGNOSTIC
STRIP MISCELLANEOUS
Qty: 300 | Refills: 0 | Status: ACTIVE | COMMUNITY
Start: 2022-04-19

## 2022-04-27 RX ORDER — KETOCONAZOLE 20 MG/G
2 CREAM TOPICAL
Qty: 60 | Refills: 0 | Status: COMPLETED | COMMUNITY
Start: 2022-01-13

## 2022-04-27 NOTE — HEALTH RISK ASSESSMENT
[Never] : Never [No] : In the past 12 months have you used drugs other than those required for medical reasons? No [No falls in past year] : Patient reported no falls in the past year [0] : 2) Feeling down, depressed, or hopeless: Not at all (0) [PHQ-2 Negative - No further assessment needed] : PHQ-2 Negative - No further assessment needed [Audit-CScore] : 0 [[id number]] : 0

## 2022-04-27 NOTE — COUNSELING
[Fall prevention counseling provided] : Fall prevention counseling provided [Adequate lighting] : Adequate lighting [No throw rugs] : No throw rugs [Use proper foot wear] : Use proper foot wear [Use recommended devices] : Use recommended devices [Behavioral health counseling provided] : Behavioral health counseling provided [Sleep ___ hours/day] : Sleep [unfilled] hours/day [Engage in a relaxing activity] : Engage in a relaxing activity [Plan in advance] : Plan in advance [AUDIT-C Screening administered and reviewed] : AUDIT-C Screening administered and reviewed [Potential consequences of obesity discussed] : Potential consequences of obesity discussed [Benefits of weight loss discussed] : Benefits of weight loss discussed [Structured Weight Management Program suggested:] : Structured weight management program suggested [Encouraged to maintain food diary] : Encouraged to maintain food diary [Encouraged to increase physical activity] : Encouraged to increase physical activity [Encouraged to use exercise tracking device] : Encouraged to use exercise tracking device [Target Wt Loss Goal ___] : Weight Loss Goals: Target weight loss goal [unfilled] lbs [Weigh Self Weekly] : weigh self weekly [Decrease Portions] : decrease portions [____ min/wk Activity] : [unfilled] min/wk activity [Keep Food Diary] : keep food diary [FreeTextEntry1] : [employer] or [employer] [None] : None [Good understanding] : Patient has a good understanding of lifestyle changes and steps needed to achieve self management goal

## 2022-04-27 NOTE — PHYSICAL EXAM
[No Acute Distress] : no acute distress [Well Nourished] : well nourished [Well Developed] : well developed [Well-Appearing] : well-appearing [Normal Voice/Communication] : normal voice/communication [Normal Sclera/Conjunctiva] : normal sclera/conjunctiva [PERRL] : pupils equal round and reactive to light [EOMI] : extraocular movements intact [Normal Outer Ear/Nose] : the outer ears and nose were normal in appearance [Normal Oropharynx] : the oropharynx was normal [Normal TMs] : both tympanic membranes were normal [No JVD] : no jugular venous distention [No Lymphadenopathy] : no lymphadenopathy [Supple] : supple [Thyroid Normal, No Nodules] : the thyroid was normal and there were no nodules present [No Respiratory Distress] : no respiratory distress  [No Accessory Muscle Use] : no accessory muscle use [Clear to Auscultation] : lungs were clear to auscultation bilaterally [Normal Rate] : normal rate  [Regular Rhythm] : with a regular rhythm [Normal S1, S2] : normal S1 and S2 [No Murmur] : no murmur heard [No Carotid Bruits] : no carotid bruits [No Abdominal Bruit] : a ~M bruit was not heard ~T in the abdomen [No Varicosities] : no varicosities [Pedal Pulses Present] : the pedal pulses are present [No Palpable Aorta] : no palpable aorta [No Extremity Clubbing/Cyanosis] : no extremity clubbing/cyanosis [Normal Appearance] : normal in appearance [No Nipple Discharge] : no nipple discharge [No Axillary Lymphadenopathy] : no axillary lymphadenopathy [Soft] : abdomen soft [Non Tender] : non-tender [Non-distended] : non-distended [No Masses] : no abdominal mass palpated [No HSM] : no HSM [Normal Bowel Sounds] : normal bowel sounds [No Hernias] : no hernias [Declined Rectal Exam] : declined rectal exam [Normal Supraclavicular Nodes] : no supraclavicular lymphadenopathy [Normal Axillary Nodes] : no axillary lymphadenopathy [Normal Posterior Cervical Nodes] : no posterior cervical lymphadenopathy [Normal Anterior Cervical Nodes] : no anterior cervical lymphadenopathy [Normal Inguinal Nodes] : no inguinal lymphadenopathy [Normal Femoral Nodes] : no femoral lymphadenopathy [No CVA Tenderness] : no CVA  tenderness [No Spinal Tenderness] : no spinal tenderness [No Joint Swelling] : no joint swelling [Grossly Normal Strength/Tone] : grossly normal strength/tone [No Rash] : no rash [Coordination Grossly Intact] : coordination grossly intact [No Focal Deficits] : no focal deficits [Normal Gait] : normal gait [Speech Grossly Normal] : speech grossly normal [Memory Grossly Normal] : memory grossly normal [Normal Affect] : the affect was normal [Alert and Oriented x3] : oriented to person, place, and time [Normal Mood] : the mood was normal [Normal Insight/Judgement] : insight and judgment were intact [Comprehensive Foot Exam Normal] : Right and left foot were examined and both feet are normal. No ulcers in either foot. Toes are normal and with full ROM.  Normal tactile sensation with monofilament testing throughout both feet [Acne] : no acne [de-identified] : Stasis dermatitis, secondary to venous insufficiency, right lower extremity edematous secondary to DVT in the past [de-identified] : obese [FreeTextEntry1] : urology [de-identified] : urology [de-identified] : Lipoma left upper back stable [de-identified] : Stasis dermatitis bilateral lower extremities, excoriation bilateral knees status post fall healing well. [de-identified] : Peripheral neuropathy stable for patient, history of transverse myelitis patient has recovered

## 2022-04-27 NOTE — HISTORY OF PRESENT ILLNESS
[FreeTextEntry1] : See HPI. [de-identified] : Is a 76-year-old gentleman who presents today for follow-up and disease management he is status post left total hip replacement which was done March 17, 2022 by Dr. Fabio Anderson subsequently discharged home the next day has been participating with physical therapy patient states that he feels great.\par \par We will be addressing a history of DVT but that happened years ago presently patient has discontinued anticoagulation that is vascular surgery recommended he can discontinue.  We will be addressing diabetes mellitus type 2 non-insulin-requiring, history of diabetic neuropathy, hypertension, hyperlipidemia and rheumatoid arthritis.\par \par Presently the patient is awake alert and oriented x3 he is in no acute distress calm and cooperative.  He informs me that his endocrinologist will be retiring and I will be more than willing to follow him for his diabetes.

## 2022-04-27 NOTE — ASSESSMENT
[FreeTextEntry1] : Assessment and plan:\par \par 1.  Status post left total hip replacement patient doing well continue present management and physical therapy.\par \par 2.  History of DVT patient is off of Eliquis continue aspirin\par \par 3.  Diabetes mellitus type 2 non-insulin-requiring continue Ozempic, Tresiba and Jardiance patient also follows a no concentrated sweets consistent carbohydrate diet he also has underlying diabetic neuropathy all medications for diabetic neuropathy have not worked for the patient therefore duloxetine was discontinued.\par \par 4.  Hypertension excellent blood pressure control with present medical management which includes losartan, Norvasc diastolic and hydralazine.\par \par 5.  Hyperlipidemia continue low-fat low-cholesterol diet, increase physical activity as tolerated and atorvastatin with fenofibrate.\par \par 6.  Rheumatoid arthritis has responded to Humira patient follows up with rheumatology on a regular basis\par \par 7.  Hypothyroidism continue levothyroxine 75 mcg p.o. daily.\par \par No change in medical management at this time at next visit I will obtain patient's comprehensive blood work.\par \par Total time spent face-to-face and non-face-to-face time 45 minutes.

## 2022-04-27 NOTE — REVIEW OF SYSTEMS
[Joint Pain] : joint pain [Joint Stiffness] : joint stiffness [Back Pain] : back pain [Negative] : Heme/Lymph [Muscle Pain] : no muscle pain [Muscle Weakness] : no muscle weakness [Joint Swelling] : no joint swelling [FreeTextEntry2] : Patient states that he is feeling well

## 2022-05-04 ENCOUNTER — APPOINTMENT (OUTPATIENT)
Dept: ORTHOPEDIC SURGERY | Facility: CLINIC | Age: 77
End: 2022-05-04
Payer: MEDICARE

## 2022-05-04 PROCEDURE — 99024 POSTOP FOLLOW-UP VISIT: CPT

## 2022-05-04 NOTE — HISTORY OF PRESENT ILLNESS
[de-identified] : Left total hip replacement March 17, 2022 [de-identified] : Denies fever chills groin or thigh pain. Ambulating independently [de-identified] : Well-nourished no distress left hip satisfactory gait mild Trendelenburg leg lengths equal incision healed  intact passive motion pain-free neurovascular intact [de-identified] : Review priorX-ray AP pelvis left hip AP lateral satisfactory postop component alignment total hip [de-identified] : Left total hip replacement [de-identified] : Complete, physical therapy, follow-up 6 months

## 2022-06-13 NOTE — DISCHARGE NOTE PROVIDER - NSDCQMAMI_CARD_ALL_CORE
No
Introduction Text (Please End With A Colon): The following procedure was deferred:
Detail Level: Detailed

## 2022-06-25 NOTE — ASSESSMENT
History   Chief Complaint:  Urinary Retention       HPI   Terry Verduzco is a 77 year old male who presents with urinary retention since this morning after his right inguinal hernia surgery at 9:45am. He was only able to urinate very small about of urine after the surgery and right before presenting to the ED. He has some abdominal pain and is passing minimal gas though no bowel movement. He took 1 pill of oxycodone with acetaminophen today. He denies fever, chills, diarrhea, or vomiting.     Review of Systems   Constitutional: Negative for chills and fever.   Gastrointestinal: Positive for abdominal pain. Negative for diarrhea and vomiting.   Genitourinary: Positive for difficulty urinating.   All other systems reviewed and are negative.      Allergies:  Codeine  Demerol [Meperidine]    Medications:  Lipitor  Triglide  Norco  Zestoretic  Toprol    Past Medical History:     Hypertension  Psoriasis  Hyperlipidemia  Prediabetes  OA    Past Surgical History:    Right inguinal hernia repair  Cholecystectomy  Left testicle removed  Left eye surgery    Family History:    Hypertension  Parkinsonism  Cancer     Social History:  The patient presents to the ED with wife    Physical Exam     Patient Vitals for the past 24 hrs:   BP Temp Pulse Resp SpO2   06/25/22 0000 -- -- 82 -- --   06/24/22 2231 (!) 142/97 98.8  F (37.1  C) 103 16 99 %       Physical Exam  Nursing note and vitals reviewed.  Constitutional: Well nourished.   Eyes: Conjunctiva normal.  Pupils are equal, round, and reactive to light.   ENT: Nose normal. Mucous membranes pink and moist.    Neck: Normal range of motion.  CVS: Sinus tachycardia.  Normal heart sounds.    Pulmonary: Lungs clear to auscultation bilaterally. No wheezes/rales/rhonchi.  GI: Abdomen minimally distended, appropriately tender post-operatively. No rigidity or guarding.  No CVA tenderness. Laparoscopic incisions c/d/i  MSK: No calf tenderness or swelling.  Neuro: Alert. Follows simple  [FreeTextEntry1] : CK PACHECO is a 75 year old man with stable, well controlled RA on Humira and SSZ. Prior mild inflammatory sx resolved with increasing frequency of Humira dosing. L hip pain OA, upcoming THR. \par \par -  reviewed labs in detail with patient, stable \par - no rheumatologic contraindication to planned THR \par - c/w Humira q3 weeks -- advised to hold 1 week prior to surgery and for at least 2 weeks after surgery then can resume as long as he is healing well \par - c/w SSZ 1000mg BID -- ok to skip the day of the sx and up to 1 week before and 2 weeks after sx if preferred by ortho but no issue to the sx while on it from a rheumatologic standpoint. \par - check routine labs prior to next visit \par - RTC in 3 months  commands.  Skin: Skin is warm and dry. No rash noted.   Psychiatric: Normal affect.       Emergency Department Course   ECG  ECG taken at 0017, ECG read at 0017  Normal sinus rhythm   Rate 86 bpm. UT interval 186 ms. QRS duration 108 ms. QT/QTc 392/469 ms. P-R-T axes 81 76 67.     Laboratory:  Labs Ordered and Resulted from Time of ED Arrival to Time of ED Departure   ROUTINE UA WITH MICROSCOPIC REFLEX TO CULTURE - Abnormal       Result Value    Color Urine Light Yellow      Appearance Urine Clear      Glucose Urine Negative      Bilirubin Urine Negative      Ketones Urine Negative      Specific Gravity Urine 1.010      Blood Urine Large (*)     pH Urine 5.5      Protein Albumin Urine Negative      Urobilinogen Urine Normal      Nitrite Urine Negative      Leukocyte Esterase Urine Negative      Bacteria Urine Few (*)     RBC Urine 25 (*)     WBC Urine 3     BASIC METABOLIC PANEL - Abnormal    Sodium 124 (*)     Potassium 4.9      Chloride 90 (*)     Carbon Dioxide (CO2) 22      Anion Gap 12      Urea Nitrogen 37 (*)     Creatinine 1.41 (*)     Calcium 9.2      Glucose 130 (*)     GFR Estimate 52 (*)    CBC WITH PLATELETS AND DIFFERENTIAL - Abnormal    WBC Count 12.0 (*)     RBC Count 4.49      Hemoglobin 13.9      Hematocrit 40.8      MCV 91      MCH 31.0      MCHC 34.1      RDW 12.0      Platelet Count 200      % Neutrophils 84      % Lymphocytes 9      % Monocytes 7      % Eosinophils 0      % Basophils 0      % Immature Granulocytes 0      NRBCs per 100 WBC 0      Absolute Neutrophils 10.1 (*)     Absolute Lymphocytes 1.1      Absolute Monocytes 0.8      Absolute Eosinophils 0.0      Absolute Basophils 0.0      Absolute Immature Granulocytes 0.0      Absolute NRBCs 0.0     OSMOLALITY - Abnormal    Osmolality Blood 268 (*)    COVID-19 VIRUS (CORONAVIRUS) BY PCR - Normal    SARS CoV2 PCR Negative     OSMOLALITY, RANDOM URINE - Normal    Osmolality Urine 306     SODIUM RANDOM URINE    Sodium Urine mmol/L 20      BASIC METABOLIC PANEL   MAGNESIUM      Emergency Department Course:    Reviewed:  I reviewed nursing notes, vitals, past medical history and Care Everywhere    Assessments:  2300 I obtained history and examined the patient as noted above.     0006 I rechecked the patient and explained findings.     Consults:  0002 I spoke with Dr. Landon, hospitalist, who agreed to admit the patient.    Interventions:  Medications   0.9% sodium chloride BOLUS (1,000 mLs Intravenous New Bag 6/24/22 3628)   HYDROcodone-acetaminophen (NORCO) 5-325 MG per tablet 1 tablet (has no administration in time range)     Disposition:  The patient was admitted to the hospital under the care of Dr. Landon.     Impression & Plan     Medical Decision Making:  Patient is a 77-year-old male recently postop this morning from a right inguinal hernia repair presenting with urinary retention.  Patient is mildly tachycardic on arrival though overall nontoxic-appearing.  He has no significant abdominal tenderness and appears appropriate in the postop setting.  I do not feel emergent CT imaging is warranted today as I doubt intra-abdominal catastrophe.  Bladder scan shows greater than 700 cc urine on arrival.  Ma catheter placed with significant improvement in patient's symptoms.  UA without infection.  Labs do show notable hyponatremia, I suspect this is more prerenal.  He is on a diuretic at home.  He is to benefit from IV fluids and electrolyte monitoring at this point in time.  He has a mild leukocytosis though stronger suspicion this is more reactive at this point in time.  I doubt underlying serious bacterial etiology.  He remained hemodynamically stable, excepted by hospitalist for admission.      Diagnosis:    ICD-10-CM    1. Hyponatremia  E87.1    2. Urinary retention  R33.9    3. Leukocytosis, unspecified type  D72.829        Scribe Disclosure:  Shandra ARORA, am serving as a scribe at 10:33 PM on 6/24/2022 to document services  personally performed by Sonia Vicente DO based on my observations and the provider's statements to me.          Sonia Vicente,   06/25/22 0636

## 2022-07-19 ENCOUNTER — APPOINTMENT (OUTPATIENT)
Dept: RHEUMATOLOGY | Facility: CLINIC | Age: 77
End: 2022-07-19

## 2022-07-19 VITALS
WEIGHT: 278 LBS | DIASTOLIC BLOOD PRESSURE: 80 MMHG | OXYGEN SATURATION: 91 % | HEART RATE: 90 BPM | RESPIRATION RATE: 16 BRPM | TEMPERATURE: 97.3 F | SYSTOLIC BLOOD PRESSURE: 158 MMHG | BODY MASS INDEX: 42.13 KG/M2 | HEIGHT: 68 IN

## 2022-07-19 DIAGNOSIS — M62.830 MUSCLE SPASM OF BACK: ICD-10-CM

## 2022-07-19 PROCEDURE — 99213 OFFICE O/P EST LOW 20 MIN: CPT

## 2022-07-19 NOTE — PHYSICAL EXAM
[General Appearance - Alert] : alert [General Appearance - In No Acute Distress] : in no acute distress [General Appearance - Well Nourished] : well nourished [Sclera] : the sclera and conjunctiva were normal [PERRL With Normal Accommodation] : pupils were equal in size, round, and reactive to light [Extraocular Movements] : extraocular movements were intact [Neck Appearance] : the appearance of the neck was normal [Auscultation Breath Sounds / Voice Sounds] : lungs were clear to auscultation bilaterally [Heart Rate And Rhythm] : heart rate was normal and rhythm regular [Heart Sounds] : normal S1 and S2 [Murmurs] : no murmurs [Abdomen Soft] : soft [Abdomen Tenderness] : non-tender [No CVA Tenderness] : no ~M costovertebral angle tenderness [No Spinal Tenderness] : no spinal tenderness [Nail Clubbing] : no clubbing  or cyanosis of the fingernails [Musculoskeletal - Swelling] : no joint swelling seen [Motor Tone] : muscle strength and tone were normal [] : no rash [Oriented To Time, Place, And Person] : oriented to person, place, and time [Impaired Insight] : insight and judgment were intact [Affect] : the affect was normal [FreeTextEntry1] : No synovitis, contractures, ROM intact diffusely including in shoulders [No Focal Deficits] : no focal deficits

## 2022-07-19 NOTE — HISTORY OF PRESENT ILLNESS
[FreeTextEntry1] : CK PACHECO is a 76 year old man with chronic, well controlled RA on Humira and SSZ 1000mg BID. \par \par RA hx - dx in 2007, few flares since but no flares and has been off steroids x years. Never had rashes, eye inflammation, C spine involvement, inflammatory low back pain, IBD, lung involvement, SQ nodules, paresthesias, muscle weakness.  \par \par + RLE DVT in 2016, off A/C\par + R shoulder OA\par + R hip replacement \par + hx of TM in 2006, fully resolved \par + intentional weight loss of approx 30lbs which has improved LE edema, HBA1c\par + recent retinal swelling, self-resolved, following with optho, reportedly related to DM2 and not RA\par \par Labs - chronically mild elevation in CPK since 2016\par Preimm -- Hep B/C, Quant - negative 3/2020\par DEXA 2018 - normal \par ----------------\par \par 7/30/20-- No complaints today -- no synovitis, prolonged AM stiffness, loss of ROM. No recent F/C, infections, cough, CP, SOB. Feels well. No SE from meds. Observing covid recommendations of social distancing and using a mask when outside. Recent improvement in HBA1c. \par \par 10/29/20 -- No joint complaints, R hip replacement mild discomfort with positional change but able to ambulate/sit/sleep comfortably. No extra-articular sx, F/C, infectious sx. HBA1c markedly improved and endo adjusted DM2 meds, mild worsening of renal fxn, endo added ARB. \par \par 1/28/21 -- No joint complaints today. Has been spacing out Humira, now up to q26 days without recurrence of sx. Feels well otherwise, no infectious sx, no extra-articular sx. Improving DM2 control. \par \par 4/6/21 -- L hip acute pain x 5 days, no preceding issues, radiating to groin with weight bearing, no sciatica pain or back pain. Up to Humira q4 weeks, some stiffness creeping back into hands and shoulders but no pain or synovitis. Feels well otherwise. \par \par 5/20/21 -- L hip replacement scheduled for 6/10/21. Currently on Humira q3 weeks, daily SSZ and no active inflammatory sx, prior stiffness resolved. No infectious sx, feels fine otherwise. \par \par 8/6/21 -- Total LHR postponed, now scheduled for 8/12, has already been holding Humira, taking SSZ, no current inflammatory joint sx or extra-articular manifestations. Feels well otherwise. \par \par 11/2/21 -- Total LHR postponed again, now scheduled for late Jan 2022. TRA stable, no SE with meds, no recent infectious sx. Some MSK related shoulder pain and low back pain arising from seated as trying not to hurt his hip, but intact ROM, not needing OTC pain meds. Feels well otherwise. S/p Covid booster, asking about flu vaccine. \par \par 4/8/22 -- S/p L THR, healing well, doing well with PT, not needing pain meds. RA well controlled, no flares while holding meds and has now resumed. Prior b/l shoulder pain resolved, some mild midline LBP now but not limiting him. No infectious sx, mild covid in jan, no monoclonals, feels well today. \par \par 7/19/22 -- Hip doing well, did a trip to [place] and was able to walk comfortably, worsening lumbar spasm which PT has been working on, some upper arm pain as he has to push off from seated but ROM intact. RA quiescent, no extra-articular inflammatory sx, no infectious sx.

## 2022-07-19 NOTE — ASSESSMENT
[FreeTextEntry1] : CK PACHECO is a 77 year old man with stable, well controlled RA on Humira and SSZ. L hip pain OA, s/p THR with marked improvement in pain and mobility. Some MSK mediated low back pain 2/2 mechanics, more spasm, no limitation to ROM. \par \par # RA\par - reviewed last labs, mild rise in ESR/CRP, will trend tho not clinically correlating as very stable \par - c/w Humira q3 weeks \par - c/w SSZ 1000mg BID \par \par # immunosuppressed status\par - pt knows to call if febrile or unwell, knows to hold medication while on Abx \par - s/p covid booster, s/p 2021 flu vaccine, s/p PNA vaccines, s/p shingles vaccine\par \par # chronic LBP, stable\par - prn tylenol \par - trial of QHS Flexeril. Advised pt of sedation and advised to take when does not have to drive the following day to evaluate extent of sedation. Can reduce to 2.5mg QHS if getting benefit but too much somnolence. Pt verbalized understanding. \par - c/w PT \par \par RTC 4 months, sooner if new/worsening sx

## 2022-07-19 NOTE — REVIEW OF SYSTEMS
[Arthralgias] : arthralgias [Joint Pain] : joint pain [As Noted in HPI] : as noted in HPI [Negative] : Heme/Lymph [Joint Swelling] : no joint swelling [Joint Stiffness] : no joint stiffness [de-identified] : chronic peripheral neuropathy

## 2022-08-01 LAB
CRP SERPL-MCNC: 6 MG/L
ERYTHROCYTE [SEDIMENTATION RATE] IN BLOOD BY WESTERGREN METHOD: 17 MM/HR

## 2022-08-12 ENCOUNTER — APPOINTMENT (OUTPATIENT)
Dept: FAMILY MEDICINE | Facility: CLINIC | Age: 77
End: 2022-08-12

## 2022-08-12 VITALS
TEMPERATURE: 98 F | OXYGEN SATURATION: 93 % | DIASTOLIC BLOOD PRESSURE: 85 MMHG | BODY MASS INDEX: 40.77 KG/M2 | HEIGHT: 68 IN | HEART RATE: 79 BPM | SYSTOLIC BLOOD PRESSURE: 165 MMHG | WEIGHT: 269 LBS | RESPIRATION RATE: 16 BRPM

## 2022-08-12 DIAGNOSIS — R35.0 FREQUENCY OF MICTURITION: ICD-10-CM

## 2022-08-12 PROCEDURE — G0439: CPT

## 2022-08-12 PROCEDURE — 36415 COLL VENOUS BLD VENIPUNCTURE: CPT

## 2022-08-12 NOTE — ASSESSMENT
[FreeTextEntry1] : Assessment and plan:\par \par 1.  Comprehensive health maintenance physical exam is stable for patient.\par \par 2.  Electrocardiogram and 2D echo done by cardiology reviewed and patient will be following up with cardiology in the near future.\par \par 3.  Diabetes mellitus type 2 non-insulin-requiring continue Ozempic, Tresiba and Jardiance patient also follows a no concentrated sweets consistent carbohydrate diet he also has underlying diabetic neuropathy all medications for diabetic neuropathy have not worked for the patient therefore duloxetine was discontinued.\par \par 4.  Hypertension excellent blood pressure control with present medical management which includes losartan, Norvasc diastolic and hydralazine.\par \par 5.  Hyperlipidemia continue low-fat low-cholesterol diet, increase physical activity as tolerated and atorvastatin with fenofibrate.\par \par 6.  Rheumatoid arthritis has responded to Humira patient follows up with rheumatology on a regular basis\par \par 7.  Hypothyroidism continue levothyroxine 75 mcg p.o. daily\par \par Comprehensive blood work drawn in office by examiner.

## 2022-08-12 NOTE — HEALTH RISK ASSESSMENT
[Very Good] : ~his/her~  mood as very good [Never] : Never [No] : In the past 12 months have you used drugs other than those required for medical reasons? No [No falls in past year] : Patient reported no falls in the past year [0] : 2) Feeling down, depressed, or hopeless: Not at all (0) [PHQ-2 Negative - No further assessment needed] : PHQ-2 Negative - No further assessment needed [HIV test declined] : HIV test declined [None] : None [With Family] : lives with family [# of Members in Household ___] :  household currently consist of [unfilled] member(s) [Retired] : retired [Graduate School] : graduate school [] :  [Fully functional (bathing, dressing, toileting, transferring, walking, feeding)] : Fully functional (bathing, dressing, toileting, transferring, walking, feeding) [Fully functional (using the telephone, shopping, preparing meals, housekeeping, doing laundry, using] : Fully functional and needs no help or supervision to perform IADLs (using the telephone, shopping, preparing meals, housekeeping, doing laundry, using transportation, managing medications and managing finances) [Reports changes in hearing] : Reports changes in hearing [Smoke Detector] : smoke detector [Carbon Monoxide Detector] : carbon monoxide detector [Safety elements used in home] : safety elements used in home [Seat Belt] :  uses seat belt [Sunscreen] : uses sunscreen [With Patient/Caregiver] : , with patient/caregiver [Reviewed no changes] : Reviewed, no changes [Designated Healthcare Proxy] : Designated healthcare proxy [Name: ___] : Health Care Proxy's Name: [unfilled]  [Relationship: ___] : Relationship: [unfilled] [Aggressive treatment] : aggressive treatment [I will adhere to the patient's wishes.] : I will adhere to the patient's wishes. [Audit-CScore] : 0 [[id number]] : 0 [Change in mental status noted] : No change in mental status noted [Language] : denies difficulty with language [Behavior] : denies difficulty with behavior [Learning/Retaining New Information] : denies difficulty learning/retaining new information [Handling Complex Tasks] : denies difficulty handling complex tasks [Reasoning] : denies difficulty with reasoning [Spatial Ability and Orientation] : denies difficulty with spatial ability and orientation [Sexually Active] : not sexually active [High Risk Behavior] : no high risk behavior [Reports changes in vision] : Reports no changes in vision [Reports normal functional visual acuity (ie: able to read med bottle)] : Reports poor functional visual acuity.  [Reports changes in dental health] : Reports no changes in dental health [Guns at Home] : no guns at home [Travel to Developing Areas] : does not  travel to developing areas [TB Exposure] : is not being exposed to tuberculosis [Caregiver Concerns] : does not have caregiver concerns [de-identified] : daughter [de-identified] : glasses [de-identified] : HANNAH [AdvancecareDate] : 08/22

## 2022-08-12 NOTE — PHYSICAL EXAM
[No Acute Distress] : no acute distress [Well Nourished] : well nourished [Well Developed] : well developed [Well-Appearing] : well-appearing [Normal Voice/Communication] : normal voice/communication [Normal Sclera/Conjunctiva] : normal sclera/conjunctiva [PERRL] : pupils equal round and reactive to light [EOMI] : extraocular movements intact [Normal Outer Ear/Nose] : the outer ears and nose were normal in appearance [Normal Oropharynx] : the oropharynx was normal [Normal TMs] : both tympanic membranes were normal [No JVD] : no jugular venous distention [No Lymphadenopathy] : no lymphadenopathy [Supple] : supple [Thyroid Normal, No Nodules] : the thyroid was normal and there were no nodules present [No Respiratory Distress] : no respiratory distress  [No Accessory Muscle Use] : no accessory muscle use [Clear to Auscultation] : lungs were clear to auscultation bilaterally [Normal Rate] : normal rate  [Regular Rhythm] : with a regular rhythm [Normal S1, S2] : normal S1 and S2 [No Murmur] : no murmur heard [No Carotid Bruits] : no carotid bruits [No Abdominal Bruit] : a ~M bruit was not heard ~T in the abdomen [No Varicosities] : no varicosities [Pedal Pulses Present] : the pedal pulses are present [No Palpable Aorta] : no palpable aorta [No Extremity Clubbing/Cyanosis] : no extremity clubbing/cyanosis [Normal Appearance] : normal in appearance [No Nipple Discharge] : no nipple discharge [No Axillary Lymphadenopathy] : no axillary lymphadenopathy [Soft] : abdomen soft [Non Tender] : non-tender [Non-distended] : non-distended [No Masses] : no abdominal mass palpated [No HSM] : no HSM [Normal Bowel Sounds] : normal bowel sounds [No Hernias] : no hernias [Declined Rectal Exam] : declined rectal exam [Normal Supraclavicular Nodes] : no supraclavicular lymphadenopathy [Normal Axillary Nodes] : no axillary lymphadenopathy [Normal Posterior Cervical Nodes] : no posterior cervical lymphadenopathy [Normal Anterior Cervical Nodes] : no anterior cervical lymphadenopathy [Normal Inguinal Nodes] : no inguinal lymphadenopathy [Normal Femoral Nodes] : no femoral lymphadenopathy [No CVA Tenderness] : no CVA  tenderness [No Spinal Tenderness] : no spinal tenderness [No Joint Swelling] : no joint swelling [Grossly Normal Strength/Tone] : grossly normal strength/tone [No Rash] : no rash [Coordination Grossly Intact] : coordination grossly intact [No Focal Deficits] : no focal deficits [Normal Gait] : normal gait [Speech Grossly Normal] : speech grossly normal [Memory Grossly Normal] : memory grossly normal [Normal Affect] : the affect was normal [Alert and Oriented x3] : oriented to person, place, and time [Normal Mood] : the mood was normal [Normal Insight/Judgement] : insight and judgment were intact [Comprehensive Foot Exam Normal] : Right and left foot were examined and both feet are normal. No ulcers in either foot. Toes are normal and with full ROM.  Normal tactile sensation with monofilament testing throughout both feet [Acne] : no acne [de-identified] : Stasis dermatitis, secondary to venous insufficiency, right lower extremity edematous secondary to DVT in the past [de-identified] : obese [FreeTextEntry1] : urology [de-identified] : urology [de-identified] : Lipoma left upper back stable [de-identified] : Stasis dermatitis bilateral lower extremities, excoriation bilateral knees status post fall healing well. [de-identified] : Peripheral neuropathy stable for patient, history of transverse myelitis patient has recovered

## 2022-08-12 NOTE — CURRENT MEDS
[Takes medication as prescribed] : takes [None] : Patient does not have any barriers to medication adherence [Yes] : Reviewed medication list for presence of high-risk medications. [Blood Thinners] : blood thinners [FreeTextEntry1] : Anticoagulation has been discontinued.

## 2022-08-12 NOTE — COUNSELING

## 2022-08-12 NOTE — HISTORY OF PRESENT ILLNESS
[FreeTextEntry1] : See HPI. [de-identified] : Patient is a 77-year-old gentleman who presents today for annual wellness exam.  Patient states that he is in his usual state of health he does complain of lower back pain recently seen by rheumatology and is seeing on a regular basis physical therapy.  I agree with muscle relaxant cyclobenzaprine 5 mg up to 3 times a day as needed which basically is a low-dose and due to the fact patient has renal insufficiency I recommend avoiding nonsteroidal anti-inflammatory drugs.  Local treatment is fine and if symptoms persist change in character or increase in any way patient may be candidate for pain management that is epidural steroid injection.\par \par We will be addressing diabetes mellitus type 2, hypertension, hyperlipidemia, rheumatoid arthritis, hypothyroidism.  Patient is also seen regularly by cardiology most recent electrocardiogram and 2D echo are in chart and reviewed.\par \par Presently the patient is awake alert and oriented x3 in no acute distress calm and cooperative.  Comprehensive blood work will be drawn at today's visit.

## 2022-08-14 LAB
ALBUMIN SERPL ELPH-MCNC: 4.5 G/DL
ALP BLD-CCNC: 65 U/L
ALT SERPL-CCNC: 18 U/L
ANION GAP SERPL CALC-SCNC: 15 MMOL/L
APPEARANCE: ABNORMAL
AST SERPL-CCNC: 27 U/L
BACTERIA: ABNORMAL
BASOPHILS # BLD AUTO: 0.02 K/UL
BASOPHILS NFR BLD AUTO: 0.4 %
BILIRUB SERPL-MCNC: 0.3 MG/DL
BILIRUBIN URINE: NEGATIVE
BLOOD URINE: NORMAL
BUN SERPL-MCNC: 20 MG/DL
CALCIUM SERPL-MCNC: 9.4 MG/DL
CHLORIDE SERPL-SCNC: 104 MMOL/L
CHOLEST SERPL-MCNC: 138 MG/DL
CO2 SERPL-SCNC: 22 MMOL/L
COLOR: YELLOW
CREAT SERPL-MCNC: 1.44 MG/DL
CREAT SPEC-SCNC: 79 MG/DL
CRP SERPL-MCNC: <3 MG/L
EGFR: 50 ML/MIN/1.73M2
EOSINOPHIL # BLD AUTO: 0.22 K/UL
EOSINOPHIL NFR BLD AUTO: 4.5 %
ERYTHROCYTE [SEDIMENTATION RATE] IN BLOOD BY WESTERGREN METHOD: 29 MM/HR
ESTIMATED AVERAGE GLUCOSE: 128 MG/DL
GLUCOSE QUALITATIVE U: ABNORMAL
GLUCOSE SERPL-MCNC: 177 MG/DL
HBA1C MFR BLD HPLC: 6.1 %
HCT VFR BLD CALC: 50.1 %
HDLC SERPL-MCNC: 46 MG/DL
HGB BLD-MCNC: 16.6 G/DL
IMM GRANULOCYTES NFR BLD AUTO: 0.2 %
KETONES URINE: NEGATIVE
LDLC SERPL CALC-MCNC: 62 MG/DL
LEUKOCYTE ESTERASE URINE: ABNORMAL
LYMPHOCYTES # BLD AUTO: 0.94 K/UL
LYMPHOCYTES NFR BLD AUTO: 19.1 %
MAN DIFF?: NORMAL
MCHC RBC-ENTMCNC: 30.3 PG
MCHC RBC-ENTMCNC: 33.1 GM/DL
MCV RBC AUTO: 91.6 FL
MICROALBUMIN 24H UR DL<=1MG/L-MCNC: 39.9 MG/DL
MICROALBUMIN/CREAT 24H UR-RTO: 506 MG/G
MICROSCOPIC-UA: NORMAL
MONOCYTES # BLD AUTO: 0.36 K/UL
MONOCYTES NFR BLD AUTO: 7.3 %
NEUTROPHILS # BLD AUTO: 3.37 K/UL
NEUTROPHILS NFR BLD AUTO: 68.5 %
NITRITE URINE: NEGATIVE
NONHDLC SERPL-MCNC: 92 MG/DL
PH URINE: 6
PLATELET # BLD AUTO: 182 K/UL
POTASSIUM SERPL-SCNC: 4.2 MMOL/L
PROT SERPL-MCNC: 7.5 G/DL
PROTEIN URINE: ABNORMAL
PSA SERPL-MCNC: 0.02 NG/ML
RBC # BLD: 5.47 M/UL
RBC # FLD: 15.2 %
RED BLOOD CELLS URINE: 2 /HPF
SODIUM SERPL-SCNC: 141 MMOL/L
SPECIFIC GRAVITY URINE: 1.02
SQUAMOUS EPITHELIAL CELLS: 0 /HPF
T4 FREE SERPL-MCNC: 1.1 NG/DL
TRIGL SERPL-MCNC: 151 MG/DL
TSH SERPL-ACNC: 2.23 UIU/ML
UROBILINOGEN URINE: NORMAL
WBC # FLD AUTO: 4.92 K/UL
WHITE BLOOD CELLS URINE: 500 /HPF

## 2022-09-08 ENCOUNTER — APPOINTMENT (OUTPATIENT)
Dept: FAMILY MEDICINE | Facility: CLINIC | Age: 77
End: 2022-09-08

## 2022-09-08 VITALS
HEART RATE: 77 BPM | SYSTOLIC BLOOD PRESSURE: 153 MMHG | RESPIRATION RATE: 16 BRPM | WEIGHT: 270 LBS | BODY MASS INDEX: 40.92 KG/M2 | TEMPERATURE: 97.6 F | OXYGEN SATURATION: 94 % | DIASTOLIC BLOOD PRESSURE: 72 MMHG | HEIGHT: 68 IN

## 2022-09-08 PROCEDURE — 99214 OFFICE O/P EST MOD 30 MIN: CPT

## 2022-09-08 NOTE — PHYSICAL EXAM
[No Acute Distress] : no acute distress [Well Nourished] : well nourished [Well Developed] : well developed [Well-Appearing] : well-appearing [Normal Voice/Communication] : normal voice/communication [Normal Sclera/Conjunctiva] : normal sclera/conjunctiva [PERRL] : pupils equal round and reactive to light [EOMI] : extraocular movements intact [Normal Outer Ear/Nose] : the outer ears and nose were normal in appearance [Normal Oropharynx] : the oropharynx was normal [Normal TMs] : both tympanic membranes were normal [No JVD] : no jugular venous distention [No Lymphadenopathy] : no lymphadenopathy [Supple] : supple [Thyroid Normal, No Nodules] : the thyroid was normal and there were no nodules present [No Respiratory Distress] : no respiratory distress  [No Accessory Muscle Use] : no accessory muscle use [Clear to Auscultation] : lungs were clear to auscultation bilaterally [Normal Rate] : normal rate  [Regular Rhythm] : with a regular rhythm [Normal S1, S2] : normal S1 and S2 [No Murmur] : no murmur heard [No Carotid Bruits] : no carotid bruits [No Abdominal Bruit] : a ~M bruit was not heard ~T in the abdomen [No Varicosities] : no varicosities [Pedal Pulses Present] : the pedal pulses are present [No Palpable Aorta] : no palpable aorta [No Extremity Clubbing/Cyanosis] : no extremity clubbing/cyanosis [Normal Appearance] : normal in appearance [No Nipple Discharge] : no nipple discharge [No Axillary Lymphadenopathy] : no axillary lymphadenopathy [Soft] : abdomen soft [Non Tender] : non-tender [Non-distended] : non-distended [No Masses] : no abdominal mass palpated [No HSM] : no HSM [Normal Bowel Sounds] : normal bowel sounds [No Hernias] : no hernias [Declined Rectal Exam] : declined rectal exam [Normal Supraclavicular Nodes] : no supraclavicular lymphadenopathy [Normal Axillary Nodes] : no axillary lymphadenopathy [Normal Posterior Cervical Nodes] : no posterior cervical lymphadenopathy [Normal Anterior Cervical Nodes] : no anterior cervical lymphadenopathy [Normal Inguinal Nodes] : no inguinal lymphadenopathy [Normal Femoral Nodes] : no femoral lymphadenopathy [No CVA Tenderness] : no CVA  tenderness [No Spinal Tenderness] : no spinal tenderness [No Joint Swelling] : no joint swelling [Grossly Normal Strength/Tone] : grossly normal strength/tone [No Rash] : no rash [Coordination Grossly Intact] : coordination grossly intact [No Focal Deficits] : no focal deficits [Normal Gait] : normal gait [Speech Grossly Normal] : speech grossly normal [Memory Grossly Normal] : memory grossly normal [Normal Affect] : the affect was normal [Alert and Oriented x3] : oriented to person, place, and time [Normal Mood] : the mood was normal [Normal Insight/Judgement] : insight and judgment were intact [Comprehensive Foot Exam Normal] : Right and left foot were examined and both feet are normal. No ulcers in either foot. Toes are normal and with full ROM.  Normal tactile sensation with monofilament testing throughout both feet [Acne] : no acne [de-identified] : Stasis dermatitis, secondary to venous insufficiency, right lower extremity edematous secondary to DVT in the past [de-identified] : obese [FreeTextEntry1] : urology [de-identified] : urology [de-identified] : Lipoma left upper back stable [de-identified] : Stasis dermatitis bilateral lower extremities, excoriation bilateral knees status post fall healing well. [de-identified] : Peripheral neuropathy stable for patient, history of transverse myelitis patient has recovered

## 2022-09-08 NOTE — ASSESSMENT
[FreeTextEntry1] : Assessment and plan:\par \par 1.  Diabetes mellitus type 2 non-insulin-requiring continue Ozempic, Tresiba and Jardiance patient also follows a no concentrated sweets consistent carbohydrate diet he also has underlying diabetic neuropathy all medications for diabetic neuropathy have not worked for the patient therefore duloxetine was discontinued.\par \par 2.  Hypertension excellent blood pressure control with present medical management which includes losartan, Norvasc diastolic and hydralazine.\par \par 3.  Hyperlipidemia continue low-fat low-cholesterol diet, increase physical activity as tolerated and atorvastatin with fenofibrate.\par \par 4.  Rheumatoid arthritis has responded to Humira patient follows up with rheumatology on a regular basis\par \par 5.  Hypothyroidism continue levothyroxine 75 mcg p.o. daily\par \par 6.  Chronic kidney disease stage III a stable in fact improved compared to previous patient will continue to avoid nephrotoxic medications and continue good hydration.\par \par Comprehensive blood work drawn at last visit reviewed with patient today.

## 2022-09-08 NOTE — HISTORY OF PRESENT ILLNESS
[FreeTextEntry1] : See HPI. [de-identified] : Patient is 77-year-old gentleman who presents today for follow-up and disease management we will be addressing at today's visit diabetes mellitus type 2, hypertension, hyperlipidemia, rheumatoid arthritis, hypothyroidism, elevated BMI and chronic kidney disease stage III which is stable.\par \par Patient is awake alert and oriented x3 in no acute distress calm and cooperative.  Presently the patient denies any chest pain or shortness of breath states that he is feeling well.

## 2022-09-08 NOTE — HEALTH RISK ASSESSMENT
[Never] : Never [No] : In the past 12 months have you used drugs other than those required for medical reasons? No [No falls in past year] : Patient reported no falls in the past year [0] : 2) Feeling down, depressed, or hopeless: Not at all (0) [PHQ-2 Negative - No further assessment needed] : PHQ-2 Negative - No further assessment needed [Audit-CScore] : 0 [[id number]] : 0

## 2022-09-08 NOTE — COUNSELING

## 2022-11-07 ENCOUNTER — APPOINTMENT (OUTPATIENT)
Dept: ORTHOPEDIC SURGERY | Facility: CLINIC | Age: 77
End: 2022-11-07

## 2022-11-07 NOTE — HISTORY OF PRESENT ILLNESS
[de-identified] : Fabio is a 77 year old male who presents for follow up for his left hip. Left total hip replacement March 17, 2022. He has been attending physical therapy sessions. \par

## 2022-11-07 NOTE — HISTORY OF PRESENT ILLNESS
[de-identified] : Fabio is a 77 year old male who presents for follow up for his left hip. Left total hip replacement March 17, 2022. He has been attending physical therapy sessions. \par

## 2022-11-15 ENCOUNTER — APPOINTMENT (OUTPATIENT)
Dept: RHEUMATOLOGY | Facility: CLINIC | Age: 77
End: 2022-11-15

## 2022-11-15 VITALS
HEART RATE: 74 BPM | RESPIRATION RATE: 16 BRPM | WEIGHT: 271 LBS | SYSTOLIC BLOOD PRESSURE: 162 MMHG | BODY MASS INDEX: 41.07 KG/M2 | HEIGHT: 68 IN | DIASTOLIC BLOOD PRESSURE: 75 MMHG | TEMPERATURE: 97.3 F | OXYGEN SATURATION: 95 %

## 2022-11-15 PROCEDURE — 99213 OFFICE O/P EST LOW 20 MIN: CPT

## 2022-11-15 RX ORDER — CYCLOBENZAPRINE HYDROCHLORIDE 5 MG/1
5 TABLET, FILM COATED ORAL
Qty: 30 | Refills: 0 | Status: DISCONTINUED | COMMUNITY
Start: 2022-07-19 | End: 2022-11-15

## 2022-11-15 NOTE — REVIEW OF SYSTEMS
[Arthralgias] : arthralgias [As Noted in HPI] : as noted in HPI [Negative] : Heme/Lymph [Joint Swelling] : no joint swelling [Joint Stiffness] : no joint stiffness [de-identified] : chronic peripheral neuropathy

## 2022-11-15 NOTE — PHYSICAL EXAM
[General Appearance - Alert] : alert [General Appearance - In No Acute Distress] : in no acute distress [General Appearance - Well Nourished] : well nourished [Sclera] : the sclera and conjunctiva were normal [PERRL With Normal Accommodation] : pupils were equal in size, round, and reactive to light [Extraocular Movements] : extraocular movements were intact [Neck Appearance] : the appearance of the neck was normal [Auscultation Breath Sounds / Voice Sounds] : lungs were clear to auscultation bilaterally [Heart Rate And Rhythm] : heart rate was normal and rhythm regular [Heart Sounds] : normal S1 and S2 [Murmurs] : no murmurs [No CVA Tenderness] : no ~M costovertebral angle tenderness [No Spinal Tenderness] : no spinal tenderness [Nail Clubbing] : no clubbing  or cyanosis of the fingernails [Musculoskeletal - Swelling] : no joint swelling seen [Motor Tone] : muscle strength and tone were normal [] : no rash [No Focal Deficits] : no focal deficits [Oriented To Time, Place, And Person] : oriented to person, place, and time [Impaired Insight] : insight and judgment were intact [Affect] : the affect was normal [Abnormal Walk] : normal gait [FreeTextEntry1] : No synovitis, contractures, ROM intact diffusely including in shoulders

## 2022-11-15 NOTE — ASSESSMENT
[FreeTextEntry1] : CK PACHECO is a 77 year old man with stable, well controlled RA on Humira and SSZ. L hip pain OA, s/p THR with marked improvement in pain and mobility. Some MSK mediated low back pain 2/2 mechanics, more spasm, no limitation to ROM. \par \par # RA\par - reviewed last labs, mild rise in ESR/CRP, will trend tho not clinically correlating as very stable -- repeat with upcoming PMD labs \par - c/w Humira q3 weeks \par - c/w SSZ 1000mg BID \par \par # immunosuppressed status\par - pt knows to call if febrile or unwell, knows to hold medication while on Abx \par - s/p covid bivalent booster, s/p 2022 flu vaccine, s/p PNA vaccines, s/p shingles vaccine\par \par # chronic LBP, stable\par - prn tylenol \par - c/w HEP \par - d/c flexeril as has not found much benefit \par \par RTC 6 months, sooner if new/worsening sx

## 2022-11-15 NOTE — HISTORY OF PRESENT ILLNESS
[FreeTextEntry1] : CK PACHECO is a 76 year old man with chronic, well controlled RA on Humira and SSZ 1000mg BID. \par \par RA hx - dx in 2007, few flares since but no flares and has been off steroids x years. Never had rashes, eye inflammation, C spine involvement, inflammatory low back pain, IBD, lung involvement, SQ nodules, paresthesias, muscle weakness.  \par \par + RLE DVT in 2016, off A/C\par + R shoulder OA\par + R hip replacement \par + hx of TM in 2006, fully resolved \par + intentional weight loss of approx 30lbs which has improved LE edema, HBA1c\par + recent retinal swelling, self-resolved, following with optho, reportedly related to DM2 and not RA\par \par Labs - chronically mild elevation in CPK since 2016\par Preimm -- Hep B/C, Quant - negative 3/2020\par DEXA 2018 - normal \par ----------------\par \par 7/30/20-- No complaints today -- no synovitis, prolonged AM stiffness, loss of ROM. No recent F/C, infections, cough, CP, SOB. Feels well. No SE from meds. Observing covid recommendations of social distancing and using a mask when outside. Recent improvement in HBA1c. \par \par 10/29/20 -- No joint complaints, R hip replacement mild discomfort with positional change but able to ambulate/sit/sleep comfortably. No extra-articular sx, F/C, infectious sx. HBA1c markedly improved and endo adjusted DM2 meds, mild worsening of renal fxn, endo added ARB. \par \par 1/28/21 -- No joint complaints today. Has been spacing out Humira, now up to q26 days without recurrence of sx. Feels well otherwise, no infectious sx, no extra-articular sx. Improving DM2 control. \par \par 4/6/21 -- L hip acute pain x 5 days, no preceding issues, radiating to groin with weight bearing, no sciatica pain or back pain. Up to Humira q4 weeks, some stiffness creeping back into hands and shoulders but no pain or synovitis. Feels well otherwise. \par \par 5/20/21 -- L hip replacement scheduled for 6/10/21. Currently on Humira q3 weeks, daily SSZ and no active inflammatory sx, prior stiffness resolved. No infectious sx, feels fine otherwise. \par \par 8/6/21 -- Total LHR postponed, now scheduled for 8/12, has already been holding Humira, taking SSZ, no current inflammatory joint sx or extra-articular manifestations. Feels well otherwise. \par \par 11/2/21 -- Total LHR postponed again, now scheduled for late Jan 2022. TRA stable, no SE with meds, no recent infectious sx. Some MSK related shoulder pain and low back pain arising from seated as trying not to hurt his hip, but intact ROM, not needing OTC pain meds. Feels well otherwise. S/p Covid booster, asking about flu vaccine. \par \par 4/8/22 -- S/p L THR, healing well, doing well with PT, not needing pain meds. RA well controlled, no flares while holding meds and has now resumed. Prior b/l shoulder pain resolved, some mild midline LBP now but not limiting him. No infectious sx, mild covid in jan, no monoclonals, feels well today. \par \par 7/19/22 -- Hip doing well, did a trip to [place] and was able to walk comfortably, worsening lumbar spasm which PT has been working on, some upper arm pain as he has to push off from seated but ROM intact. RA quiescent, no extra-articular inflammatory sx, no infectious sx.\par \par 11/15/22 -- No current inflammatory arthritis sx, no extra-articular inflammatory sx. Ongoing but mild LBP but less spasm, needing arms less to arise from seated. No infectious sx.

## 2022-11-29 ENCOUNTER — INPATIENT (INPATIENT)
Facility: HOSPITAL | Age: 77
LOS: 1 days | Discharge: HOME | DRG: 871 | End: 2022-12-01
Attending: HOSPITALIST | Admitting: INTERNAL MEDICINE
Payer: MEDICARE

## 2022-11-29 VITALS
WEIGHT: 268.08 LBS | RESPIRATION RATE: 16 BRPM | SYSTOLIC BLOOD PRESSURE: 126 MMHG | HEART RATE: 105 BPM | DIASTOLIC BLOOD PRESSURE: 68 MMHG | TEMPERATURE: 97 F | HEIGHT: 68 IN | OXYGEN SATURATION: 96 %

## 2022-11-29 DIAGNOSIS — Z90.89 ACQUIRED ABSENCE OF OTHER ORGANS: Chronic | ICD-10-CM

## 2022-11-29 DIAGNOSIS — Z90.49 ACQUIRED ABSENCE OF OTHER SPECIFIED PARTS OF DIGESTIVE TRACT: Chronic | ICD-10-CM

## 2022-11-29 DIAGNOSIS — Z98.890 OTHER SPECIFIED POSTPROCEDURAL STATES: Chronic | ICD-10-CM

## 2022-11-29 DIAGNOSIS — J45.901 UNSPECIFIED ASTHMA WITH (ACUTE) EXACERBATION: ICD-10-CM

## 2022-11-29 DIAGNOSIS — Z96.649 PRESENCE OF UNSPECIFIED ARTIFICIAL HIP JOINT: Chronic | ICD-10-CM

## 2022-11-29 LAB
ALBUMIN SERPL ELPH-MCNC: 3.5 G/DL — SIGNIFICANT CHANGE UP (ref 3.3–5)
ALP SERPL-CCNC: 72 U/L — SIGNIFICANT CHANGE UP (ref 40–120)
ALT FLD-CCNC: 23 U/L — SIGNIFICANT CHANGE UP (ref 10–45)
ANION GAP SERPL CALC-SCNC: 12 MMOL/L — SIGNIFICANT CHANGE UP (ref 5–17)
APPEARANCE UR: ABNORMAL
APTT BLD: 24.9 SEC — LOW (ref 27.5–35.5)
AST SERPL-CCNC: 37 U/L — SIGNIFICANT CHANGE UP (ref 10–40)
B PERT DNA SPEC QL NAA+PROBE: SIGNIFICANT CHANGE UP
BASOPHILS # BLD AUTO: 0.03 K/UL — SIGNIFICANT CHANGE UP (ref 0–0.2)
BASOPHILS NFR BLD AUTO: 0.4 % — SIGNIFICANT CHANGE UP (ref 0–2)
BILIRUB SERPL-MCNC: 0.6 MG/DL — SIGNIFICANT CHANGE UP (ref 0.2–1.2)
BILIRUB UR-MCNC: NEGATIVE — SIGNIFICANT CHANGE UP
BUN SERPL-MCNC: 22 MG/DL — SIGNIFICANT CHANGE UP (ref 7–23)
C PNEUM DNA SPEC QL NAA+PROBE: SIGNIFICANT CHANGE UP
CALCIUM SERPL-MCNC: 8.8 MG/DL — SIGNIFICANT CHANGE UP (ref 8.4–10.5)
CHLORIDE SERPL-SCNC: 106 MMOL/L — SIGNIFICANT CHANGE UP (ref 96–108)
CO2 SERPL-SCNC: 24 MMOL/L — SIGNIFICANT CHANGE UP (ref 22–31)
COLOR SPEC: YELLOW — SIGNIFICANT CHANGE UP
CREAT SERPL-MCNC: 1.78 MG/DL — HIGH (ref 0.5–1.3)
DIFF PNL FLD: ABNORMAL
EGFR: 39 ML/MIN/1.73M2 — LOW
EOSINOPHIL # BLD AUTO: 0.13 K/UL — SIGNIFICANT CHANGE UP (ref 0–0.5)
EOSINOPHIL NFR BLD AUTO: 1.5 % — SIGNIFICANT CHANGE UP (ref 0–6)
FLUAV H1 2009 PAND RNA SPEC QL NAA+PROBE: SIGNIFICANT CHANGE UP
FLUAV H1 RNA SPEC QL NAA+PROBE: SIGNIFICANT CHANGE UP
FLUAV H3 RNA SPEC QL NAA+PROBE: SIGNIFICANT CHANGE UP
FLUAV SUBTYP SPEC NAA+PROBE: SIGNIFICANT CHANGE UP
FLUBV RNA SPEC QL NAA+PROBE: SIGNIFICANT CHANGE UP
GLUCOSE SERPL-MCNC: 135 MG/DL — HIGH (ref 70–99)
GLUCOSE UR QL: 1000 MG/DL
HADV DNA SPEC QL NAA+PROBE: SIGNIFICANT CHANGE UP
HCOV PNL SPEC NAA+PROBE: SIGNIFICANT CHANGE UP
HCT VFR BLD CALC: 47.4 % — SIGNIFICANT CHANGE UP (ref 39–50)
HGB BLD-MCNC: 15.9 G/DL — SIGNIFICANT CHANGE UP (ref 13–17)
HMPV RNA SPEC QL NAA+PROBE: SIGNIFICANT CHANGE UP
HPIV1 RNA SPEC QL NAA+PROBE: SIGNIFICANT CHANGE UP
HPIV2 RNA SPEC QL NAA+PROBE: SIGNIFICANT CHANGE UP
HPIV3 RNA SPEC QL NAA+PROBE: SIGNIFICANT CHANGE UP
HPIV4 RNA SPEC QL NAA+PROBE: SIGNIFICANT CHANGE UP
IMM GRANULOCYTES NFR BLD AUTO: 0.2 % — SIGNIFICANT CHANGE UP (ref 0–0.9)
INR BLD: 1.07 RATIO — SIGNIFICANT CHANGE UP (ref 0.88–1.16)
KETONES UR-MCNC: NEGATIVE — SIGNIFICANT CHANGE UP
LACTATE SERPL-SCNC: 1.2 MMOL/L — SIGNIFICANT CHANGE UP (ref 0.7–2)
LACTATE SERPL-SCNC: 2.1 MMOL/L — HIGH (ref 0.7–2)
LEUKOCYTE ESTERASE UR-ACNC: ABNORMAL
LYMPHOCYTES # BLD AUTO: 0.21 K/UL — LOW (ref 1–3.3)
LYMPHOCYTES # BLD AUTO: 2.5 % — LOW (ref 13–44)
MCHC RBC-ENTMCNC: 30.6 PG — SIGNIFICANT CHANGE UP (ref 27–34)
MCHC RBC-ENTMCNC: 33.5 GM/DL — SIGNIFICANT CHANGE UP (ref 32–36)
MCV RBC AUTO: 91.3 FL — SIGNIFICANT CHANGE UP (ref 80–100)
MONOCYTES # BLD AUTO: 0.39 K/UL — SIGNIFICANT CHANGE UP (ref 0–0.9)
MONOCYTES NFR BLD AUTO: 4.6 % — SIGNIFICANT CHANGE UP (ref 2–14)
NEUTROPHILS # BLD AUTO: 7.73 K/UL — HIGH (ref 1.8–7.4)
NEUTROPHILS NFR BLD AUTO: 90.8 % — HIGH (ref 43–77)
NITRITE UR-MCNC: POSITIVE
NRBC # BLD: 0 /100 WBCS — SIGNIFICANT CHANGE UP (ref 0–0)
NT-PROBNP SERPL-SCNC: 579 PG/ML — HIGH (ref 0–300)
PH UR: 6 — SIGNIFICANT CHANGE UP (ref 5–8)
PLATELET # BLD AUTO: 141 K/UL — LOW (ref 150–400)
POTASSIUM SERPL-MCNC: 3.6 MMOL/L — SIGNIFICANT CHANGE UP (ref 3.5–5.3)
POTASSIUM SERPL-SCNC: 3.6 MMOL/L — SIGNIFICANT CHANGE UP (ref 3.5–5.3)
PROT SERPL-MCNC: 7.1 G/DL — SIGNIFICANT CHANGE UP (ref 6–8.3)
PROT UR-MCNC: 100
PROTHROM AB SERPL-ACNC: 12.4 SEC — SIGNIFICANT CHANGE UP (ref 10.5–13.4)
RAPID RVP RESULT: DETECTED
RBC # BLD: 5.19 M/UL — SIGNIFICANT CHANGE UP (ref 4.2–5.8)
RBC # FLD: 14.3 % — SIGNIFICANT CHANGE UP (ref 10.3–14.5)
RV+EV RNA SPEC QL NAA+PROBE: SIGNIFICANT CHANGE UP
SARS-COV-2 RNA SPEC QL NAA+PROBE: DETECTED
SODIUM SERPL-SCNC: 142 MMOL/L — SIGNIFICANT CHANGE UP (ref 135–145)
SP GR SPEC: 1 — LOW (ref 1.01–1.02)
TROPONIN I, HIGH SENSITIVITY RESULT: 19 NG/L — SIGNIFICANT CHANGE UP
UROBILINOGEN FLD QL: NEGATIVE — SIGNIFICANT CHANGE UP
WBC # BLD: 8.51 K/UL — SIGNIFICANT CHANGE UP (ref 3.8–10.5)
WBC # FLD AUTO: 8.51 K/UL — SIGNIFICANT CHANGE UP (ref 3.8–10.5)

## 2022-11-29 PROCEDURE — 99285 EMERGENCY DEPT VISIT HI MDM: CPT | Mod: CS

## 2022-11-29 PROCEDURE — 71275 CT ANGIOGRAPHY CHEST: CPT | Mod: 26

## 2022-11-29 PROCEDURE — 93010 ELECTROCARDIOGRAM REPORT: CPT

## 2022-11-29 PROCEDURE — 71045 X-RAY EXAM CHEST 1 VIEW: CPT | Mod: 26

## 2022-11-29 PROCEDURE — 93971 EXTREMITY STUDY: CPT | Mod: 26,LT

## 2022-11-29 PROCEDURE — 99223 1ST HOSP IP/OBS HIGH 75: CPT

## 2022-11-29 RX ORDER — ENOXAPARIN SODIUM 100 MG/ML
40 INJECTION SUBCUTANEOUS EVERY 12 HOURS
Refills: 0 | Status: DISCONTINUED | OUTPATIENT
Start: 2022-11-29 | End: 2022-12-01

## 2022-11-29 RX ORDER — REMDESIVIR 5 MG/ML
INJECTION INTRAVENOUS
Refills: 0 | Status: DISCONTINUED | OUTPATIENT
Start: 2022-11-29 | End: 2022-12-01

## 2022-11-29 RX ORDER — ALBUTEROL 90 UG/1
2 AEROSOL, METERED ORAL EVERY 6 HOURS
Refills: 0 | Status: DISCONTINUED | OUTPATIENT
Start: 2022-11-29 | End: 2022-12-01

## 2022-11-29 RX ORDER — REMDESIVIR 5 MG/ML
100 INJECTION INTRAVENOUS EVERY 24 HOURS
Refills: 0 | Status: DISCONTINUED | OUTPATIENT
Start: 2022-11-30 | End: 2022-12-01

## 2022-11-29 RX ORDER — MAGNESIUM SULFATE 500 MG/ML
2 VIAL (ML) INJECTION ONCE
Refills: 0 | Status: COMPLETED | OUTPATIENT
Start: 2022-11-29 | End: 2022-11-29

## 2022-11-29 RX ORDER — DEXAMETHASONE 0.5 MG/5ML
6 ELIXIR ORAL DAILY
Refills: 0 | Status: DISCONTINUED | OUTPATIENT
Start: 2022-11-30 | End: 2022-12-01

## 2022-11-29 RX ORDER — ALBUTEROL 90 UG/1
2.5 AEROSOL, METERED ORAL ONCE
Refills: 0 | Status: COMPLETED | OUTPATIENT
Start: 2022-11-29 | End: 2022-11-29

## 2022-11-29 RX ORDER — ONDANSETRON 8 MG/1
4 TABLET, FILM COATED ORAL ONCE
Refills: 0 | Status: COMPLETED | OUTPATIENT
Start: 2022-11-29 | End: 2022-11-29

## 2022-11-29 RX ORDER — REMDESIVIR 5 MG/ML
200 INJECTION INTRAVENOUS EVERY 24 HOURS
Refills: 0 | Status: COMPLETED | OUTPATIENT
Start: 2022-11-29 | End: 2022-11-29

## 2022-11-29 RX ORDER — SODIUM CHLORIDE 9 MG/ML
1000 INJECTION INTRAMUSCULAR; INTRAVENOUS; SUBCUTANEOUS ONCE
Refills: 0 | Status: COMPLETED | OUTPATIENT
Start: 2022-11-29 | End: 2022-11-29

## 2022-11-29 RX ORDER — ACETAMINOPHEN 500 MG
975 TABLET ORAL ONCE
Refills: 0 | Status: COMPLETED | OUTPATIENT
Start: 2022-11-29 | End: 2022-11-29

## 2022-11-29 RX ORDER — SODIUM CHLORIDE 9 MG/ML
1000 INJECTION, SOLUTION INTRAVENOUS
Refills: 0 | Status: COMPLETED | OUTPATIENT
Start: 2022-11-29 | End: 2022-11-30

## 2022-11-29 RX ORDER — CEFTRIAXONE 500 MG/1
1000 INJECTION, POWDER, FOR SOLUTION INTRAMUSCULAR; INTRAVENOUS ONCE
Refills: 0 | Status: COMPLETED | OUTPATIENT
Start: 2022-11-29 | End: 2022-11-29

## 2022-11-29 RX ORDER — ACETAMINOPHEN 500 MG
650 TABLET ORAL EVERY 6 HOURS
Refills: 0 | Status: DISCONTINUED | OUTPATIENT
Start: 2022-11-29 | End: 2022-12-01

## 2022-11-29 RX ADMIN — Medication 2 GRAM(S): at 21:55

## 2022-11-29 RX ADMIN — Medication 125 MILLIGRAM(S): at 21:34

## 2022-11-29 RX ADMIN — SODIUM CHLORIDE 1000 MILLILITER(S): 9 INJECTION INTRAMUSCULAR; INTRAVENOUS; SUBCUTANEOUS at 20:18

## 2022-11-29 RX ADMIN — Medication 150 GRAM(S): at 21:34

## 2022-11-29 RX ADMIN — ALBUTEROL 2.5 MILLIGRAM(S): 90 AEROSOL, METERED ORAL at 21:05

## 2022-11-29 RX ADMIN — ONDANSETRON 4 MILLIGRAM(S): 8 TABLET, FILM COATED ORAL at 21:48

## 2022-11-29 RX ADMIN — CEFTRIAXONE 100 MILLIGRAM(S): 500 INJECTION, POWDER, FOR SOLUTION INTRAMUSCULAR; INTRAVENOUS at 23:49

## 2022-11-29 RX ADMIN — Medication 975 MILLIGRAM(S): at 22:10

## 2022-11-29 RX ADMIN — Medication 975 MILLIGRAM(S): at 21:42

## 2022-11-29 RX ADMIN — SODIUM CHLORIDE 1000 MILLILITER(S): 9 INJECTION INTRAMUSCULAR; INTRAVENOUS; SUBCUTANEOUS at 21:18

## 2022-11-29 NOTE — ED PROVIDER NOTE - OBJECTIVE STATEMENT
77-year-old male with history of hypertension, hypothyroidism, hyperlipidemia, asthma, elevated BMI brought in by EMS for evaluation of sudden onset of chills, fatigue, shortness of breath diaphoresis that started at approximately 5 AM today.  Patient notes that he just started developing a dry cough upon arrival to the emergency department.  Patient denies any chest pain at this time.  Patient also complains of a dull bilateral frontal headache and discomfort in his left lower extremity.  Patient denies any urinary symptoms.  When asked about sick contacts, the patient states that he has a daughter that has been in and out of the house and has been recently babysitting small kids.  Patient denies any recent travel.  Patient notes that 2 days after Thanksgiving, he had a 24-hour period of multiple episodes of vomiting and diarrhea that resolved 2 days ago.

## 2022-11-29 NOTE — H&P ADULT - NEUROLOGICAL
cranial nerves II-XII intact/sensation intact/responds to pain/deep reflexes intact/cranial nerves intact/upper extremity  reflex

## 2022-11-29 NOTE — H&P ADULT - ASSESSMENT
77-year-old male w/ HTN, HLD, PAD, DM2, Obesity, Hypothyroidism, Prostate Ca, RA, Asthma, remote hx of DVT, BIBEMS, w/c/o sudden onset of chills, fatigue, shortness of breath diaphoresis that started at approximately 5 AM today.  Patient reports that he also started having dry cough, headache today.  In the ED, pt noted to have O2 sat of 89-90%.  O2 2 LPM was applied w/ improvement.   Pt states he had episode of diarrhea and vomiting 2 days ago, w/c has resolved. He denies fever, chest pain, abd pain, dysuria.  He denies recent travel nor sick contacts.    Urinalysis was c/w UTI.  COVID 19 is +ve.     Hypoxia, COVID 19 infection  UTI, JAN  Obesity, asthma  Pt w/DMs, HTN, PAD, hx of DVT    Admit  O2 supp to keep O2 sat >92%  Empiric Ceftriaxone pending urine cultures  Will initiate Decadron-10 day course  Remdesivir w/ load 200mg, then 100 mg- 5 days course  Gentle IV hydration  Bronchodilators prn   Monitor FS, cont Lantus, SSi  Cont other meds: ASA, Losartan, Statin, Duloxetine, Levothyroxine, Amlodipine, Montelukast, Hydralazine  FFup Labs  CT Angio chest to r/o pE  Leg dopplers to r/o DVT  DVT prophylaxis

## 2022-11-29 NOTE — H&P ADULT - NSHPPHYSICALEXAM_GEN_ALL_CORE
Vital Signs (24 Hrs):  T(C): 35.9 (11-29-22 @ 19:27), Max: 35.9 (11-29-22 @ 19:27)  HR: 86 (11-29-22 @ 21:30) (81 - 105)  BP: 137/62 (11-29-22 @ 21:30) (110/52 - 166/82)  RR: 18 (11-29-22 @ 21:30) (16 - 18)  SpO2: 99% (11-29-22 @ 21:30) (92% - 100%)  Daily Height in cm: 172.72 (29 Nov 2022 19:27)

## 2022-11-29 NOTE — H&P ADULT - HISTORY OF PRESENT ILLNESS
Advair -21 MCG/ACT Inhalation Aerosol  Albuterol Sulfate HFA AERS  amLODIPine Besylate 10 MG Oral Tablet; Take 1 tablet daily  Aspirin EC 81 MG Oral Tablet Delayed Release; Take 1 tablet daily  Atorvastatin Calcium 20 MG Oral Tablet; take 1 tablet by mouth at bedtime  Cyclobenzaprine HCl - 5 MG Oral Tablet; TAKE 1 TABLET AT BEDTIME AS NEEDED  DULoxetine HCl - 60 MG Oral Capsule Delayed Release Particles; TAKE 1 CAPSULE BY  MOUTH EVERY DAY  Fenofibrate 160 MG Oral Tablet; TAKE 1 TABLET BY MOUTH EVERY DAY  Folic Acid 1 MG Oral Tablet; TAKE 1 TABLET BY MOUTH EVERY DAY  FreeStyle Lite Test In Vitro Strip  Humira Pen 40 MG/0.8ML Subcutaneous Pen-injector Kit; INJECT ONE PEN (40 MG)  SUBCUTANEOUSLY EVERY OTHER WEEK. REFRIGERATE  hydrALAZINE HCl - 50 MG Oral Tablet; TAKE 1 TABLET TWICE DAILY  Jardiance 10 MG Oral Tablet; 1 tab po daily  Levothyroxine Sodium 75 MCG Oral Tablet; TAKE 1 TABLET Every morning on a empty  stomach  Losartan Potassium 100 MG Oral Tablet; Take 1 tablet daily  Montelukast Sodium 10 MG Oral Tablet; TAKE 1 TABLET BY MOUTH DAILY  Nebivolol HCl - 10 MG Oral Tablet; Take 1 tablet daily  Ozempic (1 MG/DOSE) 4 MG/3ML Subcutaneous Solution Pen-injector; INJECT 1 MG  SUBCUTANEOUSLY WEEKLY  sulfaSALAzine 500 MG Oral Tablet; Take 2 tablets twice a day  Tresiba FlexTouch 200 UNIT/ML Subcutaneous Solution Pen-injector; 20 units  subcutaneously daily  Vitamin D TABS   76M HTN, HLD, PAD, DM2, Hypothyroidism, DVT, Prostate CA, Asthma and RA     77-year-old male w/ HTN, HLD, PAD, DM2, Obesity, Hypothyroidism, Prostate Ca, RA, Asthma, remote hx of DVT, BIBEMS, w/c/o sudden onset of chills, fatigue, shortness of breath diaphoresis that started at approximately 5 AM today.  Patient reports that he also started having dry cough, headache today.  In the ED, pt noted to have O2 sat of 89-90%.  O2 2 LPM was applied w/ improvement.   Pt states he had episode of diarrhea and vomiting 2 days ago, w/c has resolved. He denies fever, chest pain, abd pain, dysuria.  He denies recent travel nor sick contacts.    Urinalysis was c/w UTI.  COVID 19 is +ve.

## 2022-11-29 NOTE — ED ADULT NURSE NOTE - OBJECTIVE STATEMENT
Pt a&ox3 BIB EMS from home c/o weakness and chills x today. Pt noted to be hypoxic to low 90s on RA. Pt placed on 2L NC with improvement. Sepsis protocol initiated per MD order.

## 2022-11-29 NOTE — H&P ADULT - MUSCULOSKELETAL
ROM intact/strength 5/5 bilateral upper extremities/strength 5/5 bilateral lower extremities/extremities exam

## 2022-11-30 DIAGNOSIS — R09.89 OTHER SPECIFIED SYMPTOMS AND SIGNS INVOLVING THE CIRCULATORY AND RESPIRATORY SYSTEMS: ICD-10-CM

## 2022-11-30 LAB
A1C WITH ESTIMATED AVERAGE GLUCOSE RESULT: 6 % — HIGH (ref 4–5.6)
ALBUMIN SERPL ELPH-MCNC: 3.2 G/DL — LOW (ref 3.3–5)
ALP SERPL-CCNC: 57 U/L — SIGNIFICANT CHANGE UP (ref 40–120)
ALT FLD-CCNC: 37 U/L — SIGNIFICANT CHANGE UP (ref 10–45)
ANION GAP SERPL CALC-SCNC: 9 MMOL/L — SIGNIFICANT CHANGE UP (ref 5–17)
AST SERPL-CCNC: 44 U/L — HIGH (ref 10–40)
BASOPHILS # BLD AUTO: 0.03 K/UL — SIGNIFICANT CHANGE UP (ref 0–0.2)
BASOPHILS NFR BLD AUTO: 0.2 % — SIGNIFICANT CHANGE UP (ref 0–2)
BILIRUB SERPL-MCNC: 0.4 MG/DL — SIGNIFICANT CHANGE UP (ref 0.2–1.2)
BUN SERPL-MCNC: 22 MG/DL — SIGNIFICANT CHANGE UP (ref 7–23)
CALCIUM SERPL-MCNC: 8.5 MG/DL — SIGNIFICANT CHANGE UP (ref 8.4–10.5)
CHLORIDE SERPL-SCNC: 102 MMOL/L — SIGNIFICANT CHANGE UP (ref 96–108)
CO2 SERPL-SCNC: 26 MMOL/L — SIGNIFICANT CHANGE UP (ref 22–31)
CREAT SERPL-MCNC: 1.86 MG/DL — HIGH (ref 0.5–1.3)
EGFR: 37 ML/MIN/1.73M2 — LOW
EOSINOPHIL # BLD AUTO: 0 K/UL — SIGNIFICANT CHANGE UP (ref 0–0.5)
EOSINOPHIL NFR BLD AUTO: 0 % — SIGNIFICANT CHANGE UP (ref 0–6)
ESTIMATED AVERAGE GLUCOSE: 126 MG/DL — HIGH (ref 68–114)
GLUCOSE BLDC GLUCOMTR-MCNC: 148 MG/DL — HIGH (ref 70–99)
GLUCOSE BLDC GLUCOMTR-MCNC: 150 MG/DL — HIGH (ref 70–99)
GLUCOSE BLDC GLUCOMTR-MCNC: 156 MG/DL — HIGH (ref 70–99)
GLUCOSE BLDC GLUCOMTR-MCNC: 186 MG/DL — HIGH (ref 70–99)
GLUCOSE SERPL-MCNC: 172 MG/DL — HIGH (ref 70–99)
HCT VFR BLD CALC: 45.2 % — SIGNIFICANT CHANGE UP (ref 39–50)
HGB BLD-MCNC: 14.9 G/DL — SIGNIFICANT CHANGE UP (ref 13–17)
IMM GRANULOCYTES NFR BLD AUTO: 0.4 % — SIGNIFICANT CHANGE UP (ref 0–0.9)
LYMPHOCYTES # BLD AUTO: 0.22 K/UL — LOW (ref 1–3.3)
LYMPHOCYTES # BLD AUTO: 1.4 % — LOW (ref 13–44)
MCHC RBC-ENTMCNC: 30.5 PG — SIGNIFICANT CHANGE UP (ref 27–34)
MCHC RBC-ENTMCNC: 33 GM/DL — SIGNIFICANT CHANGE UP (ref 32–36)
MCV RBC AUTO: 92.4 FL — SIGNIFICANT CHANGE UP (ref 80–100)
MONOCYTES # BLD AUTO: 0.83 K/UL — SIGNIFICANT CHANGE UP (ref 0–0.9)
MONOCYTES NFR BLD AUTO: 5.1 % — SIGNIFICANT CHANGE UP (ref 2–14)
NEUTROPHILS # BLD AUTO: 15.01 K/UL — HIGH (ref 1.8–7.4)
NEUTROPHILS NFR BLD AUTO: 92.9 % — HIGH (ref 43–77)
NRBC # BLD: 0 /100 WBCS — SIGNIFICANT CHANGE UP (ref 0–0)
PLATELET # BLD AUTO: 145 K/UL — LOW (ref 150–400)
POTASSIUM SERPL-MCNC: 4.3 MMOL/L — SIGNIFICANT CHANGE UP (ref 3.5–5.3)
POTASSIUM SERPL-SCNC: 4.3 MMOL/L — SIGNIFICANT CHANGE UP (ref 3.5–5.3)
PROT SERPL-MCNC: 6.9 G/DL — SIGNIFICANT CHANGE UP (ref 6–8.3)
RBC # BLD: 4.89 M/UL — SIGNIFICANT CHANGE UP (ref 4.2–5.8)
RBC # FLD: 14.6 % — HIGH (ref 10.3–14.5)
SODIUM SERPL-SCNC: 137 MMOL/L — SIGNIFICANT CHANGE UP (ref 135–145)
TROPONIN I, HIGH SENSITIVITY RESULT: 20.1 NG/L — SIGNIFICANT CHANGE UP
WBC # BLD: 16.15 K/UL — HIGH (ref 3.8–10.5)
WBC # FLD AUTO: 16.15 K/UL — HIGH (ref 3.8–10.5)

## 2022-11-30 PROCEDURE — 99233 SBSQ HOSP IP/OBS HIGH 50: CPT

## 2022-11-30 PROCEDURE — 93971 EXTREMITY STUDY: CPT | Mod: 26,RT

## 2022-11-30 RX ORDER — GLUCAGON INJECTION, SOLUTION 0.5 MG/.1ML
1 INJECTION, SOLUTION SUBCUTANEOUS ONCE
Refills: 0 | Status: DISCONTINUED | OUTPATIENT
Start: 2022-11-30 | End: 2022-12-01

## 2022-11-30 RX ORDER — LEVOTHYROXINE SODIUM 125 MCG
75 TABLET ORAL DAILY
Refills: 0 | Status: DISCONTINUED | OUTPATIENT
Start: 2022-11-30 | End: 2022-12-01

## 2022-11-30 RX ORDER — INSULIN GLARGINE 100 [IU]/ML
20 INJECTION, SOLUTION SUBCUTANEOUS EVERY MORNING
Refills: 0 | Status: DISCONTINUED | OUTPATIENT
Start: 2022-11-30 | End: 2022-12-01

## 2022-11-30 RX ORDER — ASPIRIN/CALCIUM CARB/MAGNESIUM 324 MG
81 TABLET ORAL DAILY
Refills: 0 | Status: DISCONTINUED | OUTPATIENT
Start: 2022-11-30 | End: 2022-12-01

## 2022-11-30 RX ORDER — LOSARTAN POTASSIUM 100 MG/1
100 TABLET, FILM COATED ORAL DAILY
Refills: 0 | Status: DISCONTINUED | OUTPATIENT
Start: 2022-12-01 | End: 2022-12-01

## 2022-11-30 RX ORDER — INSULIN LISPRO 100/ML
VIAL (ML) SUBCUTANEOUS
Refills: 0 | Status: DISCONTINUED | OUTPATIENT
Start: 2022-11-30 | End: 2022-12-01

## 2022-11-30 RX ORDER — SODIUM CHLORIDE 9 MG/ML
1000 INJECTION, SOLUTION INTRAVENOUS
Refills: 0 | Status: DISCONTINUED | OUTPATIENT
Start: 2022-11-30 | End: 2022-12-01

## 2022-11-30 RX ORDER — DEXTROSE 50 % IN WATER 50 %
25 SYRINGE (ML) INTRAVENOUS ONCE
Refills: 0 | Status: DISCONTINUED | OUTPATIENT
Start: 2022-11-30 | End: 2022-12-01

## 2022-11-30 RX ORDER — HYDRALAZINE HCL 50 MG
50 TABLET ORAL
Refills: 0 | Status: DISCONTINUED | OUTPATIENT
Start: 2022-11-30 | End: 2022-12-01

## 2022-11-30 RX ORDER — DULOXETINE HYDROCHLORIDE 30 MG/1
60 CAPSULE, DELAYED RELEASE ORAL DAILY
Refills: 0 | Status: DISCONTINUED | OUTPATIENT
Start: 2022-11-30 | End: 2022-12-01

## 2022-11-30 RX ORDER — INSULIN LISPRO 100/ML
VIAL (ML) SUBCUTANEOUS AT BEDTIME
Refills: 0 | Status: DISCONTINUED | OUTPATIENT
Start: 2022-11-30 | End: 2022-12-01

## 2022-11-30 RX ORDER — FOLIC ACID 0.8 MG
1 TABLET ORAL DAILY
Refills: 0 | Status: DISCONTINUED | OUTPATIENT
Start: 2022-11-30 | End: 2022-12-01

## 2022-11-30 RX ORDER — MONTELUKAST 4 MG/1
10 TABLET, CHEWABLE ORAL DAILY
Refills: 0 | Status: DISCONTINUED | OUTPATIENT
Start: 2022-11-30 | End: 2022-12-01

## 2022-11-30 RX ORDER — ATORVASTATIN CALCIUM 80 MG/1
20 TABLET, FILM COATED ORAL AT BEDTIME
Refills: 0 | Status: DISCONTINUED | OUTPATIENT
Start: 2022-11-30 | End: 2022-12-01

## 2022-11-30 RX ORDER — DEXTROSE 50 % IN WATER 50 %
12.5 SYRINGE (ML) INTRAVENOUS ONCE
Refills: 0 | Status: DISCONTINUED | OUTPATIENT
Start: 2022-11-30 | End: 2022-12-01

## 2022-11-30 RX ORDER — SENNA PLUS 8.6 MG/1
2 TABLET ORAL AT BEDTIME
Refills: 0 | Status: DISCONTINUED | OUTPATIENT
Start: 2022-11-30 | End: 2022-12-01

## 2022-11-30 RX ORDER — OMEGA-3 ACID ETHYL ESTERS 1 G
0 CAPSULE ORAL
Qty: 0 | Refills: 0 | DISCHARGE

## 2022-11-30 RX ORDER — AMLODIPINE BESYLATE 2.5 MG/1
10 TABLET ORAL DAILY
Refills: 0 | Status: DISCONTINUED | OUTPATIENT
Start: 2022-11-30 | End: 2022-12-01

## 2022-11-30 RX ORDER — CEFTRIAXONE 500 MG/1
1000 INJECTION, POWDER, FOR SOLUTION INTRAMUSCULAR; INTRAVENOUS EVERY 24 HOURS
Refills: 0 | Status: DISCONTINUED | OUTPATIENT
Start: 2022-11-30 | End: 2022-12-01

## 2022-11-30 RX ORDER — ASCORBIC ACID 60 MG
500 TABLET,CHEWABLE ORAL
Refills: 0 | Status: DISCONTINUED | OUTPATIENT
Start: 2022-11-30 | End: 2022-12-01

## 2022-11-30 RX ORDER — DEXTROSE 50 % IN WATER 50 %
15 SYRINGE (ML) INTRAVENOUS ONCE
Refills: 0 | Status: DISCONTINUED | OUTPATIENT
Start: 2022-11-30 | End: 2022-12-01

## 2022-11-30 RX ORDER — APIXABAN 2.5 MG/1
1 TABLET, FILM COATED ORAL
Qty: 0 | Refills: 0 | DISCHARGE

## 2022-11-30 RX ORDER — LANOLIN ALCOHOL/MO/W.PET/CERES
3 CREAM (GRAM) TOPICAL AT BEDTIME
Refills: 0 | Status: DISCONTINUED | OUTPATIENT
Start: 2022-11-30 | End: 2022-12-01

## 2022-11-30 RX ADMIN — Medication 500 MILLIGRAM(S): at 18:09

## 2022-11-30 RX ADMIN — Medication 50 MILLIGRAM(S): at 06:20

## 2022-11-30 RX ADMIN — Medication 75 MICROGRAM(S): at 06:20

## 2022-11-30 RX ADMIN — ATORVASTATIN CALCIUM 20 MILLIGRAM(S): 80 TABLET, FILM COATED ORAL at 21:17

## 2022-11-30 RX ADMIN — SODIUM CHLORIDE 70 MILLILITER(S): 9 INJECTION, SOLUTION INTRAVENOUS at 06:41

## 2022-11-30 RX ADMIN — ENOXAPARIN SODIUM 40 MILLIGRAM(S): 100 INJECTION SUBCUTANEOUS at 06:20

## 2022-11-30 RX ADMIN — Medication 81 MILLIGRAM(S): at 12:04

## 2022-11-30 RX ADMIN — INSULIN GLARGINE 20 UNIT(S): 100 INJECTION, SOLUTION SUBCUTANEOUS at 07:32

## 2022-11-30 RX ADMIN — ENOXAPARIN SODIUM 40 MILLIGRAM(S): 100 INJECTION SUBCUTANEOUS at 18:09

## 2022-11-30 RX ADMIN — CEFTRIAXONE 100 MILLIGRAM(S): 500 INJECTION, POWDER, FOR SOLUTION INTRAMUSCULAR; INTRAVENOUS at 18:10

## 2022-11-30 RX ADMIN — REMDESIVIR 500 MILLIGRAM(S): 5 INJECTION INTRAVENOUS at 23:27

## 2022-11-30 RX ADMIN — DULOXETINE HYDROCHLORIDE 60 MILLIGRAM(S): 30 CAPSULE, DELAYED RELEASE ORAL at 12:03

## 2022-11-30 RX ADMIN — Medication 650 MILLIGRAM(S): at 22:00

## 2022-11-30 RX ADMIN — Medication 1: at 18:10

## 2022-11-30 RX ADMIN — Medication 3 MILLIGRAM(S): at 21:17

## 2022-11-30 RX ADMIN — REMDESIVIR 200 MILLIGRAM(S): 5 INJECTION INTRAVENOUS at 00:30

## 2022-11-30 RX ADMIN — Medication 1: at 12:38

## 2022-11-30 RX ADMIN — Medication 6 MILLIGRAM(S): at 06:19

## 2022-11-30 RX ADMIN — Medication 50 MILLIGRAM(S): at 18:09

## 2022-11-30 RX ADMIN — Medication 1 MILLIGRAM(S): at 12:03

## 2022-11-30 RX ADMIN — AMLODIPINE BESYLATE 10 MILLIGRAM(S): 2.5 TABLET ORAL at 06:19

## 2022-11-30 RX ADMIN — MONTELUKAST 10 MILLIGRAM(S): 4 TABLET, CHEWABLE ORAL at 12:03

## 2022-11-30 RX ADMIN — Medication 650 MILLIGRAM(S): at 21:17

## 2022-11-30 RX ADMIN — SENNA PLUS 2 TABLET(S): 8.6 TABLET ORAL at 21:17

## 2022-11-30 RX ADMIN — Medication 500 MILLIGRAM(S): at 06:19

## 2022-12-01 ENCOUNTER — TRANSCRIPTION ENCOUNTER (OUTPATIENT)
Age: 77
End: 2022-12-01

## 2022-12-01 VITALS — RESPIRATION RATE: 18 BRPM | OXYGEN SATURATION: 94 %

## 2022-12-01 LAB
ALBUMIN SERPL ELPH-MCNC: 3 G/DL — LOW (ref 3.3–5)
ALP SERPL-CCNC: 59 U/L — SIGNIFICANT CHANGE UP (ref 40–120)
ALT FLD-CCNC: 26 U/L — SIGNIFICANT CHANGE UP (ref 10–45)
ANION GAP SERPL CALC-SCNC: 5 MMOL/L — SIGNIFICANT CHANGE UP (ref 5–17)
AST SERPL-CCNC: 32 U/L — SIGNIFICANT CHANGE UP (ref 10–40)
BASOPHILS # BLD AUTO: 0.02 K/UL — SIGNIFICANT CHANGE UP (ref 0–0.2)
BASOPHILS NFR BLD AUTO: 0.2 % — SIGNIFICANT CHANGE UP (ref 0–2)
BILIRUB SERPL-MCNC: 0.3 MG/DL — SIGNIFICANT CHANGE UP (ref 0.2–1.2)
BUN SERPL-MCNC: 35 MG/DL — HIGH (ref 7–23)
CALCIUM SERPL-MCNC: 8.4 MG/DL — SIGNIFICANT CHANGE UP (ref 8.4–10.5)
CHLORIDE SERPL-SCNC: 106 MMOL/L — SIGNIFICANT CHANGE UP (ref 96–108)
CO2 SERPL-SCNC: 28 MMOL/L — SIGNIFICANT CHANGE UP (ref 22–31)
CREAT SERPL-MCNC: 1.86 MG/DL — HIGH (ref 0.5–1.3)
E COLI DNA BLD POS QL NAA+NON-PROBE: SIGNIFICANT CHANGE UP
EGFR: 37 ML/MIN/1.73M2 — LOW
EOSINOPHIL # BLD AUTO: 0.05 K/UL — SIGNIFICANT CHANGE UP (ref 0–0.5)
EOSINOPHIL NFR BLD AUTO: 0.5 % — SIGNIFICANT CHANGE UP (ref 0–6)
GLUCOSE BLDC GLUCOMTR-MCNC: 140 MG/DL — HIGH (ref 70–99)
GLUCOSE BLDC GLUCOMTR-MCNC: 210 MG/DL — HIGH (ref 70–99)
GLUCOSE SERPL-MCNC: 172 MG/DL — HIGH (ref 70–99)
GRAM STN FLD: SIGNIFICANT CHANGE UP
HCT VFR BLD CALC: 44.7 % — SIGNIFICANT CHANGE UP (ref 39–50)
HGB BLD-MCNC: 14.6 G/DL — SIGNIFICANT CHANGE UP (ref 13–17)
IMM GRANULOCYTES NFR BLD AUTO: 0.4 % — SIGNIFICANT CHANGE UP (ref 0–0.9)
LYMPHOCYTES # BLD AUTO: 0.59 K/UL — LOW (ref 1–3.3)
LYMPHOCYTES # BLD AUTO: 6 % — LOW (ref 13–44)
MCHC RBC-ENTMCNC: 31.1 PG — SIGNIFICANT CHANGE UP (ref 27–34)
MCHC RBC-ENTMCNC: 32.7 GM/DL — SIGNIFICANT CHANGE UP (ref 32–36)
MCV RBC AUTO: 95.1 FL — SIGNIFICANT CHANGE UP (ref 80–100)
METHOD TYPE: SIGNIFICANT CHANGE UP
MONOCYTES # BLD AUTO: 0.53 K/UL — SIGNIFICANT CHANGE UP (ref 0–0.9)
MONOCYTES NFR BLD AUTO: 5.4 % — SIGNIFICANT CHANGE UP (ref 2–14)
NEUTROPHILS # BLD AUTO: 8.56 K/UL — HIGH (ref 1.8–7.4)
NEUTROPHILS NFR BLD AUTO: 87.5 % — HIGH (ref 43–77)
NRBC # BLD: 0 /100 WBCS — SIGNIFICANT CHANGE UP (ref 0–0)
PLATELET # BLD AUTO: 143 K/UL — LOW (ref 150–400)
POTASSIUM SERPL-MCNC: 4.3 MMOL/L — SIGNIFICANT CHANGE UP (ref 3.5–5.3)
POTASSIUM SERPL-SCNC: 4.3 MMOL/L — SIGNIFICANT CHANGE UP (ref 3.5–5.3)
PROT SERPL-MCNC: 6.7 G/DL — SIGNIFICANT CHANGE UP (ref 6–8.3)
RBC # BLD: 4.7 M/UL — SIGNIFICANT CHANGE UP (ref 4.2–5.8)
RBC # FLD: 14.6 % — HIGH (ref 10.3–14.5)
SODIUM SERPL-SCNC: 139 MMOL/L — SIGNIFICANT CHANGE UP (ref 135–145)
SPECIMEN SOURCE: SIGNIFICANT CHANGE UP
WBC # BLD: 9.79 K/UL — SIGNIFICANT CHANGE UP (ref 3.8–10.5)
WBC # FLD AUTO: 9.79 K/UL — SIGNIFICANT CHANGE UP (ref 3.8–10.5)

## 2022-12-01 PROCEDURE — 96375 TX/PRO/DX INJ NEW DRUG ADDON: CPT

## 2022-12-01 PROCEDURE — 87150 DNA/RNA AMPLIFIED PROBE: CPT

## 2022-12-01 PROCEDURE — 93971 EXTREMITY STUDY: CPT

## 2022-12-01 PROCEDURE — 82962 GLUCOSE BLOOD TEST: CPT

## 2022-12-01 PROCEDURE — 83036 HEMOGLOBIN GLYCOSYLATED A1C: CPT

## 2022-12-01 PROCEDURE — 83605 ASSAY OF LACTIC ACID: CPT

## 2022-12-01 PROCEDURE — 81001 URINALYSIS AUTO W/SCOPE: CPT

## 2022-12-01 PROCEDURE — 93005 ELECTROCARDIOGRAM TRACING: CPT

## 2022-12-01 PROCEDURE — 83880 ASSAY OF NATRIURETIC PEPTIDE: CPT

## 2022-12-01 PROCEDURE — 94640 AIRWAY INHALATION TREATMENT: CPT

## 2022-12-01 PROCEDURE — 36415 COLL VENOUS BLD VENIPUNCTURE: CPT

## 2022-12-01 PROCEDURE — 87186 SC STD MICRODIL/AGAR DIL: CPT

## 2022-12-01 PROCEDURE — 80053 COMPREHEN METABOLIC PANEL: CPT

## 2022-12-01 PROCEDURE — 85610 PROTHROMBIN TIME: CPT

## 2022-12-01 PROCEDURE — 84484 ASSAY OF TROPONIN QUANT: CPT

## 2022-12-01 PROCEDURE — 85025 COMPLETE CBC W/AUTO DIFF WBC: CPT

## 2022-12-01 PROCEDURE — 85730 THROMBOPLASTIN TIME PARTIAL: CPT

## 2022-12-01 PROCEDURE — 71275 CT ANGIOGRAPHY CHEST: CPT | Mod: MA

## 2022-12-01 PROCEDURE — 87040 BLOOD CULTURE FOR BACTERIA: CPT

## 2022-12-01 PROCEDURE — 87086 URINE CULTURE/COLONY COUNT: CPT

## 2022-12-01 PROCEDURE — 99285 EMERGENCY DEPT VISIT HI MDM: CPT | Mod: 25

## 2022-12-01 PROCEDURE — 87077 CULTURE AEROBIC IDENTIFY: CPT

## 2022-12-01 PROCEDURE — 99239 HOSP IP/OBS DSCHRG MGMT >30: CPT

## 2022-12-01 PROCEDURE — 71045 X-RAY EXAM CHEST 1 VIEW: CPT

## 2022-12-01 PROCEDURE — 96365 THER/PROPH/DIAG IV INF INIT: CPT

## 2022-12-01 PROCEDURE — 0225U NFCT DS DNA&RNA 21 SARSCOV2: CPT

## 2022-12-01 RX ORDER — CEFUROXIME AXETIL 250 MG
1 TABLET ORAL
Qty: 14 | Refills: 0
Start: 2022-12-01 | End: 2022-12-07

## 2022-12-01 RX ORDER — APIXABAN 2.5 MG/1
10 TABLET, FILM COATED ORAL EVERY 12 HOURS
Refills: 0 | Status: DISCONTINUED | OUTPATIENT
Start: 2022-12-01 | End: 2022-12-01

## 2022-12-01 RX ORDER — APIXABAN 2.5 MG/1
2 TABLET, FILM COATED ORAL
Qty: 28 | Refills: 0
Start: 2022-12-01 | End: 2022-12-07

## 2022-12-01 RX ORDER — APIXABAN 2.5 MG/1
1 TABLET, FILM COATED ORAL
Qty: 60 | Refills: 2
Start: 2022-12-01 | End: 2023-02-28

## 2022-12-01 RX ORDER — BNT162B2 ORIGINAL AND OMICRON BA.4/BA.5 .1125; .1125 MG/2.25ML; MG/2.25ML
0.3 INJECTION, SUSPENSION INTRAMUSCULAR ONCE
Refills: 0 | Status: DISCONTINUED | OUTPATIENT
Start: 2022-12-01 | End: 2022-12-01

## 2022-12-01 RX ADMIN — ENOXAPARIN SODIUM 40 MILLIGRAM(S): 100 INJECTION SUBCUTANEOUS at 05:32

## 2022-12-01 RX ADMIN — Medication 2: at 12:09

## 2022-12-01 RX ADMIN — MONTELUKAST 10 MILLIGRAM(S): 4 TABLET, CHEWABLE ORAL at 12:07

## 2022-12-01 RX ADMIN — LOSARTAN POTASSIUM 100 MILLIGRAM(S): 100 TABLET, FILM COATED ORAL at 05:32

## 2022-12-01 RX ADMIN — Medication 81 MILLIGRAM(S): at 12:06

## 2022-12-01 RX ADMIN — INSULIN GLARGINE 20 UNIT(S): 100 INJECTION, SOLUTION SUBCUTANEOUS at 08:46

## 2022-12-01 RX ADMIN — Medication 75 MICROGRAM(S): at 05:31

## 2022-12-01 RX ADMIN — Medication 1 MILLIGRAM(S): at 12:06

## 2022-12-01 RX ADMIN — DULOXETINE HYDROCHLORIDE 60 MILLIGRAM(S): 30 CAPSULE, DELAYED RELEASE ORAL at 12:06

## 2022-12-01 RX ADMIN — Medication 6 MILLIGRAM(S): at 05:32

## 2022-12-01 RX ADMIN — AMLODIPINE BESYLATE 10 MILLIGRAM(S): 2.5 TABLET ORAL at 05:32

## 2022-12-01 RX ADMIN — Medication 500 MILLIGRAM(S): at 05:32

## 2022-12-01 RX ADMIN — Medication 50 MILLIGRAM(S): at 05:31

## 2022-12-01 NOTE — DISCHARGE NOTE PROVIDER - CARE PROVIDER_API CALL
Abad Gannon)  Medicine  [address], [place] [address]  Phone: [phone]  Fax: [phone]  Follow Up Time:

## 2022-12-01 NOTE — PATIENT PROFILE ADULT - FUNCTIONAL ASSESSMENT - BASIC MOBILITY 6.
3-calculated by average/Not able to assess (calculate score using [facility] averaging method)

## 2022-12-01 NOTE — DISCHARGE NOTE NURSING/CASE MANAGEMENT/SOCIAL WORK - PATIENT PORTAL LINK FT
You can access the FollowMyHealth Patient Portal offered by [facility] by registering at the following website: [email or web address]. By joining [employer]’s FollowMyHealth portal, you will also be able to view your health information using other applications (apps) compatible with our system.

## 2022-12-01 NOTE — PROGRESS NOTE ADULT - SUBJECTIVE AND OBJECTIVE BOX
Patient is a 77y old  Male who presents with a chief complaint of chills, dyspnea, hypoxia - +ve COVID (2022 22:19)      INTERVAL History of Present Illness/OVERNIGHT EVENTS: feels better    MEDICATIONS  (STANDING):  amLODIPine   Tablet 10 milliGRAM(s) Oral daily  ascorbic acid 500 milliGRAM(s) Oral two times a day  aspirin enteric coated 81 milliGRAM(s) Oral daily  atorvastatin 20 milliGRAM(s) Oral at bedtime  cefTRIAXone   IVPB 1000 milliGRAM(s) IV Intermittent every 24 hours  dexAMETHasone  Injectable 6 milliGRAM(s) IV Push daily  dextrose 5%. 1000 milliLiter(s) (100 mL/Hr) IV Continuous <Continuous>  dextrose 5%. 1000 milliLiter(s) (50 mL/Hr) IV Continuous <Continuous>  dextrose 50% Injectable 25 Gram(s) IV Push once  dextrose 50% Injectable 12.5 Gram(s) IV Push once  dextrose 50% Injectable 25 Gram(s) IV Push once  DULoxetine 60 milliGRAM(s) Oral daily  enoxaparin Injectable 40 milliGRAM(s) SubCutaneous every 12 hours  folic acid 1 milliGRAM(s) Oral daily  glucagon  Injectable 1 milliGRAM(s) IntraMuscular once  hydrALAZINE 50 milliGRAM(s) Oral two times a day  insulin glargine Injectable (LANTUS) 20 Unit(s) SubCutaneous every morning  insulin lispro (ADMELOG) corrective regimen sliding scale   SubCutaneous three times a day before meals  insulin lispro (ADMELOG) corrective regimen sliding scale   SubCutaneous at bedtime  levothyroxine 75 MICROGram(s) Oral daily  montelukast 10 milliGRAM(s) Oral daily  remdesivir  IVPB   IV Intermittent   remdesivir  IVPB 100 milliGRAM(s) IV Intermittent every 24 hours  senna 2 Tablet(s) Oral at bedtime    MEDICATIONS  (PRN):  acetaminophen     Tablet .. 650 milliGRAM(s) Oral every 6 hours PRN Temp greater or equal to 38C (100.4F), Mild Pain (1 - 3)  albuterol    90 MICROgram(s) HFA Inhaler 2 Puff(s) Inhalation every 6 hours PRN Shortness of Breath and/or Wheezing  dextrose Oral Gel 15 Gram(s) Oral once PRN Blood Glucose LESS THAN 70 milliGRAM(s)/deciliter      Allergies    Osito (Unknown)  No Known Drug Allergies    Intolerances        REVIEW OF SYSTEMS:  Other systems currently negative unless otherwise specified above.    Vital Signs Last 24 Hrs  T(C): 36.6 (2022 05:38), Max: 36.6 (:38)  T(F): 97.8 (:38), Max: 97.8 (2022 05:38)  HR: 86 (:) (81 - 105)  BP: 142/82 (:38) (110/52 - 166/82)  BP(mean): 85 (2022 21:30) (69 - 103)  RR: 18 (:) (16 - 18)  SpO2: 98% (:) (92% - 100%)    Parameters below as of :38  Patient On (Oxygen Delivery Method): nasal cannula  O2 Flow (L/min): 2      Height (cm): 172.7 ( @ 19:27)  Weight (kg): 121.6 ( @ 19:27)  BMI (kg/m2): 40.8 ( @ 19:27)  BSA (m2): 2.31 ( @ 19:27)    PHYSICAL EXAM:  GENERAL: No apparent distress, appears stated age  EYES: Conjunctiva and sclera clear, no discharge  ENMT: Moist mucous membranes, no nasal discharge  CHEST/LUNG: Clear to auscultation bilaterally, no wheeze or rales  HEART: Regular rhythm, no rubs or gallops  ABDOMEN: Soft, Nontender, Nondistended  EXTREMITIES:  No clubbing, cyanosis or edema  SKIN: No rash, no new discoloration      LABS:                        14.9   16.15 )-----------( 145      ( 2022 06:30 )             45.2     2022 06:30    137    |  102    |  22     ----------------------------<  172    4.3     |  26     |  1.86     Ca    8.5        2022 06:30    TPro  6.9    /  Alb  3.2    /  TBili  0.4    /  DBili  x      /  AST  44     /  ALT  37     /  AlkPhos  57     2022 06:30    PT/INR - ( 2022 20:19 )   PT: 12.4 sec;   INR: 1.07 ratio         PTT - ( 2022 20:19 )  PTT:24.9 sec  Urinalysis Basic - ( 2022 21:45 )    Color: Yellow / Appearance: Slightly Turbid / S.005 / pH: x  Gluc: x / Ketone: Negative  / Bili: Negative / Urobili: Negative   Blood: x / Protein: 100 / Nitrite: Positive   Leuk Esterase: Moderate / RBC: 0-4 /HPF / WBC 11-25 /HPF   Sq Epi: x / Non Sq Epi: Neg.-Few / Bacteria: Moderate /HPF      CAPILLARY BLOOD GLUCOSE      POCT Blood Glucose.: 186 mg/dL (2022 12:24)  POCT Blood Glucose.: 150 mg/dL (2022 06:43)    Height (cm): 172.7 ( @ 19:27)  Weight (kg): 121.6 ( @ 19:27)  BMI (kg/m2): 40.8 ( @ 19:27)  BSA (m2): 2.31 ( @ 19:27)    RADIOLOGY & ADDITIONAL TESTS:      Images reviewed personally    Consultant Notes Reviewed and Care Discussed with relevant Consultants.
Patient is a 77y old  Male who presents with a chief complaint of chills, dyspnea, hypoxia - +ve COVID (2022 12:46)    Feels much better.  Denies chest pain.  SOB improved.      Patient seen and examined at bedside. No overnight events reported.     ALLERGIES:  Osito (Unknown)  No Known Drug Allergies    MEDICATIONS  (STANDING):  amLODIPine   Tablet 10 milliGRAM(s) Oral daily  ascorbic acid 500 milliGRAM(s) Oral two times a day  aspirin enteric coated 81 milliGRAM(s) Oral daily  atorvastatin 20 milliGRAM(s) Oral at bedtime  cefTRIAXone   IVPB 1000 milliGRAM(s) IV Intermittent every 24 hours  dexAMETHasone  Injectable 6 milliGRAM(s) IV Push daily  dextrose 5%. 1000 milliLiter(s) (50 mL/Hr) IV Continuous <Continuous>  dextrose 5%. 1000 milliLiter(s) (100 mL/Hr) IV Continuous <Continuous>  dextrose 50% Injectable 25 Gram(s) IV Push once  dextrose 50% Injectable 25 Gram(s) IV Push once  dextrose 50% Injectable 12.5 Gram(s) IV Push once  DULoxetine 60 milliGRAM(s) Oral daily  enoxaparin Injectable 40 milliGRAM(s) SubCutaneous every 12 hours  folic acid 1 milliGRAM(s) Oral daily  glucagon  Injectable 1 milliGRAM(s) IntraMuscular once  hydrALAZINE 50 milliGRAM(s) Oral two times a day  insulin glargine Injectable (LANTUS) 20 Unit(s) SubCutaneous every morning  insulin lispro (ADMELOG) corrective regimen sliding scale   SubCutaneous three times a day before meals  insulin lispro (ADMELOG) corrective regimen sliding scale   SubCutaneous at bedtime  levothyroxine 75 MICROGram(s) Oral daily  losartan 100 milliGRAM(s) Oral daily  melatonin 3 milliGRAM(s) Oral at bedtime  montelukast 10 milliGRAM(s) Oral daily  remdesivir  IVPB   IV Intermittent   remdesivir  IVPB 100 milliGRAM(s) IV Intermittent every 24 hours  senna 2 Tablet(s) Oral at bedtime    MEDICATIONS  (PRN):  acetaminophen     Tablet .. 650 milliGRAM(s) Oral every 6 hours PRN Temp greater or equal to 38C (100.4F), Mild Pain (1 - 3)  albuterol    90 MICROgram(s) HFA Inhaler 2 Puff(s) Inhalation every 6 hours PRN Shortness of Breath and/or Wheezing  dextrose Oral Gel 15 Gram(s) Oral once PRN Blood Glucose LESS THAN 70 milliGRAM(s)/deciliter    Vital Signs Last 24 Hrs  T(F): 97.8 (01 Dec 2022 05:28), Max: 98.7 (2022 22:30)  HR: 68 (01 Dec 2022 05:28) (68 - 87)  BP: 119/65 (01 Dec 2022 05:28) (118/56 - 129/67)  RR: 20 (01 Dec 2022 05:28) (18 - 20)  SpO2: 97% (01 Dec 2022 05:28) (95% - 98%)  I&O's Summary    PHYSICAL EXAM:  General: NAD, A/O x 3  ENT: No gross hearing impairment, Moist mucous membranes, no thrush  Neck: Supple, No JVD  Lungs: Clear to auscultation bilaterally, good air entry, non-labored breathing, mild crackles at right base  Cardio: RRR, S1/S2, No murmur  Abdomen: Soft, Nontender, Nondistended; Bowel sounds present  Extremities: No calf tenderness, No cyanosis, No pitting edema  Psych: Appropriate mood and affect    LABS:                        14.6   9.79  )-----------( 143      ( 01 Dec 2022 09:05 )             44.7     12-    139  |  106  |  35  ----------------------------<  172  4.3   |  28  |  1.86    Ca    8.4      01 Dec 2022 09:05    TPro  6.7  /  Alb  3.0  /  TBili  0.3  /  DBili  x   /  AST  32  /  ALT  26  /  AlkPhos  59  12-          PT/INR - ( 2022 20:19 )   PT: 12.4 sec;   INR: 1.07 ratio         PTT - ( 2022 20:19 )  PTT:24.9 sec  Lactate, Blood: 1.2 mmol/L ( @ 23:22)  Lactate, Blood: 2.1 mmol/L ( @ 20:19)      CARDIAC MARKERS ( 2022 00:40 )  x     / 20.1 ng/L / x     / x     / x      CARDIAC MARKERS ( 2022 20:19 )  x     / 19.0 ng/L / x     / x     / x                        POCT Blood Glucose.: 210 mg/dL (01 Dec 2022 12:05)  POCT Blood Glucose.: 140 mg/dL (01 Dec 2022 08:23)  POCT Blood Glucose.: 148 mg/dL (2022 21:24)  POCT Blood Glucose.: 156 mg/dL (2022 17:39)      Urinalysis Basic - ( 2022 21:45 )    Color: Yellow / Appearance: Slightly Turbid / S.005 / pH: x  Gluc: x / Ketone: Negative  / Bili: Negative / Urobili: Negative   Blood: x / Protein: 100 / Nitrite: Positive   Leuk Esterase: Moderate / RBC: 0-4 /HPF / WBC 11-25 /HPF   Sq Epi: x / Non Sq Epi: Neg.-Few / Bacteria: Moderate /HPF    Culture - Blood (collected 2022 20:19)  Source: .Blood Blood-Peripheral  Preliminary Report (01 Dec 2022 02:04):    No growth to date.    Culture - Blood (collected 2022 20:19)  Source: .Blood Blood-Peripheral  Gram Stain (01 Dec 2022 01:05):    Growth in anaerobic bottle: Gram Negative Rods  Preliminary Report (01 Dec 2022 01:05):    Growth in anaerobic bottle: Gram Negative Rods    ***Blood Panel PCR results on this specimen are available    approximately 3 hours after the Gram stain result.***    Gram stain, PCR, and/or culture results may not always    correspond due to difference in methodologies.    ************************************************************    This PCR assay was performed by multiplex PCR. This    Assay tests for 66 bacterial and resistance gene targets.    Please refer to the [facility] test directory    at [email or web address] for details.  Organism: Blood Culture PCR (01 Dec 2022 03:08)  Organism: Blood Culture PCR (01 Dec 2022 03:08)      -  Escherichia coli: Detec      Method Type: PCR        RADIOLOGY & ADDITIONAL TESTS:    Care Discussed with Consultants/Other Providers:

## 2022-12-01 NOTE — PATIENT PROFILE ADULT - FALL HARM RISK - HARM RISK INTERVENTIONS

## 2022-12-01 NOTE — DISCHARGE NOTE PROVIDER - NSDCCPCAREPLAN_GEN_ALL_CORE_FT
PRINCIPAL DISCHARGE DIAGNOSIS  Diagnosis: Pneumonia due to COVID-19 virus  Assessment and Plan of Treatment: You were admitted for COVID 19 pneumonia with hypoxic respiratory failure (low oxygen level).   You were treated with remdesivir and antibiotics with improvement in your symptoms.  You tolerated off Oxygen supplementation.  You will need to continue to quarantine for 7 more days (until 12/8/2022).  You will need to follow up with your primary care physician, Dr. Gannon.  Discharging Provider:  Nasim Singleton MD  Contact Info: Cell [phone] - Please call with any questions or concerns.  Outpatient Provider:  Dr. Gannon, Notified.      SECONDARY DISCHARGE DIAGNOSES  Diagnosis: Urinary tract infection  Assessment and Plan of Treatment:

## 2022-12-01 NOTE — PROGRESS NOTE ADULT - ASSESSMENT
77-year-old male w/ HTN, HLD, PAD, DM2, Obesity, Hypothyroidism, Prostate Ca, RA, Asthma, remote hx of DVT, BIBEMS, w/c/o sudden onset of chills, fatigue, shortness of breath diaphoresis that started at approximately 5 AM today.  Patient reports that he also started having dry cough, headache today.  In the ED, pt noted to have O2 sat of 89-90%.  O2 2 LPM was applied w/ improvement.   Pt states he had episode of diarrhea and vomiting 2 days ago, w/c has resolved. He denies fever, chest pain, abd pain, dysuria.  He denies recent travel nor sick contacts.    Urinalysis was c/w UTI.  COVID 19 is +ve.     Hypoxia, COVID 19 infection  UTI, CKD III  Obesity, asthma  Pt w/DMs, HTN, PAD, hx of DVT    Admit  O2 supp to keep O2 sat >92%  Empiric Ceftriaxone pending urine cultures  Decadron-10 day course  Leukocytosis from steroids  Remdesivir 5 days course  Bronchodilators prn   Monitor FS, cont Lantus, SSI  adjust insulin dose daily as appropriate based on glucose levels.   Cont other meds: ASA, Losartan, Statin, Duloxetine, Levothyroxine, Amlodipine, Montelukast, Hydralazine  trend labs  CT Angio chest no PE  Leg dopplers no DVT  avoid nephrotoxics. monitor intake and output.   DVT prophylaxis with Lovenox
77-year-old male w/ HTN, HLD, PAD, DM2, Obesity, Hypothyroidism, Prostate Ca, RA, Asthma, remote hx of DVT, BIBEMS, w/c/o sudden onset of chills, fatigue, shortness of breath diaphoresis that started at approximately 5 AM today.  Patient reports that he also started having dry cough, headache today.  In the ED, pt noted to have O2 sat of 89-90%.  O2 2 LPM was applied w/ improvement.   Pt states he had episode of diarrhea and vomiting 2 days ago, w/c has resolved. He denies fever, chest pain, abd pain, dysuria.  He denies recent travel nor sick contacts.    Urinalysis was c/w UTI.  COVID 19 is +ve.     Acute hypoxic respirator failure secondary to COVID 19 pneumonia  - on remdesivir with improvement of symptoms  - trial off O2, if tolerates will d/c home     Right Lower Extremity DVT  - start eliquis 10 mg BID 7 days   - then 5 mg twice a day thereafter    CKD Stage 3  - stable  - monitor     Morbid Obesity   BMI (kg/m2): 40.8 (11-29-22 @ 19:27)    DVT PPx  - switch to eliquis    Discharge Home today

## 2022-12-01 NOTE — DISCHARGE NOTE PROVIDER - NSDCFUSCHEDAPPT_GEN_ALL_CORE_FT
Abad Gannon  [facility]  F[address]ILY[address]  Scheduled Appointment: 12/09/2022

## 2022-12-01 NOTE — DISCHARGE NOTE PROVIDER - HOSPITAL COURSE
Hospital Course  HPI:  77-year-old male w/ HTN, HLD, PAD, DM2, Obesity, Hypothyroidism, Prostate Ca, RA, Asthma, remote hx of DVT, BIBEMS, w/c/o sudden onset of chills, fatigue, shortness of breath diaphoresis that started at approximately 5 AM today.  Patient reports that he also started having dry cough, headache today.  In the ED, pt noted to have O2 sat of 89-90%.  O2 2 LPM was applied w/ improvement.   Pt states he had episode of diarrhea and vomiting 2 days ago, w/c has resolved. He denies fever, chest pain, abd pain, dysuria.  He denies recent travel nor sick contacts.    Urinalysis was c/w UTI.  COVID 19 is +ve.   (29 Nov 2022 22:19)    You were admitted for COVID 19 pneumonia with hypoxic respiratory failure (low oxygen level).     You were treated with remdesivir and antibiotics with improvement in your symptoms.  You tolerated off Oxygen supplementation.  You will need to continue to quarantine for 7 more days (until 12/8/2022).    You will need to follow up with your primary care physician, Dr. Gannon.    Discharging Provider:  Nasim Singleton MD  Contact Info: Cell [phone] - Please call with any questions or concerns.    Outpatient Provider:  Dr. Gannon, Notified.

## 2022-12-02 LAB
-  AMIKACIN: SIGNIFICANT CHANGE UP
-  AMIKACIN: SIGNIFICANT CHANGE UP
-  AMOXICILLIN/CLAVULANIC ACID: SIGNIFICANT CHANGE UP
-  AMPICILLIN/SULBACTAM: SIGNIFICANT CHANGE UP
-  AMPICILLIN/SULBACTAM: SIGNIFICANT CHANGE UP
-  AMPICILLIN: SIGNIFICANT CHANGE UP
-  AMPICILLIN: SIGNIFICANT CHANGE UP
-  AZTREONAM: SIGNIFICANT CHANGE UP
-  AZTREONAM: SIGNIFICANT CHANGE UP
-  CEFAZOLIN: SIGNIFICANT CHANGE UP
-  CEFAZOLIN: SIGNIFICANT CHANGE UP
-  CEFEPIME: SIGNIFICANT CHANGE UP
-  CEFEPIME: SIGNIFICANT CHANGE UP
-  CEFOXITIN: SIGNIFICANT CHANGE UP
-  CEFOXITIN: SIGNIFICANT CHANGE UP
-  CEFTRIAXONE: SIGNIFICANT CHANGE UP
-  CEFTRIAXONE: SIGNIFICANT CHANGE UP
-  CIPROFLOXACIN: SIGNIFICANT CHANGE UP
-  CIPROFLOXACIN: SIGNIFICANT CHANGE UP
-  ERTAPENEM: SIGNIFICANT CHANGE UP
-  ERTAPENEM: SIGNIFICANT CHANGE UP
-  GENTAMICIN: SIGNIFICANT CHANGE UP
-  GENTAMICIN: SIGNIFICANT CHANGE UP
-  IMIPENEM: SIGNIFICANT CHANGE UP
-  IMIPENEM: SIGNIFICANT CHANGE UP
-  LEVOFLOXACIN: SIGNIFICANT CHANGE UP
-  LEVOFLOXACIN: SIGNIFICANT CHANGE UP
-  MEROPENEM: SIGNIFICANT CHANGE UP
-  MEROPENEM: SIGNIFICANT CHANGE UP
-  NITROFURANTOIN: SIGNIFICANT CHANGE UP
-  PIPERACILLIN/TAZOBACTAM: SIGNIFICANT CHANGE UP
-  PIPERACILLIN/TAZOBACTAM: SIGNIFICANT CHANGE UP
-  TOBRAMYCIN: SIGNIFICANT CHANGE UP
-  TOBRAMYCIN: SIGNIFICANT CHANGE UP
-  TRIMETHOPRIM/SULFAMETHOXAZOLE: SIGNIFICANT CHANGE UP
-  TRIMETHOPRIM/SULFAMETHOXAZOLE: SIGNIFICANT CHANGE UP
CULTURE RESULTS: SIGNIFICANT CHANGE UP
CULTURE RESULTS: SIGNIFICANT CHANGE UP
METHOD TYPE: SIGNIFICANT CHANGE UP
METHOD TYPE: SIGNIFICANT CHANGE UP
ORGANISM # SPEC MICROSCOPIC CNT: SIGNIFICANT CHANGE UP
SPECIMEN SOURCE: SIGNIFICANT CHANGE UP
SPECIMEN SOURCE: SIGNIFICANT CHANGE UP

## 2022-12-02 RX ORDER — PEN NEEDLE, DIABETIC 29 G X1/2"
32G X 4 MM NEEDLE, DISPOSABLE MISCELLANEOUS
Qty: 2 | Refills: 3 | Status: ACTIVE | COMMUNITY
Start: 2022-12-02

## 2022-12-02 RX ORDER — AMOXICILLIN 500 MG/1
500 TABLET, FILM COATED ORAL
Qty: 4 | Refills: 3 | Status: COMPLETED | COMMUNITY
Start: 2022-09-12 | End: 2022-12-02

## 2022-12-05 ENCOUNTER — NON-APPOINTMENT (OUTPATIENT)
Age: 77
End: 2022-12-05

## 2022-12-05 LAB
CULTURE RESULTS: SIGNIFICANT CHANGE UP
SPECIMEN SOURCE: SIGNIFICANT CHANGE UP

## 2022-12-09 ENCOUNTER — APPOINTMENT (OUTPATIENT)
Dept: FAMILY MEDICINE | Facility: CLINIC | Age: 77
End: 2022-12-09

## 2022-12-09 VITALS
SYSTOLIC BLOOD PRESSURE: 138 MMHG | OXYGEN SATURATION: 94 % | DIASTOLIC BLOOD PRESSURE: 70 MMHG | HEIGHT: 68 IN | TEMPERATURE: 98 F | RESPIRATION RATE: 16 BRPM | BODY MASS INDEX: 40.62 KG/M2 | HEART RATE: 78 BPM | WEIGHT: 268 LBS

## 2022-12-09 DIAGNOSIS — U07.1 COVID-19: ICD-10-CM

## 2022-12-09 DIAGNOSIS — N39.0 URINARY TRACT INFECTION, SITE NOT SPECIFIED: ICD-10-CM

## 2022-12-09 PROCEDURE — 99496 TRANSJ CARE MGMT HIGH F2F 7D: CPT | Mod: CS

## 2022-12-09 NOTE — HEALTH RISK ASSESSMENT
[Never] : Never [No] : In the past 12 months have you used drugs other than those required for medical reasons? No [0] : 2) Feeling down, depressed, or hopeless: Not at all (0) [PHQ-2 Negative - No further assessment needed] : PHQ-2 Negative - No further assessment needed [None] : None [Alone] : lives alone [Retired] : retired [Graduate School] : graduate school [] :  [Feels Safe at Home] : Feels safe at home [Fully functional (bathing, dressing, toileting, transferring, walking, feeding)] : Fully functional (bathing, dressing, toileting, transferring, walking, feeding) [Fully functional (using the telephone, shopping, preparing meals, housekeeping, doing laundry, using] : Fully functional and needs no help or supervision to perform IADLs (using the telephone, shopping, preparing meals, housekeeping, doing laundry, using transportation, managing medications and managing finances) [Smoke Detector] : smoke detector [Carbon Monoxide Detector] : carbon monoxide detector [Safety elements used in home] : safety elements used in home [Seat Belt] :  uses seat belt [Sunscreen] : uses sunscreen [Any fall with injury in past year] : Patient reported fall with injury in the past year [Audit-CScore] : 0 [[id number]] : 0 [Change in mental status noted] : No change in mental status noted [Language] : denies difficulty with language [Behavior] : denies difficulty with behavior [Learning/Retaining New Information] : denies difficulty learning/retaining new information [Handling Complex Tasks] : denies difficulty handling complex tasks [Reasoning] : denies difficulty with reasoning [Spatial Ability and Orientation] : denies difficulty with spatial ability and orientation [Sexually Active] : not sexually active [Reports changes in hearing] : Reports no changes in hearing [Reports changes in vision] : Reports no changes in vision [Reports normal functional visual acuity (ie: able to read med bottle)] : Reports poor functional visual acuity.  [Reports changes in dental health] : Reports no changes in dental health [Travel to Developing Areas] : does not  travel to developing areas [TB Exposure] : is not being exposed to tuberculosis [Caregiver Concerns] : does not have caregiver concerns [de-identified] : glasses

## 2022-12-09 NOTE — HISTORY OF PRESENT ILLNESS
[Post-hospitalization from ___ Hospital] : Post-hospitalization from [unfilled] Hospital [Admitted on: ___] : The patient was admitted on [unfilled] [Discharged on ___] : discharged on [unfilled] [Discharge Summary] : discharge summary [Pertinent Labs] : pertinent labs [Radiology Findings] : radiology findings [Discharge Med List] : discharge medication list [Other: ____] : [unfilled] [Med Reconciliation] : medication reconciliation has been completed [Patient Contacted By: ____] : and contacted by [unfilled] [FreeTextEntry2] : Patient is a 77-year-old gentleman who presents today for follow-up status post hospitalization.  Patient was hospitalized at [facility] on November 29, 2022 subsequently discharged home on December 1, 2022.  Diagnosis COVID-pneumonia, urinary tract infection and DVT.  Patient's pneumonia has resolved, urinary tract infection presently being treated with antibiotics patient will be following up with urology and DVT presently being treated with Eliquis.  Medical history is significant for diabetes mellitus type 2, hypertension, hyperlipidemia, rheumatoid arthritis, hypothyroidism and chronic kidney disease stage IIIa.  Presently the patient is awake alert and oriented x3 in no acute distress, cooperative.

## 2022-12-09 NOTE — ASSESSMENT
[FreeTextEntry1] : Assessment and plan:\par \par 1.  Status post hospitalization at [facility] November 29 and discharged December 1 diagnosis COVID-19 pneumonia resolved.  Patient also had a urinary tract infection continue cyst cefuroxime and also was diagnosed with DVT continue Eliquis.\par \par 2.  Diabetes mellitus type 2 non-insulin-requiring continue Ozempic, Tresiba and Jardiance patient also follows a no concentrated sweets consistent carbohydrate diet he also has underlying diabetic neuropathy all medications for diabetic neuropathy have not worked for the patient therefore duloxetine was discontinued.\par \par 3.  Hypertension excellent blood pressure control with present medical management which includes losartan, Norvasc diastolic and hydralazine.\par \par 4.  Hyperlipidemia continue low-fat low-cholesterol diet, increase physical activity as tolerated and atorvastatin with fenofibrate.\par \par 5.  Rheumatoid arthritis has responded to Humira patient follows up with rheumatology on a regular basis\par \par 6.  Hypothyroidism continue levothyroxine 75 mcg p.o. daily\par \par 7.  Chronic kidney disease stage III a stable in fact improved compared to previous patient will continue to avoid nephrotoxic medications and continue good hydration.\par \par

## 2022-12-09 NOTE — PHYSICAL EXAM
[No Acute Distress] : no acute distress [Well Nourished] : well nourished [Well Developed] : well developed [Well-Appearing] : well-appearing [Normal Voice/Communication] : normal voice/communication [Normal Sclera/Conjunctiva] : normal sclera/conjunctiva [PERRL] : pupils equal round and reactive to light [EOMI] : extraocular movements intact [Normal Outer Ear/Nose] : the outer ears and nose were normal in appearance [Normal Oropharynx] : the oropharynx was normal [Normal TMs] : both tympanic membranes were normal [No JVD] : no jugular venous distention [No Lymphadenopathy] : no lymphadenopathy [Supple] : supple [Thyroid Normal, No Nodules] : the thyroid was normal and there were no nodules present [No Respiratory Distress] : no respiratory distress  [No Accessory Muscle Use] : no accessory muscle use [Clear to Auscultation] : lungs were clear to auscultation bilaterally [Normal Rate] : normal rate  [Regular Rhythm] : with a regular rhythm [Normal S1, S2] : normal S1 and S2 [No Murmur] : no murmur heard [No Carotid Bruits] : no carotid bruits [No Abdominal Bruit] : a ~M bruit was not heard ~T in the abdomen [No Varicosities] : no varicosities [Pedal Pulses Present] : the pedal pulses are present [No Palpable Aorta] : no palpable aorta [No Extremity Clubbing/Cyanosis] : no extremity clubbing/cyanosis [Normal Appearance] : normal in appearance [No Nipple Discharge] : no nipple discharge [No Axillary Lymphadenopathy] : no axillary lymphadenopathy [Soft] : abdomen soft [Non Tender] : non-tender [Non-distended] : non-distended [No Masses] : no abdominal mass palpated [No HSM] : no HSM [Normal Bowel Sounds] : normal bowel sounds [No Hernias] : no hernias [Declined Rectal Exam] : declined rectal exam [Normal Supraclavicular Nodes] : no supraclavicular lymphadenopathy [Normal Axillary Nodes] : no axillary lymphadenopathy [Normal Posterior Cervical Nodes] : no posterior cervical lymphadenopathy [Normal Anterior Cervical Nodes] : no anterior cervical lymphadenopathy [Normal Inguinal Nodes] : no inguinal lymphadenopathy [Normal Femoral Nodes] : no femoral lymphadenopathy [No CVA Tenderness] : no CVA  tenderness [No Spinal Tenderness] : no spinal tenderness [No Joint Swelling] : no joint swelling [Grossly Normal Strength/Tone] : grossly normal strength/tone [No Rash] : no rash [Coordination Grossly Intact] : coordination grossly intact [No Focal Deficits] : no focal deficits [Normal Gait] : normal gait [Speech Grossly Normal] : speech grossly normal [Memory Grossly Normal] : memory grossly normal [Normal Affect] : the affect was normal [Alert and Oriented x3] : oriented to person, place, and time [Normal Mood] : the mood was normal [Normal Insight/Judgement] : insight and judgment were intact [Comprehensive Foot Exam Normal] : Right and left foot were examined and both feet are normal. No ulcers in either foot. Toes are normal and with full ROM.  Normal tactile sensation with monofilament testing throughout both feet [Acne] : no acne [de-identified] : Stasis dermatitis, secondary to venous insufficiency, right lower extremity edematous secondary to DVT in the past [de-identified] : obese [FreeTextEntry1] : urology [de-identified] : urology [de-identified] : Lipoma left upper back stable [de-identified] : Stasis dermatitis bilateral lower extremities, excoriation bilateral knees status post fall healing well. [de-identified] : Peripheral neuropathy stable for patient, history of transverse myelitis patient has recovered

## 2022-12-12 ENCOUNTER — APPOINTMENT (OUTPATIENT)
Dept: ORTHOPEDIC SURGERY | Facility: CLINIC | Age: 77
End: 2022-12-12
Payer: MEDICARE

## 2022-12-12 VITALS — HEIGHT: 68 IN | BODY MASS INDEX: 40.47 KG/M2 | WEIGHT: 267 LBS

## 2022-12-12 DIAGNOSIS — Z96.642 PRESENCE OF LEFT ARTIFICIAL HIP JOINT: ICD-10-CM

## 2022-12-12 PROCEDURE — 72170 X-RAY EXAM OF PELVIS: CPT | Mod: LT

## 2022-12-12 PROCEDURE — 73502 X-RAY EXAM HIP UNI 2-3 VIEWS: CPT

## 2022-12-12 PROCEDURE — 99213 OFFICE O/P EST LOW 20 MIN: CPT

## 2022-12-12 NOTE — DISCUSSION/SUMMARY
[de-identified] : Impression contusion left pelvis, status post left total replacement\par Plan symptomatic treatment, if not further improved in 3 to 4 weeks follow-up

## 2022-12-12 NOTE — PHYSICAL EXAM
[de-identified] : Constitutional:Well nourished , well developed and in no acute distress\par Psychiatric: Alert and oriented to time place and person.Appropriate affect \par Skin:Head, neck, arms and lower extremities:no lesions or discoloration\par HEENT: Normocephalic, EOM intact, Nasal septum midline,\par Respiratory: Unlabored respirations,no audible wheezing ,no tachypnea, no cyanosis\par Cardiovascular: no leg swelling  no ankle edema no JVD, pulse regular\par Vascular: no calf or thigh tenderness, \par Peripheral pulses; intact\par Lymphatics:No groin adenopathy,no lymphedema lower  or upper extremities\par Left hip passive range of motion satisfactory pain-free tender posterior iliac crest neurovascular status left lower extremity intact [de-identified] : X-ray AP pelvis left hip reveals satisfactory maintenance of left total hip component alignment with no interval change.  No evidence of fracture

## 2022-12-12 NOTE — HISTORY OF PRESENT ILLNESS
[de-identified] : 77-year-old male status post left total hip replacement March 17, 2022.  Doing well until November 15, 2022 when patient was outdoors and tripped over irregular concrete surface.  Patient recalls falling forward but this strike his left hip and lower back complains of left upper buttock pain provoked by ambulation.  Reports pain is gradually decreasing in severity.  Denies neurovascular symptoms lower extremity denies left groin or thigh pain.  Does have past medical history of left "foot drop" following spinal intervention

## 2023-02-02 ENCOUNTER — NON-APPOINTMENT (OUTPATIENT)
Age: 78
End: 2023-02-02

## 2023-04-03 ENCOUNTER — APPOINTMENT (OUTPATIENT)
Dept: ORTHOPEDIC SURGERY | Facility: CLINIC | Age: 78
End: 2023-04-03
Payer: MEDICARE

## 2023-04-03 PROCEDURE — 99213 OFFICE O/P EST LOW 20 MIN: CPT

## 2023-04-03 NOTE — HISTORY OF PRESENT ILLNESS
[de-identified] : 77-year-old male status post left total hip replacement March 17, 2022.  And remote right total hip replacement.  Denies right or left groin or thigh pain does complain of chronic intermittent left upper buttock and low back pain denies neurovascular symptoms.  Reports intermittent sense of impaired balance when walking.  Denies bowel or bladder symptoms

## 2023-04-03 NOTE — DISCUSSION/SUMMARY
[de-identified] : Impression Right and left total hip replacement doing well, lumbar degenerative disc disease\par Recommendation follow-up patient spine surgeon

## 2023-04-03 NOTE — PHYSICAL EXAM
[de-identified] : Constitutional:Well nourished , well developed and in no acute distress\par Psychiatric: Alert and oriented to time place and person.Appropriate affect \par Skin:Head, neck, arms and lower extremities:no lesions or discoloration\par HEENT: Normocephalic, EOM intact, Nasal septum midline,\par Respiratory: Unlabored respirations,no audible wheezing ,no tachypnea, no cyanosis\par Cardiovascular: no leg swelling  no ankle edema no JVD, pulse regular\par Vascular: no calf or thigh tenderness, \par Peripheral pulses; intact\par Lymphatics:No groin adenopathy,no lymphedema lower  or upper extremities\par Left hip passive range of motion satisfactory pain-free tender posterior iliac crest neurovascular status left lower extremity intact\par Right hip passive range of motion satisfactory pain-free [de-identified] : Review prior x-ray of December 2022 X-ray AP pelvis left hip reveals satisfactory maintenance of left total hip component alignment with no interval change.  No evidence of fracture

## 2023-04-04 ENCOUNTER — APPOINTMENT (OUTPATIENT)
Dept: FAMILY MEDICINE | Facility: CLINIC | Age: 78
End: 2023-04-04
Payer: MEDICARE

## 2023-04-04 VITALS
WEIGHT: 270 LBS | TEMPERATURE: 97.9 F | DIASTOLIC BLOOD PRESSURE: 77 MMHG | RESPIRATION RATE: 16 BRPM | OXYGEN SATURATION: 95 % | HEART RATE: 69 BPM | HEIGHT: 68 IN | BODY MASS INDEX: 40.92 KG/M2 | SYSTOLIC BLOOD PRESSURE: 139 MMHG
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
## 2023-04-04 PROCEDURE — 36415 COLL VENOUS BLD VENIPUNCTURE: CPT

## 2023-04-04 PROCEDURE — 99215 OFFICE O/P EST HI 40 MIN: CPT | Mod: 25

## 2023-04-04 NOTE — ASSESSMENT
[FreeTextEntry1] : Assessment and plan:\par \par 1.  Status post accidental fall no injury patient is being evaluated by orthopedic surgery.\par \par 2.  Diabetes mellitus type 2 non-insulin-requiring continue Ozempic, Tresiba and Jardiance patient also follows a no concentrated sweets consistent carbohydrate diet he also has underlying diabetic neuropathy all medications for diabetic neuropathy have not worked for the patient therefore duloxetine was discontinued.\par \par 3.  Hypertension excellent blood pressure control with present medical management which includes losartan, Norvasc diastolic and hydralazine.\par \par 4.  Hyperlipidemia continue low-fat low-cholesterol diet, increase physical activity as tolerated and atorvastatin with fenofibrate.\par \par 5.  Rheumatoid arthritis has responded to Humira patient follows up with rheumatology on a regular basis\par \par 6.  Hypothyroidism continue levothyroxine 75 mcg p.o. daily\par \par 7.  Chronic kidney disease stage III a stable in fact improved compared to previous patient will continue to avoid nephrotoxic medications and continue good hydration.\par \par 8.  History of DVT recently had ultrasound done and also seen by vascular surgery Eliquis has been discontinued the patient will continue aspirin.\par \par 9.  Comprehensive blood work drawn in office by examiner.,total time face to face and non 45 minutes the majority spent on counceling  and cooridination of care.\par \par

## 2023-04-04 NOTE — PHYSICAL EXAM
[No Acute Distress] : no acute distress [Well Nourished] : well nourished [Well Developed] : well developed [Well-Appearing] : well-appearing [Normal Voice/Communication] : normal voice/communication [Normal Sclera/Conjunctiva] : normal sclera/conjunctiva [PERRL] : pupils equal round and reactive to light [EOMI] : extraocular movements intact [Normal Outer Ear/Nose] : the outer ears and nose were normal in appearance [Normal Oropharynx] : the oropharynx was normal [Normal TMs] : both tympanic membranes were normal [No JVD] : no jugular venous distention [No Lymphadenopathy] : no lymphadenopathy [Supple] : supple [Thyroid Normal, No Nodules] : the thyroid was normal and there were no nodules present [No Respiratory Distress] : no respiratory distress  [No Accessory Muscle Use] : no accessory muscle use [Clear to Auscultation] : lungs were clear to auscultation bilaterally [Normal Rate] : normal rate  [Regular Rhythm] : with a regular rhythm [Normal S1, S2] : normal S1 and S2 [No Murmur] : no murmur heard [No Carotid Bruits] : no carotid bruits [No Abdominal Bruit] : a ~M bruit was not heard ~T in the abdomen [No Varicosities] : no varicosities [Pedal Pulses Present] : the pedal pulses are present [No Palpable Aorta] : no palpable aorta [No Extremity Clubbing/Cyanosis] : no extremity clubbing/cyanosis [Normal Appearance] : normal in appearance [No Nipple Discharge] : no nipple discharge [No Axillary Lymphadenopathy] : no axillary lymphadenopathy [Soft] : abdomen soft [Non Tender] : non-tender [Non-distended] : non-distended [No Masses] : no abdominal mass palpated [No HSM] : no HSM [Normal Bowel Sounds] : normal bowel sounds [No Hernias] : no hernias [Declined Rectal Exam] : declined rectal exam [No CVA Tenderness] : no CVA  tenderness [No Spinal Tenderness] : no spinal tenderness [No Joint Swelling] : no joint swelling [Grossly Normal Strength/Tone] : grossly normal strength/tone [No Rash] : no rash [Coordination Grossly Intact] : coordination grossly intact [No Focal Deficits] : no focal deficits [Normal Gait] : normal gait [Speech Grossly Normal] : speech grossly normal [Memory Grossly Normal] : memory grossly normal [Normal Affect] : the affect was normal [Alert and Oriented x3] : oriented to person, place, and time [Normal Mood] : the mood was normal [Normal Insight/Judgement] : insight and judgment were intact [Comprehensive Foot Exam Normal] : Right and left foot were examined and both feet are normal. No ulcers in either foot. Toes are normal and with full ROM.  Normal tactile sensation with monofilament testing throughout both feet [Acne] : no acne [de-identified] : Stasis dermatitis, secondary to venous insufficiency, right lower extremity edematous secondary to DVT in the past [de-identified] : obese [FreeTextEntry1] : urology [de-identified] : urology [de-identified] : Lipoma left upper back stable [de-identified] : Stasis dermatitis bilateral lower extremities, excoriation bilateral knees status post fall healing well. [de-identified] : Peripheral neuropathy stable for patient, history of transverse myelitis patient has recovered

## 2023-04-04 NOTE — COUNSELING
[Fall prevention counseling provided] : Fall prevention counseling provided [Adequate lighting] : Adequate lighting [No throw rugs] : No throw rugs [Use proper foot wear] : Use proper foot wear [Use recommended devices] : Use recommended devices [Behavioral health counseling provided] : Behavioral health counseling provided [Sleep ___ hours/day] : Sleep [unfilled] hours/day [Engage in a relaxing activity] : Engage in a relaxing activity [Plan in advance] : Plan in advance [Potential consequences of obesity discussed] : Potential consequences of obesity discussed [Benefits of weight loss discussed] : Benefits of weight loss discussed [Structured Weight Management Program suggested:] : Structured weight management program suggested [Encouraged to maintain food diary] : Encouraged to maintain food diary [Encouraged to increase physical activity] : Encouraged to increase physical activity [Encouraged to use exercise tracking device] : Encouraged to use exercise tracking device [Weigh Self Weekly] : weigh self weekly [Decrease Portions] : decrease portions [____ min/wk Activity] : [unfilled] min/wk activity [Keep Food Diary] : keep food diary [None] : None [Good understanding] : Patient has a good understanding of lifestyle changes and steps needed to achieve self management goal

## 2023-04-04 NOTE — HISTORY OF PRESENT ILLNESS
[FreeTextEntry1] : Please see HPI. [de-identified] : Patient is a 77-year-old gentleman who presents today for follow-up and disease management.  Patient does admit to an accidental fall this past February subsequently was also seen by orthopedics and also neurology.  Patient had the fall it was accidental it was rolled more than an actual fall and patient did not get hurt.\par \par Medical history significant for diabetes mellitus type 2, hypertension, hyperlipidemia, rheumatoid arthritis, hypothyroidism, chronic kidney disease stage III and hypothyroidism.  Patient also has underlying degenerative joint disease unfortunately patient is still complaining of left hip pain he did have left total hip replacement was seen by orthopedics the recommendations were to be evaluated by a spine specialist.  Patient has been operated on the spine in the past by Dr. Marcelo Nunes.

## 2023-04-04 NOTE — HEALTH RISK ASSESSMENT
[No] : In the past 12 months have you used drugs other than those required for medical reasons? No [One fall no injury in past year] : Patient reported one fall in the past year without injury [0] : 2) Feeling down, depressed, or hopeless: Not at all (0) [PHQ-2 Negative - No further assessment needed] : PHQ-2 Negative - No further assessment needed [Audit-CScore] : 0 [[id number]] : 0

## 2023-04-05 LAB
ALBUMIN SERPL ELPH-MCNC: 4.6 G/DL
ALP BLD-CCNC: 70 U/L
ALT SERPL-CCNC: 22 U/L
ANION GAP SERPL CALC-SCNC: 14 MMOL/L
AST SERPL-CCNC: 27 U/L
BASOPHILS # BLD AUTO: 0.02 K/UL
BASOPHILS NFR BLD AUTO: 0.3 %
BILIRUB SERPL-MCNC: 0.3 MG/DL
BUN SERPL-MCNC: 18 MG/DL
CALCIUM SERPL-MCNC: 10.2 MG/DL
CHLORIDE SERPL-SCNC: 103 MMOL/L
CHOLEST SERPL-MCNC: 145 MG/DL
CO2 SERPL-SCNC: 26 MMOL/L
CREAT SERPL-MCNC: 1.29 MG/DL
CRP SERPL-MCNC: 3 MG/L
EGFR: 57 ML/MIN/1.73M2
EOSINOPHIL # BLD AUTO: 0.2 K/UL
EOSINOPHIL NFR BLD AUTO: 3.4 %
ERYTHROCYTE [SEDIMENTATION RATE] IN BLOOD BY WESTERGREN METHOD: 29 MM/HR
ESTIMATED AVERAGE GLUCOSE: 128 MG/DL
GLUCOSE SERPL-MCNC: 115 MG/DL
HBA1C MFR BLD HPLC: 6.1 %
HCT VFR BLD CALC: 52.5 %
HDLC SERPL-MCNC: 50 MG/DL
HGB BLD-MCNC: 16.7 G/DL
IMM GRANULOCYTES NFR BLD AUTO: 0.5 %
LDLC SERPL CALC-MCNC: 63 MG/DL
LYMPHOCYTES # BLD AUTO: 1.25 K/UL
LYMPHOCYTES NFR BLD AUTO: 21 %
MAN DIFF?: NORMAL
MCHC RBC-ENTMCNC: 30.1 PG
MCHC RBC-ENTMCNC: 31.8 GM/DL
MCV RBC AUTO: 94.8 FL
MONOCYTES # BLD AUTO: 0.51 K/UL
MONOCYTES NFR BLD AUTO: 8.6 %
NEUTROPHILS # BLD AUTO: 3.95 K/UL
NEUTROPHILS NFR BLD AUTO: 66.2 %
NONHDLC SERPL-MCNC: 95 MG/DL
PLATELET # BLD AUTO: 199 K/UL
POTASSIUM SERPL-SCNC: 4 MMOL/L
PROT SERPL-MCNC: 7.7 G/DL
PSA SERPL-MCNC: 0.02 NG/ML
RBC # BLD: 5.54 M/UL
RBC # FLD: 14.8 %
RHEUMATOID FACT SER QL: 11 IU/ML
SODIUM SERPL-SCNC: 143 MMOL/L
T4 FREE SERPL-MCNC: 1.2 NG/DL
TRIGL SERPL-MCNC: 164 MG/DL
TSH SERPL-ACNC: 2.13 UIU/ML
WBC # FLD AUTO: 5.96 K/UL

## 2023-04-17 ENCOUNTER — NON-APPOINTMENT (OUTPATIENT)
Age: 78
End: 2023-04-17

## 2023-04-17 ENCOUNTER — APPOINTMENT (OUTPATIENT)
Dept: ORTHOPEDIC SURGERY | Facility: CLINIC | Age: 78
End: 2023-04-17
Payer: MEDICARE

## 2023-04-17 VITALS
WEIGHT: 271 LBS | HEART RATE: 76 BPM | BODY MASS INDEX: 41.07 KG/M2 | DIASTOLIC BLOOD PRESSURE: 78 MMHG | HEIGHT: 68 IN | SYSTOLIC BLOOD PRESSURE: 149 MMHG

## 2023-04-17 DIAGNOSIS — M54.50 LOW BACK PAIN, UNSPECIFIED: ICD-10-CM

## 2023-04-17 PROCEDURE — 99204 OFFICE O/P NEW MOD 45 MIN: CPT

## 2023-04-17 PROCEDURE — 72110 X-RAY EXAM L-2 SPINE 4/>VWS: CPT

## 2023-04-17 PROCEDURE — 99214 OFFICE O/P EST MOD 30 MIN: CPT

## 2023-04-17 NOTE — HISTORY OF PRESENT ILLNESS
[___ mths] : [unfilled] month(s) ago [6] : a current pain level of 6/10 [Intermit.] : ~He/She~ states the symptoms seem to be intermittent [de-identified] : A 77 year old male presents an initial visit for lower back pain. The patient reports he's had a Left THR in 2022. The patient is currently experiencing significant lower back pain at this time with no radicular symptoms. The patient does not report any symptoms of numbness, tingling , or buckling or giving away of his legs. He currently takes Tylenol as needed and denies and diabetes or Thyroid disease.  The patient reports no specific history of trauma or injury.  The patient is status post decompressive laminectomy from L3-L5 12 to 13 years ago.  Patient has a history of diabetes mellitus.\par

## 2023-04-17 NOTE — ADDENDUM
[FreeTextEntry1] : I, Dimitri Villatoro Jr, documented this note as a scribe on the behalf of Dr. Marcelo Nunes MD.

## 2023-04-17 NOTE — PHYSICAL EXAM
[Obese] : obese [Normal] : Oriented to person, place, and time, insight and judgement were intact and the affect was normal [de-identified] : tenderness over L5-S1\par stocking glove anesthesia [de-identified] : toe walk weakness on the right\par Bilateral Heel Strike Weakness\par  [de-identified] : AP Lateral Flexion Extension X-Rays performed on 4/17/23 shows advanced arthritis, lumbar laminectomy at L3-4 and L4-5. Bridges adenopathies. Shows no instability.

## 2023-04-17 NOTE — DISCUSSION/SUMMARY
[2 Weeks] : in 2 weeks [de-identified] : I expressed to the patient that at this time, we need to obtain an MRI of his lumbar spine for further assessment. I explained to him that he's had significant advancement of arthritis and in order to receive proper evaluation and treatment, we would need to obtain an MRI. I provided the patient with a script for an MRI and the patient should follow-up in 1-2 weeks to review the results of the MRI. \par

## 2023-04-18 ENCOUNTER — OUTPATIENT (OUTPATIENT)
Dept: OUTPATIENT SERVICES | Facility: HOSPITAL | Age: 78
LOS: 1 days | End: 2023-04-18
Payer: MEDICARE

## 2023-04-18 ENCOUNTER — APPOINTMENT (OUTPATIENT)
Dept: MRI IMAGING | Facility: HOSPITAL | Age: 78
End: 2023-04-18
Payer: MEDICARE

## 2023-04-18 DIAGNOSIS — Z98.890 OTHER SPECIFIED POSTPROCEDURAL STATES: Chronic | ICD-10-CM

## 2023-04-18 DIAGNOSIS — Z90.89 ACQUIRED ABSENCE OF OTHER ORGANS: Chronic | ICD-10-CM

## 2023-04-18 DIAGNOSIS — Z90.49 ACQUIRED ABSENCE OF OTHER SPECIFIED PARTS OF DIGESTIVE TRACT: Chronic | ICD-10-CM

## 2023-04-18 DIAGNOSIS — Z96.649 PRESENCE OF UNSPECIFIED ARTIFICIAL HIP JOINT: Chronic | ICD-10-CM

## 2023-04-18 DIAGNOSIS — M51.36 OTHER INTERVERTEBRAL DISC DEGENERATION, LUMBAR REGION: ICD-10-CM

## 2023-04-18 PROCEDURE — 72148 MRI LUMBAR SPINE W/O DYE: CPT | Mod: 26,MH

## 2023-04-18 PROCEDURE — 72148 MRI LUMBAR SPINE W/O DYE: CPT

## 2023-04-24 ENCOUNTER — APPOINTMENT (OUTPATIENT)
Dept: ORTHOPEDIC SURGERY | Facility: CLINIC | Age: 78
End: 2023-04-24
Payer: MEDICARE

## 2023-04-24 VITALS — SYSTOLIC BLOOD PRESSURE: 168 MMHG | HEART RATE: 71 BPM | DIASTOLIC BLOOD PRESSURE: 81 MMHG

## 2023-04-24 DIAGNOSIS — Z98.890 OTHER SPECIFIED POSTPROCEDURAL STATES: ICD-10-CM

## 2023-04-24 PROCEDURE — 99214 OFFICE O/P EST MOD 30 MIN: CPT

## 2023-04-24 NOTE — ADDENDUM
[FreeTextEntry1] : I, Dimitri Villatoro Jr, documented this note as a scribe on the behalf of Dr. Marcelo Nunes MD.\par

## 2023-04-24 NOTE — PHYSICAL EXAM
[Obese] : obese [Normal] : Oriented to person, place, and time, insight and judgement were intact and the affect was normal [Wide-Based] : wide-based [de-identified] : tenderness over L5-S1\par stocking glove anesthesia [de-identified] : toe walk weakness on the right\par Bilateral Heel Strike Weakness\par  [de-identified] : MRI Evaluation of the Lumbar Spine performed on 4/18/23 shows Status post posterior decompression at L3-L4 and L4-L5.\par \par Multilevel degenerative changes throughout the lumbar spine, as detailed above, with up to moderate central canal stenosis at multiple levels throughout the lumbar spine. Multilevel neural foraminal narrowing, as above.\par  Anesthesia Type: 1% lidocaine with epinephrine and a 1:10 solution of 8.4% sodium bicarbonate

## 2023-04-24 NOTE — DISCUSSION/SUMMARY
[PRN] : PRN [Surgical risks reviewed] : Surgical risks reviewed [de-identified] : I expressed to the patient that his symptoms are consistent with an adjacent level disease, above the levels he had surgery performed. I recommended that the patient gets an epidural injection and follows the epidural with physical therapy at this time. I provided the patient with a recommended pain management information and a referral for the injection. The patient should follow-up with me as needed.

## 2023-04-24 NOTE — HISTORY OF PRESENT ILLNESS
[6] : a current pain level of 6/10 [Intermit.] : ~He/She~ states the symptoms seem to be intermittent [de-identified] : Patient presents a follow-up visit to review the results of a recent MRI of the Lumbar Spine. The patient reports no significant changes in his symptoms since his last visit. The patient states he is currently experiencing pain at this time to his lower back and left hip. He reports no modifying factors at this time.

## 2023-05-03 ENCOUNTER — APPOINTMENT (OUTPATIENT)
Dept: PAIN MANAGEMENT | Facility: CLINIC | Age: 78
End: 2023-05-03
Payer: MEDICARE

## 2023-05-03 VITALS — BODY MASS INDEX: 41.07 KG/M2 | HEIGHT: 68 IN | WEIGHT: 271 LBS

## 2023-05-03 PROCEDURE — 99204 OFFICE O/P NEW MOD 45 MIN: CPT

## 2023-05-03 NOTE — ASSESSMENT
[FreeTextEntry1] : Subjective findings\par This 77-year-old male presents to us with pain in the back with a rating component down both legs.  Patient says that the pain started approximately 6 months ago and has been slowly worsening.  He has undergone laminectomy at the L4-5 level with good success.  Patient says that there was no clear etiology for the start of this pain.  Patient has been under the care of a surgeon who suggested a try an epidural steroid injection at the level above the surgery see if that can reduce the pain.  Patient denies any bowel or bladder incontinence or any progressive weakness associated with this pain.  The CV/Pulm/GI/Heme/Renal/Hepatic//Psych/Endo systems are reviewed. A full ROS was performed and reviewed with the patient today, see intake form.  Average pain score for the month is ?? out of ten. The patient's current medications are documented to the best of their ability. The patient has failed conservative therapies to include medical management, and physical activity to treat the pain. The patient has decreased function secondary to the pain.\par Objective findings\par Imaging studies are consistent with the patient's pain complaints.  Patient is able to get to a standing position with minimal difficulty.  While standing patient ambulates without an assistive device.  Motor and sensory exams appear grossly intact.\par Assessment\par Lumbar radiculopathy\par Plan\par Spoke to patient about epidural steroid injections. Explained risks, benefits and alternatives including but not limited to the risk of infection, bleeding, headache, syncopal episode, failure to resolve issues, allergic reaction, symptom recurrence, allergic reaction, nerve injury, and increased pain. The patient understands the risks. All questions were answered. The patient is willing to proceed. The importance of increasing exercise after the injection is also stressed. The use of the injection as a jump start so that the patient can do more exercise, but that the exercise is the long term answer for the patient is reviewed. The patient states understanding.\par

## 2023-05-03 NOTE — HISTORY OF PRESENT ILLNESS
[Lower back] : lower back [6] : 6 [5] : 5 [Burning] : burning [Dull/Aching] : dull/aching [Stabbing] : stabbing [Constant] : constant [Household chores] : household chores [Sleep] : sleep [Rest] : rest [Sitting] : sitting [Standing] : standing [Walking] : walking [Bending forward] : bending forward [Retired] : Work status: retired [de-identified] : 05/03/2023- PATIENT STATES HAS NOT COMPLETED  PHYSICAL THERAPY  FOR LS BUT FOR LT HIP FROM REPLACEMENT 03/17/2022\par PT STATES DOES NOT DONE  HOME STRETCHING PROGRAM AT HOME \par  [] : Post Surgical Visit: no [FreeTextEntry1] : BILATERAL ACROS  [de-identified] : 04/18/2023

## 2023-05-03 NOTE — REASON FOR VISIT
[FreeTextEntry2] : NEW PT IS BEING SEEN FOR A PAIN MANAGEMENT VISIT IN OFFICE FOR LS PAIN BILATERAL ACROSS WOULD LIKE TO DISS FRANKLIN

## 2023-05-12 ENCOUNTER — TRANSCRIPTION ENCOUNTER (OUTPATIENT)
Age: 78
End: 2023-05-12

## 2023-05-12 RX ORDER — MONTELUKAST 4 MG/1
1 TABLET, CHEWABLE ORAL
Qty: 0 | Refills: 0 | DISCHARGE

## 2023-05-12 NOTE — ASU DISCHARGE PLAN (ADULT/PEDIATRIC) - NS MD DC FALL RISK RISK
For information on Fall & Injury Prevention, visit: [email or web address] OR  [email or web address] OR  https://www.cdc.gov/steadi/patient.html

## 2023-05-15 ENCOUNTER — APPOINTMENT (OUTPATIENT)
Dept: ORTHOPEDIC SURGERY | Facility: HOSPITAL | Age: 78
End: 2023-05-15
Payer: MEDICARE

## 2023-05-15 ENCOUNTER — OUTPATIENT (OUTPATIENT)
Dept: OUTPATIENT SERVICES | Facility: HOSPITAL | Age: 78
LOS: 1 days | End: 2023-05-15
Payer: MEDICARE

## 2023-05-15 VITALS
HEART RATE: 74 BPM | RESPIRATION RATE: 13 BRPM | WEIGHT: 272.93 LBS | HEIGHT: 68 IN | OXYGEN SATURATION: 96 % | TEMPERATURE: 98 F | SYSTOLIC BLOOD PRESSURE: 183 MMHG | DIASTOLIC BLOOD PRESSURE: 78 MMHG

## 2023-05-15 VITALS
TEMPERATURE: 98 F | SYSTOLIC BLOOD PRESSURE: 168 MMHG | OXYGEN SATURATION: 96 % | DIASTOLIC BLOOD PRESSURE: 72 MMHG | HEART RATE: 74 BPM | RESPIRATION RATE: 20 BRPM

## 2023-05-15 DIAGNOSIS — Z96.649 PRESENCE OF UNSPECIFIED ARTIFICIAL HIP JOINT: Chronic | ICD-10-CM

## 2023-05-15 DIAGNOSIS — M54.16 RADICULOPATHY, LUMBAR REGION: ICD-10-CM

## 2023-05-15 DIAGNOSIS — Z98.890 OTHER SPECIFIED POSTPROCEDURAL STATES: Chronic | ICD-10-CM

## 2023-05-15 DIAGNOSIS — Z90.89 ACQUIRED ABSENCE OF OTHER ORGANS: Chronic | ICD-10-CM

## 2023-05-15 DIAGNOSIS — Z90.49 ACQUIRED ABSENCE OF OTHER SPECIFIED PARTS OF DIGESTIVE TRACT: Chronic | ICD-10-CM

## 2023-05-15 LAB — GLUCOSE BLDC GLUCOMTR-MCNC: 151 MG/DL — HIGH (ref 70–99)

## 2023-05-15 PROCEDURE — 62323 NJX INTERLAMINAR LMBR/SAC: CPT

## 2023-05-15 PROCEDURE — 82962 GLUCOSE BLOOD TEST: CPT

## 2023-05-16 ENCOUNTER — APPOINTMENT (OUTPATIENT)
Dept: RHEUMATOLOGY | Facility: CLINIC | Age: 78
End: 2023-05-16
Payer: MEDICARE

## 2023-05-16 VITALS
SYSTOLIC BLOOD PRESSURE: 152 MMHG | RESPIRATION RATE: 16 BRPM | OXYGEN SATURATION: 94 % | WEIGHT: 279 LBS | DIASTOLIC BLOOD PRESSURE: 85 MMHG | HEART RATE: 78 BPM | BODY MASS INDEX: 42.42 KG/M2 | TEMPERATURE: 97.1 F

## 2023-05-16 DIAGNOSIS — G89.29 CERVICALGIA: ICD-10-CM

## 2023-05-16 DIAGNOSIS — M54.2 CERVICALGIA: ICD-10-CM

## 2023-05-16 PROCEDURE — 99214 OFFICE O/P EST MOD 30 MIN: CPT

## 2023-05-16 RX ORDER — CEFUROXIME AXETIL 500 MG/1
500 TABLET ORAL
Qty: 14 | Refills: 0 | Status: DISCONTINUED | COMMUNITY
Start: 2022-12-01 | End: 2023-05-16

## 2023-05-16 RX ORDER — APIXABAN 5 MG/1
5 TABLET, FILM COATED ORAL
Qty: 60 | Refills: 0 | Status: DISCONTINUED | COMMUNITY
Start: 2022-12-02 | End: 2023-05-16

## 2023-05-16 NOTE — ASSESSMENT
[FreeTextEntry1] : CK PACHECO is a 77 year old man with  -- \par \par # RA - stable, well controlled, ? if current spinal sx s/p Humira less frequently \par - recent imaging reviewed with pt - no evidence of overt inflammatory axial dz \par - recent labs stable, repeat prior to next visit \par - c/w Humira but resume q2 week dosing \par - c/w SSZ 1000mg BID \par \par # immunosuppressed status\par - pt knows to call if febrile or unwell, knows to hold medication while on Abx \par - s/p covid bivalent booster, s/p 2022 flu vaccine, s/p PNA vaccines, s/p shingles vaccine\par \par # chronic LBP in setting of DJD, chronic milder neck pain \par - prn Tylenol \par - will see response to FRANKLIN, f/u pain mgmt \par - PT referral \par - off flexeril as has not found much benefit\par \par RTC 6 months, sooner if new/worsening sx

## 2023-05-16 NOTE — PHYSICAL EXAM
[General Appearance - Alert] : alert [General Appearance - In No Acute Distress] : in no acute distress [General Appearance - Well Nourished] : well nourished [Sclera] : the sclera and conjunctiva were normal [PERRL With Normal Accommodation] : pupils were equal in size, round, and reactive to light [Extraocular Movements] : extraocular movements were intact [Neck Appearance] : the appearance of the neck was normal [Auscultation Breath Sounds / Voice Sounds] : lungs were clear to auscultation bilaterally [Heart Rate And Rhythm] : heart rate was normal and rhythm regular [Heart Sounds] : normal S1 and S2 [Murmurs] : no murmurs [No CVA Tenderness] : no ~M costovertebral angle tenderness [No Spinal Tenderness] : no spinal tenderness [Nail Clubbing] : no clubbing  or cyanosis of the fingernails [Musculoskeletal - Swelling] : no joint swelling seen [Motor Tone] : muscle strength and tone were normal [] : no rash [No Focal Deficits] : no focal deficits [Impaired Insight] : insight and judgment were intact [Oriented To Time, Place, And Person] : oriented to person, place, and time [Affect] : the affect was normal [FreeTextEntry1] : No synovitis, contractures, ROM intact diffusely, ambulates with cane

## 2023-05-16 NOTE — HISTORY OF PRESENT ILLNESS
[FreeTextEntry1] : CK PACHECO is a 76 year old man with chronic, well controlled RA on Humira and SSZ 1000mg BID. \par \par RA hx - dx in 2007, few flares since but no flares and has been off steroids x years. Never had rashes, eye inflammation, C spine involvement, inflammatory low back pain, IBD, lung involvement, SQ nodules, paresthesias, muscle weakness.  \par \par + RLE DVT in 2016, off A/C\par + R shoulder OA\par + R hip replacement \par + hx of TM in 2006, fully resolved \par + intentional weight loss of approx 30lbs which has improved LE edema, HBA1c\par + recent retinal swelling, self-resolved, following with optho, reportedly related to DM2 and not RA\par \par Labs - chronically mild elevation in CPK since 2016\par Preimm -- Hep B/C, Quant - negative 3/2020\par DEXA 2018 - normal \par ----------------\par \par 7/30/20-- No complaints today -- no synovitis, prolonged AM stiffness, loss of ROM. No recent F/C, infections, cough, CP, SOB. Feels well. No SE from meds. Observing covid recommendations of social distancing and using a mask when outside. Recent improvement in HBA1c. \par \par 10/29/20 -- No joint complaints, R hip replacement mild discomfort with positional change but able to ambulate/sit/sleep comfortably. No extra-articular sx, F/C, infectious sx. HBA1c markedly improved and endo adjusted DM2 meds, mild worsening of renal fxn, endo added ARB. \par \par 1/28/21 -- No joint complaints today. Has been spacing out Humira, now up to q26 days without recurrence of sx. Feels well otherwise, no infectious sx, no extra-articular sx. Improving DM2 control. \par \par 4/6/21 -- L hip acute pain x 5 days, no preceding issues, radiating to groin with weight bearing, no sciatica pain or back pain. Up to Humira q4 weeks, some stiffness creeping back into hands and shoulders but no pain or synovitis. Feels well otherwise. \par \par 5/20/21 -- L hip replacement scheduled for 6/10/21. Currently on Humira q3 weeks, daily SSZ and no active inflammatory sx, prior stiffness resolved. No infectious sx, feels fine otherwise. \par \par 8/6/21 -- Total LHR postponed, now scheduled for 8/12, has already been holding Humira, taking SSZ, no current inflammatory joint sx or extra-articular manifestations. Feels well otherwise. \par \par 11/2/21 -- Total LHR postponed again, now scheduled for late Jan 2022. TRA stable, no SE with meds, no recent infectious sx. Some MSK related shoulder pain and low back pain arising from seated as trying not to hurt his hip, but intact ROM, not needing OTC pain meds. Feels well otherwise. S/p Covid booster, asking about flu vaccine. \par \par 4/8/22 -- S/p L THR, healing well, doing well with PT, not needing pain meds. RA well controlled, no flares while holding meds and has now resumed. Prior b/l shoulder pain resolved, some mild midline LBP now but not limiting him. No infectious sx, mild covid in jan, no monoclonals, feels well today. \par \par 7/19/22 -- Hip doing well, did a trip to [place] and was able to walk comfortably, worsening lumbar spasm which PT has been working on, some upper arm pain as he has to push off from seated but ROM intact. RA quiescent, no extra-articular inflammatory sx, no infectious sx.\par \par 11/15/22 -- No current inflammatory arthritis sx, no extra-articular inflammatory sx. Ongoing but mild LBP but less spasm, needing arms less to arise from seated. No infectious sx. \par \par 5/16/23 -- Progressive LBP over last few months, ?related to spacing out Humira dosing as doing q3 weeks, hip isn't an issue, no radicular sx, s/p FRANKLIN with pain mgmt yesterday, milder neck pain. No peripheral synovitis, no extra-articular inflammatory sx, no infectious sx.

## 2023-05-16 NOTE — REVIEW OF SYSTEMS
[Arthralgias] : arthralgias [As Noted in HPI] : as noted in HPI [Negative] : Heme/Lymph [Joint Pain] : joint pain [Joint Swelling] : no joint swelling [Joint Stiffness] : no joint stiffness [Difficulty Walking] : difficulty walking [de-identified] : chronic peripheral neuropathy

## 2023-08-07 ENCOUNTER — APPOINTMENT (OUTPATIENT)
Dept: ORTHOPEDIC SURGERY | Facility: CLINIC | Age: 78
End: 2023-08-07
Payer: MEDICARE

## 2023-08-07 VITALS — WEIGHT: 282 LBS | HEIGHT: 68 IN | BODY MASS INDEX: 42.74 KG/M2

## 2023-08-07 PROCEDURE — 99213 OFFICE O/P EST LOW 20 MIN: CPT

## 2023-08-07 NOTE — HISTORY OF PRESENT ILLNESS
[de-identified] : Follow-up left total hip replacement March 17, 2022 patient reports left hip is pain and symptom-free he is walking independently

## 2023-08-07 NOTE — DISCUSSION/SUMMARY
[de-identified] : Impression Right and left total hip replacement doing well,  Recommendation follow-up 1 year

## 2023-08-07 NOTE — PHYSICAL EXAM
[de-identified] : Constitutional:Well nourished , well developed and in no acute distress Psychiatric: Alert and oriented to time place and person.Appropriate affect  Skin:Head, neck, arms and lower extremities:no lesions or discoloration HEENT: Normocephalic, EOM intact, Nasal septum midline, Respiratory: Unlabored respirations,no audible wheezing ,no tachypnea, no cyanosis Cardiovascular: no leg swelling  no ankle edema no JVD, pulse regular Vascular: no calf or thigh tenderness,  Peripheral pulses; intact Lymphatics:No groin adenopathy,no lymphedema lower  or upper extremities Left hip passive range of motion satisfactory pain-freeneurovascular status left lower extremity intact Right hip passive range of motion satisfactory pain-free [de-identified] : Review prior x-ray of December 2022 X-ray AP pelvis left hip reveals satisfactory maintenance of left total hip component alignment with no interval change.  No evidence of fracture

## 2023-08-08 ENCOUNTER — APPOINTMENT (OUTPATIENT)
Dept: FAMILY MEDICINE | Facility: CLINIC | Age: 78
End: 2023-08-08
Payer: MEDICARE

## 2023-08-08 VITALS
OXYGEN SATURATION: 92 % | RESPIRATION RATE: 17 BRPM | SYSTOLIC BLOOD PRESSURE: 144 MMHG | HEART RATE: 77 BPM | HEIGHT: 68 IN | DIASTOLIC BLOOD PRESSURE: 80 MMHG | TEMPERATURE: 97 F | WEIGHT: 284 LBS | BODY MASS INDEX: 43.04 KG/M2

## 2023-08-08 DIAGNOSIS — I82.409 ACUTE EMBOLISM AND THROMBOSIS OF UNSPECIFIED DEEP VEINS OF UNSPECIFIED LOWER EXTREMITY: ICD-10-CM

## 2023-08-08 PROCEDURE — 36415 COLL VENOUS BLD VENIPUNCTURE: CPT

## 2023-08-08 PROCEDURE — 99215 OFFICE O/P EST HI 40 MIN: CPT | Mod: 25

## 2023-08-08 NOTE — ASSESSMENT
[FreeTextEntry1] : Assessment and plan:  1.  Status post accidental fall no injury patient is being evaluated by orthopedic surgery.  Excoriation of right lower extremity has healed.  2.  Diabetes mellitus type 2 non-insulin-requiring continue Ozempic, Tresiba and Jardiance patient also follows a no concentrated sweets consistent carbohydrate diet he also has underlying diabetic neuropathy all medications for diabetic neuropathy have not worked for the patient therefore duloxetine was discontinued.  3.  Hypertension excellent blood pressure control with present medical management which includes losartan, Norvasc diastolic and hydralazine.  4.  Hyperlipidemia continue low-fat low-cholesterol diet, increase physical activity as tolerated and atorvastatin with fenofibrate.  5.  Rheumatoid arthritis has responded to Humira patient follows up with rheumatology on a regular basis  6.  Hypothyroidism continue levothyroxine 75 mcg p.o. daily  7.  Chronic kidney disease stage III a stable in fact improved compared to previous patient will continue to avoid nephrotoxic medications and continue good hydration.  8.  History of DVT recently had ultrasound done and also seen by vascular surgery Eliquis has been discontinued the patient will continue aspirin.  9.  Reviewed most recent consultations from orthopedic surgery, pain management, physical therapy and rheumatology.  10.  Comprehensive blood work drawn in office by examiner.,total time face to face and non 45 minutes the majority spent on counceling  and cooridination of care.

## 2023-08-08 NOTE — HEALTH RISK ASSESSMENT
[Yes] : Yes [Monthly or less (1 pt)] : Monthly or less (1 point) [1 or 2 (0 pts)] : 1 or 2 (0 points) [Never (0 pts)] : Never (0 points) [No] : In the past 12 months have you used drugs other than those required for medical reasons? No [Any fall with injury in past year] : Patient reported fall with injury in the past year [Little interest or pleasure doing things] : 1) Little interest or pleasure doing things [Feeling down, depressed, or hopeless] : 2) Feeling down, depressed, or hopeless [0] : 2) Feeling down, depressed, or hopeless: Not at all (0) [PHQ-2 Negative - No further assessment needed] : PHQ-2 Negative - No further assessment needed [Audit-CScore] : 1 [[id number]] : 0 [With Patient/Caregiver] : , with patient/caregiver [Reviewed no changes] : Reviewed, no changes [Designated Healthcare Proxy] : Designated healthcare proxy [Name: ___] : Health Care Proxy's Name: [unfilled]  [Relationship: ___] : Relationship: [unfilled] [Aggressive treatment] : aggressive treatment [I will adhere to the patient's wishes.] : I will adhere to the patient's wishes. [AdvancecareDate] : 08/23 [Never] : Never

## 2023-08-08 NOTE — PHYSICAL EXAM
[No Acute Distress] : no acute distress [Well Nourished] : well nourished [Well Developed] : well developed [Well-Appearing] : well-appearing [Normal Voice/Communication] : normal voice/communication [Normal Sclera/Conjunctiva] : normal sclera/conjunctiva [PERRL] : pupils equal round and reactive to light [EOMI] : extraocular movements intact [Normal Outer Ear/Nose] : the outer ears and nose were normal in appearance [Normal Oropharynx] : the oropharynx was normal [Normal TMs] : both tympanic membranes were normal [No JVD] : no jugular venous distention [No Lymphadenopathy] : no lymphadenopathy [Supple] : supple [Thyroid Normal, No Nodules] : the thyroid was normal and there were no nodules present [No Respiratory Distress] : no respiratory distress  [No Accessory Muscle Use] : no accessory muscle use [Clear to Auscultation] : lungs were clear to auscultation bilaterally [Normal Rate] : normal rate  [Regular Rhythm] : with a regular rhythm [Normal S1, S2] : normal S1 and S2 [No Murmur] : no murmur heard [No Carotid Bruits] : no carotid bruits [No Abdominal Bruit] : a ~M bruit was not heard ~T in the abdomen [No Varicosities] : no varicosities [Pedal Pulses Present] : the pedal pulses are present [No Palpable Aorta] : no palpable aorta [No Extremity Clubbing/Cyanosis] : no extremity clubbing/cyanosis [Normal Appearance] : normal in appearance [No Nipple Discharge] : no nipple discharge [No Axillary Lymphadenopathy] : no axillary lymphadenopathy [Soft] : abdomen soft [Non Tender] : non-tender [Non-distended] : non-distended [No Masses] : no abdominal mass palpated [No HSM] : no HSM [Normal Bowel Sounds] : normal bowel sounds [No Hernias] : no hernias [Declined Rectal Exam] : declined rectal exam [No CVA Tenderness] : no CVA  tenderness [No Spinal Tenderness] : no spinal tenderness [No Joint Swelling] : no joint swelling [Grossly Normal Strength/Tone] : grossly normal strength/tone [No Rash] : no rash [Acne] : no acne [Coordination Grossly Intact] : coordination grossly intact [No Focal Deficits] : no focal deficits [Normal Gait] : normal gait [Memory Grossly Normal] : memory grossly normal [Speech Grossly Normal] : speech grossly normal [Normal Affect] : the affect was normal [Alert and Oriented x3] : oriented to person, place, and time [Normal Mood] : the mood was normal [Normal Insight/Judgement] : insight and judgment were intact [Comprehensive Foot Exam Normal] : Right and left foot were examined and both feet are normal. No ulcers in either foot. Toes are normal and with full ROM.  Normal tactile sensation with monofilament testing throughout both feet [de-identified] : Stasis dermatitis, secondary to venous insufficiency, right lower extremity edematous secondary to DVT in the past [de-identified] : obese [FreeTextEntry1] : urology [de-identified] : urology [de-identified] : Lipoma left upper back stable [de-identified] : Peripheral neuropathy stable for patient, history of transverse myelitis patient has recovered [de-identified] : Stasis dermatitis bilateral lower extremities, excoriation bilateral knees status post fall healing well.

## 2023-08-08 NOTE — HISTORY OF PRESENT ILLNESS
[FreeTextEntry1] : Please see HPI. [de-identified] : Patient is a 78-year-old gentleman who presents today for follow-up and disease management.  Patient recently seen by orthopedic surgery he does have underlying lumbar stenosis with neurogenic claudication he is status post physical therapy, epidural steroid injection which was done by 's pain management and was recently seen by rheumatology.  Patient has multiple underlying medical problems which include diabetes mellitus type 2, hypertension, hyperlipidemia, rheumatoid arthritis, CKD, history of DVT and patient is on polypharmacy he is also immune compromised.

## 2023-08-08 NOTE — COUNSELING

## 2023-08-09 LAB
ALBUMIN SERPL ELPH-MCNC: 4.5 G/DL
ALP BLD-CCNC: 69 U/L
ALT SERPL-CCNC: 21 U/L
ANION GAP SERPL CALC-SCNC: 15 MMOL/L
AST SERPL-CCNC: 29 U/L
BILIRUB SERPL-MCNC: 0.4 MG/DL
BUN SERPL-MCNC: 16 MG/DL
CALCIUM SERPL-MCNC: 9.2 MG/DL
CHLORIDE SERPL-SCNC: 103 MMOL/L
CHOLEST SERPL-MCNC: 137 MG/DL
CO2 SERPL-SCNC: 25 MMOL/L
CREAT SERPL-MCNC: 1.29 MG/DL
EGFR: 57 ML/MIN/1.73M2
ESTIMATED AVERAGE GLUCOSE: 131 MG/DL
GLUCOSE SERPL-MCNC: 198 MG/DL
HBA1C MFR BLD HPLC: 6.2 %
HDLC SERPL-MCNC: 53 MG/DL
LDLC SERPL CALC-MCNC: 63 MG/DL
NONHDLC SERPL-MCNC: 85 MG/DL
POTASSIUM SERPL-SCNC: 4 MMOL/L
PROT SERPL-MCNC: 7 G/DL
SODIUM SERPL-SCNC: 143 MMOL/L
T3FREE SERPL-MCNC: 2.33 PG/ML
T4 FREE SERPL-MCNC: 1.1 NG/DL
TRIGL SERPL-MCNC: 118 MG/DL
TSH SERPL-ACNC: 3.14 UIU/ML

## 2023-08-16 ENCOUNTER — RX RENEWAL (OUTPATIENT)
Age: 78
End: 2023-08-16

## 2023-08-31 ENCOUNTER — RX RENEWAL (OUTPATIENT)
Age: 78
End: 2023-08-31

## 2023-08-31 RX ORDER — LOSARTAN POTASSIUM 100 MG/1
100 TABLET, FILM COATED ORAL
Qty: 90 | Refills: 3 | Status: ACTIVE | COMMUNITY
Start: 1900-01-01 | End: 1900-01-01

## 2023-09-13 ENCOUNTER — RX RENEWAL (OUTPATIENT)
Age: 78
End: 2023-09-13

## 2023-09-13 RX ORDER — EMPAGLIFLOZIN 10 MG/1
10 TABLET, FILM COATED ORAL
Qty: 90 | Refills: 3 | Status: ACTIVE | COMMUNITY
Start: 2021-07-02 | End: 1900-01-01

## 2023-09-15 NOTE — PRE-OP CHECKLIST - PATIENT'S PERSONAL PROPERTY GIVEN TO
Pt is a 68yoM with pmhx of GBM, seizure disorder, CVA w/ residual L weakness, p/w worsening cough and L side weakness, acute encephalopathy found (+) for enterovirus, rhinovirus, and worsening GBM w/ edema and midline shift. Pt p/w clinical s/sx of pharyngeal dysphagia. Oral Phase: deemed grossly intact. Pharyngeal Phase: slight change in vocal quality w/ trials of nectar thick liquids via tsp. Objective swallow testing would be indicated to assess candidacy for possible oral diet. However, plan for chemo vs home hospice unclear at this time and will continue to assess indication for swallow testing pending finalization of medical plan. Given aspiration risk factors, would not recommend oral diet in the interim. cell phone ID/security/safe Pt is a 68yoM with pmhx of GBM, seizure disorder, prior stroke w/ residual L weakness, p/w worsening cough, worsening L side weakness, and acute encephalopathy found (+) for enterovirus, rhinovirus, and worsening GBM w/ edema and midline shift. Pt p/w likely pharyngeal dysphagia given reports of worsening dysphagia and coughing on thin liquids. Pt noted w/ functional oral management of puree, and w/ no overt s/sx of aspiration/penetration with puree and moderately thick liquids. Cannot r/o silent aspiration at bedside as pt has multiple risk factors. Objective swallow testing indicated to assess candidacy for oral diet.

## 2023-09-18 ENCOUNTER — RX RENEWAL (OUTPATIENT)
Age: 78
End: 2023-09-18

## 2023-09-18 RX ORDER — HYDRALAZINE HYDROCHLORIDE 50 MG/1
50 TABLET ORAL TWICE DAILY
Qty: 180 | Refills: 3 | Status: ACTIVE | COMMUNITY
Start: 2019-07-26 | End: 1900-01-01

## 2023-10-11 ENCOUNTER — RX RENEWAL (OUTPATIENT)
Age: 78
End: 2023-10-11

## 2023-10-11 RX ORDER — DULOXETINE HYDROCHLORIDE 60 MG/1
60 CAPSULE, DELAYED RELEASE PELLETS ORAL
Qty: 90 | Refills: 3 | Status: ACTIVE | COMMUNITY
Start: 2022-09-08 | End: 1900-01-01

## 2023-10-24 LAB
ALBUMIN SERPL ELPH-MCNC: 4.4 G/DL
ALP BLD-CCNC: 66 U/L
ALT SERPL-CCNC: 26 U/L
ANION GAP SERPL CALC-SCNC: 14 MMOL/L
AST SERPL-CCNC: 38 U/L
BILIRUB SERPL-MCNC: 0.3 MG/DL
BUN SERPL-MCNC: 15 MG/DL
CALCIUM SERPL-MCNC: 9.5 MG/DL
CHLORIDE SERPL-SCNC: 103 MMOL/L
CO2 SERPL-SCNC: 28 MMOL/L
CREAT SERPL-MCNC: 1.43 MG/DL
CRP SERPL-MCNC: 4 MG/L
EGFR: 50 ML/MIN/1.73M2
ERYTHROCYTE [SEDIMENTATION RATE] IN BLOOD BY WESTERGREN METHOD: 25 MM/HR
GLUCOSE SERPL-MCNC: 116 MG/DL
POTASSIUM SERPL-SCNC: 4.8 MMOL/L
PROT SERPL-MCNC: 7.3 G/DL
SODIUM SERPL-SCNC: 145 MMOL/L

## 2023-10-26 ENCOUNTER — RX RENEWAL (OUTPATIENT)
Age: 78
End: 2023-10-26

## 2023-11-01 ENCOUNTER — RX RENEWAL (OUTPATIENT)
Age: 78
End: 2023-11-01

## 2023-11-01 RX ORDER — ATORVASTATIN CALCIUM 20 MG/1
20 TABLET, FILM COATED ORAL
Qty: 90 | Refills: 3 | Status: ACTIVE | COMMUNITY
Start: 2022-01-31 | End: 1900-01-01

## 2023-11-07 ENCOUNTER — APPOINTMENT (OUTPATIENT)
Dept: RHEUMATOLOGY | Facility: CLINIC | Age: 78
End: 2023-11-07
Payer: MEDICARE

## 2023-11-07 VITALS
HEART RATE: 75 BPM | BODY MASS INDEX: 42.89 KG/M2 | SYSTOLIC BLOOD PRESSURE: 124 MMHG | HEIGHT: 68 IN | OXYGEN SATURATION: 91 % | RESPIRATION RATE: 15 BRPM | DIASTOLIC BLOOD PRESSURE: 58 MMHG | WEIGHT: 283 LBS | TEMPERATURE: 97.1 F

## 2023-11-07 DIAGNOSIS — M51.36 OTHER INTERVERTEBRAL DISC DEGENERATION, LUMBAR REGION: ICD-10-CM

## 2023-11-07 PROCEDURE — 99214 OFFICE O/P EST MOD 30 MIN: CPT

## 2023-11-17 ENCOUNTER — RX RENEWAL (OUTPATIENT)
Age: 78
End: 2023-11-17

## 2023-11-17 RX ORDER — FENOFIBRATE 160 MG/1
160 TABLET ORAL
Qty: 90 | Refills: 3 | Status: ACTIVE | COMMUNITY
Start: 2021-07-02 | End: 1900-01-01

## 2023-11-26 NOTE — ASSESSMENT
[FreeTextEntry1] : CK PACHECO is a 75 year old man with stable, well controlled RA on Humira and SSZ. Doing well today, joints quiescent. 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 \par \par - c/w Humira q2 weeks\par - c/w SSZ 1000mg BID \par - check routine labs prior to next visit \par - prior labs reviewed with patient \par - RTC 3-4 months, sooner if flare 
7 (severe pain)

## 2023-12-01 ENCOUNTER — RX RENEWAL (OUTPATIENT)
Age: 78
End: 2023-12-01

## 2023-12-01 RX ORDER — SEMAGLUTIDE 1.34 MG/ML
4 INJECTION, SOLUTION SUBCUTANEOUS
Qty: 9 | Refills: 3 | Status: ACTIVE | COMMUNITY
Start: 2022-04-19 | End: 1900-01-01

## 2023-12-07 ENCOUNTER — EMERGENCY (EMERGENCY)
Facility: HOSPITAL | Age: 78
LOS: 1 days | Discharge: HOME | End: 2023-12-07
Attending: STUDENT IN AN ORGANIZED HEALTH CARE EDUCATION/TRAINING PROGRAM | Admitting: STUDENT IN AN ORGANIZED HEALTH CARE EDUCATION/TRAINING PROGRAM
Payer: COMMERCIAL

## 2023-12-07 VITALS
TEMPERATURE: 97 F | OXYGEN SATURATION: 98 % | HEART RATE: 71 BPM | DIASTOLIC BLOOD PRESSURE: 78 MMHG | SYSTOLIC BLOOD PRESSURE: 164 MMHG | RESPIRATION RATE: 18 BRPM

## 2023-12-07 DIAGNOSIS — Z98.890 OTHER SPECIFIED POSTPROCEDURAL STATES: Chronic | ICD-10-CM

## 2023-12-07 DIAGNOSIS — Z96.649 PRESENCE OF UNSPECIFIED ARTIFICIAL HIP JOINT: Chronic | ICD-10-CM

## 2023-12-07 DIAGNOSIS — Z90.89 ACQUIRED ABSENCE OF OTHER ORGANS: Chronic | ICD-10-CM

## 2023-12-07 DIAGNOSIS — Z90.49 ACQUIRED ABSENCE OF OTHER SPECIFIED PARTS OF DIGESTIVE TRACT: Chronic | ICD-10-CM

## 2023-12-07 PROCEDURE — 99283 EMERGENCY DEPT VISIT LOW MDM: CPT

## 2023-12-07 PROCEDURE — 99282 EMERGENCY DEPT VISIT SF MDM: CPT

## 2023-12-07 RX ORDER — ACETAMINOPHEN 500 MG
650 TABLET ORAL ONCE
Refills: 0 | Status: DISCONTINUED | OUTPATIENT
Start: 2023-12-07 | End: 2023-12-07
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
## 2023-12-07 NOTE — ED ADULT NURSE NOTE - NSFALLHARMRISKINTERV_ED_ALL_ED
Communicate risk of Fall with Harm to all staff, patient, and family/Provide visual cue: red socks, yellow wristband, yellow gown, etc/Reinforce activity limits and safety measures with patient and family/Bed in lowest position, wheels locked, appropriate side rails in place/Call bell, personal items and telephone in reach/Instruct patient to call for assistance before getting out of bed/chair/stretcher/Non-slip footwear applied when patient is off stretcher/Bells to call system/Physically safe environment - no spills, clutter or unnecessary equipment/Purposeful Proactive Rounding/Room/bathroom lighting operational, light cord in reach Communicate risk of Fall with Harm to all staff, patient, and family/Provide visual cue: red socks, yellow wristband, yellow gown, etc/Reinforce activity limits and safety measures with patient and family/Bed in lowest position, wheels locked, appropriate side rails in place/Call bell, personal items and telephone in reach/Instruct patient to call for assistance before getting out of bed/chair/stretcher/Non-slip footwear applied when patient is off stretcher/[place] to call system/Physically safe environment - no spills, clutter or unnecessary equipment/Purposeful Proactive Rounding/Room/bathroom lighting operational, light cord in reach

## 2023-12-07 NOTE — ED PROVIDER NOTE - PATIENT PORTAL LINK FT
You can access the FollowMyHealth Patient Portal offered by [facility] by registering at the following website: [email or web address]. By joining [employer]’s FollowMyHealth portal, you will also be able to view your health information using other applications (apps) compatible with our system. You can access the FollowMyHealth Patient Portal offered by [facility] by registering at the following website: [email or web address]. By joining [facility]’s FollowMyHealth portal, you will also be able to view your health information using other applications (apps) compatible with our system.

## 2023-12-07 NOTE — ED PROVIDER NOTE - PHYSICAL EXAMINATION
VITAL SIGNS: I have reviewed nursing notes and confirm.   GEN: Well-developed; well-nourished; in no acute distress. Speaking full sentences. GCS 15  SKIN: Warm, pink, no rash, no diaphoresis, no cyanosis, well perfused.   HEAD: Normocephalic; atraumatic. No scalp lacerations, no abrasions.  NECK: Supple; non tender.  NO midline ttp,  NO step offs,  Full ROM w/o pain.  EYES: Pupils 3mm equal, round, reactive to light and accomodation, conjunctiva and sclera clear. Extra-ocular movements intact bilaterally.  ENT: No nasal discharge; airway clear. Trachea is midline. ORAL: No oropharyngeal exudates or erythema. Normal dentition.  CV: RRR. S1, S2 normal; no murmurs, gallops, or rubs. Capillary refill < 2 seconds throughout. Distal pulses intact 2+ throughout.  NO chest wall TTP.  RESP: CTA bilaterally. No wheezes, rales, or rhonchi.   ABD: Normal bowel sounds, soft, non-distended, non-tender, no rebound, no guarding, no rigidity, no hepatosplenomegaly.  MSK: Normal range of motion and movement of all 4 extremities. No joint or muscular pain throughout. No clubbing.    BACK:  NO thoracolumbar midline TTP,  NO parathoracic TTP,  NO paralumbar TTP. No step-offs or obvious deformities.   NEURO: Alert & oriented x 3, Grossly unremarkable. Sensory and motor intact throughout. No focal deficits. Gait: Fluid. Normal speech and coordination.
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
## 2023-12-13 ENCOUNTER — APPOINTMENT (OUTPATIENT)
Dept: FAMILY MEDICINE | Facility: CLINIC | Age: 78
End: 2023-12-13
Payer: MEDICARE

## 2023-12-13 VITALS
SYSTOLIC BLOOD PRESSURE: 146 MMHG | TEMPERATURE: 97.2 F | RESPIRATION RATE: 14 BRPM | OXYGEN SATURATION: 93 % | HEIGHT: 68 IN | WEIGHT: 280 LBS | BODY MASS INDEX: 42.44 KG/M2 | DIASTOLIC BLOOD PRESSURE: 72 MMHG | HEART RATE: 74 BPM

## 2023-12-13 DIAGNOSIS — N18.31 CHRONIC KIDNEY DISEASE, STAGE 3A: ICD-10-CM

## 2023-12-13 DIAGNOSIS — Z00.00 ENCOUNTER FOR GENERAL ADULT MEDICAL EXAMINATION W/OUT ABNORMAL FINDINGS: ICD-10-CM

## 2023-12-13 DIAGNOSIS — D63.1 CHRONIC KIDNEY DISEASE, STAGE 3A: ICD-10-CM

## 2023-12-13 PROCEDURE — G0439: CPT

## 2023-12-13 PROCEDURE — 36415 COLL VENOUS BLD VENIPUNCTURE: CPT

## 2023-12-13 NOTE — PHYSICAL EXAM
[No Acute Distress] : no acute distress [Well Nourished] : well nourished [Well Developed] : well developed [Well-Appearing] : well-appearing [Normal Voice/Communication] : normal voice/communication [Normal Sclera/Conjunctiva] : normal sclera/conjunctiva [PERRL] : pupils equal round and reactive to light [EOMI] : extraocular movements intact [Normal Outer Ear/Nose] : the outer ears and nose were normal in appearance [Normal Oropharynx] : the oropharynx was normal [Normal TMs] : both tympanic membranes were normal [No JVD] : no jugular venous distention [No Lymphadenopathy] : no lymphadenopathy [Supple] : supple [Thyroid Normal, No Nodules] : the thyroid was normal and there were no nodules present [No Respiratory Distress] : no respiratory distress  [No Accessory Muscle Use] : no accessory muscle use [Clear to Auscultation] : lungs were clear to auscultation bilaterally [Normal Rate] : normal rate  [Regular Rhythm] : with a regular rhythm [Normal S1, S2] : normal S1 and S2 [No Murmur] : no murmur heard [No Carotid Bruits] : no carotid bruits [No Abdominal Bruit] : a ~M bruit was not heard ~T in the abdomen [No Varicosities] : no varicosities [Pedal Pulses Present] : the pedal pulses are present [No Palpable Aorta] : no palpable aorta [No Extremity Clubbing/Cyanosis] : no extremity clubbing/cyanosis [Normal Appearance] : normal in appearance [No Nipple Discharge] : no nipple discharge [No Axillary Lymphadenopathy] : no axillary lymphadenopathy [Soft] : abdomen soft [Non Tender] : non-tender [Non-distended] : non-distended [No Masses] : no abdominal mass palpated [No HSM] : no HSM [Normal Bowel Sounds] : normal bowel sounds [No Hernias] : no hernias [Declined Rectal Exam] : declined rectal exam [No CVA Tenderness] : no CVA  tenderness [No Spinal Tenderness] : no spinal tenderness [No Joint Swelling] : no joint swelling [Grossly Normal Strength/Tone] : grossly normal strength/tone [No Rash] : no rash [Acne] : no acne [Coordination Grossly Intact] : coordination grossly intact [No Focal Deficits] : no focal deficits [Normal Gait] : normal gait [Speech Grossly Normal] : speech grossly normal [Memory Grossly Normal] : memory grossly normal [Normal Affect] : the affect was normal [Alert and Oriented x3] : oriented to person, place, and time [Normal Mood] : the mood was normal [Normal Insight/Judgement] : insight and judgment were intact [Comprehensive Foot Exam Normal] : Right and left foot were examined and both feet are normal. No ulcers in either foot. Toes are normal and with full ROM.  Normal tactile sensation with monofilament testing throughout both feet [de-identified] : Stasis dermatitis, secondary to venous insufficiency, right lower extremity edematous secondary to DVT in the past [de-identified] : obese [FreeTextEntry1] : urology [de-identified] : urology [de-identified] : Lipoma left upper back stable [de-identified] : Stasis dermatitis bilateral lower extremities, excoriation bilateral knees status post fall healing well. [de-identified] : Peripheral neuropathy stable for patient, history of transverse myelitis patient has recovered

## 2023-12-13 NOTE — HEALTH RISK ASSESSMENT
[Very Good] : ~his/her~  mood as very good [Yes] : Yes [Monthly or less (1 pt)] : Monthly or less (1 point) [1 or 2 (0 pts)] : 1 or 2 (0 points) [Never (0 pts)] : Never (0 points) [No] : In the past 12 months have you used drugs other than those required for medical reasons? No [No falls in past year] : Patient reported no falls in the past year [Little interest or pleasure doing things] : 1) Little interest or pleasure doing things [Feeling down, depressed, or hopeless] : 2) Feeling down, depressed, or hopeless [0] : 2) Feeling down, depressed, or hopeless: Not at all (0) [PHQ-2 Negative - No further assessment needed] : PHQ-2 Negative - No further assessment needed [Audit-CScore] : 1 [[id number]] : 0 [HIV test declined] : HIV test declined [Change in mental status noted] : No change in mental status noted [Language] : denies difficulty with language [Behavior] : denies difficulty with behavior [Learning/Retaining New Information] : denies difficulty learning/retaining new information [Handling Complex Tasks] : denies difficulty handling complex tasks [Reasoning] : denies difficulty with reasoning [Spatial Ability and Orientation] : denies difficulty with spatial ability and orientation [None] : None [With Family] : lives with family [# of Members in Household ___] :  household currently consist of [unfilled] member(s) [Retired] : retired [Graduate School] : graduate school [] :  [Sexually Active] : not sexually active [Feels Safe at Home] : Feels safe at home [Fully functional (bathing, dressing, toileting, transferring, walking, feeding)] : Fully functional (bathing, dressing, toileting, transferring, walking, feeding) [Fully functional (using the telephone, shopping, preparing meals, housekeeping, doing laundry, using] : Fully functional and needs no help or supervision to perform IADLs (using the telephone, shopping, preparing meals, housekeeping, doing laundry, using transportation, managing medications and managing finances) [Reports changes in hearing] : Reports no changes in hearing [Reports changes in vision] : Reports no changes in vision [Reports normal functional visual acuity (ie: able to read med bottle)] : Reports poor functional visual acuity.  [Reports changes in dental health] : Reports no changes in dental health [Smoke Detector] : smoke detector [Carbon Monoxide Detector] : carbon monoxide detector [Guns at Home] : no guns at home [Safety elements used in home] : safety elements used in home [Seat Belt] :  uses seat belt [Sunscreen] : uses sunscreen [Travel to Developing Areas] : does not  travel to developing areas [TB Exposure] : is not being exposed to tuberculosis [Caregiver Concerns] : does not have caregiver concerns [With Patient/Caregiver] : , with patient/caregiver [Reviewed no changes] : Reviewed, no changes [Designated Healthcare Proxy] : Designated healthcare proxy [Relationship: ___] : Relationship: [unfilled] [Aggressive treatment] : aggressive treatment [I will adhere to the patient's wishes.] : I will adhere to the patient's wishes. [AdvancecareDate] : 12/23 [Never] : Never

## 2023-12-13 NOTE — HISTORY OF PRESENT ILLNESS
[FreeTextEntry1] : Annual wellness exam. [de-identified] : Patient is a 78-year-old gentleman who presents today for annual wellness exam.  Patient recently seen by orthopedic surgery he does have underlying lumbar stenosis with neurogenic claudication he is status post physical therapy, epidural steroid injection which was done by 's pain management and was recently seen by rheumatology.  Patient has multiple underlying medical problems which include diabetes mellitus type 2, hypertension, hyperlipidemia, rheumatoid arthritis, CKD, history of DVT and patient is on polypharmacy he is also immune compromised.

## 2023-12-13 NOTE — ASSESSMENT
[FreeTextEntry1] : Assessment and plan:  1.  Comprehensive health maintenance physical exam stable for patient no acute findings.  2.  Comprehensive blood work drawn in office by examiner  3.  Comprehensive cardiac evaluation and electrocardiogram recently done by cardiology.  4.  Diabetes mellitus type 2 patient will continue present medical management I will be checking hemoglobin A1c if any adjustment is necessary of his medications it will be done once I get the results back.  5.  Hypertension blood pressure is stable continue present medical management with amlodipine, hydralazine losartan and Bystolic.  6.  Hyperlipidemia patient does follow a low-fat low-cholesterol diet continue atorvastatin 20 mg 1 tab at bedtime with fenofibrate 160 mg.  7.  Hypothyroidism continue levothyroxine 75 mcg check free T3, free T4 and TSH.  8.  Rheumatoid arthritis followed closely by rheumatology I reviewed most recent consultation patient will continue Humira and sulfasalazine  At this point no change in medical management continue all medications as prescribed follow-up appointment in 3 months.

## 2023-12-14 LAB
ALBUMIN SERPL ELPH-MCNC: 4.4 G/DL
ALP BLD-CCNC: 65 U/L
ALT SERPL-CCNC: 30 U/L
ANION GAP SERPL CALC-SCNC: 17 MMOL/L
APPEARANCE: CLEAR
AST SERPL-CCNC: 34 U/L
BACTERIA: NEGATIVE /HPF
BASOPHILS # BLD AUTO: 0.01 K/UL
BASOPHILS NFR BLD AUTO: 0.2 %
BILIRUB SERPL-MCNC: 0.3 MG/DL
BILIRUBIN URINE: NEGATIVE
BLOOD URINE: NEGATIVE
BUN SERPL-MCNC: 18 MG/DL
CALCIUM SERPL-MCNC: 9.4 MG/DL
CAST: 0 /LPF
CHLORIDE SERPL-SCNC: 106 MMOL/L
CHOLEST SERPL-MCNC: 129 MG/DL
CO2 SERPL-SCNC: 25 MMOL/L
COLOR: NORMAL
CREAT SERPL-MCNC: 1.3 MG/DL
CREAT SPEC-SCNC: 102 MG/DL
EGFR: 57 ML/MIN/1.73M2
EOSINOPHIL # BLD AUTO: 0.1 K/UL
EOSINOPHIL NFR BLD AUTO: 2.4 %
EPITHELIAL CELLS: 1 /HPF
ESTIMATED AVERAGE GLUCOSE: 120 MG/DL
FERRITIN SERPL-MCNC: 45 NG/ML
FOLATE SERPL-MCNC: >20 NG/ML
GLUCOSE QUALITATIVE U: >=1000 MG/DL
GLUCOSE SERPL-MCNC: 119 MG/DL
HBA1C MFR BLD HPLC: 5.8 %
HCT VFR BLD CALC: 49.8 %
HDLC SERPL-MCNC: 52 MG/DL
HGB BLD-MCNC: 16.4 G/DL
IMM GRANULOCYTES NFR BLD AUTO: 0.2 %
IRON SATN MFR SERPL: 25 %
IRON SERPL-MCNC: 89 UG/DL
KETONES URINE: NEGATIVE MG/DL
LDLC SERPL CALC-MCNC: 53 MG/DL
LEUKOCYTE ESTERASE URINE: NEGATIVE
LYMPHOCYTES # BLD AUTO: 0.8 K/UL
LYMPHOCYTES NFR BLD AUTO: 19.4 %
MAN DIFF?: NORMAL
MCHC RBC-ENTMCNC: 31.9 PG
MCHC RBC-ENTMCNC: 32.9 GM/DL
MCV RBC AUTO: 96.9 FL
MICROALBUMIN 24H UR DL<=1MG/L-MCNC: 87.9 MG/DL
MICROALBUMIN/CREAT 24H UR-RTO: 862 MG/G
MICROSCOPIC-UA: NORMAL
MONOCYTES # BLD AUTO: 0.39 K/UL
MONOCYTES NFR BLD AUTO: 9.4 %
NEUTROPHILS # BLD AUTO: 2.82 K/UL
NEUTROPHILS NFR BLD AUTO: 68.4 %
NITRITE URINE: NEGATIVE
NONHDLC SERPL-MCNC: 77 MG/DL
PH URINE: 5.5
PLATELET # BLD AUTO: 151 K/UL
POTASSIUM SERPL-SCNC: 4.1 MMOL/L
PROT SERPL-MCNC: 7.1 G/DL
PROTEIN URINE: 100 MG/DL
PSA SERPL-MCNC: <0.01 NG/ML
RBC # BLD: 5.14 M/UL
RBC # FLD: 15.1 %
RED BLOOD CELLS URINE: 1 /HPF
SODIUM SERPL-SCNC: 147 MMOL/L
SPECIFIC GRAVITY URINE: 1.03
T3FREE SERPL-MCNC: 2.79 PG/ML
T4 FREE SERPL-MCNC: 1 NG/DL
TIBC SERPL-MCNC: 357 UG/DL
TRIGL SERPL-MCNC: 144 MG/DL
TSH SERPL-ACNC: 2.43 UIU/ML
UIBC SERPL-MCNC: 268 UG/DL
UROBILINOGEN URINE: 0.2 MG/DL
VIT B12 SERPL-MCNC: 923 PG/ML
WBC # FLD AUTO: 4.13 K/UL
WHITE BLOOD CELLS URINE: 1 /HPF

## 2024-01-03 DIAGNOSIS — D89.2 HYPERGAMMAGLOBULINEMIA, UNSPECIFIED: ICD-10-CM

## 2024-01-05 ENCOUNTER — APPOINTMENT (OUTPATIENT)
Dept: PAIN MANAGEMENT | Facility: CLINIC | Age: 79
End: 2024-01-05
Payer: MEDICARE

## 2024-01-05 DIAGNOSIS — M54.16 RADICULOPATHY, LUMBAR REGION: ICD-10-CM

## 2024-01-05 PROCEDURE — 99213 OFFICE O/P EST LOW 20 MIN: CPT

## 2024-01-05 NOTE — ASSESSMENT
[FreeTextEntry1] : Interim history The patient returns with pain that has been treated adequately in the past with epidural steroid injections.  The patient's complaints are consistent with radiculopathy. Patient's ADL's increase with prior FRANKLIN.   The last injection gave greater than 60% reduction of the pain but now there is a return of symptoms. The patient is requesting a subsequent epidural steroid injection to alleviate pain and improve functional ability and quality of life.  Patient is a candidate. Objective findings Since the last visit, there have been no new imaging studies. The patient has no new symptoms except the return of the their pain complaints. Motor and sensory function is unchanged. Plan Spoke to patient about epidural steroid injections. Explained risks, benefits and alternatives including but not limited to the risk of infection, bleeding, headache, syncopal episode, failure to resolve issues, allergic reaction, symptom recurrence, allergic reaction, nerve injury, and increased pain. The patient understands the risks. All questions were answered. The patient is willing to proceed.

## 2024-01-05 NOTE — HISTORY OF PRESENT ILLNESS
[Lower back] : lower back [6] : 6 [5] : 5 [Burning] : burning [Dull/Aching] : dull/aching [Stabbing] : stabbing [Constant] : constant [Household chores] : household chores [Sleep] : sleep [Rest] : rest [Sitting] : sitting [Standing] : standing [Walking] : walking [Bending forward] : bending forward [Retired] : Work status: retired [Home] : at home, [unfilled] , at the time of the visit. [Medical Office: ([facility])___] : at the medical office located in  [Verbal consent obtained from patient] : the patient, [unfilled] [] : Post Surgical Visit: no [FreeTextEntry1] : BILATERAL ACROS  [de-identified] : 04/18/2023

## 2024-01-08 ENCOUNTER — APPOINTMENT (OUTPATIENT)
Dept: MRI IMAGING | Facility: CLINIC | Age: 79
End: 2024-01-08

## 2024-01-08 ENCOUNTER — APPOINTMENT (OUTPATIENT)
Dept: FAMILY MEDICINE | Facility: CLINIC | Age: 79
End: 2024-01-08
Payer: MEDICARE

## 2024-01-08 VITALS
TEMPERATURE: 98.5 F | HEIGHT: 68 IN | HEART RATE: 75 BPM | RESPIRATION RATE: 18 BRPM | BODY MASS INDEX: 42.28 KG/M2 | WEIGHT: 279 LBS | OXYGEN SATURATION: 92 %

## 2024-01-08 DIAGNOSIS — J20.9 ACUTE BRONCHITIS, UNSPECIFIED: ICD-10-CM

## 2024-01-08 PROCEDURE — 99214 OFFICE O/P EST MOD 30 MIN: CPT

## 2024-01-08 NOTE — PLAN
[FreeTextEntry1] : salt water gargles. supportive care, fluids.   rest. tylenol prn. if no improvement, return to office.  Inhaler use as needed.

## 2024-01-08 NOTE — HISTORY OF PRESENT ILLNESS
[Moderate] : moderate [___ Weeks ago] :  [unfilled] weeks ago [Sudden] : suddenly [Constant] : constant [Congestion] : congestion [Cough] : cough [Sore Throat] : sore throat [Wheezing] : wheezing [Anorexia] : anorexia [Fatigue] : fatigue [OTC Remedies] : OTC remedies [At Night] : at night [Stable] : stable [Chills] : no chills [Shortness Of Breath] : no shortness of breath [Earache] : no earache [Headache] : no headache [Fever] : no fever [FreeTextEntry2] : PND making it difficukt to sleep [FreeTextEntry5] : Nyquil and Dayquil [FreeTextEntry8] : Patient's daughter was sick with Covid 2 weeks ago. Mucus is green grey.  On Oct. ,2023, he had 4 vaccines against Flu, Covid, RSV and Pneumonia.

## 2024-01-08 NOTE — PHYSICAL EXAM
[No Acute Distress] : no acute distress [Well Nourished] : well nourished [Well Developed] : well developed [Ill-Appearing] : ill-appearing [Normal Sclera/Conjunctiva] : normal sclera/conjunctiva [PERRL] : pupils equal round and reactive to light [EOMI] : extraocular movements intact [No JVD] : no jugular venous distention [No Lymphadenopathy] : no lymphadenopathy [Supple] : supple [Thyroid Normal, No Nodules] : the thyroid was normal and there were no nodules present [No Respiratory Distress] : no respiratory distress  [No Accessory Muscle Use] : no accessory muscle use [Normal Rate] : normal rate  [Regular Rhythm] : with a regular rhythm [Normal S1, S2] : normal S1 and S2 [No Murmur] : no murmur heard [No Carotid Bruits] : no carotid bruits [No Abdominal Bruit] : a ~M bruit was not heard ~T in the abdomen [No Varicosities] : no varicosities [Pedal Pulses Present] : the pedal pulses are present [No Edema] : there was no peripheral edema [No Palpable Aorta] : no palpable aorta [No Extremity Clubbing/Cyanosis] : no extremity clubbing/cyanosis [Soft] : abdomen soft [Non Tender] : non-tender [Non-distended] : non-distended [No Masses] : no abdominal mass palpated [No HSM] : no HSM [Normal Bowel Sounds] : normal bowel sounds [Normal Posterior Cervical Nodes] : no posterior cervical lymphadenopathy [Normal Anterior Cervical Nodes] : no anterior cervical lymphadenopathy [No CVA Tenderness] : no CVA  tenderness [No Spinal Tenderness] : no spinal tenderness [No Joint Swelling] : no joint swelling [Grossly Normal Strength/Tone] : grossly normal strength/tone [No Rash] : no rash [Coordination Grossly Intact] : coordination grossly intact [No Focal Deficits] : no focal deficits [Normal Gait] : normal gait [Deep Tendon Reflexes (DTR)] : deep tendon reflexes were 2+ and symmetric [Normal Affect] : the affect was normal [Normal Insight/Judgement] : insight and judgment were intact [de-identified] : nasal and sinus congestion , throat erythema , TM normal  [de-identified] : wheeze on expiration

## 2024-01-08 NOTE — HEALTH RISK ASSESSMENT
[No] : In the past 12 months have you used drugs other than those required for medical reasons? No [No falls in past year] : Patient reported no falls in the past year [0] : 2) Feeling down, depressed, or hopeless: Not at all (0) [PHQ-2 Negative - No further assessment needed] : PHQ-2 Negative - No further assessment needed [Never] : Never [de-identified] : regular [[id number]] : 0

## 2024-01-09 ENCOUNTER — TRANSCRIPTION ENCOUNTER (OUTPATIENT)
Age: 79
End: 2024-01-09

## 2024-01-09 LAB
INFLUENZA A RESULT: NOT DETECTED
INFLUENZA B RESULT: NOT DETECTED
RESP SYN VIRUS RESULT: NOT DETECTED
SARS-COV-2 RESULT: DETECTED

## 2024-01-11 ENCOUNTER — APPOINTMENT (OUTPATIENT)
Dept: ORTHOPEDIC SURGERY | Facility: HOSPITAL | Age: 79
End: 2024-01-11

## 2024-01-22 ENCOUNTER — APPOINTMENT (OUTPATIENT)
Dept: MRI IMAGING | Facility: CLINIC | Age: 79
End: 2024-01-22
Payer: MEDICARE

## 2024-01-22 ENCOUNTER — OUTPATIENT (OUTPATIENT)
Dept: OUTPATIENT SERVICES | Facility: HOSPITAL | Age: 79
LOS: 1 days | End: 2024-01-22
Payer: MEDICARE

## 2024-01-22 DIAGNOSIS — H53.453 OTHER LOCALIZED VISUAL FIELD DEFECT, BILATERAL: ICD-10-CM

## 2024-01-22 DIAGNOSIS — H46.9 UNSPECIFIED OPTIC NEURITIS: ICD-10-CM

## 2024-01-22 DIAGNOSIS — R90.82 WHITE MATTER DISEASE, UNSPECIFIED: ICD-10-CM

## 2024-01-22 DIAGNOSIS — Z13.9 ENCOUNTER FOR SCREENING, UNSPECIFIED: ICD-10-CM

## 2024-01-22 DIAGNOSIS — Z98.890 OTHER SPECIFIED POSTPROCEDURAL STATES: Chronic | ICD-10-CM

## 2024-01-22 DIAGNOSIS — Z90.49 ACQUIRED ABSENCE OF OTHER SPECIFIED PARTS OF DIGESTIVE TRACT: Chronic | ICD-10-CM

## 2024-01-22 DIAGNOSIS — M04.9 AUTOINFLAMMATORY SYNDROME, UNSPECIFIED: ICD-10-CM

## 2024-01-22 DIAGNOSIS — H47.219: ICD-10-CM

## 2024-01-22 DIAGNOSIS — J32.9 CHRONIC SINUSITIS, UNSPECIFIED: ICD-10-CM

## 2024-01-22 DIAGNOSIS — H74.8X1 OTHER SPECIFIED DISORDERS OF RIGHT MIDDLE EAR AND MASTOID: ICD-10-CM

## 2024-01-22 DIAGNOSIS — Z90.89 ACQUIRED ABSENCE OF OTHER ORGANS: Chronic | ICD-10-CM

## 2024-01-22 DIAGNOSIS — H47.20 UNSPECIFIED OPTIC ATROPHY: ICD-10-CM

## 2024-01-22 DIAGNOSIS — C61 MALIGNANT NEOPLASM OF PROSTATE: ICD-10-CM

## 2024-01-22 DIAGNOSIS — H53.9 UNSPECIFIED VISUAL DISTURBANCE: ICD-10-CM

## 2024-01-22 PROCEDURE — 70544 MR ANGIOGRAPHY HEAD W/O DYE: CPT | Mod: 26,MH,59

## 2024-01-22 PROCEDURE — 70553 MRI BRAIN STEM W/O & W/DYE: CPT | Mod: 26,MH

## 2024-01-22 PROCEDURE — 70553 MRI BRAIN STEM W/O & W/DYE: CPT

## 2024-01-22 PROCEDURE — 70543 MRI ORBT/FAC/NCK W/O &W/DYE: CPT | Mod: 26,MH

## 2024-01-22 PROCEDURE — 70544 MR ANGIOGRAPHY HEAD W/O DYE: CPT

## 2024-01-22 PROCEDURE — 70543 MRI ORBT/FAC/NCK W/O &W/DYE: CPT

## 2024-01-22 PROCEDURE — A9585: CPT

## 2024-01-23 ENCOUNTER — APPOINTMENT (OUTPATIENT)
Dept: PULMONOLOGY | Facility: CLINIC | Age: 79
End: 2024-01-23
Payer: MEDICARE

## 2024-01-23 ENCOUNTER — TRANSCRIPTION ENCOUNTER (OUTPATIENT)
Age: 79
End: 2024-01-23

## 2024-01-23 VITALS
HEIGHT: 68 IN | RESPIRATION RATE: 17 BRPM | DIASTOLIC BLOOD PRESSURE: 80 MMHG | HEART RATE: 72 BPM | BODY MASS INDEX: 43.35 KG/M2 | WEIGHT: 286 LBS | SYSTOLIC BLOOD PRESSURE: 142 MMHG | OXYGEN SATURATION: 91 % | TEMPERATURE: 97.8 F

## 2024-01-23 VITALS — OXYGEN SATURATION: 99 %

## 2024-01-23 PROCEDURE — 99214 OFFICE O/P EST MOD 30 MIN: CPT

## 2024-01-23 NOTE — ASSESSMENT
[FreeTextEntry1] : 77y/o  male    1-  severe  SAMARA last study 2021   did not tolerate mask  wants alternative  therapy  2- obesity   BMI  43.9  3- DM / HTN   Recommendations 1- discussed   dental appliance     weight loss nutrition      ENT   input 2- repeat sleep study   return  after  sleep study

## 2024-01-23 NOTE — PHYSICAL EXAM
[IV] : Mallampati Class: IV [Normal Appearance] : normal appearance [Supple] : supple [No Neck Mass] : no neck mass [No JVD] : no jvd [Normal Rate/Rhythm] : normal rate/rhythm [Normal S1, S2] : normal s1, s2 [No Murmurs] : no murmurs [No Resp Distress] : no resp distress [No Acc Muscle Use] : no acc muscle use [Clear to Auscultation Bilaterally] : clear to auscultation bilaterally [Benign] : benign [Not Tender] : not tender [No HSM] : no hsm [No Rash] : no rash [No Motor Deficits] : no motor deficits [Normal Affect] : normal affect [TextBox_2] : pleasant  male no distress speaking full sentneces no cough   [TextBox_11] : crowded airway   no lesion s  [TextBox_99] : wide    based   due to   hip issues  [TextBox_105] : mild edema

## 2024-01-23 NOTE — ASU PATIENT PROFILE, ADULT - NSICDXPASTSURGICALHX_GEN_ALL_CORE_FT
PAST SURGICAL HISTORY:  H/O hernia repair 2000's    S/P foot surgery bilateral; 2010    S/P laparoscopic cholecystectomy 2000    S/P LASIK surgery of both eyes     S/P lumbar laminectomy 2012    S/P tonsillectomy childhood    S/P total hip arthroplasty right 2011     Right foot pain

## 2024-01-23 NOTE — PROCEDURE
[FreeTextEntry1] : 1/23/2024  pulse oximetry     rest  99 %  hr  72 walk     hallway  pulse ox after  sats 93% hr  90's

## 2024-01-23 NOTE — HISTORY OF PRESENT ILLNESS
[Never] : never [Obstructive Sleep Apnea] : obstructive sleep apnea [TextBox_4] : last  MM   3/2022     1/23/2024 77y/o male  born in [place]   never smoker   retired   college  degree construction  firm  manager    [employer]  reserves h/o  RIght DVT   after cruise   was on eliquis  in past -     DM obesity HTN    SAMARA  dx 15 years ago  - could not tolerate  cpap  mask    chart reviewed and  discussed sleep history  -       -2021  severe samara   weight  295  now   286 pounds - wants alternative  therapy  - daily aspirin only now  -n cough  no sob  no  palpitations currently has lost weight   [TextBox_100] : 8/2021  [TextBox_108] : 58.4   [TextBox_112] : 81 [TextBox_116] : 70

## 2024-01-23 NOTE — ASU PATIENT PROFILE, ADULT - FALL HARM RISK - UNIVERSAL INTERVENTIONS
Bed in lowest position, wheels locked, appropriate side rails in place/Call bell, personal items and telephone in reach/Instruct patient to call for assistance before getting out of bed or chair/Non-slip footwear when patient is out of bed/[place] to call system/Physically safe environment - no spills, clutter or unnecessary equipment/Purposeful Proactive Rounding/Room/bathroom lighting operational, light cord in reach

## 2024-01-23 NOTE — REVIEW OF SYSTEMS
[Recent Wt Loss (___ Lbs)] : ~T recent [unfilled] lb weight loss [Clotting Disorder/ Frequent bleeding] : clotting disorder/ frequent bleeding [Diabetes] : diabetes [Obesity] : obesity [Fever] : no fever [Recent Wt Gain (___ Lbs)] : ~T no recent weight gain [Chills] : no chills [Cough] : no cough [Hemoptysis] : no hemoptysis [Sputum] : no sputum [Dyspnea] : no dyspnea [Wheezing] : no wheezing [Chest Discomfort] : no chest discomfort [SOB on Exertion] : no sob on exertion [Palpitations] : no palpitations [GERD] : no gerd [Abdominal Pain] : no abdominal pain [Nausea] : no nausea [Vomiting] : no vomiting [Dysphagia] : no dysphagia [Bleeding] : no bleeding [Blood Transfusion] : no blood transfusion [Thyroid Problem] : no thyroid problem [TextBox_3] : dec2023  covid weight loss

## 2024-01-25 ENCOUNTER — OUTPATIENT (OUTPATIENT)
Dept: OUTPATIENT SERVICES | Facility: HOSPITAL | Age: 79
LOS: 1 days | End: 2024-01-25
Payer: MEDICARE

## 2024-01-25 ENCOUNTER — APPOINTMENT (OUTPATIENT)
Dept: ORTHOPEDIC SURGERY | Facility: HOSPITAL | Age: 79
End: 2024-01-25
Payer: MEDICARE

## 2024-01-25 VITALS
OXYGEN SATURATION: 93 % | DIASTOLIC BLOOD PRESSURE: 72 MMHG | SYSTOLIC BLOOD PRESSURE: 158 MMHG | TEMPERATURE: 98 F | RESPIRATION RATE: 18 BRPM | HEART RATE: 73 BPM

## 2024-01-25 VITALS
OXYGEN SATURATION: 93 % | HEIGHT: 68 IN | RESPIRATION RATE: 20 BRPM | SYSTOLIC BLOOD PRESSURE: 172 MMHG | TEMPERATURE: 98 F | HEART RATE: 75 BPM | WEIGHT: 285.94 LBS | DIASTOLIC BLOOD PRESSURE: 85 MMHG

## 2024-01-25 DIAGNOSIS — Z90.49 ACQUIRED ABSENCE OF OTHER SPECIFIED PARTS OF DIGESTIVE TRACT: Chronic | ICD-10-CM

## 2024-01-25 DIAGNOSIS — Z98.890 OTHER SPECIFIED POSTPROCEDURAL STATES: Chronic | ICD-10-CM

## 2024-01-25 DIAGNOSIS — M54.16 RADICULOPATHY, LUMBAR REGION: ICD-10-CM

## 2024-01-25 DIAGNOSIS — Z96.649 PRESENCE OF UNSPECIFIED ARTIFICIAL HIP JOINT: Chronic | ICD-10-CM

## 2024-01-25 DIAGNOSIS — Z90.89 ACQUIRED ABSENCE OF OTHER ORGANS: Chronic | ICD-10-CM

## 2024-01-25 LAB — GLUCOSE BLDC GLUCOMTR-MCNC: 114 MG/DL — HIGH (ref 70–99)

## 2024-01-25 PROCEDURE — 82962 GLUCOSE BLOOD TEST: CPT

## 2024-01-25 PROCEDURE — 62323 NJX INTERLAMINAR LMBR/SAC: CPT

## 2024-01-25 RX ORDER — LOSARTAN POTASSIUM 100 MG/1
1 TABLET, FILM COATED ORAL
Qty: 0 | Refills: 0 | DISCHARGE

## 2024-01-25 RX ORDER — LEVOTHYROXINE SODIUM 125 MCG
1 TABLET ORAL
Qty: 0 | Refills: 0 | DISCHARGE

## 2024-01-25 RX ORDER — ALBUTEROL 90 UG/1
2 AEROSOL, METERED ORAL
Qty: 0 | Refills: 0 | DISCHARGE

## 2024-01-25 RX ORDER — CELECOXIB 200 MG/1
1 CAPSULE ORAL
Refills: 0 | DISCHARGE

## 2024-01-25 RX ORDER — EMPAGLIFLOZIN 10 MG/1
1 TABLET, FILM COATED ORAL
Qty: 0 | Refills: 0 | DISCHARGE

## 2024-01-25 RX ORDER — NEBIVOLOL HYDROCHLORIDE 5 MG/1
1 TABLET ORAL
Qty: 0 | Refills: 0 | DISCHARGE

## 2024-01-25 RX ORDER — AMLODIPINE BESYLATE 2.5 MG/1
1 TABLET ORAL
Qty: 0 | Refills: 0 | DISCHARGE

## 2024-01-25 RX ORDER — HYDRALAZINE HCL 50 MG
1 TABLET ORAL
Qty: 0 | Refills: 0 | DISCHARGE

## 2024-01-25 RX ORDER — SULFASALAZINE 500 MG
2 TABLET ORAL
Qty: 0 | Refills: 0 | DISCHARGE

## 2024-01-25 RX ORDER — CHOLECALCIFEROL (VITAMIN D3) 125 MCG
0 CAPSULE ORAL
Qty: 0 | Refills: 0 | DISCHARGE

## 2024-01-25 RX ORDER — ADALIMUMAB 40MG/0.8ML
1 KIT SUBCUTANEOUS
Qty: 0 | Refills: 0 | DISCHARGE

## 2024-01-25 RX ORDER — INSULIN DEGLUDEC 100 U/ML
20 INJECTION, SOLUTION SUBCUTANEOUS
Qty: 0 | Refills: 0 | DISCHARGE

## 2024-01-25 RX ORDER — FOLIC ACID 0.8 MG
1 TABLET ORAL
Qty: 0 | Refills: 0 | DISCHARGE

## 2024-01-25 RX ORDER — DULOXETINE HYDROCHLORIDE 30 MG/1
1 CAPSULE, DELAYED RELEASE ORAL
Qty: 0 | Refills: 0 | DISCHARGE

## 2024-01-25 RX ORDER — FENOFIBRATE,MICRONIZED 130 MG
1 CAPSULE ORAL
Qty: 0 | Refills: 0 | DISCHARGE

## 2024-01-25 RX ORDER — ASPIRIN/CALCIUM CARB/MAGNESIUM 324 MG
0 TABLET ORAL
Qty: 0 | Refills: 0 | DISCHARGE

## 2024-01-25 RX ORDER — ASCORBIC ACID 60 MG
1 TABLET,CHEWABLE ORAL
Qty: 0 | Refills: 0 | DISCHARGE

## 2024-01-25 RX ORDER — SEMAGLUTIDE 0.68 MG/ML
1 INJECTION, SOLUTION SUBCUTANEOUS
Qty: 0 | Refills: 0 | DISCHARGE

## 2024-01-29 ENCOUNTER — NON-APPOINTMENT (OUTPATIENT)
Age: 79
End: 2024-01-29

## 2024-02-05 ENCOUNTER — OUTPATIENT (OUTPATIENT)
Dept: OUTPATIENT SERVICES | Facility: HOSPITAL | Age: 79
LOS: 1 days | End: 2024-02-05
Payer: MEDICARE

## 2024-02-05 DIAGNOSIS — Z90.89 ACQUIRED ABSENCE OF OTHER ORGANS: Chronic | ICD-10-CM

## 2024-02-05 DIAGNOSIS — Z96.649 PRESENCE OF UNSPECIFIED ARTIFICIAL HIP JOINT: Chronic | ICD-10-CM

## 2024-02-05 DIAGNOSIS — Z98.890 OTHER SPECIFIED POSTPROCEDURAL STATES: Chronic | ICD-10-CM

## 2024-02-05 DIAGNOSIS — G47.33 OBSTRUCTIVE SLEEP APNEA (ADULT) (PEDIATRIC): ICD-10-CM

## 2024-02-05 DIAGNOSIS — Z90.49 ACQUIRED ABSENCE OF OTHER SPECIFIED PARTS OF DIGESTIVE TRACT: Chronic | ICD-10-CM

## 2024-02-05 PROCEDURE — 95800 SLP STDY UNATTENDED: CPT

## 2024-02-05 PROCEDURE — G0400: CPT | Mod: 26

## 2024-02-07 ENCOUNTER — APPOINTMENT (OUTPATIENT)
Dept: PULMONOLOGY | Facility: CLINIC | Age: 79
End: 2024-02-07
Payer: MEDICARE

## 2024-02-07 VITALS
WEIGHT: 286 LBS | OXYGEN SATURATION: 96 % | BODY MASS INDEX: 43.35 KG/M2 | HEART RATE: 66 BPM | SYSTOLIC BLOOD PRESSURE: 130 MMHG | HEIGHT: 68 IN | DIASTOLIC BLOOD PRESSURE: 77 MMHG | TEMPERATURE: 97.1 F

## 2024-02-07 PROCEDURE — 99215 OFFICE O/P EST HI 40 MIN: CPT

## 2024-02-07 RX ORDER — DOXYCYCLINE 100 MG/1
100 CAPSULE ORAL TWICE DAILY
Qty: 14 | Refills: 0 | Status: DISCONTINUED | COMMUNITY
Start: 2024-01-08 | End: 2024-02-07

## 2024-02-07 RX ORDER — IPRATROPIUM BROMIDE AND ALBUTEROL SULFATE 2.5; .5 MG/3ML; MG/3ML
0.5-2.5 (3) SOLUTION RESPIRATORY (INHALATION)
Qty: 1620 | Refills: 3 | Status: ACTIVE | COMMUNITY
Start: 2024-02-07 | End: 1900-01-01

## 2024-02-07 NOTE — REVIEW OF SYSTEMS
[Recent Wt Loss (___ Lbs)] : ~T recent [unfilled] lb weight loss [Clotting Disorder/ Frequent bleeding] : clotting disorder/ frequent bleeding [Diabetes] : diabetes [Obesity] : obesity [Fever] : no fever [Recent Wt Gain (___ Lbs)] : ~T no recent weight gain [Chills] : no chills [Cough] : no cough [Hemoptysis] : no hemoptysis [Sputum] : no sputum [Dyspnea] : no dyspnea [Wheezing] : no wheezing [SOB on Exertion] : no sob on exertion [Chest Discomfort] : no chest discomfort [Palpitations] : no palpitations [GERD] : no gerd [Abdominal Pain] : no abdominal pain [Nausea] : no nausea [Vomiting] : no vomiting [Dysphagia] : no dysphagia [Bleeding] : no bleeding [Blood Transfusion] : no blood transfusion [Thyroid Problem] : no thyroid problem [TextBox_3] : dec2023  covid weight loss

## 2024-02-07 NOTE — PROCEDURE
[FreeTextEntry1] : 2/7/2024   resting sats  95%  hr  72  walk in hallway  slow  room air   sats   93%  hr  112  1/23/2024  pulse oximetry     rest  99 %  hr  72 walk     hallway  pulse ox after  sats 93% hr  90's     PROCEDURE DATE: 11/29/2022  INTERPRETATION: CLINICAL INFORMATION: Patient presents with shortness of breath, chest discomfort  COMPARISON: January 25, 2022.  CONTRAST/COMPLICATIONS: IV Contrast: Omnipaque 350 75 cc administered 25 cc discarded Oral Contrast: NONE Complications: None reported at time of study completion  PROCEDURE: CT Angiography of the Chest. Sagittal and coronal reformats were performed as well as 3D (MIP) reconstructions.  FINDINGS:  LUNGS AND AIRWAYS: Patent central airways. There is mild bibasilar infiltrate and/or subsegmental atelectasis. PLEURA: No pleural effusion. MEDIASTINUM AND MEÑO: No lymphadenopathy. VESSELS: No thoracic aortic aneurysm or dissection. No acute aortic syndrome. No PE where visualized. HEART: Heart size is normal. No pericardial effusion. Coronary artery and mitral calcification present. CHEST WALL AND LOWER NECK: Within normal limits. VISUALIZED UPPER ABDOMEN: Within normal limits. Gallbladder surgically absent. BONES: Osseous structures demonstrate degenerative changes. Stable sclerotic foci within the right fourth and left second rib. No new lytic or blastic process.  IMPRESSION: There is mild bibasilar infiltrate and/or subsegmental atelectasis.  No PE where visualized.  Please refer to detailed findings otherwise described above.  --- End of Report ---      LO CARVALHO MD; Attending Radiologist This document has been electronically signed. Nov 30   pelvis ct  2015 lung bases normal

## 2024-02-07 NOTE — ASSESSMENT
[FreeTextEntry1] : 79y/o  male    1- 2/2024  severe  SAMARA with desaturation  2- obesity   BMI  43.9  3- DM / HTN  4- recent covid  treated  with  asthma allergic  followed x years  allergist  Dr Najera  not controlled 5- RA on humira   last ct 2022 no ILD 6- h/o DVT in past treated  7- vaccination per  primary   Recommendations 1-   sleep hygeine reviewed  new mask for autopap  discussed in detail  willing to try smaller mask  ordered 2- advair bid 3- encouraged to use  his     nebulizer ordered DuoNeb  to add 4- cxr  and f/u  HRCT next visit     return  for compliance sleep MD    and  reviewed  above in detail   walk test

## 2024-02-07 NOTE — HISTORY OF PRESENT ILLNESS
[Never] : never [Obstructive Sleep Apnea] : obstructive sleep apnea [TextBox_4] : last  MM   3/2022     1/23/2024 79y/o male  born in [place]   never smoker   retired   college  degree construction  firm  manager    [place] h/o  RIght DVT   after cruise   was on eliquis  in past -     DM obesity HTN    SAMARA  dx 15 years ago  - could not tolerate  cpap  mask    chart reviewed and  discussed sleep history  -       -2021  severe samara   weight  295  now   286 pounds - wants alternative  therapy  - daily aspirin only now  -n cough  no sob  no  palpitations currently has lost weight   2/7/2024 79y/o  male   never smoker   h/o    right DVT  after cruise  Eliquis   asthma followed by   allergist Dr Reese on  singulair   RA x 20 years   on humira      here for     SAMARA severe  - had covid 1/2024   - on singulair and advair - has neblizer sleep study reviewed today  -severe   SAMARA  with significant   desaturation   states  no  sob using his inhalers advair and albuterol  - has nebulizer not using currently  -last ct    2022 ctpa  no pe  bilateral  atelectasis  -   [TextBox_100] : 2/2024 [TextBox_108] : 60 [TextBox_116] : 68 [TextBox_120] : 258min/502 min  sats  < 88%

## 2024-02-09 ENCOUNTER — APPOINTMENT (OUTPATIENT)
Dept: RADIOLOGY | Facility: HOSPITAL | Age: 79
End: 2024-02-09
Payer: MEDICARE

## 2024-02-09 ENCOUNTER — OUTPATIENT (OUTPATIENT)
Dept: OUTPATIENT SERVICES | Facility: HOSPITAL | Age: 79
LOS: 1 days | End: 2024-02-09
Payer: MEDICARE

## 2024-02-09 ENCOUNTER — RX RENEWAL (OUTPATIENT)
Age: 79
End: 2024-02-09

## 2024-02-09 DIAGNOSIS — Z90.49 ACQUIRED ABSENCE OF OTHER SPECIFIED PARTS OF DIGESTIVE TRACT: Chronic | ICD-10-CM

## 2024-02-09 DIAGNOSIS — Z96.649 PRESENCE OF UNSPECIFIED ARTIFICIAL HIP JOINT: Chronic | ICD-10-CM

## 2024-02-09 DIAGNOSIS — Z90.89 ACQUIRED ABSENCE OF OTHER ORGANS: Chronic | ICD-10-CM

## 2024-02-09 DIAGNOSIS — Z98.890 OTHER SPECIFIED POSTPROCEDURAL STATES: Chronic | ICD-10-CM

## 2024-02-09 DIAGNOSIS — J45.909 UNSPECIFIED ASTHMA, UNCOMPLICATED: ICD-10-CM

## 2024-02-09 PROCEDURE — 71046 X-RAY EXAM CHEST 2 VIEWS: CPT

## 2024-02-09 PROCEDURE — 71046 X-RAY EXAM CHEST 2 VIEWS: CPT | Mod: 26

## 2024-02-12 ENCOUNTER — APPOINTMENT (OUTPATIENT)
Dept: CT IMAGING | Facility: HOSPITAL | Age: 79
End: 2024-02-12
Payer: MEDICARE

## 2024-02-12 ENCOUNTER — NON-APPOINTMENT (OUTPATIENT)
Age: 79
End: 2024-02-12

## 2024-02-12 ENCOUNTER — APPOINTMENT (OUTPATIENT)
Dept: ORTHOPEDIC SURGERY | Facility: CLINIC | Age: 79
End: 2024-02-12
Payer: MEDICARE

## 2024-02-12 ENCOUNTER — OUTPATIENT (OUTPATIENT)
Dept: OUTPATIENT SERVICES | Facility: HOSPITAL | Age: 79
LOS: 1 days | End: 2024-02-12
Payer: MEDICARE

## 2024-02-12 VITALS — WEIGHT: 286 LBS | BODY MASS INDEX: 43.35 KG/M2 | HEIGHT: 68 IN

## 2024-02-12 DIAGNOSIS — Z96.641 PRESENCE OF RIGHT ARTIFICIAL HIP JOINT: ICD-10-CM

## 2024-02-12 DIAGNOSIS — Z98.890 OTHER SPECIFIED POSTPROCEDURAL STATES: Chronic | ICD-10-CM

## 2024-02-12 DIAGNOSIS — Z96.649 PRESENCE OF UNSPECIFIED ARTIFICIAL HIP JOINT: Chronic | ICD-10-CM

## 2024-02-12 DIAGNOSIS — Z90.89 ACQUIRED ABSENCE OF OTHER ORGANS: Chronic | ICD-10-CM

## 2024-02-12 DIAGNOSIS — Z90.49 ACQUIRED ABSENCE OF OTHER SPECIFIED PARTS OF DIGESTIVE TRACT: Chronic | ICD-10-CM

## 2024-02-12 DIAGNOSIS — Z96.642 PRESENCE OF LEFT ARTIFICIAL HIP JOINT: ICD-10-CM

## 2024-02-12 DIAGNOSIS — E04.1 NONTOXIC SINGLE THYROID NODULE: ICD-10-CM

## 2024-02-12 DIAGNOSIS — R93.89 ABNORMAL FINDINGS ON DIAGNOSTIC IMAGING OF OTHER SPECIFIED BODY STRUCTURES: ICD-10-CM

## 2024-02-12 PROCEDURE — 73502 X-RAY EXAM HIP UNI 2-3 VIEWS: CPT

## 2024-02-12 PROCEDURE — 99213 OFFICE O/P EST LOW 20 MIN: CPT

## 2024-02-12 PROCEDURE — 71250 CT THORAX DX C-: CPT | Mod: 26,MH

## 2024-02-12 PROCEDURE — 71250 CT THORAX DX C-: CPT

## 2024-02-12 NOTE — PHYSICAL EXAM
[de-identified] : Constitutional:Well nourished , well developed and in no acute distress Psychiatric: Alert and oriented to time place and person.Appropriate affect  Skin:Head, neck, arms and lower extremities:no lesions or discoloration HEENT: Normocephalic, EOM intact, Nasal septum midline, Respiratory: Unlabored respirations,no audible wheezing ,no tachypnea, no cyanosis Cardiovascular: no leg swelling  no ankle edema no JVD, pulse regular Vascular: no calf or thigh tenderness,  Peripheral pulses; intact Lymphatics:No groin adenopathy,no lymphedema lower  or upper extremities Left hip passive range of motion satisfactory pain-freeneurovascular status left lower extremity intact  [de-identified] :  X-ray AP pelvis left hip reveals satisfactory maintenance of left total hip component alignment with no interval change.  No evidence of fracture.  Satisfactory appearance right total hip replacement

## 2024-02-12 NOTE — DISCUSSION/SUMMARY
[de-identified] : Impression Right and left total hip replacement doing well,  Recommendation follow-up 1 year

## 2024-02-12 NOTE — HISTORY OF PRESENT ILLNESS
[de-identified] : Follow-up left total hip replacement March 17, 2022 patient reports left hip is pain and symptom-free he is walking independently

## 2024-02-16 ENCOUNTER — OUTPATIENT (OUTPATIENT)
Dept: OUTPATIENT SERVICES | Facility: HOSPITAL | Age: 79
LOS: 1 days | Discharge: HOME | End: 2024-02-16

## 2024-02-16 DIAGNOSIS — Z96.649 PRESENCE OF UNSPECIFIED ARTIFICIAL HIP JOINT: Chronic | ICD-10-CM

## 2024-02-16 DIAGNOSIS — Z98.890 OTHER SPECIFIED POSTPROCEDURAL STATES: Chronic | ICD-10-CM

## 2024-02-16 DIAGNOSIS — D89.2 HYPERGAMMAGLOBULINEMIA, UNSPECIFIED: ICD-10-CM

## 2024-02-16 DIAGNOSIS — Z90.89 ACQUIRED ABSENCE OF OTHER ORGANS: Chronic | ICD-10-CM

## 2024-02-16 DIAGNOSIS — Z90.49 ACQUIRED ABSENCE OF OTHER SPECIFIED PARTS OF DIGESTIVE TRACT: Chronic | ICD-10-CM

## 2024-02-20 ENCOUNTER — RESULT REVIEW (OUTPATIENT)
Age: 79
End: 2024-02-20

## 2024-02-20 ENCOUNTER — NON-APPOINTMENT (OUTPATIENT)
Age: 79
End: 2024-02-20

## 2024-02-20 ENCOUNTER — APPOINTMENT (OUTPATIENT)
Dept: HEMATOLOGY ONCOLOGY | Facility: CLINIC | Age: 79
End: 2024-02-20
Payer: MEDICARE

## 2024-02-20 VITALS
HEIGHT: 67.6 IN | BODY MASS INDEX: 42.08 KG/M2 | WEIGHT: 274.48 LBS | OXYGEN SATURATION: 96 % | RESPIRATION RATE: 17 BRPM | SYSTOLIC BLOOD PRESSURE: 162 MMHG | TEMPERATURE: 97.2 F | HEART RATE: 83 BPM | DIASTOLIC BLOOD PRESSURE: 81 MMHG

## 2024-02-20 DIAGNOSIS — D47.2 MONOCLONAL GAMMOPATHY: ICD-10-CM

## 2024-02-20 DIAGNOSIS — Z80.0 FAMILY HISTORY OF MALIGNANT NEOPLASM OF DIGESTIVE ORGANS: ICD-10-CM

## 2024-02-20 LAB
ALBUMIN SERPL ELPH-MCNC: 4.2 G/DL
ALP BLD-CCNC: 57 U/L
ALT SERPL-CCNC: 24 U/L
ANION GAP SERPL CALC-SCNC: 11 MMOL/L
AST SERPL-CCNC: 28 U/L
BASOPHILS # BLD AUTO: 0.02 K/UL — SIGNIFICANT CHANGE UP (ref 0–0.2)
BASOPHILS NFR BLD AUTO: 0.3 % — SIGNIFICANT CHANGE UP (ref 0–2)
BILIRUB SERPL-MCNC: 0.2 MG/DL
BUN SERPL-MCNC: 22 MG/DL
CALCIUM SERPL-MCNC: 9.2 MG/DL
CHLORIDE SERPL-SCNC: 105 MMOL/L
CO2 SERPL-SCNC: 29 MMOL/L
CREAT SERPL-MCNC: 1.32 MG/DL
EGFR: 56 ML/MIN/1.73M2
EOSINOPHIL # BLD AUTO: 0.07 K/UL — SIGNIFICANT CHANGE UP (ref 0–0.5)
EOSINOPHIL NFR BLD AUTO: 1.1 % — SIGNIFICANT CHANGE UP (ref 0–6)
GLUCOSE SERPL-MCNC: 115 MG/DL
HCT VFR BLD CALC: 49.5 % — SIGNIFICANT CHANGE UP (ref 39–50)
HGB BLD-MCNC: 16.4 G/DL — SIGNIFICANT CHANGE UP (ref 13–17)
IMM GRANULOCYTES NFR BLD AUTO: 0.3 % — SIGNIFICANT CHANGE UP (ref 0–0.9)
LYMPHOCYTES # BLD AUTO: 0.96 K/UL — LOW (ref 1–3.3)
LYMPHOCYTES # BLD AUTO: 15.8 % — SIGNIFICANT CHANGE UP (ref 13–44)
MCHC RBC-ENTMCNC: 30.4 PG — SIGNIFICANT CHANGE UP (ref 27–34)
MCHC RBC-ENTMCNC: 33.1 G/DL — SIGNIFICANT CHANGE UP (ref 32–36)
MCV RBC AUTO: 91.7 FL — SIGNIFICANT CHANGE UP (ref 80–100)
MONOCYTES # BLD AUTO: 0.53 K/UL — SIGNIFICANT CHANGE UP (ref 0–0.9)
MONOCYTES NFR BLD AUTO: 8.7 % — SIGNIFICANT CHANGE UP (ref 2–14)
NEUTROPHILS # BLD AUTO: 4.49 K/UL — SIGNIFICANT CHANGE UP (ref 1.8–7.4)
NEUTROPHILS NFR BLD AUTO: 73.8 % — SIGNIFICANT CHANGE UP (ref 43–77)
NRBC # BLD: 0 /100 WBCS — SIGNIFICANT CHANGE UP (ref 0–0)
PLATELET # BLD AUTO: 152 K/UL — SIGNIFICANT CHANGE UP (ref 150–400)
POTASSIUM SERPL-SCNC: 4.7 MMOL/L
PROT SERPL-MCNC: 6.6 G/DL
RBC # BLD: 5.4 M/UL — SIGNIFICANT CHANGE UP (ref 4.2–5.8)
RBC # FLD: 14.6 % — HIGH (ref 10.3–14.5)
SODIUM SERPL-SCNC: 145 MMOL/L
WBC # BLD: 6.09 K/UL — SIGNIFICANT CHANGE UP (ref 3.8–10.5)
WBC # FLD AUTO: 6.09 K/UL — SIGNIFICANT CHANGE UP (ref 3.8–10.5)

## 2024-02-20 PROCEDURE — 99204 OFFICE O/P NEW MOD 45 MIN: CPT

## 2024-02-20 RX ORDER — FLUTICASONE PROPIONATE 50 UG/1
50 SPRAY, METERED NASAL TWICE DAILY
Qty: 1 | Refills: 0 | Status: DISCONTINUED | COMMUNITY
Start: 2024-01-08 | End: 2024-02-20

## 2024-02-20 RX ORDER — GUAIFENESIN SYRUP AND DEXTROMETHORPHAN 100; 10 MG/5ML; MG/5ML
100-10 SYRUP ORAL
Qty: 1 | Refills: 0 | Status: DISCONTINUED | COMMUNITY
Start: 2024-01-08 | End: 2024-02-20

## 2024-02-21 NOTE — DIETITIAN INITIAL EVALUATION ADULT. - ORAL INTAKE PTA/DIET HISTORY
Pt reports generally good appetite. States that he has been gradually losing weight over the past 3 months via portion control (~1lb/wk). Reports allergy to mangoes. Fall with Harm Risk

## 2024-02-21 NOTE — COUNSELING
Abdomen , soft, nontender, nondistended , no guarding or rigidity , no masses palpable , normal bowel sounds , Liver and Spleen , no hepatomegaly present , no hepatosplenomegaly , liver nontender , spleen not palpable [Potential consequences of obesity discussed] : Potential consequences of obesity discussed [Encouraged to increase physical activity] : Encouraged to increase physical activity

## 2024-02-25 LAB
ALBUMIN MFR SERPL ELPH: 58.2 %
ALBUMIN SERPL-MCNC: 3.8 G/DL
ALBUMIN/GLOB SERPL: 1.4 RATIO
ALBUPE: 72.9 %
ALPHA1 GLOB MFR SERPL ELPH: 3.9 %
ALPHA1 GLOB SERPL ELPH-MCNC: 0.3 G/DL
ALPHA1UPE: 6.7 %
ALPHA2 GLOB MFR SERPL ELPH: 8.7 %
ALPHA2 GLOB SERPL ELPH-MCNC: 0.6 G/DL
ALPHA2UPE: 4.2 %
B-GLOBULIN MFR SERPL ELPH: 12.2 %
B-GLOBULIN SERPL ELPH-MCNC: 0.8 G/DL
BETAUPE: 7.4 %
DEPRECATED KAPPA LC FREE/LAMBDA SER: 1.84 RATIO
GAMMA GLOB FLD ELPH-MCNC: 1.1 G/DL
GAMMA GLOB MFR SERPL ELPH: 17 %
GAMMAUPE: 8.8 %
IGA 24H UR QL IFE: NORMAL
IGA SER QL IEP: 172 MG/DL
IGG SER QL IEP: 1317 MG/DL
IGM SER QL IEP: 194 MG/DL
INTERPRETATION SERPL IEP-IMP: NORMAL
KAPPA LC 24H UR QL: PRESENT
KAPPA LC CSF-MCNC: 3.96 MG/DL
KAPPA LC SERPL-MCNC: 7.27 MG/DL
M PROTEIN MFR SERPL ELPH: 3.3 %
M PROTEIN SPEC IFE-MCNC: NORMAL
M SPIKE RU: 1.4 %
MONOCLON BAND OBS SERPL: 0.2 G/DL
PROT PATTERN 24H UR ELPH-IMP: NORMAL
PROT SERPL-MCNC: 6.6 G/DL
PROT SERPL-MCNC: 6.6 G/DL
PROT UR-MCNC: 142 MG/DL
PROT UR-MCNC: 142 MG/DL

## 2024-02-26 ENCOUNTER — APPOINTMENT (OUTPATIENT)
Dept: OTOLARYNGOLOGY | Facility: CLINIC | Age: 79
End: 2024-02-26
Payer: MEDICARE

## 2024-02-26 VITALS
OXYGEN SATURATION: 94 % | DIASTOLIC BLOOD PRESSURE: 80 MMHG | WEIGHT: 275 LBS | BODY MASS INDEX: 41.68 KG/M2 | TEMPERATURE: 96.8 F | HEART RATE: 80 BPM | HEIGHT: 68 IN | SYSTOLIC BLOOD PRESSURE: 140 MMHG

## 2024-02-26 DIAGNOSIS — I15.2 TYPE 2 DIABETES MELLITUS WITH OTHER SPECIFIED COMPLICATION: ICD-10-CM

## 2024-02-26 DIAGNOSIS — N18.30 CHRONIC KIDNEY DISEASE, STAGE 3 UNSPECIFIED: ICD-10-CM

## 2024-02-26 DIAGNOSIS — E11.69 TYPE 2 DIABETES MELLITUS WITH OTHER SPECIFIED COMPLICATION: ICD-10-CM

## 2024-02-26 DIAGNOSIS — E78.00 PURE HYPERCHOLESTEROLEMIA, UNSPECIFIED: ICD-10-CM

## 2024-02-26 DIAGNOSIS — Z87.09 PERSONAL HISTORY OF OTHER DISEASES OF THE RESPIRATORY SYSTEM: ICD-10-CM

## 2024-02-26 DIAGNOSIS — R09.82 POSTNASAL DRIP: ICD-10-CM

## 2024-02-26 DIAGNOSIS — I82.401 ACUTE EMBOLISM AND THROMBOSIS OF UNSPECIFIED DEEP VEINS OF RIGHT LOWER EXTREMITY: ICD-10-CM

## 2024-02-26 DIAGNOSIS — J00 ACUTE NASOPHARYNGITIS [COMMON COLD]: ICD-10-CM

## 2024-02-26 DIAGNOSIS — I10 ESSENTIAL (PRIMARY) HYPERTENSION: ICD-10-CM

## 2024-02-26 DIAGNOSIS — R94.31 ABNORMAL ELECTROCARDIOGRAM [ECG] [EKG]: ICD-10-CM

## 2024-02-26 DIAGNOSIS — E11.59 TYPE 2 DIABETES MELLITUS WITH OTHER SPECIFIED COMPLICATION: ICD-10-CM

## 2024-02-26 DIAGNOSIS — G47.00 INSOMNIA, UNSPECIFIED: ICD-10-CM

## 2024-02-26 DIAGNOSIS — E66.9 TYPE 2 DIABETES MELLITUS WITH OTHER SPECIFIED COMPLICATION: ICD-10-CM

## 2024-02-26 DIAGNOSIS — Z80.9 FAMILY HISTORY OF MALIGNANT NEOPLASM, UNSPECIFIED: ICD-10-CM

## 2024-02-26 PROCEDURE — 99204 OFFICE O/P NEW MOD 45 MIN: CPT | Mod: 25,57

## 2024-02-26 PROCEDURE — 31575 DIAGNOSTIC LARYNGOSCOPY: CPT

## 2024-02-26 RX ORDER — VITAMIN B COMPLEX
TABLET ORAL
Refills: 0 | Status: ACTIVE | COMMUNITY

## 2024-02-26 RX ORDER — ASCORBIC ACID 500 MG
TABLET ORAL
Refills: 0 | Status: ACTIVE | COMMUNITY

## 2024-02-26 RX ORDER — PSYLLIUM HUSK 0.4 G
CAPSULE ORAL
Refills: 0 | Status: ACTIVE | COMMUNITY

## 2024-02-26 RX ORDER — PNV NO.95/FERROUS FUM/FOLIC AC 28MG-0.8MG
TABLET ORAL
Refills: 0 | Status: ACTIVE | COMMUNITY

## 2024-02-26 NOTE — PHYSICAL EXAM
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

## 2024-02-26 NOTE — CONSULT LETTER
[Dear  ___] : Dear  [unfilled], [Consult Letter:] : I had the pleasure of evaluating your patient, [unfilled]. [Please see my note below.] : Please see my note below. [Consult Closing:] : Thank you very much for allowing me to participate in the care of this patient.  If you have any questions, please do not hesitate to contact me. [Sincerely,] : Sincerely, [FreeTextEntry3] : Joni Andrade MD, ANNETTA, FACS  Department Otolaryngology Director of [facility] Professor of Otolaryngology,  [employer] [facility] [FreeTextEntry2] : Antonette Diaz

## 2024-02-26 NOTE — REASON FOR VISIT
[Initial Consultation] : an initial consultation for [FreeTextEntry2] : referred by Dr. Antonette Diaz, pulmonologist, for obstructive sleep apnea

## 2024-02-26 NOTE — HISTORY OF PRESENT ILLNESS
[de-identified] : 78 year old male referred by Dr. Antonette Diaz, pulmonologist, for obstructive sleep apnea.  Patient learned of [facility] via media.  Patient had a sleep study conducted on 2/5/24 showing an AHI of 60.1 with central and mixed apneas less than 25% of the total events.  Patient has a BMI of 41.81.  Patient has tried a CPAP machine without relief.  Patient complains of removing mask during sleep.  Patient has tried various types of masks without relief.  Patient states mask causes difficulty breathing, dry mouth. Patient states has tried losing weight, approximately 25 lbs in the past with no relief from sleep apnea.  Patient comorbidities include weight gain, hypertension, diabetes, daytime fatigue. [Obstructive Sleep Apnea] : Obstructive Sleep Apnea [Snoring] : snoring [Witnessed Apneas] : no witnessed sleep apnea [Frequent Nocturnal Awakening] : frequent nocturnal awakening [Daytime Somnolence] : daytime somnolence [Unintentional Sleep while Active] : no unintentional sleep while active [Awakes Unrefreshed] : awakening unrefreshed [Unintentional Sleep While Inactive] : unintentional sleep while inactive [Awakes with Headache] : no headache upon awakening [Awakening With Dry Mouth] : awakening with dry mouth [Recent  Weight Gain] : recent weight gain [Date: ___] : the most recent diagnostic polysomnogram was completed [unfilled] [AHI: ___ per hour] : the apnea-hypopnea index was [unfilled] per hour [CPAP] : CPAP

## 2024-02-26 NOTE — PROCEDURE
[Image(s) Captured] : image(s) captured and filed [Video Captured] : video captured and filed [Topical Lidocaine] : topical lidocaine [Oxymetazoline HCl] : oxymetazoline HCl [Flexible Endoscope] : examined with the flexible endoscope [Serial Number: ___] : Serial Number: [unfilled] [Unable to Cooperate with Mirror] : patient unable to cooperate with mirror [Complicated Symptoms] : complicated symptoms requiring more thorough examination than provided by mirror [Velopharynx ___ %] : the velopharynx was [unfilled]U% collapsed [Normal] : posterior cricoid area had healthy pink mucosa in the interarytenoid area and the esophageal inlet [de-identified] : Patient was placed in the examination chair in a sitting position. The nose was decongested with oxymetazoline nasal solution. The airway was anesthetized with 4% Xylocaine.  The back of the throat was anesthetized with Cetacaine. Direct flexible/rigid video endoscopy was performed. Findings revealed:  Pt has a positive Mesa maneuver, larynx epiglottis and vocal cords normal. [FreeTextEntry3] : positve Mesa maneuver

## 2024-03-01 NOTE — ED ADULT NURSE NOTE - NSFALLRSKHARMRISK_ED_ALL_ED
Interval History: Still having persistent severe swelling. He is worried that the swelling will increase to the point that he will not be able to urinate. Currently, he has no problems getting urine out.     Review of Systems   Constitutional:  Negative for chills and fever.   Cardiovascular:  Positive for leg swelling.   Skin:  Positive for color change. Negative for wound.   Neurological:  Negative for seizures and syncope.     Objective:     Vital Signs (Most Recent):  Temp: 98 °F (36.7 °C) (03/01/24 1105)  Pulse: 86 (03/01/24 1105)  Resp: 18 (03/01/24 1105)  BP: (!) 142/77 (03/01/24 1105)  SpO2: 96 % (03/01/24 1105) Vital Signs (24h Range):  Temp:  [98 °F (36.7 °C)-99 °F (37.2 °C)] 98 °F (36.7 °C)  Pulse:  [77-86] 86  Resp:  [18] 18  SpO2:  [96 %-98 %] 96 %  BP: (122-162)/(67-84) 142/77     Weight: 83.5 kg (184 lb 1.4 oz)  Body mass index is 28.83 kg/m².    Intake/Output Summary (Last 24 hours) at 3/1/2024 1252  Last data filed at 2/29/2024 1814  Gross per 24 hour   Intake 165.11 ml   Output --   Net 165.11 ml           Physical Exam  Vitals and nursing note reviewed.   Constitutional:       General: He is not in acute distress.     Appearance: He is well-developed. He is not toxic-appearing or diaphoretic.   Pulmonary:      Effort: Pulmonary effort is normal. No respiratory distress.   Genitourinary:     Penis: Swelling present. No discharge.       Testes:         Right: Swelling present.         Left: Swelling present.   Musculoskeletal:         General: No deformity or signs of injury.      Right lower leg: Edema present.      Left lower leg: No edema.   Skin:     Findings: Erythema present.   Neurological:      Mental Status: He is alert and oriented to person, place, and time. Mental status is at baseline.      Motor: No seizure activity.   Psychiatric:         Attention and Perception: Attention normal.         Mood and Affect: Mood and affect normal.         Behavior: Behavior is cooperative.              Significant Labs: All pertinent labs within the past 24 hours have been reviewed.    Significant Imaging: I have reviewed all pertinent imaging results/findings within the past 24 hours.   no

## 2024-03-06 ENCOUNTER — NON-APPOINTMENT (OUTPATIENT)
Age: 79
End: 2024-03-06

## 2024-03-10 PROBLEM — Z80.0 FAMILY HISTORY OF MALIGNANT NEOPLASM OF STOMACH: Status: ACTIVE | Noted: 2024-03-10

## 2024-03-10 RX ORDER — INSULIN DEGLUDEC INJECTION 200 U/ML
200 INJECTION, SOLUTION SUBCUTANEOUS
Qty: 36 | Refills: 3 | Status: DISCONTINUED | COMMUNITY
Start: 2018-07-23 | End: 2024-03-10

## 2024-03-10 RX ORDER — INSULIN DEGLUDEC INJECTION 200 U/ML
200 INJECTION, SOLUTION SUBCUTANEOUS
Refills: 0 | Status: ACTIVE | COMMUNITY

## 2024-03-10 NOTE — REVIEW OF SYSTEMS
[Recent Change In Weight] : ~T recent weight change [Diarrhea: Grade 0] : Diarrhea: Grade 0 [Negative] : Heme/Lymph
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
## 2024-03-10 NOTE — ASSESSMENT
[FreeTextEntry1] : 78-year-old man with history of RA on Humira, prior RLE DVT X 2 off Eliquis, asthma, hypertension, diabetes, hypothyroidism, SAMARA on CPAP, prostate cancer s/p brachytherapy (no hormonal treatment) here for evaluation of paraproteinemia.    Around 11/2023, patient developed visual changes, including flashes of light.  He underwent a full ophthalmology/neuro-ophthalmology work-up.  His symptoms were thought to possibly be optic neuritis and then resolved. As part of the work-up, a plasma cell dyscrasia screen was sent. Recent serum protein electrophoresis on 12/18/2023 showed weak gamma migrating paraprotein, M spike unable to quantitate. Serum immunofixation showed weak IgG Kappa Band.  Recent metabolic profile in 12/2023 showed creatinine 1.30, total protein 7.1 and calcium 9.4.  Recent CBC on 12/13/2023 showed WBC 4.13, HGB 16.4, HCT 49.8, MCV 96.9 and ,000.   Plan: Will check CBC, CMP, serum protein electrophoresis, serum immunofixation, quantitative serum free light chains, urine protein electrophoresis, urine immunofixation and Bence Polo.  Patient advised to call in 7-10 days to discuss laboratory results.

## 2024-03-10 NOTE — PHYSICAL EXAM
[Normal] : full range of motion and no deformities appreciated [de-identified] : No cervical, axillary or inguinal LAD noted

## 2024-03-20 ENCOUNTER — APPOINTMENT (OUTPATIENT)
Dept: PULMONOLOGY | Facility: CLINIC | Age: 79
End: 2024-03-20
Payer: MEDICARE

## 2024-03-20 VITALS
OXYGEN SATURATION: 95 % | HEART RATE: 85 BPM | DIASTOLIC BLOOD PRESSURE: 77 MMHG | SYSTOLIC BLOOD PRESSURE: 130 MMHG | TEMPERATURE: 97.4 F

## 2024-03-20 DIAGNOSIS — R93.89 ABNORMAL FINDINGS ON DIAGNOSTIC IMAGING OF OTHER SPECIFIED BODY STRUCTURES: ICD-10-CM

## 2024-03-20 DIAGNOSIS — J45.909 UNSPECIFIED ASTHMA, UNCOMPLICATED: ICD-10-CM

## 2024-03-20 DIAGNOSIS — Z87.09 PERSONAL HISTORY OF OTHER DISEASES OF THE RESPIRATORY SYSTEM: ICD-10-CM

## 2024-03-20 DIAGNOSIS — G47.33 OBSTRUCTIVE SLEEP APNEA (ADULT) (PEDIATRIC): ICD-10-CM

## 2024-03-20 PROCEDURE — 99213 OFFICE O/P EST LOW 20 MIN: CPT

## 2024-03-20 PROCEDURE — G2211 COMPLEX E/M VISIT ADD ON: CPT

## 2024-03-20 NOTE — REVIEW OF SYSTEMS
[Recent Wt Loss (___ Lbs)] : ~T recent [unfilled] lb weight loss [Clotting Disorder/ Frequent bleeding] : clotting disorder/ frequent bleeding [Diabetes] : diabetes [Obesity] : obesity [Fever] : no fever [Chills] : no chills [Recent Wt Gain (___ Lbs)] : ~T no recent weight gain [Cough] : no cough [Hemoptysis] : no hemoptysis [Sputum] : no sputum [Dyspnea] : no dyspnea [SOB on Exertion] : no sob on exertion [Wheezing] : no wheezing [Chest Discomfort] : no chest discomfort [Palpitations] : no palpitations [GERD] : no gerd [Abdominal Pain] : no abdominal pain [Vomiting] : no vomiting [Nausea] : no nausea [Bleeding] : no bleeding [Dysphagia] : no dysphagia [Blood Transfusion] : no blood transfusion [Thyroid Problem] : no thyroid problem [TextBox_3] : dec2023  covid weight loss

## 2024-03-20 NOTE — PHYSICAL EXAM
[IV] : Mallampati Class: IV [Enlarged Base of the Tongue] : enlarged base of the tongue [Supple] : supple [Normal Appearance] : normal appearance [No Neck Mass] : no neck mass [No JVD] : no jvd [Normal Rate/Rhythm] : normal rate/rhythm [Normal S1, S2] : normal s1, s2 [No Murmurs] : no murmurs [No Acc Muscle Use] : no acc muscle use [No Resp Distress] : no resp distress [Kyphosis] : kyphosis [Clear to Auscultation Bilaterally] : clear to auscultation bilaterally [Benign] : benign [Not Tender] : not tender [Normal Gait] : normal gait [No Edema] : no edema [No Clubbing] : no clubbing [No Rash] : no rash [Normal Affect] : normal affect [No Focal Deficits] : no focal deficits [TextBox_2] : pleasant  male no distress no cough    [TextBox_11] : no lesion moist

## 2024-03-20 NOTE — HISTORY OF PRESENT ILLNESS
[Never] : never [Obstructive Sleep Apnea] : obstructive sleep apnea [TextBox_4] : last  MM   3/2022     1/23/2024 79y/o male  born in [place]   never smoker   retired   college  degree construction  firm  manager    [place] h/o  RIght DVT   after cruise   was on eliquis  in past -     DM obesity HTN    SAMARA  dx 15 years ago  - could not tolerate  cpap  mask    chart reviewed and  discussed sleep history  -       -2021  severe samara   weight  295  now   286 pounds - wants alternative  therapy  - daily aspirin only now  -n cough  no sob  no  palpitations currently has lost weight   2/7/2024 79y/o  male   never smoker   h/o    right DVT  after cruise  Eliquis   asthma followed by   allergist Dr Reese on  singulair   RA x 20 years   on humira      here for     SAMARA severe  - had covid 1/2024   - on singulair and advair - has neblizer sleep study reviewed today  -severe   SAMARA  with significant   desaturation   states  no  sob using his inhalers advair and albuterol  - has nebulizer not using currently  -last ct    2022 ctpa  no pe  bilateral  atelectasis  - 3/20/2024 79y/o male never smoker  right DVT on elqiuis   asthma followed by  allergist   RA x 20 years  on humira   SAMARA severe - currently  not inspire candidate due to  weight - cpap mask changed to   large now with large leak  will try medium  -no chest pain  no sob  -has sleep md follow up  -     [TextBox_100] : 2/2024 [TextBox_108] : 60 [TextBox_116] : 68 [TextBox_120] : 258min/502 min  sats  < 88%

## 2024-03-20 NOTE — ASSESSMENT
[FreeTextEntry1] : 79y/o  male    1- 2/2024  severe  SAMARA with desaturation  2- obesity   BMI  43.9  3- DM / HTN  4- recent covid  treated  with  asthma allergic  followed x years  allergist  Dr Najera  not controlled 5- RA on humira   last ct 2/2024    no ILD  6- h/o DVT in past treated  7- vaccination per  primary   Recommendations 1-   sleep hygeine reviewed  new mask for autopap  discussed in detail   - ordered new mask  today  2- advair bid 3- encouraged to use  his     nebulizer ordered DuoNeb    return  for compliance sleep MD    and  reviewed  above in detail   walk test  -counseling and imaging reviewed

## 2024-03-27 NOTE — ED PROVIDER NOTE - NS_BEDUNITTYPES_ED_ALL_ED
Problem: Adult Inpatient Plan of Care  Goal: Plan of Care Review  Outcome: Ongoing, Progressing  Flowsheets (Taken 3/27/2024 0115)  Plan of Care Reviewed With: patient  Goal: Patient-Specific Goal (Individualized)  Outcome: Ongoing, Progressing  Flowsheets (Taken 3/27/2024 0115)  Anxieties, Fears or Concerns: none  Individualized Care Needs: turn q 2, monitor wound vac,  Goal: Absence of Hospital-Acquired Illness or Injury  Outcome: Ongoing, Progressing  Intervention: Identify and Manage Fall Risk  Flowsheets (Taken 3/27/2024 0115)  Safety Promotion/Fall Prevention:   assistive device/personal item within reach   bed alarm set   nonskid shoes/socks when out of bed   side rails raised x 3  Intervention: Prevent Skin Injury  Flowsheets (Taken 3/27/2024 0115)  Body Position: position changed independently  Skin Protection: adhesive use limited  Intervention: Prevent and Manage VTE (Venous Thromboembolism) Risk  Flowsheets (Taken 3/27/2024 0115)  Activity Management: Rolling - L1  VTE Prevention/Management: fluids promoted  Range of Motion: ROM (range of motion) performed  Intervention: Prevent Infection  Flowsheets (Taken 3/27/2024 0115)  Infection Prevention:   cohorting utilized   personal protective equipment utilized   rest/sleep promoted  Goal: Optimal Comfort and Wellbeing  Outcome: Ongoing, Progressing  Intervention: Monitor Pain and Promote Comfort  Flowsheets (Taken 3/27/2024 0115)  Pain Management Interventions:   care clustered   position adjusted   quiet environment facilitated   relaxation techniques promoted  Intervention: Provide Person-Centered Care  Flowsheets (Taken 3/27/2024 0115)  Trust Relationship/Rapport:   care explained   choices provided   emotional support provided   empathic listening provided   questions answered   questions encouraged   reassurance provided   thoughts/feelings acknowledged  Goal: Readiness for Transition of Care  Outcome: Ongoing, Progressing  Intervention: Mutually  Develop Transition Plan  Flowsheets (Taken 3/27/2024 0115)  Equipment Currently Used at Home:   hospital bed   lift device   power chair  Transportation Anticipated: family or friend will provide  Who are your caregiver(s) and their phone number(s)?: Judy(mother) [phone]  Communicated SADE with patient/caregiver: Yes  Do you expect to return to your current living situation?: Yes  Do you have help at home or someone to help you manage your care at home?: Yes  Readmission within 30 days?: No  Do you currently have service(s) that help you manage your care at home?: No     Problem: Diabetes Comorbidity  Goal: Blood Glucose Level Within Targeted Range  Outcome: Ongoing, Progressing  Intervention: Monitor and Manage Glycemia  Flowsheets (Taken 3/27/2024 0115)  Glycemic Management:   blood glucose monitored   supplemental insulin given     Problem: Skin Injury Risk Increased  Goal: Skin Health and Integrity  Outcome: Ongoing, Progressing  Intervention: Optimize Skin Protection  Flowsheets (Taken 3/27/2024 0115)  Pressure Reduction Techniques: positioned off wounds  Pressure Reduction Devices:   positioning supports utilized   heel offloading device utilized  Skin Protection: adhesive use limited  Head of Bed (HOB) Positioning: HOB at 30 degrees  Intervention: Promote and Optimize Oral Intake  Flowsheets (Taken 3/27/2024 0115)  Oral Nutrition Promotion: calorie-dense foods provided     Problem: Impaired Wound Healing  Goal: Optimal Wound Healing  Outcome: Ongoing, Progressing  Intervention: Promote Wound Healing  Flowsheets (Taken 3/27/2024 0115)  Oral Nutrition Promotion: calorie-dense foods provided  Sleep/Rest Enhancement:   awakenings minimized   natural light exposure provided   relaxation techniques promoted  Activity Management: Rolling - L1  Pain Management Interventions:   care clustered   position adjusted   quiet environment facilitated   relaxation techniques promoted     Problem: Fall Injury Risk  Goal:  Absence of Fall and Fall-Related Injury  Outcome: Ongoing, Progressing  Intervention: Identify and Manage Contributors  Flowsheets (Taken 3/27/2024 0115)  Self-Care Promotion: safe use of adaptive equipment encouraged  Medication Review/Management: medications reviewed  Intervention: Promote Injury-Free Environment  Flowsheets (Taken 3/27/2024 0115)  Safety Promotion/Fall Prevention:   assistive device/personal item within reach   bed alarm set   nonskid shoes/socks when out of bed   side rails raised x 3     Problem: Infection  Goal: Absence of Infection Signs and Symptoms  Outcome: Ongoing, Progressing  Intervention: Prevent or Manage Infection  Flowsheets (Taken 3/27/2024 0115)  Fever Reduction/Comfort Measures:   lightweight bedding   lightweight clothing  Infection Management: aseptic technique maintained  Isolation Precautions: precautions maintained      MED/SURG

## 2024-04-02 LAB
ALBUMIN SERPL ELPH-MCNC: 4.1 G/DL
ALP BLD-CCNC: 62 U/L
ALT SERPL-CCNC: 22 U/L
ANION GAP SERPL CALC-SCNC: 12 MMOL/L
AST SERPL-CCNC: 26 U/L
BASOPHILS # BLD AUTO: 0.02 K/UL
BASOPHILS NFR BLD AUTO: 0.3 %
BILIRUB SERPL-MCNC: 0.3 MG/DL
BUN SERPL-MCNC: 16 MG/DL
CALCIUM SERPL-MCNC: 9.2 MG/DL
CHLORIDE SERPL-SCNC: 105 MMOL/L
CO2 SERPL-SCNC: 30 MMOL/L
CREAT SERPL-MCNC: 1.47 MG/DL
CRP SERPL-MCNC: 21 MG/L
EGFR: 49 ML/MIN/1.73M2
EOSINOPHIL # BLD AUTO: 0.12 K/UL
EOSINOPHIL NFR BLD AUTO: 2 %
ERYTHROCYTE [SEDIMENTATION RATE] IN BLOOD BY WESTERGREN METHOD: 24 MM/HR
GLUCOSE SERPL-MCNC: 161 MG/DL
HBV CORE IGG+IGM SER QL: NONREACTIVE
HBV SURFACE AB SER QL: NONREACTIVE
HBV SURFACE AG SER QL: NONREACTIVE
HCT VFR BLD CALC: 46.9 %
HCV AB SER QL: NONREACTIVE
HCV S/CO RATIO: 0.1 S/CO
HGB BLD-MCNC: 15.2 G/DL
IMM GRANULOCYTES NFR BLD AUTO: 0.3 %
LYMPHOCYTES # BLD AUTO: 1.09 K/UL
LYMPHOCYTES NFR BLD AUTO: 18.3 %
MAN DIFF?: NORMAL
MCHC RBC-ENTMCNC: 29.8 PG
MCHC RBC-ENTMCNC: 32.4 GM/DL
MCV RBC AUTO: 92 FL
MONOCYTES # BLD AUTO: 0.52 K/UL
MONOCYTES NFR BLD AUTO: 8.7 %
NEUTROPHILS # BLD AUTO: 4.18 K/UL
NEUTROPHILS NFR BLD AUTO: 70.4 %
PLATELET # BLD AUTO: 151 K/UL
POTASSIUM SERPL-SCNC: 4.7 MMOL/L
PROT SERPL-MCNC: 6.6 G/DL
RBC # BLD: 5.1 M/UL
RBC # FLD: 15.1 %
SODIUM SERPL-SCNC: 146 MMOL/L
WBC # FLD AUTO: 5.95 K/UL

## 2024-04-04 LAB
M TB IFN-G BLD-IMP: NEGATIVE
QUANTIFERON TB PLUS MITOGEN MINUS NIL: >10 IU/ML
QUANTIFERON TB PLUS NIL: 0.02 IU/ML
QUANTIFERON TB PLUS TB1 MINUS NIL: 0 IU/ML
QUANTIFERON TB PLUS TB2 MINUS NIL: 0.01 IU/ML

## 2024-04-09 ENCOUNTER — APPOINTMENT (OUTPATIENT)
Dept: RHEUMATOLOGY | Facility: CLINIC | Age: 79
End: 2024-04-09
Payer: MEDICARE

## 2024-04-09 VITALS
SYSTOLIC BLOOD PRESSURE: 142 MMHG | DIASTOLIC BLOOD PRESSURE: 88 MMHG | TEMPERATURE: 97.3 F | WEIGHT: 277 LBS | HEART RATE: 76 BPM | OXYGEN SATURATION: 96 % | HEIGHT: 68 IN | RESPIRATION RATE: 16 BRPM | BODY MASS INDEX: 41.98 KG/M2

## 2024-04-09 DIAGNOSIS — G89.29 LOW BACK PAIN, UNSPECIFIED: ICD-10-CM

## 2024-04-09 DIAGNOSIS — D84.9 IMMUNODEFICIENCY, UNSPECIFIED: ICD-10-CM

## 2024-04-09 DIAGNOSIS — M54.50 LOW BACK PAIN, UNSPECIFIED: ICD-10-CM

## 2024-04-09 PROCEDURE — 99214 OFFICE O/P EST MOD 30 MIN: CPT

## 2024-04-09 PROCEDURE — G2211 COMPLEX E/M VISIT ADD ON: CPT

## 2024-04-10 ENCOUNTER — APPOINTMENT (OUTPATIENT)
Dept: FAMILY MEDICINE | Facility: CLINIC | Age: 79
End: 2024-04-10
Payer: MEDICARE

## 2024-04-10 VITALS
SYSTOLIC BLOOD PRESSURE: 140 MMHG | DIASTOLIC BLOOD PRESSURE: 78 MMHG | WEIGHT: 279 LBS | HEIGHT: 68 IN | HEART RATE: 88 BPM | OXYGEN SATURATION: 96 % | BODY MASS INDEX: 42.28 KG/M2 | TEMPERATURE: 97.7 F | RESPIRATION RATE: 16 BRPM

## 2024-04-10 DIAGNOSIS — E11.9 TYPE 2 DIABETES MELLITUS W/OUT COMPLICATIONS: ICD-10-CM

## 2024-04-10 DIAGNOSIS — M48.062 SPINAL STENOSIS, LUMBAR REGION WITH NEUROGENIC CLAUDICATION: ICD-10-CM

## 2024-04-10 DIAGNOSIS — M06.9 RHEUMATOID ARTHRITIS, UNSPECIFIED: ICD-10-CM

## 2024-04-10 DIAGNOSIS — E66.9 OBESITY, UNSPECIFIED: ICD-10-CM

## 2024-04-10 DIAGNOSIS — E11.40 TYPE 2 DIABETES MELLITUS WITH DIABETIC NEUROPATHY, UNSPECIFIED: ICD-10-CM

## 2024-04-10 DIAGNOSIS — I10 ESSENTIAL (PRIMARY) HYPERTENSION: ICD-10-CM

## 2024-04-10 DIAGNOSIS — E03.9 HYPOTHYROIDISM, UNSPECIFIED: ICD-10-CM

## 2024-04-10 DIAGNOSIS — E78.5 HYPERLIPIDEMIA, UNSPECIFIED: ICD-10-CM

## 2024-04-10 PROBLEM — M54.50 CHRONIC BILATERAL LOW BACK PAIN WITHOUT SCIATICA: Status: ACTIVE | Noted: 2022-04-08

## 2024-04-10 PROCEDURE — G2211 COMPLEX E/M VISIT ADD ON: CPT

## 2024-04-10 PROCEDURE — 99214 OFFICE O/P EST MOD 30 MIN: CPT

## 2024-04-10 NOTE — REVIEW OF SYSTEMS
[As Noted in HPI] : as noted in HPI [Arthralgias] : arthralgias [Joint Pain] : joint pain [Difficulty Walking] : difficulty walking [Negative] : Heme/Lymph [Joint Swelling] : no joint swelling [Joint Stiffness] : no joint stiffness

## 2024-04-10 NOTE — PHYSICAL EXAM
[General Appearance - Alert] : alert [General Appearance - In No Acute Distress] : in no acute distress [General Appearance - Well Nourished] : well nourished [Sclera] : the sclera and conjunctiva were normal [PERRL With Normal Accommodation] : pupils were equal in size, round, and reactive to light [Extraocular Movements] : extraocular movements were intact [Oropharynx] : the oropharynx was normal [Neck Appearance] : the appearance of the neck was normal [Auscultation Breath Sounds / Voice Sounds] : lungs were clear to auscultation bilaterally [Heart Rate And Rhythm] : heart rate was normal and rhythm regular [Murmurs] : no murmurs [Heart Sounds] : normal S1 and S2 [No CVA Tenderness] : no ~M costovertebral angle tenderness [No Spinal Tenderness] : no spinal tenderness [Nail Clubbing] : no clubbing  or cyanosis of the fingernails [Musculoskeletal - Swelling] : no joint swelling seen [Motor Tone] : muscle strength and tone were normal [] : no rash [No Focal Deficits] : no focal deficits [Oriented To Time, Place, And Person] : oriented to person, place, and time [Impaired Insight] : insight and judgment were intact [Affect] : the affect was normal [FreeTextEntry1] : No synovitis, contractures, ROM intact diffusely, slightly antalgic gait

## 2024-04-10 NOTE — PHYSICAL EXAM
[No Acute Distress] : no acute distress [Well Nourished] : well nourished [Well Developed] : well developed [Well-Appearing] : well-appearing [Normal Voice/Communication] : normal voice/communication [Normal Sclera/Conjunctiva] : normal sclera/conjunctiva [PERRL] : pupils equal round and reactive to light [EOMI] : extraocular movements intact [Normal Outer Ear/Nose] : the outer ears and nose were normal in appearance [Normal Oropharynx] : the oropharynx was normal [Normal TMs] : both tympanic membranes were normal [No JVD] : no jugular venous distention [No Lymphadenopathy] : no lymphadenopathy [Supple] : supple [Thyroid Normal, No Nodules] : the thyroid was normal and there were no nodules present [No Respiratory Distress] : no respiratory distress  [No Accessory Muscle Use] : no accessory muscle use [Clear to Auscultation] : lungs were clear to auscultation bilaterally [Normal Rate] : normal rate  [Regular Rhythm] : with a regular rhythm [Normal S1, S2] : normal S1 and S2 [No Murmur] : no murmur heard [No Carotid Bruits] : no carotid bruits [No Abdominal Bruit] : a ~M bruit was not heard ~T in the abdomen [No Varicosities] : no varicosities [Pedal Pulses Present] : the pedal pulses are present [No Palpable Aorta] : no palpable aorta [No Extremity Clubbing/Cyanosis] : no extremity clubbing/cyanosis [Normal Appearance] : normal in appearance [No Nipple Discharge] : no nipple discharge [No Axillary Lymphadenopathy] : no axillary lymphadenopathy [Soft] : abdomen soft [Non Tender] : non-tender [Non-distended] : non-distended [No Masses] : no abdominal mass palpated [No HSM] : no HSM [Normal Bowel Sounds] : normal bowel sounds [No Hernias] : no hernias [Declined Rectal Exam] : declined rectal exam [No CVA Tenderness] : no CVA  tenderness [No Spinal Tenderness] : no spinal tenderness [No Joint Swelling] : no joint swelling [Grossly Normal Strength/Tone] : grossly normal strength/tone [No Rash] : no rash [Acne] : no acne [Coordination Grossly Intact] : coordination grossly intact [No Focal Deficits] : no focal deficits [Normal Gait] : normal gait [Speech Grossly Normal] : speech grossly normal [Memory Grossly Normal] : memory grossly normal [Normal Affect] : the affect was normal [Alert and Oriented x3] : oriented to person, place, and time [Normal Mood] : the mood was normal [Normal Insight/Judgement] : insight and judgment were intact [Comprehensive Foot Exam Normal] : Right and left foot were examined and both feet are normal. No ulcers in either foot. Toes are normal and with full ROM.  Normal tactile sensation with monofilament testing throughout both feet [de-identified] : Stasis dermatitis, secondary to venous insufficiency, right lower extremity edematous secondary to DVT in the past [de-identified] : obese [FreeTextEntry1] : urology [de-identified] : urology [de-identified] : Lipoma left upper back stable [de-identified] : Stasis dermatitis bilateral lower extremities, excoriation bilateral knees status post fall healing well. [de-identified] : Peripheral neuropathy stable for patient, history of transverse myelitis patient has recovered

## 2024-04-10 NOTE — ASSESSMENT
[FreeTextEntry1] : CK PACHECO is a 78 year old man with --  # RA - stable, well controlled. Knee and ankle pain improved w/ q2 week humira compared to q3 week.  - recent labs stable, repeat prior to next visit  - c/w Humira q2 week dosing - c/w SSZ 1000mg BID  # immunosuppressed status - pt knows to call if febrile or unwell, knows to hold medication while on Abx - s/p updated covid booster, annual flu, and PNA vaccines, s/p shingles vaccine  # chronic LBP/hip girdle pain in setting of DJD and MSK issues, chronic milder neck pain (improved w/ prior epidural injection) - c/w FRANKLIN as indicated  - pt defers PT as hasn't found much benefit prior, will c/w HEP and use of assistive device PRN as well as slower walking to avoid falls  - c/w prn Tylenol   RTC 6 months, sooner if new/worsening sx

## 2024-04-10 NOTE — HISTORY OF PRESENT ILLNESS
[FreeTextEntry1] : Follow-up and disease management [de-identified] : Patient is a 78-year-old gentleman who presents today for annual wellness exam.  Patient recently seen by orthopedic surgery he does have underlying lumbar stenosis with neurogenic claudication he is status post physical therapy, epidural steroid injection which was done by 's pain management and was recently seen by rheumatology.  Patient has multiple underlying medical problems which include diabetes mellitus type 2, hypertension, hyperlipidemia, rheumatoid arthritis, CKD, history of DVT and patient is on polypharmacy he is also immune compromised.

## 2024-04-10 NOTE — ASSESSMENT
[FreeTextEntry1] : Assessment and plan:  1.  Comprehensive blood work drawn recently reviewed with patient at this visit his most recent hemoglobin A1c which was done this past December 5.8 shows excellent blood sugar control.  2.  Comprehensive cardiac evaluation and electrocardiogram recently done by cardiology.  Consultation reviewed.  3.  Diabetes mellitus type 2 patient will continue present medical management and diet as stated earlier most recent A1c 5.8 that is excellent control.  4.  Hypertension blood pressure is stable continue present medical management with amlodipine, hydralazine losartan and Bystolic.  5.  Hyperlipidemia patient does follow a low-fat low-cholesterol diet continue atorvastatin 20 mg 1 tab at bedtime with fenofibrate 160 mg.  6.  Hypothyroidism continue levothyroxine 75 mcg  free T3, free T4 and TSH within normal limits.  7.  Rheumatoid arthritis followed closely by rheumatology I reviewed most recent consultation patient will continue Humira and sulfasalazine  At this point no change in medical management continue all medications as prescribed follow-up appointment in 3 months.  At that time I will be obtaining comprehensive blood work.

## 2024-06-17 ENCOUNTER — RX RENEWAL (OUTPATIENT)
Age: 79
End: 2024-06-17

## 2024-07-03 ENCOUNTER — APPOINTMENT (OUTPATIENT)
Dept: SURGERY | Facility: CLINIC | Age: 79
End: 2024-07-03

## 2024-07-03 VITALS
TEMPERATURE: 98 F | OXYGEN SATURATION: 92 % | HEIGHT: 68 IN | RESPIRATION RATE: 16 BRPM | BODY MASS INDEX: 43.04 KG/M2 | HEART RATE: 96 BPM | WEIGHT: 284 LBS

## 2024-07-23 ENCOUNTER — APPOINTMENT (OUTPATIENT)
Dept: PULMONOLOGY | Facility: CLINIC | Age: 79
End: 2024-07-23
Payer: MEDICARE

## 2024-07-23 VITALS
WEIGHT: 284 LBS | TEMPERATURE: 97.5 F | SYSTOLIC BLOOD PRESSURE: 138 MMHG | RESPIRATION RATE: 16 BRPM | HEIGHT: 68 IN | DIASTOLIC BLOOD PRESSURE: 76 MMHG | HEART RATE: 89 BPM | BODY MASS INDEX: 43.04 KG/M2 | OXYGEN SATURATION: 92 %

## 2024-07-23 DIAGNOSIS — J45.909 UNSPECIFIED ASTHMA, UNCOMPLICATED: ICD-10-CM

## 2024-07-23 DIAGNOSIS — G47.33 OBSTRUCTIVE SLEEP APNEA (ADULT) (PEDIATRIC): ICD-10-CM

## 2024-07-23 PROCEDURE — 99214 OFFICE O/P EST MOD 30 MIN: CPT

## 2024-07-23 PROCEDURE — G2211 COMPLEX E/M VISIT ADD ON: CPT

## 2024-07-23 NOTE — HISTORY OF PRESENT ILLNESS
[Never] : never [Obstructive Sleep Apnea] : obstructive sleep apnea [APAP:] : APAP [Full Face mask] : full face mask [TextBox_4] : last  MM   3/2022     1/23/2024 79y/o male  born in [place]   never smoker   retired   college  degree construction  firm  manager    [place] h/o  RIght DVT   after cruise   was on eliquis  in past -     DM obesity HTN    SAMARA  dx 15 years ago  - could not tolerate  cpap  mask    chart reviewed and  discussed sleep history  -       -2021  severe samara   weight  295  now   286 pounds - wants alternative  therapy  - daily aspirin only now  -n cough  no sob  no  palpitations currently has lost weight   2/7/2024 79y/o  male   never smoker   h/o    right DVT  after cruise  Eliquis   asthma followed by   allergist Dr Reese on  singulair   RA x 20 years   on humira      here for     SAMARA severe  - had covid 1/2024   - on singulair and advair - has neblizer sleep study reviewed today  -severe   SAMARA  with significant   desaturation   states  no  sob using his inhalers advair and albuterol  - has nebulizer not using currently  -last ct    2022 ctpa  no pe  bilateral  atelectasis  - 3/20/2024 79y/o male never smoker  right DVT on elqiuis   asthma followed by  allergist   RA x 20 years  on humira   SAMARA severe - currently  not inspire candidate due to  weight - cpap mask changed to   large now with large leak  will try medium  -no chest pain  no sob  -has sleep md follow up  -  7/22/2024 78y/o  male never   smoker    right DVT  treated  no longer on   a/c  asthma   by allergist   RA x  20 year  SAMARA severe =- compliance reviewed  doing well   -no cough no sob   no chest pain  - feels much  better with cpap   alert  and   less sleepiness -   [TextBox_100] : 2/2024 [TextBox_108] : 60 [TextBox_116] : 68 [TextBox_120] : 258min/502 min  sats  < 88% [TextBox_125] : 4-20 [TextBox_127] : 6/2024 [TextBox_129] : 7/2024 [TextBox_133] : 97 [TextBox_137] : 87 [TextBox_141] : 6 [TextBox_143] : 14 [TextBox_147] : 4.7 [TextBox_158] : [place] [TextBox_160] : air touch  F20 full

## 2024-07-23 NOTE — PHYSICAL EXAM
[Enlarged Base of the Tongue] : enlarged base of the tongue [IV] : Mallampati Class: IV [Normal Appearance] : normal appearance [Supple] : supple [No Neck Mass] : no neck mass [No JVD] : no jvd [Normal Rate/Rhythm] : normal rate/rhythm [Normal S1, S2] : normal s1, s2 [No Murmurs] : no murmurs [No Resp Distress] : no resp distress [No Acc Muscle Use] : no acc muscle use [Clear to Auscultation Bilaterally] : clear to auscultation bilaterally [Benign] : benign [Not Tender] : not tender [No Masses] : no masses [Soft] : soft [No Hernias] : no hernias [Normal Bowel Sounds] : normal bowel sounds [Normal Gait] : normal gait [No Clubbing] : no clubbing [No Edema] : no edema [No Rash] : no rash [No Motor Deficits] : no motor deficits [Normal Affect] : normal affect [TextBox_2] : pleasant male no distress no cough   speaking full sentences no cough  [TextBox_11] : crowded no lesion

## 2024-07-23 NOTE — ASSESSMENT
[FreeTextEntry1] : 80y/o  male    1- 2/2024  severe  SAMARA with desaturation  -   autopap  tolerated  2- obesity   BMI  43.9  3- DM / HTN  4-   asthma allergic  followed x years  allergist  Dr Najera stable 5- RA on humira   last ct 2/2024    no ILD  6- h/o DVT  s/p eliquis 7- vaccination per  primary   Recommendations 1-    tolerating cpap  compliance   in chart full face mask  F20   using   cpap and benefiting  for  use  less sleepiness  2- advair bid 3- Duonebs  q  6 hour prn   4- HRCT 2/2025  for RA  5- has oxygen  prn   6- follow up  with  sleep  study  with  autopap     to review  oxygenation   ( patient prefers  home study) - when patient agrees   f/u   in   six months

## 2024-07-30 ENCOUNTER — RX RENEWAL (OUTPATIENT)
Age: 79
End: 2024-07-30

## 2024-07-30 RX ORDER — ADALIMUMAB 40MG/0.8ML
40 KIT SUBCUTANEOUS
Qty: 1 | Refills: 5 | Status: ACTIVE | COMMUNITY
Start: 2024-07-30 | End: 1900-01-01

## 2024-08-21 ENCOUNTER — EMERGENCY (EMERGENCY)
Facility: HOSPITAL | Age: 79
LOS: 1 days | Discharge: HOME | End: 2024-08-21
Attending: INTERNAL MEDICINE | Admitting: STUDENT IN AN ORGANIZED HEALTH CARE EDUCATION/TRAINING PROGRAM
Payer: MEDICARE

## 2024-08-21 ENCOUNTER — APPOINTMENT (OUTPATIENT)
Dept: RHEUMATOLOGY | Facility: CLINIC | Age: 79
End: 2024-08-21
Payer: MEDICARE

## 2024-08-21 ENCOUNTER — APPOINTMENT (OUTPATIENT)
Dept: ULTRASOUND IMAGING | Facility: HOSPITAL | Age: 79
End: 2024-08-21
Payer: MEDICARE

## 2024-08-21 ENCOUNTER — RESULT REVIEW (OUTPATIENT)
Age: 79
End: 2024-08-21

## 2024-08-21 VITALS
WEIGHT: 281 LBS | HEART RATE: 74 BPM | HEIGHT: 68 IN | OXYGEN SATURATION: 94 % | RESPIRATION RATE: 16 BRPM | SYSTOLIC BLOOD PRESSURE: 132 MMHG | DIASTOLIC BLOOD PRESSURE: 86 MMHG | BODY MASS INDEX: 42.59 KG/M2 | TEMPERATURE: 97.6 F

## 2024-08-21 VITALS
RESPIRATION RATE: 18 BRPM | DIASTOLIC BLOOD PRESSURE: 72 MMHG | OXYGEN SATURATION: 96 % | HEART RATE: 68 BPM | TEMPERATURE: 98 F | SYSTOLIC BLOOD PRESSURE: 178 MMHG

## 2024-08-21 VITALS
RESPIRATION RATE: 18 BRPM | HEIGHT: 68 IN | HEART RATE: 71 BPM | OXYGEN SATURATION: 94 % | TEMPERATURE: 98 F | WEIGHT: 281.09 LBS | DIASTOLIC BLOOD PRESSURE: 79 MMHG | SYSTOLIC BLOOD PRESSURE: 190 MMHG

## 2024-08-21 DIAGNOSIS — Z90.89 ACQUIRED ABSENCE OF OTHER ORGANS: Chronic | ICD-10-CM

## 2024-08-21 DIAGNOSIS — M79.9 SOFT TISSUE DISORDER, UNSPECIFIED: ICD-10-CM

## 2024-08-21 DIAGNOSIS — Z90.49 ACQUIRED ABSENCE OF OTHER SPECIFIED PARTS OF DIGESTIVE TRACT: Chronic | ICD-10-CM

## 2024-08-21 DIAGNOSIS — R20.2 PARESTHESIA OF SKIN: ICD-10-CM

## 2024-08-21 DIAGNOSIS — M54.50 LOW BACK PAIN, UNSPECIFIED: ICD-10-CM

## 2024-08-21 DIAGNOSIS — Z98.890 OTHER SPECIFIED POSTPROCEDURAL STATES: Chronic | ICD-10-CM

## 2024-08-21 DIAGNOSIS — S63.602A UNSPECIFIED SPRAIN OF LEFT THUMB, INITIAL ENCOUNTER: ICD-10-CM

## 2024-08-21 DIAGNOSIS — M06.9 RHEUMATOID ARTHRITIS, UNSPECIFIED: ICD-10-CM

## 2024-08-21 DIAGNOSIS — Z96.649 PRESENCE OF UNSPECIFIED ARTIFICIAL HIP JOINT: Chronic | ICD-10-CM

## 2024-08-21 LAB
ALBUMIN SERPL ELPH-MCNC: 3.4 G/DL — SIGNIFICANT CHANGE UP (ref 3.3–5)
ALP SERPL-CCNC: 62 U/L — SIGNIFICANT CHANGE UP (ref 40–120)
ALT FLD-CCNC: 37 U/L — SIGNIFICANT CHANGE UP (ref 10–45)
ANION GAP SERPL CALC-SCNC: 8 MMOL/L — SIGNIFICANT CHANGE UP (ref 5–17)
AST SERPL-CCNC: 70 U/L — HIGH (ref 10–40)
BASOPHILS # BLD AUTO: 0.05 K/UL — SIGNIFICANT CHANGE UP (ref 0–0.2)
BASOPHILS NFR BLD AUTO: 0.7 % — SIGNIFICANT CHANGE UP (ref 0–2)
BILIRUB SERPL-MCNC: 0.4 MG/DL — SIGNIFICANT CHANGE UP (ref 0.2–1.2)
BUN SERPL-MCNC: 19 MG/DL — SIGNIFICANT CHANGE UP (ref 7–23)
CALCIUM SERPL-MCNC: 8.8 MG/DL — SIGNIFICANT CHANGE UP (ref 8.4–10.5)
CHLORIDE SERPL-SCNC: 107 MMOL/L — SIGNIFICANT CHANGE UP (ref 96–108)
CO2 SERPL-SCNC: 28 MMOL/L — SIGNIFICANT CHANGE UP (ref 22–31)
CREAT SERPL-MCNC: 1.29 MG/DL — SIGNIFICANT CHANGE UP (ref 0.5–1.3)
EGFR: 57 ML/MIN/1.73M2 — LOW
EGFR: 57 ML/MIN/1.73M2 — LOW
EOSINOPHIL # BLD AUTO: 0.24 K/UL — SIGNIFICANT CHANGE UP (ref 0–0.5)
EOSINOPHIL NFR BLD AUTO: 3.2 % — SIGNIFICANT CHANGE UP (ref 0–6)
GLUCOSE SERPL-MCNC: 94 MG/DL — SIGNIFICANT CHANGE UP (ref 70–99)
HCT VFR BLD CALC: 44.6 % — SIGNIFICANT CHANGE UP (ref 39–50)
HGB BLD-MCNC: 15.3 G/DL — SIGNIFICANT CHANGE UP (ref 13–17)
IMM GRANULOCYTES NFR BLD AUTO: 0.5 % — SIGNIFICANT CHANGE UP (ref 0–0.9)
INR BLD: 0.99 RATIO — SIGNIFICANT CHANGE UP (ref 0.85–1.18)
LYMPHOCYTES # BLD AUTO: 1.26 K/UL — SIGNIFICANT CHANGE UP (ref 1–3.3)
LYMPHOCYTES # BLD AUTO: 16.8 % — SIGNIFICANT CHANGE UP (ref 13–44)
MCHC RBC-ENTMCNC: 31 PG — SIGNIFICANT CHANGE UP (ref 27–34)
MCHC RBC-ENTMCNC: 34.3 GM/DL — SIGNIFICANT CHANGE UP (ref 32–36)
MCV RBC AUTO: 90.5 FL — SIGNIFICANT CHANGE UP (ref 80–100)
MONOCYTES # BLD AUTO: 0.55 K/UL — SIGNIFICANT CHANGE UP (ref 0–0.9)
MONOCYTES NFR BLD AUTO: 7.3 % — SIGNIFICANT CHANGE UP (ref 2–14)
NEUTROPHILS # BLD AUTO: 5.37 K/UL — SIGNIFICANT CHANGE UP (ref 1.8–7.4)
NEUTROPHILS NFR BLD AUTO: 71.5 % — SIGNIFICANT CHANGE UP (ref 43–77)
NRBC # BLD: 0 /100 WBCS — SIGNIFICANT CHANGE UP (ref 0–0)
NRBC BLD-RTO: 0 /100 WBCS — SIGNIFICANT CHANGE UP (ref 0–0)
PLATELET # BLD AUTO: 150 K/UL — SIGNIFICANT CHANGE UP (ref 150–400)
POTASSIUM SERPL-MCNC: 5.5 MMOL/L — HIGH (ref 3.5–5.3)
POTASSIUM SERPL-SCNC: 5.5 MMOL/L — HIGH (ref 3.5–5.3)
PROT SERPL-MCNC: 7.4 G/DL — SIGNIFICANT CHANGE UP (ref 6–8.3)
PROTHROM AB SERPL-ACNC: 11.6 SEC — SIGNIFICANT CHANGE UP (ref 9.5–13)
RBC # BLD: 4.93 M/UL — SIGNIFICANT CHANGE UP (ref 4.2–5.8)
RBC # FLD: 14 % — SIGNIFICANT CHANGE UP (ref 10.3–14.5)
SODIUM SERPL-SCNC: 143 MMOL/L — SIGNIFICANT CHANGE UP (ref 135–145)
WBC # BLD: 7.51 K/UL — SIGNIFICANT CHANGE UP (ref 3.8–10.5)
WBC # FLD AUTO: 7.51 K/UL — SIGNIFICANT CHANGE UP (ref 3.8–10.5)

## 2024-08-21 PROCEDURE — G2211 COMPLEX E/M VISIT ADD ON: CPT

## 2024-08-21 PROCEDURE — 93971 EXTREMITY STUDY: CPT | Mod: 26,RT

## 2024-08-21 PROCEDURE — 96360 HYDRATION IV INFUSION INIT: CPT

## 2024-08-21 PROCEDURE — 73130 X-RAY EXAM OF HAND: CPT | Mod: 26,LT

## 2024-08-21 PROCEDURE — 99284 EMERGENCY DEPT VISIT MOD MDM: CPT

## 2024-08-21 PROCEDURE — 99214 OFFICE O/P EST MOD 30 MIN: CPT

## 2024-08-21 PROCEDURE — 99283 EMERGENCY DEPT VISIT LOW MDM: CPT | Mod: 25

## 2024-08-21 RX ORDER — APIXABAN 2.5 MG/1
2 TABLET, FILM COATED ORAL
Qty: 28 | Refills: 0
Start: 2024-08-21 | End: 2024-08-27

## 2024-08-21 RX ORDER — METHYLPREDNISOLONE 4 MG/1
4 TABLET ORAL
Qty: 1 | Refills: 1 | Status: ACTIVE | COMMUNITY
Start: 2024-08-21 | End: 1900-01-01

## 2024-08-21 RX ORDER — APIXABAN 2.5 MG/1
10 TABLET, FILM COATED ORAL ONCE
Refills: 0 | Status: COMPLETED | OUTPATIENT
Start: 2024-08-21 | End: 2024-08-21

## 2024-08-21 RX ADMIN — APIXABAN 10 MILLIGRAM(S): 2.5 TABLET, FILM COATED ORAL at 18:51

## 2024-08-21 RX ADMIN — Medication 1000 MILLILITER(S): at 17:35

## 2024-08-21 RX ADMIN — Medication 500 MILLILITER(S): at 18:46

## 2024-08-21 NOTE — REVIEW OF SYSTEMS
[Arthralgias] : arthralgias [Joint Pain] : joint pain [Difficulty Walking] : difficulty walking [Negative] : Heme/Lymph [Joint Swelling] : no joint swelling [Joint Stiffness] : no joint stiffness [As Noted in HPI] : as noted in HPI

## 2024-08-21 NOTE — PHYSICAL EXAM
[General Appearance - Alert] : alert [General Appearance - In No Acute Distress] : in no acute distress [General Appearance - Well Nourished] : well nourished [Sclera] : the sclera and conjunctiva were normal [PERRL With Normal Accommodation] : pupils were equal in size, round, and reactive to light [Extraocular Movements] : extraocular movements were intact [Oropharynx] : the oropharynx was normal [Neck Appearance] : the appearance of the neck was normal [No CVA Tenderness] : no ~M costovertebral angle tenderness [No Spinal Tenderness] : no spinal tenderness [Abnormal Walk] : normal gait [Nail Clubbing] : no clubbing  or cyanosis of the fingernails [Musculoskeletal - Swelling] : no joint swelling seen [Motor Tone] : muscle strength and tone were normal [Oriented To Time, Place, And Person] : oriented to person, place, and time [Impaired Insight] : insight and judgment were intact [Affect] : the affect was normal [] : no respiratory distress [Respiration, Rhythm And Depth] : normal respiratory rhythm and effort [Motor Exam] : the motor exam was normal [FreeTextEntry1] : Decrasd sensation but intact motor use of R LLE and foot

## 2024-08-21 NOTE — HISTORY OF PRESENT ILLNESS
[FreeTextEntry1] : CK PACHECO is a 76 year old man with chronic, well controlled RA on Humira and SSZ 1000mg BID.   RA hx - dx in 2007, few flares since but no flares and has been off steroids x years. Never had rashes, eye inflammation, C spine involvement, inflammatory low back pain, IBD, lung involvement, SQ nodules, paresthesia, muscle weakness.    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 No recent F/C, infections, cough, CP, SOB. Feels well. No SE from meds. Observing covid recommendations of social distancing and using a mask when outside. Recent improvement in HBA1c.   10/29/20 -- No joint complaints, R hip replacement mild discomfort with positional change but able to ambulate/sit/sleep comfortably. No extra-articular sx, F/C, infectious sx. HBA1c markedly improved and endo adjusted DM2 meds, mild worsening of renal fxn, endo added ARB.   1/28/21 -- No joint complaints today. Has been spacing out Humira, now up to q26 days without recurrence of sx. Feels well otherwise, no infectious sx, no extra-articular sx. Improving DM2 control.   4/6/21 -- L hip acute pain x 5 days, no preceding issues, radiating to groin with weight bearing, no sciatica pain or back pain. Up to Humira q4 weeks, some stiffness creeping back into hands and shoulders but no pain or synovitis. Feels well otherwise.   5/20/21 -- L hip replacement scheduled for 6/10/21. Currently on Humira q3 weeks, daily SSZ and no active inflammatory sx, prior stiffness resolved. No infectious sx, feels fine otherwise.   8/6/21 -- Total LHR postponed, now scheduled for 8/12, has already been holding Humira, taking SSZ, no current inflammatory joint sx or extra-articular manifestations. Feels well otherwise.   11/2/21 -- Total LHR postponed again, now scheduled for late Jan 2022. TRA stable, no SE with meds, no recent infectious sx. Some MSK related shoulder pain and low back pain arising from seated as trying not to hurt his hip, but intact ROM, not needing OTC pain meds. Feels well otherwise. S/p Covid booster, asking about flu vaccine.   4/8/22 -- S/p L THR, healing well, doing well with PT, not needing pain meds. RA well controlled, no flares while holding meds and has now resumed. Prior b/l shoulder pain resolved, some mild midline LBP now but not limiting him. No infectious sx, mild covid in jan, no monoclonals, feels well today.   7/19/22 -- Hip doing well, did a trip to [place] and was able to walk comfortably, worsening lumbar spasm which PT has been working on, some upper arm pain as he has to push off from seated but ROM intact. RA quiescent, no extra-articular inflammatory sx, no infectious sx.  11/15/22 -- No current inflammatory arthritis sx, no extra-articular inflammatory sx. Ongoing but mild LBP but less spasm, needing arms less to arise from seated. No infectious sx.   5/16/23 -- Progressive LBP over last few months, ?related to spacing out Humira dosing as doing q3 weeks, hip isn't an issue, no radicular sx, s/p FRANKLIN with pain mgmt yesterday, milder neck pain. No peripheral synovitis, no extra-articular inflammatory sx, no infectious sx.   11/7/23 -- RA symptoms improved w/ q2 week humira compared to q3 week - mainly knee and ankle pain. Has minimal hand stiffness. Has noticed R leg swelling, had negative doppler 9/2023. Does have hx of varicose veins. Still has R sided low back pain worse w/ sitting to standing and prolonged ambulation. No improvement w/ q2 week humira, remains active, ADLs not impacted. no extra-articular inflammatory sx, no infectious sx.   4/9/24 -- RA joints stable, no extra-articular inflammatory sx, no infectious sx. L spine FRANKLIN very effective, ongoing R hip girdle pain but not felt to be from his prior THR - both in place, recent mild fall but no ongoing pain.   8/21/24 - In last week worsening L sided LBP without sciatica, not improving with conservative measures, making arising and ambulating hard but no falls. Over same time course, R shin numbness, not extending to foot, noted soft tissue protuberance on lateral shin just today, no prior trauma, no constitutional symptoms. + 1 month of L thumb CMC pain, hard to open jars since, not improving, did land on it from seated position, no swelling. RA quiescent, no infectious sx.

## 2024-08-21 NOTE — PATIENT PROFILE ADULT - DO YOU LACK THE NECESSARY SUPPORT TO HELP YOU COPE WITH LIFE CHALLENGES?
Anesthesia Post Evaluation    Patient: Caio Grace    Procedure(s) Performed: Procedure(s) (LRB):  CHOLECYSTECTOMY, LAPAROSCOPIC (N/A)    Final Anesthesia Type: general      Patient location during evaluation: PACU  Patient participation: Yes- Able to Participate  Level of consciousness: awake and alert and oriented  Post-procedure vital signs: reviewed and stable  Pain management: adequate  Airway patency: patent    PONV status at discharge: No PONV  Anesthetic complications: no      Cardiovascular status: blood pressure returned to baseline and hemodynamically stable  Respiratory status: unassisted, spontaneous ventilation and room air  Hydration status: euvolemic  Follow-up not needed.              Vitals Value Taken Time   /79 08/21/24 1224   Temp 36.6 °C (97.8 °F) 08/21/24 1205   Pulse 87 08/21/24 1224   Resp 15 08/21/24 1224   SpO2 93 % 08/21/24 1224         No case tracking events are documented in the log.      Pain/Ruth Score: Pain Rating Prior to Med Admin: 10 (8/20/2024 10:48 PM)  Pain Rating Post Med Admin: 4 (8/20/2024 11:18 PM)  Ruth Score: 9 (8/21/2024 12:05 PM)           yes

## 2024-08-21 NOTE — ASSESSMENT
[FreeTextEntry1] : CK PACHECO is a 79 year old man with --  # RA - stable, well controlled. Knee and ankle pain improved w/ q2 week humira compared to q3 week.  - repeat prior to next visit  - c/w Humira q2 week dosing - c/w SSZ 1000mg BID  # immunosuppressed status - pt knows to call if febrile or unwell, knows to hold medication while on Abx - s/p updated covid booster, annual flu, and PNA vaccines, s/p shingles vaccine  # chronic LBP/hip girdle pain in setting of DJD and MSK issues, chronic milder neck pain (improved w/ prior epidural injection) # acute L sided LBP now # acute R calf soft tissue lesion and paresthesia # L thumb sprain - MRI L spine - US doppler and soft tisue R LE - XR L thumb, thumb spica splint  - medrol pack  - advised if develops worsening pain, larger mass, F/C at shin site to promptly go to ER  - c/w FRANKLIN pending response to Medrol dose pack and MRI results  - pt defers PT as hasn't found much benefit prior, will c/w HEP and use of assistive device PRN as well as slower walking to avoid falls  - c/w prn Tylenol   RTC at next routinely scheduled visit

## 2024-08-22 ENCOUNTER — RX RENEWAL (OUTPATIENT)
Age: 79
End: 2024-08-22

## 2024-08-26 ENCOUNTER — RX RENEWAL (OUTPATIENT)
Age: 79
End: 2024-08-26

## 2024-08-28 RX ORDER — APIXABAN 5 MG/1
5 TABLET, FILM COATED ORAL
Qty: 60 | Refills: 1 | Status: ACTIVE | COMMUNITY
Start: 2024-08-28 | End: 1900-01-01

## 2024-09-05 ENCOUNTER — OUTPATIENT (OUTPATIENT)
Dept: OUTPATIENT SERVICES | Facility: HOSPITAL | Age: 79
LOS: 1 days | End: 2024-09-05
Payer: OTHER GOVERNMENT

## 2024-09-05 ENCOUNTER — APPOINTMENT (OUTPATIENT)
Dept: MRI IMAGING | Facility: HOSPITAL | Age: 79
End: 2024-09-05
Payer: MEDICARE

## 2024-09-05 ENCOUNTER — APPOINTMENT (OUTPATIENT)
Dept: ULTRASOUND IMAGING | Facility: HOSPITAL | Age: 79
End: 2024-09-05
Payer: MEDICARE

## 2024-09-05 DIAGNOSIS — M54.50 LOW BACK PAIN, UNSPECIFIED: ICD-10-CM

## 2024-09-05 DIAGNOSIS — Z98.890 OTHER SPECIFIED POSTPROCEDURAL STATES: Chronic | ICD-10-CM

## 2024-09-05 DIAGNOSIS — Z96.649 PRESENCE OF UNSPECIFIED ARTIFICIAL HIP JOINT: Chronic | ICD-10-CM

## 2024-09-05 DIAGNOSIS — Z90.89 ACQUIRED ABSENCE OF OTHER ORGANS: Chronic | ICD-10-CM

## 2024-09-05 DIAGNOSIS — Z90.49 ACQUIRED ABSENCE OF OTHER SPECIFIED PARTS OF DIGESTIVE TRACT: Chronic | ICD-10-CM

## 2024-09-05 PROCEDURE — 72148 MRI LUMBAR SPINE W/O DYE: CPT

## 2024-09-05 PROCEDURE — 76882 US LMTD JT/FCL EVL NVASC XTR: CPT

## 2024-09-05 PROCEDURE — 76882 US LMTD JT/FCL EVL NVASC XTR: CPT | Mod: 26,RT

## 2024-09-05 PROCEDURE — 72148 MRI LUMBAR SPINE W/O DYE: CPT | Mod: 26,MH

## 2024-09-21 ENCOUNTER — RX RENEWAL (OUTPATIENT)
Age: 79
End: 2024-09-21

## 2024-09-26 ENCOUNTER — RX RENEWAL (OUTPATIENT)
Age: 79
End: 2024-09-26

## 2024-10-02 ENCOUNTER — APPOINTMENT (OUTPATIENT)
Dept: RHEUMATOLOGY | Facility: CLINIC | Age: 79
End: 2024-10-02
Payer: MEDICARE

## 2024-10-02 VITALS
OXYGEN SATURATION: 95 % | BODY MASS INDEX: 42.59 KG/M2 | HEIGHT: 68 IN | SYSTOLIC BLOOD PRESSURE: 148 MMHG | RESPIRATION RATE: 16 BRPM | DIASTOLIC BLOOD PRESSURE: 80 MMHG | TEMPERATURE: 97.2 F | WEIGHT: 281 LBS | HEART RATE: 72 BPM

## 2024-10-02 DIAGNOSIS — G89.29 LOW BACK PAIN, UNSPECIFIED: ICD-10-CM

## 2024-10-02 DIAGNOSIS — M06.9 RHEUMATOID ARTHRITIS, UNSPECIFIED: ICD-10-CM

## 2024-10-02 DIAGNOSIS — M54.50 LOW BACK PAIN, UNSPECIFIED: ICD-10-CM

## 2024-10-02 DIAGNOSIS — D84.9 IMMUNODEFICIENCY, UNSPECIFIED: ICD-10-CM

## 2024-10-02 PROCEDURE — 99214 OFFICE O/P EST MOD 30 MIN: CPT

## 2024-10-02 PROCEDURE — G2211 COMPLEX E/M VISIT ADD ON: CPT

## 2024-10-02 NOTE — HISTORY OF PRESENT ILLNESS
[FreeTextEntry1] : CK PACHECO is a 76 year old man with chronic, well controlled RA on Humira and SSZ 1000mg BID.   RA hx - dx in 2007, few flares since but no flares and has been off steroids x years. Never had rashes, eye inflammation, C spine involvement, inflammatory low back pain, IBD, lung involvement, SQ nodules, paresthesia, muscle weakness.    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 No recent F/C, infections, cough, CP, SOB. Feels well. No SE from meds. Observing covid recommendations of social distancing and using a mask when outside. Recent improvement in HBA1c.   10/29/20 -- No joint complaints, R hip replacement mild discomfort with positional change but able to ambulate/sit/sleep comfortably. No extra-articular sx, F/C, infectious sx. HBA1c markedly improved and endo adjusted DM2 meds, mild worsening of renal fxn, endo added ARB.   1/28/21 -- No joint complaints today. Has been spacing out Humira, now up to q26 days without recurrence of sx. Feels well otherwise, no infectious sx, no extra-articular sx. Improving DM2 control.   4/6/21 -- L hip acute pain x 5 days, no preceding issues, radiating to groin with weight bearing, no sciatica pain or back pain. Up to Humira q4 weeks, some stiffness creeping back into hands and shoulders but no pain or synovitis. Feels well otherwise.   5/20/21 -- L hip replacement scheduled for 6/10/21. Currently on Humira q3 weeks, daily SSZ and no active inflammatory sx, prior stiffness resolved. No infectious sx, feels fine otherwise.   8/6/21 -- Total LHR postponed, now scheduled for 8/12, has already been holding Humira, taking SSZ, no current inflammatory joint sx or extra-articular manifestations. Feels well otherwise.   11/2/21 -- Total LHR postponed again, now scheduled for late Jan 2022. TRA stable, no SE with meds, no recent infectious sx. Some MSK related shoulder pain and low back pain arising from seated as trying not to hurt his hip, but intact ROM, not needing OTC pain meds. Feels well otherwise. S/p Covid booster, asking about flu vaccine.   4/8/22 -- S/p L THR, healing well, doing well with PT, not needing pain meds. RA well controlled, no flares while holding meds and has now resumed. Prior b/l shoulder pain resolved, some mild midline LBP now but not limiting him. No infectious sx, mild covid in jan, no monoclonals, feels well today.   7/19/22 -- Hip doing well, did a trip to [place] and was able to walk comfortably, worsening lumbar spasm which PT has been working on, some upper arm pain as he has to push off from seated but ROM intact. RA quiescent, no extra-articular inflammatory sx, no infectious sx.  11/15/22 -- No current inflammatory arthritis sx, no extra-articular inflammatory sx. Ongoing but mild LBP but less spasm, needing arms less to arise from seated. No infectious sx.   5/16/23 -- Progressive LBP over last few months, ?related to spacing out Humira dosing as doing q3 weeks, hip isn't an issue, no radicular sx, s/p FRANKLIN with pain mgmt yesterday, milder neck pain. No peripheral synovitis, no extra-articular inflammatory sx, no infectious sx.   11/7/23 -- RA symptoms improved w/ q2 week humira compared to q3 week - mainly knee and ankle pain. Has minimal hand stiffness. Has noticed R leg swelling, had negative doppler 9/2023. Does have hx of varicose veins. Still has R sided low back pain worse w/ sitting to standing and prolonged ambulation. No improvement w/ q2 week humira, remains active, ADLs not impacted. no extra-articular inflammatory sx, no infectious sx.   4/9/24 -- RA joints stable, no extra-articular inflammatory sx, no infectious sx. L spine FRANKLIN very effective, ongoing R hip girdle pain but not felt to be from his prior THR - both in place, recent mild fall but no ongoing pain.   8/21/24 - In last week worsening L sided LBP without sciatica, not improving with conservative measures, making arising and ambulating hard but no falls. Over same time course, R shin numbness, not extending to foot, noted soft tissue protuberance on lateral shin just today, no prior trauma, no constitutional symptoms. + 1 month of L thumb CMC pain, hard to open jars since, not improving, did land on it from seated position, no swelling. RA quiescent, no infectious sx.   10/2/24 -- Last visit, US showed extensive DVT, s/p 2 months of Eliquis, now off since last week at Vascular request, will be repeating imaging with vascular next week. Stress test with non surgical small blockage. Worsening midline LBP, Medrol packs didn't help as much this time around, remains with numbness over same distal, lateral R shin as prior but no focal weaknes. No active inflammatory joints, no extra-articular inflammatory sx, no infectious symptoms.

## 2024-10-02 NOTE — ASSESSMENT
[FreeTextEntry1] : CK PACHECO is a 79 year old man with --  # RA - stable, well controlled. Knee and ankle pain improved w/ q2 week Humira compared to q3 week.  - will do labs with upcoming urology labs  - c/w Humira q2 week dosing  - c/w SSZ 1000mg BID  # immunosuppressed status - pt knows to call if febrile or unwell, knows to hold medication while on Abx - s/p PNA vaccines, shingles vaccines prior  - advised updated covid booster, annual flu  # chronic LBP/hip girdle pain in setting of DJD and MSK issues, chronic milder neck pain (improved w/ prior epidural injection). Acute worsening, MRI with advancement of DJD/stenosis  - MRI L spine reviewed with him - now he is off A/C ?FRANKLIN but not sure it will fully address - tasked pain mgmt to get their opinion. Advised to make ortho spine visit as well to discuss with both  - medrol pack PRN worsening  - pt defers PT as hasn't found much benefit prior, will c/w HEP and use of assistive device PRN as well as slower walking to avoid falls  - c/w prn Tylenol   RTC 2 months

## 2024-10-02 NOTE — PHYSICAL EXAM
[General Appearance - Alert] : alert [General Appearance - In No Acute Distress] : in no acute distress [General Appearance - Well Nourished] : well nourished [Sclera] : the sclera and conjunctiva were normal [PERRL With Normal Accommodation] : pupils were equal in size, round, and reactive to light [Extraocular Movements] : extraocular movements were intact [Oropharynx] : the oropharynx was normal [Neck Appearance] : the appearance of the neck was normal [Respiration, Rhythm And Depth] : normal respiratory rhythm and effort [No CVA Tenderness] : no ~M costovertebral angle tenderness [No Spinal Tenderness] : no spinal tenderness [Abnormal Walk] : normal gait [Nail Clubbing] : no clubbing  or cyanosis of the fingernails [Musculoskeletal - Swelling] : no joint swelling seen [Motor Tone] : muscle strength and tone were normal [] : no rash [Motor Exam] : the motor exam was normal [Oriented To Time, Place, And Person] : oriented to person, place, and time [Impaired Insight] : insight and judgment were intact [Affect] : the affect was normal [FreeTextEntry1] : As above

## 2024-10-02 NOTE — REVIEW OF SYSTEMS
[Arthralgias] : arthralgias [Joint Pain] : joint pain [As Noted in HPI] : as noted in HPI [Difficulty Walking] : difficulty walking [Negative] : Heme/Lymph [Joint Swelling] : no joint swelling [Joint Stiffness] : no joint stiffness [de-identified] : Focal paresthesia

## 2024-10-02 NOTE — HISTORY OF PRESENT ILLNESS
[FreeTextEntry1] : CK PACHECO is a 76 year old man with chronic, well controlled RA on Humira and SSZ 1000mg BID.   RA hx - dx in 2007, few flares since but no flares and has been off steroids x years. Never had rashes, eye inflammation, C spine involvement, inflammatory low back pain, IBD, lung involvement, SQ nodules, paresthesia, muscle weakness.    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 No recent F/C, infections, cough, CP, SOB. Feels well. No SE from meds. Observing covid recommendations of social distancing and using a mask when outside. Recent improvement in HBA1c.   10/29/20 -- No joint complaints, R hip replacement mild discomfort with positional change but able to ambulate/sit/sleep comfortably. No extra-articular sx, F/C, infectious sx. HBA1c markedly improved and endo adjusted DM2 meds, mild worsening of renal fxn, endo added ARB.   1/28/21 -- No joint complaints today. Has been spacing out Humira, now up to q26 days without recurrence of sx. Feels well otherwise, no infectious sx, no extra-articular sx. Improving DM2 control.   4/6/21 -- L hip acute pain x 5 days, no preceding issues, radiating to groin with weight bearing, no sciatica pain or back pain. Up to Humira q4 weeks, some stiffness creeping back into hands and shoulders but no pain or synovitis. Feels well otherwise.   5/20/21 -- L hip replacement scheduled for 6/10/21. Currently on Humira q3 weeks, daily SSZ and no active inflammatory sx, prior stiffness resolved. No infectious sx, feels fine otherwise.   8/6/21 -- Total LHR postponed, now scheduled for 8/12, has already been holding Humira, taking SSZ, no current inflammatory joint sx or extra-articular manifestations. Feels well otherwise.   11/2/21 -- Total LHR postponed again, now scheduled for late Jan 2022. TRA stable, no SE with meds, no recent infectious sx. Some MSK related shoulder pain and low back pain arising from seated as trying not to hurt his hip, but intact ROM, not needing OTC pain meds. Feels well otherwise. S/p Covid booster, asking about flu vaccine.   4/8/22 -- S/p L THR, healing well, doing well with PT, not needing pain meds. RA well controlled, no flares while holding meds and has now resumed. Prior b/l shoulder pain resolved, some mild midline LBP now but not limiting him. No infectious sx, mild covid in jan, no monoclonals, feels well today.   7/19/22 -- Hip doing well, did a trip to [place] and was able to walk comfortably, worsening lumbar spasm which PT has been working on, some upper arm pain as he has to push off from seated but ROM intact. RA quiescent, no extra-articular inflammatory sx, no infectious sx.  11/15/22 -- No current inflammatory arthritis sx, no extra-articular inflammatory sx. Ongoing but mild LBP but less spasm, needing arms less to arise from seated. No infectious sx.   5/16/23 -- Progressive LBP over last few months, ?related to spacing out Humira dosing as doing q3 weeks, hip isn't an issue, no radicular sx, s/p FRANKLIN with pain mgmt yesterday, milder neck pain. No peripheral synovitis, no extra-articular inflammatory sx, no infectious sx.   11/7/23 -- RA symptoms improved w/ q2 week humira compared to q3 week - mainly knee and ankle pain. Has minimal hand stiffness. Has noticed R leg swelling, had negative doppler 9/2023. Does have hx of varicose veins. Still has R sided low back pain worse w/ sitting to standing and prolonged ambulation. No improvement w/ q2 week humira, remains active, ADLs not impacted. no extra-articular inflammatory sx, no infectious sx.   4/9/24 -- RA joints stable, no extra-articular inflammatory sx, no infectious sx. L spine FRANKLIN very effective, ongoing R hip girdle pain but not felt to be from his prior THR - both in place, recent mild fall but no ongoing pain.   8/21/24 - In last week worsening L sided LBP without sciatica, not improving with conservative measures, making arising and ambulating hard but no falls. Over same time course, R shin numbness, not extending to foot, noted soft tissue protuberance on lateral shin just today, no prior trauma, no constitutional symptoms. + 1 month of L thumb CMC pain, hard to open jars since, not improving, did land on it from seated position, no swelling. RA quiescent, no infectious sx.   10/2/24 -- Last visit, US showed extensive DVT, s/p 2 months of Eliquis, now off since last week at Vascular request, will be repeating imaging with vascular next week. Stress test with non surgical small blockage. Worsening midline LBP, Medrol packs didn't help as much this time around, remains with numbness over same distal, lateral R shin as prior but no focal weaknes. No active inflammatory joints, no extra-articular inflammatory sx, no infectious symptoms.

## 2024-10-02 NOTE — REVIEW OF SYSTEMS
[Arthralgias] : arthralgias [Joint Pain] : joint pain [As Noted in HPI] : as noted in HPI [Difficulty Walking] : difficulty walking [Negative] : Heme/Lymph [Joint Swelling] : no joint swelling [Joint Stiffness] : no joint stiffness [de-identified] : Focal paresthesia

## 2024-10-07 ENCOUNTER — APPOINTMENT (OUTPATIENT)
Dept: PAIN MANAGEMENT | Facility: CLINIC | Age: 79
End: 2024-10-07
Payer: MEDICARE

## 2024-10-07 VITALS — HEIGHT: 68 IN | BODY MASS INDEX: 42.59 KG/M2 | WEIGHT: 281 LBS

## 2024-10-07 DIAGNOSIS — M54.16 RADICULOPATHY, LUMBAR REGION: ICD-10-CM

## 2024-10-07 PROCEDURE — 99214 OFFICE O/P EST MOD 30 MIN: CPT

## 2024-10-09 ENCOUNTER — TRANSCRIPTION ENCOUNTER (OUTPATIENT)
Age: 79
End: 2024-10-09

## 2024-10-09 NOTE — ASU DISCHARGE PLAN (ADULT/PEDIATRIC) - NS MD DC FALL RISK RISK
For information on Fall & Injury Prevention, visit: [email or web address] OR  [email or web address] OR  https://www.cdc.gov/steadi/patient.html

## 2024-10-10 ENCOUNTER — APPOINTMENT (OUTPATIENT)
Dept: ORTHOPEDIC SURGERY | Facility: HOSPITAL | Age: 79
End: 2024-10-10
Payer: MEDICARE

## 2024-10-10 ENCOUNTER — OUTPATIENT (OUTPATIENT)
Dept: OUTPATIENT SERVICES | Facility: HOSPITAL | Age: 79
LOS: 1 days | End: 2024-10-10
Payer: MEDICARE

## 2024-10-10 VITALS
OXYGEN SATURATION: 96 % | HEART RATE: 72 BPM | SYSTOLIC BLOOD PRESSURE: 157 MMHG | TEMPERATURE: 98 F | DIASTOLIC BLOOD PRESSURE: 70 MMHG | HEIGHT: 68 IN | RESPIRATION RATE: 18 BRPM | WEIGHT: 281.09 LBS

## 2024-10-10 VITALS
HEART RATE: 77 BPM | RESPIRATION RATE: 21 BRPM | TEMPERATURE: 98 F | OXYGEN SATURATION: 95 % | DIASTOLIC BLOOD PRESSURE: 76 MMHG | SYSTOLIC BLOOD PRESSURE: 167 MMHG

## 2024-10-10 DIAGNOSIS — Z98.890 OTHER SPECIFIED POSTPROCEDURAL STATES: Chronic | ICD-10-CM

## 2024-10-10 DIAGNOSIS — M54.16 RADICULOPATHY, LUMBAR REGION: ICD-10-CM

## 2024-10-10 DIAGNOSIS — Z96.649 PRESENCE OF UNSPECIFIED ARTIFICIAL HIP JOINT: Chronic | ICD-10-CM

## 2024-10-10 DIAGNOSIS — Z90.89 ACQUIRED ABSENCE OF OTHER ORGANS: Chronic | ICD-10-CM

## 2024-10-10 DIAGNOSIS — Z90.49 ACQUIRED ABSENCE OF OTHER SPECIFIED PARTS OF DIGESTIVE TRACT: Chronic | ICD-10-CM

## 2024-10-10 LAB — GLUCOSE BLDC GLUCOMTR-MCNC: 130 MG/DL — HIGH (ref 70–99)

## 2024-10-10 PROCEDURE — 62323 NJX INTERLAMINAR LMBR/SAC: CPT

## 2024-10-16 ENCOUNTER — APPOINTMENT (OUTPATIENT)
Dept: FAMILY MEDICINE | Facility: CLINIC | Age: 79
End: 2024-10-16
Payer: MEDICARE

## 2024-10-16 VITALS
HEART RATE: 64 BPM | WEIGHT: 279 LBS | SYSTOLIC BLOOD PRESSURE: 150 MMHG | TEMPERATURE: 98 F | HEIGHT: 68 IN | RESPIRATION RATE: 16 BRPM | DIASTOLIC BLOOD PRESSURE: 73 MMHG | BODY MASS INDEX: 42.28 KG/M2 | OXYGEN SATURATION: 95 %

## 2024-10-16 DIAGNOSIS — D63.1 CHRONIC KIDNEY DISEASE, STAGE 3A: ICD-10-CM

## 2024-10-16 DIAGNOSIS — E11.59 TYPE 2 DIABETES MELLITUS WITH OTHER SPECIFIED COMPLICATION: ICD-10-CM

## 2024-10-16 DIAGNOSIS — N18.31 CHRONIC KIDNEY DISEASE, STAGE 3A: ICD-10-CM

## 2024-10-16 DIAGNOSIS — E66.9 TYPE 2 DIABETES MELLITUS WITH OTHER SPECIFIED COMPLICATION: ICD-10-CM

## 2024-10-16 DIAGNOSIS — I15.2 TYPE 2 DIABETES MELLITUS WITH OTHER SPECIFIED COMPLICATION: ICD-10-CM

## 2024-10-16 DIAGNOSIS — N18.30 CHRONIC KIDNEY DISEASE, STAGE 3 UNSPECIFIED: ICD-10-CM

## 2024-10-16 DIAGNOSIS — E11.40 TYPE 2 DIABETES MELLITUS WITH DIABETIC NEUROPATHY, UNSPECIFIED: ICD-10-CM

## 2024-10-16 DIAGNOSIS — G47.33 OBSTRUCTIVE SLEEP APNEA (ADULT) (PEDIATRIC): ICD-10-CM

## 2024-10-16 DIAGNOSIS — E11.9 TYPE 2 DIABETES MELLITUS W/OUT COMPLICATIONS: ICD-10-CM

## 2024-10-16 DIAGNOSIS — E11.69 TYPE 2 DIABETES MELLITUS WITH OTHER SPECIFIED COMPLICATION: ICD-10-CM

## 2024-10-16 DIAGNOSIS — E78.5 HYPERLIPIDEMIA, UNSPECIFIED: ICD-10-CM

## 2024-10-16 PROCEDURE — 90662 IIV NO PRSV INCREASED AG IM: CPT

## 2024-10-16 PROCEDURE — G0008: CPT

## 2024-10-16 PROCEDURE — 99214 OFFICE O/P EST MOD 30 MIN: CPT | Mod: 25

## 2024-10-28 ENCOUNTER — APPOINTMENT (OUTPATIENT)
Dept: ORTHOPEDIC SURGERY | Facility: CLINIC | Age: 79
End: 2024-10-28
Payer: MEDICARE

## 2024-10-28 DIAGNOSIS — M48.062 SPINAL STENOSIS, LUMBAR REGION WITH NEUROGENIC CLAUDICATION: ICD-10-CM

## 2024-10-28 DIAGNOSIS — Z98.890 OTHER SPECIFIED POSTPROCEDURAL STATES: ICD-10-CM

## 2024-10-28 PROCEDURE — 99214 OFFICE O/P EST MOD 30 MIN: CPT

## 2024-11-04 ENCOUNTER — RX RENEWAL (OUTPATIENT)
Age: 79
End: 2024-11-04

## 2024-11-20 PROCEDURE — 62323 NJX INTERLAMINAR LMBR/SAC: CPT

## 2024-11-20 PROCEDURE — 82962 GLUCOSE BLOOD TEST: CPT

## 2024-12-03 ENCOUNTER — APPOINTMENT (OUTPATIENT)
Dept: RHEUMATOLOGY | Facility: CLINIC | Age: 79
End: 2024-12-03
Payer: MEDICARE

## 2024-12-03 VITALS
DIASTOLIC BLOOD PRESSURE: 80 MMHG | TEMPERATURE: 98 F | BODY MASS INDEX: 42.89 KG/M2 | OXYGEN SATURATION: 94 % | RESPIRATION RATE: 16 BRPM | HEART RATE: 77 BPM | HEIGHT: 68 IN | SYSTOLIC BLOOD PRESSURE: 128 MMHG | WEIGHT: 283 LBS

## 2024-12-03 DIAGNOSIS — M06.9 RHEUMATOID ARTHRITIS, UNSPECIFIED: ICD-10-CM

## 2024-12-03 DIAGNOSIS — D84.9 IMMUNODEFICIENCY, UNSPECIFIED: ICD-10-CM

## 2024-12-03 DIAGNOSIS — M48.062 SPINAL STENOSIS, LUMBAR REGION WITH NEUROGENIC CLAUDICATION: ICD-10-CM

## 2024-12-03 DIAGNOSIS — R26.89 OTHER ABNORMALITIES OF GAIT AND MOBILITY: ICD-10-CM

## 2024-12-03 DIAGNOSIS — M54.50 LOW BACK PAIN, UNSPECIFIED: ICD-10-CM

## 2024-12-03 PROCEDURE — 99214 OFFICE O/P EST MOD 30 MIN: CPT

## 2024-12-03 PROCEDURE — G2211 COMPLEX E/M VISIT ADD ON: CPT

## 2024-12-06 RX ORDER — ADALIMUMAB 40MG/0.8ML
40 KIT SUBCUTANEOUS
Qty: 1 | Refills: 11 | Status: ACTIVE | COMMUNITY
Start: 2024-12-03

## 2024-12-16 ENCOUNTER — OUTPATIENT (OUTPATIENT)
Dept: OUTPATIENT SERVICES | Facility: HOSPITAL | Age: 79
LOS: 1 days | End: 2024-12-16
Payer: MEDICARE

## 2024-12-16 ENCOUNTER — APPOINTMENT (OUTPATIENT)
Dept: RADIOLOGY | Facility: HOSPITAL | Age: 79
End: 2024-12-16
Payer: MEDICARE

## 2024-12-16 DIAGNOSIS — M54.2 CERVICALGIA: ICD-10-CM

## 2024-12-16 DIAGNOSIS — Z96.649 PRESENCE OF UNSPECIFIED ARTIFICIAL HIP JOINT: Chronic | ICD-10-CM

## 2024-12-16 DIAGNOSIS — Z98.890 OTHER SPECIFIED POSTPROCEDURAL STATES: Chronic | ICD-10-CM

## 2024-12-16 DIAGNOSIS — Z90.89 ACQUIRED ABSENCE OF OTHER ORGANS: Chronic | ICD-10-CM

## 2024-12-16 DIAGNOSIS — M54.50 LOW BACK PAIN, UNSPECIFIED: ICD-10-CM

## 2024-12-16 PROCEDURE — 72040 X-RAY EXAM NECK SPINE 2-3 VW: CPT | Mod: 26

## 2024-12-16 PROCEDURE — 72070 X-RAY EXAM THORAC SPINE 2VWS: CPT | Mod: 26

## 2024-12-16 PROCEDURE — 72100 X-RAY EXAM L-S SPINE 2/3 VWS: CPT | Mod: 26

## 2024-12-16 PROCEDURE — 72040 X-RAY EXAM NECK SPINE 2-3 VW: CPT

## 2024-12-16 PROCEDURE — 72070 X-RAY EXAM THORAC SPINE 2VWS: CPT

## 2024-12-16 PROCEDURE — 72100 X-RAY EXAM L-S SPINE 2/3 VWS: CPT

## 2024-12-30 NOTE — PATIENT PROFILE ADULT - NS PRO AD ANY ON CHART
N/A Patient is under age 18 and does not have a history of high risk behavior or is not high risk for Hep C Yes

## 2025-01-17 ENCOUNTER — EMERGENCY (EMERGENCY)
Facility: HOSPITAL | Age: 80
LOS: 1 days | Discharge: HOME | End: 2025-01-17
Attending: STUDENT IN AN ORGANIZED HEALTH CARE EDUCATION/TRAINING PROGRAM | Admitting: STUDENT IN AN ORGANIZED HEALTH CARE EDUCATION/TRAINING PROGRAM
Payer: MEDICARE

## 2025-01-17 VITALS
TEMPERATURE: 98 F | RESPIRATION RATE: 16 BRPM | OXYGEN SATURATION: 94 % | HEART RATE: 65 BPM | SYSTOLIC BLOOD PRESSURE: 161 MMHG | DIASTOLIC BLOOD PRESSURE: 83 MMHG

## 2025-01-17 VITALS
HEART RATE: 74 BPM | OXYGEN SATURATION: 99 % | RESPIRATION RATE: 17 BRPM | SYSTOLIC BLOOD PRESSURE: 160 MMHG | WEIGHT: 285.06 LBS | TEMPERATURE: 98 F | DIASTOLIC BLOOD PRESSURE: 78 MMHG | HEIGHT: 68 IN

## 2025-01-17 DIAGNOSIS — Z98.890 OTHER SPECIFIED POSTPROCEDURAL STATES: Chronic | ICD-10-CM

## 2025-01-17 DIAGNOSIS — Z90.89 ACQUIRED ABSENCE OF OTHER ORGANS: Chronic | ICD-10-CM

## 2025-01-17 DIAGNOSIS — Z90.49 ACQUIRED ABSENCE OF OTHER SPECIFIED PARTS OF DIGESTIVE TRACT: Chronic | ICD-10-CM

## 2025-01-17 DIAGNOSIS — Z96.649 PRESENCE OF UNSPECIFIED ARTIFICIAL HIP JOINT: Chronic | ICD-10-CM

## 2025-01-17 LAB
ALBUMIN SERPL ELPH-MCNC: 3.3 G/DL — SIGNIFICANT CHANGE UP (ref 3.3–5)
ALP SERPL-CCNC: 69 U/L — SIGNIFICANT CHANGE UP (ref 40–120)
ALT FLD-CCNC: 30 U/L — SIGNIFICANT CHANGE UP (ref 10–45)
ANION GAP SERPL CALC-SCNC: 7 MMOL/L — SIGNIFICANT CHANGE UP (ref 5–17)
APTT BLD: 28.8 SEC — SIGNIFICANT CHANGE UP (ref 24.5–35.6)
AST SERPL-CCNC: 21 U/L — SIGNIFICANT CHANGE UP (ref 10–40)
BASOPHILS # BLD AUTO: 0.02 K/UL — SIGNIFICANT CHANGE UP (ref 0–0.2)
BASOPHILS NFR BLD AUTO: 0.3 % — SIGNIFICANT CHANGE UP (ref 0–2)
BILIRUB SERPL-MCNC: 0.3 MG/DL — SIGNIFICANT CHANGE UP (ref 0.2–1.2)
BUN SERPL-MCNC: 28 MG/DL — HIGH (ref 7–23)
CALCIUM SERPL-MCNC: 9 MG/DL — SIGNIFICANT CHANGE UP (ref 8.4–10.5)
CHLORIDE SERPL-SCNC: 104 MMOL/L — SIGNIFICANT CHANGE UP (ref 96–108)
CO2 SERPL-SCNC: 32 MMOL/L — HIGH (ref 22–31)
CREAT SERPL-MCNC: 1.43 MG/DL — HIGH (ref 0.5–1.3)
CRP SERPL-MCNC: 17 MG/L — HIGH
EGFR: 50 ML/MIN/1.73M2 — LOW
EOSINOPHIL # BLD AUTO: 0.04 K/UL — SIGNIFICANT CHANGE UP (ref 0–0.5)
EOSINOPHIL NFR BLD AUTO: 0.6 % — SIGNIFICANT CHANGE UP (ref 0–6)
ERYTHROCYTE [SEDIMENTATION RATE] IN BLOOD: 50 MM/HR — HIGH (ref 0–20)
GLUCOSE BLDC GLUCOMTR-MCNC: 176 MG/DL — HIGH (ref 70–99)
GLUCOSE SERPL-MCNC: 144 MG/DL — HIGH (ref 70–99)
HCT VFR BLD CALC: 42.4 % — SIGNIFICANT CHANGE UP (ref 39–50)
HGB BLD-MCNC: 14.6 G/DL — SIGNIFICANT CHANGE UP (ref 13–17)
IMM GRANULOCYTES NFR BLD AUTO: 0.4 % — SIGNIFICANT CHANGE UP (ref 0–0.9)
INR BLD: 0.97 RATIO — SIGNIFICANT CHANGE UP (ref 0.85–1.16)
LYMPHOCYTES # BLD AUTO: 0.89 K/UL — LOW (ref 1–3.3)
LYMPHOCYTES # BLD AUTO: 13 % — SIGNIFICANT CHANGE UP (ref 13–44)
MCHC RBC-ENTMCNC: 31.3 PG — SIGNIFICANT CHANGE UP (ref 27–34)
MCHC RBC-ENTMCNC: 34.4 G/DL — SIGNIFICANT CHANGE UP (ref 32–36)
MCV RBC AUTO: 91 FL — SIGNIFICANT CHANGE UP (ref 80–100)
MONOCYTES # BLD AUTO: 0.53 K/UL — SIGNIFICANT CHANGE UP (ref 0–0.9)
MONOCYTES NFR BLD AUTO: 7.7 % — SIGNIFICANT CHANGE UP (ref 2–14)
NEUTROPHILS # BLD AUTO: 5.34 K/UL — SIGNIFICANT CHANGE UP (ref 1.8–7.4)
NEUTROPHILS NFR BLD AUTO: 78 % — HIGH (ref 43–77)
NRBC # BLD: 0 /100 WBCS — SIGNIFICANT CHANGE UP (ref 0–0)
PLATELET # BLD AUTO: 198 K/UL — SIGNIFICANT CHANGE UP (ref 150–400)
POTASSIUM SERPL-MCNC: 4 MMOL/L — SIGNIFICANT CHANGE UP (ref 3.5–5.3)
POTASSIUM SERPL-SCNC: 4 MMOL/L — SIGNIFICANT CHANGE UP (ref 3.5–5.3)
PROT SERPL-MCNC: 7.5 G/DL — SIGNIFICANT CHANGE UP (ref 6–8.3)
PROTHROM AB SERPL-ACNC: 11.5 SEC — SIGNIFICANT CHANGE UP (ref 9.9–13.4)
RBC # BLD: 4.66 M/UL — SIGNIFICANT CHANGE UP (ref 4.2–5.8)
RBC # FLD: 13.4 % — SIGNIFICANT CHANGE UP (ref 10.3–14.5)
SODIUM SERPL-SCNC: 143 MMOL/L — SIGNIFICANT CHANGE UP (ref 135–145)
WBC # BLD: 6.85 K/UL — SIGNIFICANT CHANGE UP (ref 3.8–10.5)
WBC # FLD AUTO: 6.85 K/UL — SIGNIFICANT CHANGE UP (ref 3.8–10.5)

## 2025-01-17 PROCEDURE — 85652 RBC SED RATE AUTOMATED: CPT

## 2025-01-17 PROCEDURE — 85730 THROMBOPLASTIN TIME PARTIAL: CPT

## 2025-01-17 PROCEDURE — 85025 COMPLETE CBC W/AUTO DIFF WBC: CPT

## 2025-01-17 PROCEDURE — 80053 COMPREHEN METABOLIC PANEL: CPT

## 2025-01-17 PROCEDURE — 85610 PROTHROMBIN TIME: CPT

## 2025-01-17 PROCEDURE — 86140 C-REACTIVE PROTEIN: CPT

## 2025-01-17 PROCEDURE — 99284 EMERGENCY DEPT VISIT MOD MDM: CPT | Mod: 25

## 2025-01-17 PROCEDURE — 82962 GLUCOSE BLOOD TEST: CPT

## 2025-01-17 PROCEDURE — 73590 X-RAY EXAM OF LOWER LEG: CPT | Mod: 26,LT

## 2025-01-17 PROCEDURE — 96374 THER/PROPH/DIAG INJ IV PUSH: CPT

## 2025-01-17 PROCEDURE — 99285 EMERGENCY DEPT VISIT HI MDM: CPT

## 2025-01-17 PROCEDURE — 96375 TX/PRO/DX INJ NEW DRUG ADDON: CPT

## 2025-01-17 PROCEDURE — 73590 X-RAY EXAM OF LOWER LEG: CPT

## 2025-01-17 PROCEDURE — 36415 COLL VENOUS BLD VENIPUNCTURE: CPT

## 2025-01-17 RX ORDER — ACETAMINOPHEN 80 MG/.8ML
1000 SOLUTION/ DROPS ORAL ONCE
Refills: 0 | Status: COMPLETED | OUTPATIENT
Start: 2025-01-17 | End: 2025-01-17

## 2025-01-17 RX ORDER — SODIUM CHLORIDE 9 MG/ML
1000 INJECTION, SOLUTION INTRAMUSCULAR; INTRAVENOUS; SUBCUTANEOUS ONCE
Refills: 0 | Status: COMPLETED | OUTPATIENT
Start: 2025-01-17 | End: 2025-01-17

## 2025-01-17 RX ORDER — CLINDAMYCIN HYDROCHLORIDE 300 MG/1
1 CAPSULE ORAL
Qty: 42 | Refills: 0
Start: 2025-01-17 | End: 2025-01-30

## 2025-01-17 RX ORDER — CLINDAMYCIN HYDROCHLORIDE 300 MG/1
600 CAPSULE ORAL ONCE
Refills: 0 | Status: COMPLETED | OUTPATIENT
Start: 2025-01-17 | End: 2025-01-17

## 2025-01-17 RX ADMIN — CLINDAMYCIN HYDROCHLORIDE 100 MILLIGRAM(S): 300 CAPSULE ORAL at 14:18

## 2025-01-17 RX ADMIN — SODIUM CHLORIDE 1000 MILLILITER(S): 9 INJECTION, SOLUTION INTRAMUSCULAR; INTRAVENOUS; SUBCUTANEOUS at 14:09

## 2025-01-17 RX ADMIN — ACETAMINOPHEN 400 MILLIGRAM(S): 80 SOLUTION/ DROPS ORAL at 14:09

## 2025-01-17 NOTE — ED PROVIDER NOTE - NSCAREINITIATED _GEN_ER
After obtaining consent, and per orders of Dr. Chevy Ludwig , injection of b12 given in Right deltoid by Buffy Zacarias MA. Patient tolerated injection well with no questions or concerns.
Marcelo Quan(Attending)

## 2025-01-17 NOTE — ED PROVIDER NOTE - PHYSICAL EXAMINATION
VITAL SIGNS: I have reviewed nursing notes and confirm.   GEN: Well-developed; well-nourished; in no acute distress. Speaking full sentences.  SKIN: Warm, pink, no rash, no diaphoresis, no cyanosis, well perfused.   HEAD: Normocephalic; atraumatic.    NECK: Supple; non tender. Full range of motion.   EYES: Pupils 3mm equal, round, reactive to light and accomodation, conjunctiva and sclera clear. Extra-ocular movements intact bilaterally.  ENT: No nasal discharge; airway clear. Trachea is midline.    CV: RRR. S1, S2 normal; no murmurs, gallops, or rubs. Capillary refill < 2 seconds throughout. Distal pulses intact 2+ throughout.  RESP: CTA bilaterally. No wheezes, rales, or rhonchi.   ABD: Normal bowel sounds, soft, non-distended, non-tender, no rebound, no guarding, no rigidity   MSK: Normal range of motion and movement of all 4 extremities.  (+) LEFT anterior shin with mild cellulitis, mid shin abrasion, clear oozing, with demarcated outline in surgical marker. NO crepitus. No fluctuance. No lymphangitis. No red streaking.    NEURO: Alert & oriented x 3, Grossly unremarkable. Sensory and motor intact throughout. No focal deficits.  Normal speech and coordination.

## 2025-01-17 NOTE — ED ADULT TRIAGE NOTE - SPO2 (%)
"Problem: Patient Care Overview  Goal: Plan of Care/Patient Progress Review  Outcome: Improving  [facility]opedic Nursing Progress Note       Assessment   Assessment:  Post-operative day #2  Total hip arthoplasty (Right)     /62  Pulse 95  Temp 98.3  F (36.8  C) (Oral)  Resp 16  Ht 1.626 m (5' 4\")  Wt 75.8 kg (167 lb)  LMP 07/11/2014  SpO2 93%  BMI 28.67 kg/m2     CMS: is intact. Calves non-tender bilaterally.  Lungs: are clear  BS: hypoactive  Urine: voiding adequately in BR  Surgical Site: Dressing is clean dry intact.   Pain: rating pain  pain meds. Ice to incisional area.   Activity: BRPs w/A1/walker  Slept well.        Trevor Dumont RN      " 99

## 2025-01-17 NOTE — ED ADULT NURSE NOTE - NSFALLUNIVINTERV_ED_ALL_ED
Bed/Stretcher in lowest position, wheels locked, appropriate side rails in place/Call bell, personal items and telephone in reach/Instruct patient to call for assistance before getting out of bed/chair/stretcher/Non-slip footwear applied when patient is off stretcher/[place] to call system/Physically safe environment - no spills, clutter or unnecessary equipment/Purposeful proactive rounding/Room/bathroom lighting operational, light cord in reach

## 2025-01-17 NOTE — ED PROVIDER NOTE - PATIENT PORTAL LINK FT
You can access the FollowMyHealth Patient Portal offered by [facility] by registering at the following website: [email or web address]. By joining [employer]’s FollowMyHealth portal, you will also be able to view your health information using other applications (apps) compatible with our system.

## 2025-01-17 NOTE — ED ADULT TRIAGE NOTE - CHIEF COMPLAINT QUOTE
Patient presents to ED with complaint of left lower leg wound and redness x 2 weeks. Alert and oriented x 4.

## 2025-01-17 NOTE — ED PROVIDER NOTE - CLINICAL SUMMARY MEDICAL DECISION MAKING FREE TEXT BOX
79-year-old male with a past medical diabetes type 2 presenting with left anterior shin cellulitis today.  Describes over the past 4 to 5 days onset of mild redness to the left shin after an abrasion to the mid shin.   Went to urgent care and had blood drawn with a normal white count and referred to the emergency room for possible IV antibiotics.  On arrival here he denies any fevers or red streaking or increased pain or  chills or chest pain or abdominal pain or shortness of breath.  He is able to bear weight on the left leg.     Denies any nausea/vomiting, headaches,   weakness, syncope, hematuria, dysuria, urinary symptoms, subjective neurological deficits.    AP: Pt p/w local erythema, warmth, swelling to LEFT anterior low leg for 3-4 days. Sensitivity/pain to light touch around the erythematous area. No lymphangitic spread visible and no fluid pockets or fluctuance on physical exam; low suspicion for abscess. Low suspicion for osteomyelitis or DVTs. Not immunocompromised, no bullae, no discoloration, no foul odor, no crepitus at site, no pain out of proportion, or rapid progression concerning for necrotizing fasciitis.  DDX includes but is not limited to: abscess, osteomyelitis, DVT, necrotizing fasciitis, peripheral vascular disease, ischemic limb  PLAN:  - Erythema outline w/ surgical marking, educated on return precautions.  - clindamycin IVPB   - Wound check w/ PMD or here in ED in 48 hours.   Disposition: No evidence of serious bacterial illness requiring admission for IV ABX. Non-toxic appearing, hemodynamically stable. Low risk for treatment failure based on (-) fever, (-) chronic leg ulcers, (-) chronic edema/lymphedema, (-) prior cellulitis in same area. Normal WBC, labs nonactionable, afebrile, well appearing.   Shared decision making with the patient, offered observation with IV antibiotics versus trial of oral antibiotics at home and if worsening return to the emergency room with strict return precautions.  He prefers to go home.  - Will discharge home w/ PO ABX and return precautions discussed at bedside.

## 2025-01-17 NOTE — ED PROVIDER NOTE - OBJECTIVE STATEMENT
79-year-old male with a past medical diabetes type 2 presenting with left anterior shin cellulitis today.  Describes over the past 4 to 5 days onset of mild redness to the left shin after an abrasion to the mid shin.   Went to urgent care and had blood drawn with a normal white count and referred to the emergency room for possible IV antibiotics.  On arrival here he denies any fevers or red streaking or increased pain or  chills or chest pain or abdominal pain or shortness of breath.  He is able to bear weight on the left leg.     Denies any nausea/vomiting, headaches,   weakness, syncope, hematuria, dysuria, urinary symptoms, subjective neurological deficits.

## 2025-01-22 NOTE — CHART NOTE - NSCHARTNOTEFT_GEN_A_CORE
79 y o male presenting to the ED on 01/17 for wound check as per chart.  SW called to assist with scheduling the recommended primary care follow up appointment and spoke with patient.  The patient was feeling better and has an appointment within one month to see Dr. Abad Richard.

## 2025-02-21 ENCOUNTER — RX RENEWAL (OUTPATIENT)
Age: 80
End: 2025-02-21

## 2025-02-27 ENCOUNTER — NON-APPOINTMENT (OUTPATIENT)
Age: 80
End: 2025-02-27

## 2025-02-27 NOTE — ED PROVIDER NOTE - PSH
Problem: Safety - Adult  Goal: Free from fall injury  Outcome: Progressing     Problem: Chronic Conditions and Co-morbidities  Goal: Patient's chronic conditions and co-morbidity symptoms are monitored and maintained or improved  Outcome: Progressing     Problem: Discharge Planning  Goal: Discharge to home or other facility with appropriate resources  Outcome: Progressing     Problem: Pain  Goal: Verbalizes/displays adequate comfort level or baseline comfort level  Outcome: Progressing     Problem: ABCDS Injury Assessment  Goal: Absence of physical injury  Outcome: Progressing  Flowsheets (Taken 2/26/2025 [id number] by Lita Costa RN)  Absence of Physical Injury: Implement safety measures based on patient assessment      H/O hernia repair

## 2025-03-10 ENCOUNTER — APPOINTMENT (OUTPATIENT)
Dept: ORTHOPEDIC SURGERY | Facility: CLINIC | Age: 80
End: 2025-03-10
Payer: MEDICARE

## 2025-03-10 VITALS — HEIGHT: 68 IN | BODY MASS INDEX: 41.68 KG/M2 | WEIGHT: 275 LBS

## 2025-03-10 DIAGNOSIS — Z96.642 PRESENCE OF LEFT ARTIFICIAL HIP JOINT: ICD-10-CM

## 2025-03-10 DIAGNOSIS — Z96.641 PRESENCE OF RIGHT ARTIFICIAL HIP JOINT: ICD-10-CM

## 2025-03-10 PROCEDURE — 99213 OFFICE O/P EST LOW 20 MIN: CPT

## 2025-03-10 PROCEDURE — 73502 X-RAY EXAM HIP UNI 2-3 VIEWS: CPT

## 2025-03-12 ENCOUNTER — APPOINTMENT (OUTPATIENT)
Dept: ORTHOPEDIC SURGERY | Facility: CLINIC | Age: 80
End: 2025-03-12
Payer: MEDICARE

## 2025-03-12 DIAGNOSIS — M48.062 SPINAL STENOSIS, LUMBAR REGION WITH NEUROGENIC CLAUDICATION: ICD-10-CM

## 2025-03-12 DIAGNOSIS — Z98.890 OTHER SPECIFIED POSTPROCEDURAL STATES: ICD-10-CM

## 2025-03-12 PROCEDURE — 99214 OFFICE O/P EST MOD 30 MIN: CPT

## 2025-03-13 ENCOUNTER — APPOINTMENT (OUTPATIENT)
Dept: WOUND CARE | Facility: HOSPITAL | Age: 80
End: 2025-03-13
Payer: MEDICARE

## 2025-03-13 ENCOUNTER — OUTPATIENT (OUTPATIENT)
Dept: OUTPATIENT SERVICES | Facility: HOSPITAL | Age: 80
LOS: 1 days | End: 2025-03-13
Payer: MEDICARE

## 2025-03-13 VITALS
RESPIRATION RATE: 16 BRPM | HEART RATE: 78 BPM | DIASTOLIC BLOOD PRESSURE: 86 MMHG | TEMPERATURE: 97.9 F | SYSTOLIC BLOOD PRESSURE: 195 MMHG | OXYGEN SATURATION: 94 %

## 2025-03-13 DIAGNOSIS — Z90.49 ACQUIRED ABSENCE OF OTHER SPECIFIED PARTS OF DIGESTIVE TRACT: Chronic | ICD-10-CM

## 2025-03-13 DIAGNOSIS — L97.922 NON-PRESSURE CHRONIC ULCER OF UNSPECIFIED PART OF LEFT LOWER LEG WITH FAT LAYER EXPOSED: ICD-10-CM

## 2025-03-13 DIAGNOSIS — Z90.89 ACQUIRED ABSENCE OF OTHER ORGANS: Chronic | ICD-10-CM

## 2025-03-13 DIAGNOSIS — Z98.890 OTHER SPECIFIED POSTPROCEDURAL STATES: Chronic | ICD-10-CM

## 2025-03-13 DIAGNOSIS — Z96.649 PRESENCE OF UNSPECIFIED ARTIFICIAL HIP JOINT: Chronic | ICD-10-CM

## 2025-03-13 PROCEDURE — 99205 OFFICE O/P NEW HI 60 MIN: CPT | Mod: 25

## 2025-03-13 PROCEDURE — 11042 DBRDMT SUBQ TIS 1ST 20SQCM/<: CPT

## 2025-03-13 PROCEDURE — G0463: CPT

## 2025-03-13 PROCEDURE — 29581 APPL MULTLAYER CMPRN SYS LEG: CPT | Mod: LT

## 2025-03-18 DIAGNOSIS — I89.0 LYMPHEDEMA, NOT ELSEWHERE CLASSIFIED: ICD-10-CM

## 2025-03-18 DIAGNOSIS — L97.922 NON-PRESSURE CHRONIC ULCER OF UNSPECIFIED PART OF LEFT LOWER LEG WITH FAT LAYER EXPOSED: ICD-10-CM

## 2025-03-21 ENCOUNTER — OUTPATIENT (OUTPATIENT)
Dept: OUTPATIENT SERVICES | Facility: HOSPITAL | Age: 80
LOS: 1 days | End: 2025-03-21
Payer: MEDICARE

## 2025-03-21 ENCOUNTER — APPOINTMENT (OUTPATIENT)
Dept: WOUND CARE | Facility: HOSPITAL | Age: 80
End: 2025-03-21
Payer: MEDICARE

## 2025-03-21 DIAGNOSIS — Z98.890 OTHER SPECIFIED POSTPROCEDURAL STATES: Chronic | ICD-10-CM

## 2025-03-21 DIAGNOSIS — Z96.649 PRESENCE OF UNSPECIFIED ARTIFICIAL HIP JOINT: Chronic | ICD-10-CM

## 2025-03-21 DIAGNOSIS — Z90.89 ACQUIRED ABSENCE OF OTHER ORGANS: Chronic | ICD-10-CM

## 2025-03-21 DIAGNOSIS — S81.802D UNSPECIFIED OPEN WOUND, LEFT LOWER LEG, SUBSEQUENT ENCOUNTER: ICD-10-CM

## 2025-03-21 DIAGNOSIS — L72.3 SEBACEOUS CYST: ICD-10-CM

## 2025-03-21 DIAGNOSIS — I89.0 LYMPHEDEMA, NOT ELSEWHERE CLASSIFIED: ICD-10-CM

## 2025-03-21 DIAGNOSIS — S31.109A UNSPECIFIED OPEN WOUND OF ABDOMINAL WALL, UNSPECIFIED QUADRANT W/OUT PENETRATION INTO PERITONEAL CAVITY, INITIAL ENCOUNTER: ICD-10-CM

## 2025-03-21 DIAGNOSIS — Z90.49 ACQUIRED ABSENCE OF OTHER SPECIFIED PARTS OF DIGESTIVE TRACT: Chronic | ICD-10-CM

## 2025-03-21 PROCEDURE — 11042 DBRDMT SUBQ TIS 1ST 20SQCM/<: CPT

## 2025-03-21 PROCEDURE — G0463: CPT | Mod: 25

## 2025-03-21 PROCEDURE — 99202 OFFICE O/P NEW SF 15 MIN: CPT | Mod: 25

## 2025-03-21 PROCEDURE — 93970 EXTREMITY STUDY: CPT | Mod: 26

## 2025-03-21 PROCEDURE — 93970 EXTREMITY STUDY: CPT

## 2025-03-26 ENCOUNTER — NON-APPOINTMENT (OUTPATIENT)
Age: 80
End: 2025-03-26
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
## 2025-04-02 DIAGNOSIS — L72.3 SEBACEOUS CYST: ICD-10-CM

## 2025-04-02 DIAGNOSIS — S31.109A UNSPECIFIED OPEN WOUND OF ABDOMINAL WALL, UNSPECIFIED QUADRANT WITHOUT PENETRATION INTO PERITONEAL CAVITY, INITIAL ENCOUNTER: ICD-10-CM
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
## 2025-04-11 ENCOUNTER — APPOINTMENT (OUTPATIENT)
Dept: WOUND CARE | Facility: HOSPITAL | Age: 80
End: 2025-04-11
Payer: MEDICARE

## 2025-04-11 ENCOUNTER — OUTPATIENT (OUTPATIENT)
Dept: OUTPATIENT SERVICES | Facility: HOSPITAL | Age: 80
LOS: 1 days | End: 2025-04-11
Payer: MEDICARE

## 2025-04-11 VITALS
RESPIRATION RATE: 18 BRPM | HEART RATE: 67 BPM | DIASTOLIC BLOOD PRESSURE: 83 MMHG | HEIGHT: 68 IN | TEMPERATURE: 98.4 F | WEIGHT: 275 LBS | SYSTOLIC BLOOD PRESSURE: 177 MMHG | BODY MASS INDEX: 41.68 KG/M2 | OXYGEN SATURATION: 96 %

## 2025-04-11 DIAGNOSIS — Z98.890 OTHER SPECIFIED POSTPROCEDURAL STATES: Chronic | ICD-10-CM

## 2025-04-11 DIAGNOSIS — S81.802D UNSPECIFIED OPEN WOUND, LEFT LOWER LEG, SUBSEQUENT ENCOUNTER: ICD-10-CM

## 2025-04-11 DIAGNOSIS — Z90.89 ACQUIRED ABSENCE OF OTHER ORGANS: Chronic | ICD-10-CM

## 2025-04-11 DIAGNOSIS — Z87.828 PERSONAL HISTORY OF OTHER (HEALED) PHYSICAL INJURY AND TRAUMA: ICD-10-CM

## 2025-04-11 DIAGNOSIS — L72.3 SEBACEOUS CYST: ICD-10-CM

## 2025-04-11 DIAGNOSIS — Z96.649 PRESENCE OF UNSPECIFIED ARTIFICIAL HIP JOINT: Chronic | ICD-10-CM

## 2025-04-11 PROCEDURE — G0463: CPT

## 2025-04-11 PROCEDURE — 99214 OFFICE O/P EST MOD 30 MIN: CPT

## 2025-04-16 ENCOUNTER — APPOINTMENT (OUTPATIENT)
Dept: FAMILY MEDICINE | Facility: CLINIC | Age: 80
End: 2025-04-16
Payer: MEDICARE

## 2025-04-16 VITALS
WEIGHT: 274 LBS | SYSTOLIC BLOOD PRESSURE: 142 MMHG | DIASTOLIC BLOOD PRESSURE: 74 MMHG | BODY MASS INDEX: 41.52 KG/M2 | HEIGHT: 68 IN | RESPIRATION RATE: 16 BRPM | TEMPERATURE: 97 F | OXYGEN SATURATION: 94 % | HEART RATE: 75 BPM

## 2025-04-16 DIAGNOSIS — E11.9 TYPE 2 DIABETES MELLITUS W/OUT COMPLICATIONS: ICD-10-CM

## 2025-04-16 DIAGNOSIS — E78.5 HYPERLIPIDEMIA, UNSPECIFIED: ICD-10-CM

## 2025-04-16 DIAGNOSIS — E11.40 TYPE 2 DIABETES MELLITUS WITH DIABETIC NEUROPATHY, UNSPECIFIED: ICD-10-CM

## 2025-04-16 DIAGNOSIS — I10 ESSENTIAL (PRIMARY) HYPERTENSION: ICD-10-CM

## 2025-04-16 DIAGNOSIS — E03.9 HYPOTHYROIDISM, UNSPECIFIED: ICD-10-CM

## 2025-04-16 DIAGNOSIS — I82.409 ACUTE EMBOLISM AND THROMBOSIS OF UNSPECIFIED DEEP VEINS OF UNSPECIFIED LOWER EXTREMITY: ICD-10-CM

## 2025-04-16 PROCEDURE — 99215 OFFICE O/P EST HI 40 MIN: CPT

## 2025-04-21 ENCOUNTER — NON-APPOINTMENT (OUTPATIENT)
Age: 80
End: 2025-04-21

## 2025-04-22 ENCOUNTER — APPOINTMENT (OUTPATIENT)
Dept: RHEUMATOLOGY | Facility: CLINIC | Age: 80
End: 2025-04-22
Payer: MEDICARE

## 2025-04-22 VITALS
DIASTOLIC BLOOD PRESSURE: 86 MMHG | RESPIRATION RATE: 16 BRPM | WEIGHT: 272 LBS | SYSTOLIC BLOOD PRESSURE: 122 MMHG | HEIGHT: 68 IN | BODY MASS INDEX: 41.22 KG/M2 | HEART RATE: 98 BPM | OXYGEN SATURATION: 97 % | TEMPERATURE: 97.2 F

## 2025-04-22 DIAGNOSIS — M06.9 RHEUMATOID ARTHRITIS, UNSPECIFIED: ICD-10-CM

## 2025-04-22 DIAGNOSIS — M54.50 LOW BACK PAIN, UNSPECIFIED: ICD-10-CM

## 2025-04-22 DIAGNOSIS — D84.9 IMMUNODEFICIENCY, UNSPECIFIED: ICD-10-CM

## 2025-04-22 DIAGNOSIS — G89.29 LOW BACK PAIN, UNSPECIFIED: ICD-10-CM

## 2025-04-22 PROCEDURE — G2211 COMPLEX E/M VISIT ADD ON: CPT

## 2025-04-22 PROCEDURE — 99214 OFFICE O/P EST MOD 30 MIN: CPT

## 2025-05-02 ENCOUNTER — OUTPATIENT (OUTPATIENT)
Dept: OUTPATIENT SERVICES | Facility: HOSPITAL | Age: 80
LOS: 1 days | End: 2025-05-02
Payer: MEDICARE

## 2025-05-02 ENCOUNTER — APPOINTMENT (OUTPATIENT)
Dept: WOUND CARE | Facility: HOSPITAL | Age: 80
End: 2025-05-02
Payer: MEDICARE

## 2025-05-02 DIAGNOSIS — Z96.649 PRESENCE OF UNSPECIFIED ARTIFICIAL HIP JOINT: Chronic | ICD-10-CM

## 2025-05-02 DIAGNOSIS — I89.0 LYMPHEDEMA, NOT ELSEWHERE CLASSIFIED: ICD-10-CM

## 2025-05-02 DIAGNOSIS — S81.802D UNSPECIFIED OPEN WOUND, LEFT LOWER LEG, SUBSEQUENT ENCOUNTER: ICD-10-CM

## 2025-05-02 DIAGNOSIS — Z98.890 OTHER SPECIFIED POSTPROCEDURAL STATES: Chronic | ICD-10-CM

## 2025-05-02 DIAGNOSIS — Z87.828 PERSONAL HISTORY OF OTHER (HEALED) PHYSICAL INJURY AND TRAUMA: ICD-10-CM

## 2025-05-02 DIAGNOSIS — Z90.49 ACQUIRED ABSENCE OF OTHER SPECIFIED PARTS OF DIGESTIVE TRACT: Chronic | ICD-10-CM

## 2025-05-02 PROCEDURE — 99213 OFFICE O/P EST LOW 20 MIN: CPT

## 2025-05-02 PROCEDURE — G0463: CPT

## 2025-05-30 NOTE — ED ADULT TRIAGE NOTE - IDEAL BODY WEIGHT(KG)
Pt to the ED via EMS with c/o chronic bilateral leg swelling. Pt has been seen multiple times over the last two days with the same complaint.    69

## 2025-06-23 DIAGNOSIS — S81.802D UNSPECIFIED OPEN WOUND, LEFT LOWER LEG, SUBSEQUENT ENCOUNTER: ICD-10-CM

## 2025-06-23 DIAGNOSIS — L72.3 SEBACEOUS CYST: ICD-10-CM

## 2025-06-23 DIAGNOSIS — Z87.828 PERSONAL HISTORY OF OTHER (HEALED) PHYSICAL INJURY AND TRAUMA: ICD-10-CM

## 2025-06-24 DIAGNOSIS — Z87.828 PERSONAL HISTORY OF OTHER (HEALED) PHYSICAL INJURY AND TRAUMA: ICD-10-CM

## 2025-06-24 DIAGNOSIS — I89.0 LYMPHEDEMA, NOT ELSEWHERE CLASSIFIED: ICD-10-CM

## 2025-06-24 DIAGNOSIS — S81.802D UNSPECIFIED OPEN WOUND, LEFT LOWER LEG, SUBSEQUENT ENCOUNTER: ICD-10-CM

## 2025-06-27 DIAGNOSIS — I83.893 VARICOSE VEINS OF BILATERAL LOWER EXTREMITIES WITH OTHER COMPLICATIONS: ICD-10-CM

## 2025-07-09 ENCOUNTER — APPOINTMENT (OUTPATIENT)
Dept: DERMATOLOGY | Facility: CLINIC | Age: 80
End: 2025-07-09

## 2025-07-09 PROBLEM — L57.8 ACTINIC SKIN DAMAGE: Status: ACTIVE | Noted: 2025-07-09

## 2025-07-09 PROBLEM — R60.0 EDEMA, LEG: Status: ACTIVE | Noted: 2025-07-09

## 2025-07-09 PROBLEM — L82.1 SEBORRHEIC KERATOSES: Status: ACTIVE | Noted: 2025-07-09

## 2025-07-09 PROBLEM — L21.9 SEBORRHEIC DERMATITIS: Status: ACTIVE | Noted: 2025-07-09

## 2025-07-09 PROBLEM — L02.219 ABSCESS, TRUNK: Status: ACTIVE | Noted: 2025-07-09

## 2025-07-09 PROBLEM — L57.0 SOLAR KERATOSIS: Status: ACTIVE | Noted: 2025-07-09

## 2025-07-09 PROBLEM — Z85.828: Status: RESOLVED | Noted: 2025-07-09 | Resolved: 2025-07-09

## 2025-07-09 PROBLEM — Z85.828 PERSONAL HISTORY OF SKIN CANCER: Status: ACTIVE | Noted: 2025-07-09

## 2025-07-09 PROCEDURE — 99203 OFFICE O/P NEW LOW 30 MIN: CPT | Mod: 25

## 2025-07-09 PROCEDURE — 17000 DESTRUCT PREMALG LESION: CPT

## 2025-07-09 PROCEDURE — 17003 DESTRUCT PREMALG LES 2-14: CPT

## 2025-07-28 ENCOUNTER — RX RENEWAL (OUTPATIENT)
Age: 80
End: 2025-07-28

## 2025-08-07 ENCOUNTER — RX RENEWAL (OUTPATIENT)
Age: 80
End: 2025-08-07

## 2025-08-16 ENCOUNTER — EMERGENCY (EMERGENCY)
Facility: HOSPITAL | Age: 80
LOS: 1 days | End: 2025-08-16
Attending: STUDENT IN AN ORGANIZED HEALTH CARE EDUCATION/TRAINING PROGRAM | Admitting: STUDENT IN AN ORGANIZED HEALTH CARE EDUCATION/TRAINING PROGRAM
Payer: MEDICARE

## 2025-08-16 VITALS
HEART RATE: 80 BPM | TEMPERATURE: 98 F | HEIGHT: 68 IN | SYSTOLIC BLOOD PRESSURE: 142 MMHG | DIASTOLIC BLOOD PRESSURE: 72 MMHG | WEIGHT: 274.92 LBS | RESPIRATION RATE: 16 BRPM | OXYGEN SATURATION: 98 %

## 2025-08-16 DIAGNOSIS — Z90.49 ACQUIRED ABSENCE OF OTHER SPECIFIED PARTS OF DIGESTIVE TRACT: Chronic | ICD-10-CM

## 2025-08-16 DIAGNOSIS — Z98.890 OTHER SPECIFIED POSTPROCEDURAL STATES: Chronic | ICD-10-CM

## 2025-08-16 DIAGNOSIS — Z96.649 PRESENCE OF UNSPECIFIED ARTIFICIAL HIP JOINT: Chronic | ICD-10-CM

## 2025-08-16 DIAGNOSIS — Z90.89 ACQUIRED ABSENCE OF OTHER ORGANS: Chronic | ICD-10-CM

## 2025-08-16 PROCEDURE — 99284 EMERGENCY DEPT VISIT MOD MDM: CPT

## 2025-08-16 PROCEDURE — 99284 EMERGENCY DEPT VISIT MOD MDM: CPT | Mod: 25

## 2025-08-16 PROCEDURE — 96374 THER/PROPH/DIAG INJ IV PUSH: CPT

## 2025-08-16 RX ORDER — CALAMINE 8% AND ZINC OXIDE 8% 160 MG/ML
1 LOTION TOPICAL ONCE
Refills: 0 | Status: COMPLETED | OUTPATIENT
Start: 2025-08-16 | End: 2025-08-16

## 2025-08-16 RX ORDER — PREDNISONE 20 MG/1
2 TABLET ORAL
Qty: 10 | Refills: 0
Start: 2025-08-16 | End: 2025-08-20

## 2025-08-16 RX ORDER — METHYLPREDNISOLONE ACETATE 80 MG/ML
125 INJECTION, SUSPENSION INTRA-ARTICULAR; INTRALESIONAL; INTRAMUSCULAR; SOFT TISSUE ONCE
Refills: 0 | Status: COMPLETED | OUTPATIENT
Start: 2025-08-16 | End: 2025-08-16

## 2025-08-16 RX ORDER — CALAMINE 8% AND ZINC OXIDE 8% 160 MG/ML
1 LOTION TOPICAL
Qty: 1 | Refills: 1
Start: 2025-08-16 | End: 2025-09-14

## 2025-08-16 RX ADMIN — Medication 10 MILLIGRAM(S): at 08:39

## 2025-08-16 RX ADMIN — Medication 500 MILLILITER(S): at 08:39

## 2025-08-16 RX ADMIN — CALAMINE 8% AND ZINC OXIDE 8% 1 APPLICATION(S): 160 LOTION TOPICAL at 09:24

## 2025-08-16 RX ADMIN — METHYLPREDNISOLONE ACETATE 125 MILLIGRAM(S): 80 INJECTION, SUSPENSION INTRA-ARTICULAR; INTRALESIONAL; INTRAMUSCULAR; SOFT TISSUE at 08:39

## 2025-08-16 RX ADMIN — Medication 1 APPLICATION(S): at 09:24

## 2025-08-18 ENCOUNTER — APPOINTMENT (OUTPATIENT)
Dept: DERMATOLOGY | Facility: CLINIC | Age: 80
End: 2025-08-18
Payer: MEDICARE

## 2025-08-18 DIAGNOSIS — L23.9 ALLERGIC CONTACT DERMATITIS, UNSPECIFIED CAUSE: ICD-10-CM

## 2025-08-18 PROCEDURE — 99214 OFFICE O/P EST MOD 30 MIN: CPT

## 2025-08-18 RX ORDER — HYDROCORTISONE 25 MG/G
2.5 OINTMENT TOPICAL
Qty: 1 | Refills: 0 | Status: ACTIVE | COMMUNITY
Start: 2025-08-18 | End: 1900-01-01

## 2025-08-18 RX ORDER — TRIAMCINOLONE ACETONIDE 1 MG/G
0.1 OINTMENT TOPICAL
Qty: 1 | Refills: 1 | Status: ACTIVE | COMMUNITY
Start: 2025-08-18 | End: 1900-01-01

## 2025-08-19 ENCOUNTER — RX RENEWAL (OUTPATIENT)
Age: 80
End: 2025-08-19

## 2025-08-26 ENCOUNTER — APPOINTMENT (OUTPATIENT)
Dept: RHEUMATOLOGY | Facility: CLINIC | Age: 80
End: 2025-08-26
Payer: MEDICARE

## 2025-08-26 VITALS
DIASTOLIC BLOOD PRESSURE: 86 MMHG | OXYGEN SATURATION: 94 % | HEART RATE: 93 BPM | RESPIRATION RATE: 16 BRPM | TEMPERATURE: 97.5 F | HEIGHT: 68 IN | BODY MASS INDEX: 42.59 KG/M2 | SYSTOLIC BLOOD PRESSURE: 130 MMHG | WEIGHT: 281 LBS

## 2025-08-26 DIAGNOSIS — M54.50 LOW BACK PAIN, UNSPECIFIED: ICD-10-CM

## 2025-08-26 DIAGNOSIS — G89.29 LOW BACK PAIN, UNSPECIFIED: ICD-10-CM

## 2025-08-26 DIAGNOSIS — M06.9 RHEUMATOID ARTHRITIS, UNSPECIFIED: ICD-10-CM

## 2025-08-26 DIAGNOSIS — D84.9 IMMUNODEFICIENCY, UNSPECIFIED: ICD-10-CM

## 2025-08-26 PROCEDURE — 99214 OFFICE O/P EST MOD 30 MIN: CPT

## 2025-08-26 PROCEDURE — G2211 COMPLEX E/M VISIT ADD ON: CPT

## 2025-08-29 ENCOUNTER — APPOINTMENT (OUTPATIENT)
Dept: PAIN MANAGEMENT | Facility: CLINIC | Age: 80
End: 2025-08-29
Payer: MEDICARE

## 2025-08-29 VITALS — HEIGHT: 68 IN | WEIGHT: 281 LBS | BODY MASS INDEX: 42.59 KG/M2

## 2025-08-29 DIAGNOSIS — M54.16 RADICULOPATHY, LUMBAR REGION: ICD-10-CM

## 2025-08-29 PROCEDURE — 99214 OFFICE O/P EST MOD 30 MIN: CPT

## 2025-09-02 ENCOUNTER — RX RENEWAL (OUTPATIENT)
Age: 80
End: 2025-09-02

## 2025-09-04 ENCOUNTER — RX RENEWAL (OUTPATIENT)
Age: 80
End: 2025-09-04

## 2025-09-05 ENCOUNTER — TRANSCRIPTION ENCOUNTER (OUTPATIENT)
Age: 80
End: 2025-09-05

## 2025-09-08 ENCOUNTER — APPOINTMENT (OUTPATIENT)
Dept: ORTHOPEDIC SURGERY | Facility: HOSPITAL | Age: 80
End: 2025-09-08
Payer: MEDICARE

## 2025-09-08 PROCEDURE — 62323 NJX INTERLAMINAR LMBR/SAC: CPT

## 2025-09-12 ENCOUNTER — RX RENEWAL (OUTPATIENT)
Age: 80
End: 2025-09-12

## 2025-09-17 ENCOUNTER — RX RENEWAL (OUTPATIENT)
Age: 80
End: 2025-09-17

## (undated) DEVICE — TRAY EPIDURAL SINGLE DOSE

## (undated) DEVICE — STYLET  ENDOTRACH 7.5MM X 10MM

## (undated) DEVICE — SUT POLYSORB 1 27" GS-12 UNDYED

## (undated) DEVICE — SUT POLYSORB 0 60" REEL

## (undated) DEVICE — SPONGE RAYTEC 4X4 16PLY

## (undated) DEVICE — SEALER BIPOLAR 6.0 AQUAMANTYS

## (undated) DEVICE — DRAPE PATIENT ISOLATION

## (undated) DEVICE — POSITIONER STIRRUP STRAP W SLIP RING 19X3.5"

## (undated) DEVICE — GLV 8 PROTEXIS

## (undated) DEVICE — POSITIONER HANA PATIENT CARE KIT POSITION SUPINE

## (undated) DEVICE — GLV 8.5 PROTEXIS (WHITE)

## (undated) DEVICE — SOL IRR POUR H2O 1500ML

## (undated) DEVICE — SYM-STRYKER SYSTEM 7: Type: DURABLE MEDICAL EQUIPMENT

## (undated) DEVICE — PACK TOTAL HIP CUST

## (undated) DEVICE — SYR LUER LOK 20CC

## (undated) DEVICE — HOOD FLYTE STRYKER HELMET SHIELD

## (undated) DEVICE — DRSG SILVERLON ISLAND 4X10" 2X8" PAD

## (undated) DEVICE — ELCTR EDGE BOVIE COATED BLADE TIP 6.5"

## (undated) DEVICE — SUT TEVDEK 1 30" KC-6

## (undated) DEVICE — DRAPE C ARM UNIVERSAL

## (undated) DEVICE — BAG SPONGE COUNTER EZ

## (undated) DEVICE — SUT POLYSORB 2-0 30" GS-11 UNDYED

## (undated) DEVICE — DRAPE IOBAN 23X23"

## (undated) DEVICE — SOLIDIFIER CANN EXPRESS 3K

## (undated) DEVICE — DRILL BIT EXACTECH MODULAR 3.2MM

## (undated) DEVICE — DRAPE MAYO STAND 23"

## (undated) DEVICE — NDL SPINAL 22G X 3.5" QUINCKE

## (undated) DEVICE — DRAPE TOP SHEET 53"X101"

## (undated) DEVICE — NDL HYPO SAFE 22G X 1.5"

## (undated) DEVICE — POSITIONER POSITIONING ROLL  5X17"

## (undated) DEVICE — DRAPE 3/4 SHEET 52X76"

## (undated) DEVICE — CONTAINER SPECIMEN PET

## (undated) DEVICE — GOWN TRIMAX XXL

## (undated) DEVICE — ELCTR PENCIL NEPTUNE SMOKE EVACUATION

## (undated) DEVICE — Device

## (undated) DEVICE — IRRISEPT JET LAVAGE W 0.05 PCT CHG

## (undated) DEVICE — POSITIONER STRAP ARMBOARD VELCRO TS-30 12

## (undated) DEVICE — DRSG SILVERLON ISLAND 4X6" 2X4" PAD

## (undated) DEVICE — DRAIN JACKSON PRATT 3 SPRING RESERVOIR W 10FR PVC DRAIN

## (undated) DEVICE — SAW BLADE STRYKER SAGITTAL AGGRESSIVE 25X86.5X1.32MM

## (undated) DEVICE — BLANKET WARMER UPPER ADULT

## (undated) DEVICE — SUT MONOSOF 3-0 30" C-16

## (undated) DEVICE — WRAP COMPRESSION CALF MED

## (undated) DEVICE — SOL IRR POUR NS 0.9% 500ML

## (undated) DEVICE — SOL IRR BAG NS 0.9% 3000ML